# Patient Record
Sex: FEMALE | Race: WHITE | Employment: OTHER | URBAN - METROPOLITAN AREA
[De-identification: names, ages, dates, MRNs, and addresses within clinical notes are randomized per-mention and may not be internally consistent; named-entity substitution may affect disease eponyms.]

---

## 2017-01-19 ENCOUNTER — APPOINTMENT (OUTPATIENT)
Dept: CT IMAGING | Facility: CLINIC | Age: 82
DRG: 481 | End: 2017-01-19
Attending: FAMILY MEDICINE
Payer: MEDICARE

## 2017-01-19 ENCOUNTER — APPOINTMENT (OUTPATIENT)
Dept: GENERAL RADIOLOGY | Facility: CLINIC | Age: 82
DRG: 481 | End: 2017-01-19
Attending: FAMILY MEDICINE
Payer: MEDICARE

## 2017-01-19 ENCOUNTER — HOSPITAL ENCOUNTER (INPATIENT)
Facility: CLINIC | Age: 82
LOS: 4 days | Discharge: NURSING, REHAB OR LONG-TERM CARE | DRG: 481 | End: 2017-01-23
Attending: FAMILY MEDICINE | Admitting: FAMILY MEDICINE
Payer: MEDICARE

## 2017-01-19 DIAGNOSIS — I25.10 CORONARY ARTERY DISEASE INVOLVING NATIVE CORONARY ARTERY OF NATIVE HEART WITHOUT ANGINA PECTORIS: ICD-10-CM

## 2017-01-19 DIAGNOSIS — N18.9 CHRONIC KIDNEY DISEASE, UNSPECIFIED STAGE: ICD-10-CM

## 2017-01-19 DIAGNOSIS — S72.002A CLOSED LEFT HIP FRACTURE, INITIAL ENCOUNTER (H): ICD-10-CM

## 2017-01-19 DIAGNOSIS — D64.9 CHRONIC ANEMIA: ICD-10-CM

## 2017-01-19 DIAGNOSIS — E11.40 TYPE 2 DIABETES MELLITUS WITH DIABETIC NEUROPATHY, WITHOUT LONG-TERM CURRENT USE OF INSULIN (H): ICD-10-CM

## 2017-01-19 DIAGNOSIS — I10 BENIGN ESSENTIAL HYPERTENSION: ICD-10-CM

## 2017-01-19 DIAGNOSIS — E03.8 OTHER SPECIFIED HYPOTHYROIDISM: ICD-10-CM

## 2017-01-19 DIAGNOSIS — Z86.73 HISTORY OF CVA (CEREBROVASCULAR ACCIDENT): ICD-10-CM

## 2017-01-19 DIAGNOSIS — M1A.9XX0 CHRONIC GOUT, UNSPECIFIED CAUSE, UNSPECIFIED SITE: Primary | ICD-10-CM

## 2017-01-19 DIAGNOSIS — E87.6 HYPOKALEMIA: ICD-10-CM

## 2017-01-19 DIAGNOSIS — E78.00 HYPERCHOLESTEROLEMIA: ICD-10-CM

## 2017-01-19 PROBLEM — E87.0 HYPERNATREMIA: Status: ACTIVE | Noted: 2017-01-19

## 2017-01-19 PROBLEM — E78.5 HYPERLIPIDEMIA LDL GOAL <100: Status: ACTIVE | Noted: 2017-01-19

## 2017-01-19 PROBLEM — N18.30 CHRONIC KIDNEY DISEASE, STAGE III (MODERATE) (H): Status: ACTIVE | Noted: 2017-01-19

## 2017-01-19 PROBLEM — E03.4 HYPOTHYROIDISM DUE TO ACQUIRED ATROPHY OF THYROID: Status: ACTIVE | Noted: 2017-01-19

## 2017-01-19 PROBLEM — E11.9 TYPE 2 DIABETES MELLITUS WITHOUT COMPLICATION (H): Status: ACTIVE | Noted: 2017-01-19

## 2017-01-19 LAB
ALBUMIN SERPL-MCNC: 3.5 G/DL (ref 3.4–5)
ALP SERPL-CCNC: 56 U/L (ref 40–150)
ALT SERPL W P-5'-P-CCNC: 21 U/L (ref 0–50)
ANION GAP SERPL CALCULATED.3IONS-SCNC: 10 MMOL/L (ref 3–14)
AST SERPL W P-5'-P-CCNC: 25 U/L (ref 0–45)
BASOPHILS # BLD AUTO: 0 10E9/L (ref 0–0.2)
BASOPHILS NFR BLD AUTO: 0.2 %
BILIRUB DIRECT SERPL-MCNC: 0.2 MG/DL (ref 0–0.2)
BILIRUB SERPL-MCNC: 0.7 MG/DL (ref 0.2–1.3)
BUN SERPL-MCNC: 29 MG/DL (ref 7–30)
CALCIUM SERPL-MCNC: 8.7 MG/DL (ref 8.5–10.1)
CHLORIDE SERPL-SCNC: 115 MMOL/L (ref 94–109)
CO2 SERPL-SCNC: 25 MMOL/L (ref 20–32)
CREAT SERPL-MCNC: 1.22 MG/DL (ref 0.52–1.04)
DIFFERENTIAL METHOD BLD: ABNORMAL
EOSINOPHIL # BLD AUTO: 0 10E9/L (ref 0–0.7)
EOSINOPHIL NFR BLD AUTO: 0.3 %
ERYTHROCYTE [DISTWIDTH] IN BLOOD BY AUTOMATED COUNT: 15.6 % (ref 10–15)
GFR SERPL CREATININE-BSD FRML MDRD: 41 ML/MIN/1.7M2
GLUCOSE BLDC GLUCOMTR-MCNC: 132 MG/DL (ref 70–99)
GLUCOSE SERPL-MCNC: 106 MG/DL (ref 70–99)
HCT VFR BLD AUTO: 31 % (ref 35–47)
HGB BLD-MCNC: 10.1 G/DL (ref 11.7–15.7)
IMM GRANULOCYTES # BLD: 0 10E9/L (ref 0–0.4)
IMM GRANULOCYTES NFR BLD: 0.3 %
INR PPP: 1.05 (ref 0.86–1.14)
LYMPHOCYTES # BLD AUTO: 1.2 10E9/L (ref 0.8–5.3)
LYMPHOCYTES NFR BLD AUTO: 12.5 %
MCH RBC QN AUTO: 29.4 PG (ref 26.5–33)
MCHC RBC AUTO-ENTMCNC: 32.6 G/DL (ref 31.5–36.5)
MCV RBC AUTO: 90 FL (ref 78–100)
MONOCYTES # BLD AUTO: 0.7 10E9/L (ref 0–1.3)
MONOCYTES NFR BLD AUTO: 7.3 %
NEUTROPHILS # BLD AUTO: 7.7 10E9/L (ref 1.6–8.3)
NEUTROPHILS NFR BLD AUTO: 79.4 %
PLATELET # BLD AUTO: 213 10E9/L (ref 150–450)
POTASSIUM SERPL-SCNC: 3.9 MMOL/L (ref 3.4–5.3)
PROT SERPL-MCNC: 6.3 G/DL (ref 6.8–8.8)
RBC # BLD AUTO: 3.44 10E12/L (ref 3.8–5.2)
SODIUM SERPL-SCNC: 150 MMOL/L (ref 133–144)
WBC # BLD AUTO: 9.7 10E9/L (ref 4–11)

## 2017-01-19 PROCEDURE — 25000132 ZZH RX MED GY IP 250 OP 250 PS 637: Mod: GY | Performed by: FAMILY MEDICINE

## 2017-01-19 PROCEDURE — 99222 1ST HOSP IP/OBS MODERATE 55: CPT | Mod: AI | Performed by: FAMILY MEDICINE

## 2017-01-19 PROCEDURE — 73523 X-RAY EXAM HIPS BI 5/> VIEWS: CPT | Mod: TC

## 2017-01-19 PROCEDURE — 85610 PROTHROMBIN TIME: CPT | Performed by: FAMILY MEDICINE

## 2017-01-19 PROCEDURE — 85025 COMPLETE CBC W/AUTO DIFF WBC: CPT | Performed by: FAMILY MEDICINE

## 2017-01-19 PROCEDURE — 72125 CT NECK SPINE W/O DYE: CPT

## 2017-01-19 PROCEDURE — 27210995 ZZH RX 272: Performed by: FAMILY MEDICINE

## 2017-01-19 PROCEDURE — A9270 NON-COVERED ITEM OR SERVICE: HCPCS | Mod: GY | Performed by: FAMILY MEDICINE

## 2017-01-19 PROCEDURE — 00000146 ZZHCL STATISTIC GLUCOSE BY METER IP

## 2017-01-19 PROCEDURE — 99285 EMERGENCY DEPT VISIT HI MDM: CPT | Mod: 25

## 2017-01-19 PROCEDURE — 93005 ELECTROCARDIOGRAM TRACING: CPT

## 2017-01-19 PROCEDURE — 80076 HEPATIC FUNCTION PANEL: CPT | Performed by: FAMILY MEDICINE

## 2017-01-19 PROCEDURE — 93010 ELECTROCARDIOGRAM REPORT: CPT | Performed by: FAMILY MEDICINE

## 2017-01-19 PROCEDURE — 12000000 ZZH R&B MED SURG/OB

## 2017-01-19 PROCEDURE — 87081 CULTURE SCREEN ONLY: CPT | Performed by: FAMILY MEDICINE

## 2017-01-19 PROCEDURE — 70450 CT HEAD/BRAIN W/O DYE: CPT

## 2017-01-19 PROCEDURE — 99285 EMERGENCY DEPT VISIT HI MDM: CPT | Mod: 25 | Performed by: FAMILY MEDICINE

## 2017-01-19 PROCEDURE — 73700 CT LOWER EXTREMITY W/O DYE: CPT | Mod: LT

## 2017-01-19 PROCEDURE — 80048 BASIC METABOLIC PNL TOTAL CA: CPT | Performed by: FAMILY MEDICINE

## 2017-01-19 RX ORDER — ACETAMINOPHEN 325 MG/1
650 TABLET ORAL EVERY 4 HOURS PRN
Status: DISCONTINUED | OUTPATIENT
Start: 2017-01-19 | End: 2017-01-20

## 2017-01-19 RX ORDER — NALOXONE HYDROCHLORIDE 0.4 MG/ML
.1-.4 INJECTION, SOLUTION INTRAMUSCULAR; INTRAVENOUS; SUBCUTANEOUS
Status: DISCONTINUED | OUTPATIENT
Start: 2017-01-19 | End: 2017-01-20

## 2017-01-19 RX ORDER — LIDOCAINE 40 MG/G
CREAM TOPICAL
Status: DISCONTINUED | OUTPATIENT
Start: 2017-01-19 | End: 2017-01-20

## 2017-01-19 RX ORDER — SODIUM CHLORIDE 450 MG/100ML
INJECTION, SOLUTION INTRAVENOUS CONTINUOUS
Status: DISCONTINUED | OUTPATIENT
Start: 2017-01-19 | End: 2017-01-20

## 2017-01-19 RX ORDER — DEXTROSE MONOHYDRATE 25 G/50ML
25-50 INJECTION, SOLUTION INTRAVENOUS
Status: DISCONTINUED | OUTPATIENT
Start: 2017-01-19 | End: 2017-01-20

## 2017-01-19 RX ORDER — HYDROMORPHONE HYDROCHLORIDE 1 MG/ML
0.2 INJECTION, SOLUTION INTRAMUSCULAR; INTRAVENOUS; SUBCUTANEOUS
Status: DISCONTINUED | OUTPATIENT
Start: 2017-01-19 | End: 2017-01-20

## 2017-01-19 RX ORDER — HYDROCODONE BITARTRATE AND ACETAMINOPHEN 5; 325 MG/1; MG/1
1-2 TABLET ORAL EVERY 4 HOURS PRN
Status: DISCONTINUED | OUTPATIENT
Start: 2017-01-19 | End: 2017-01-20

## 2017-01-19 RX ORDER — NICOTINE POLACRILEX 4 MG
15-30 LOZENGE BUCCAL
Status: DISCONTINUED | OUTPATIENT
Start: 2017-01-19 | End: 2017-01-20

## 2017-01-19 RX ADMIN — HYDROCODONE BITARTRATE AND ACETAMINOPHEN 1 TABLET: 5; 325 TABLET ORAL at 18:55

## 2017-01-19 RX ADMIN — SODIUM CHLORIDE: 4.5 INJECTION, SOLUTION INTRAVENOUS at 20:46

## 2017-01-19 ASSESSMENT — ENCOUNTER SYMPTOMS
HEADACHES: 0
SHORTNESS OF BREATH: 0
VOMITING: 0
NAUSEA: 0

## 2017-01-19 ASSESSMENT — PAIN DESCRIPTION - DESCRIPTORS: DESCRIPTORS: ACHING

## 2017-01-19 NOTE — IP AVS SNAPSHOT
"          [address] SURGICAL: [phone]                                              INTERAGENCY TRANSFER FORM - LAB / IMAGING / EKG / EMG RESULTS   2017                    Hospital Admission Date: 2017  MINESH RUTHERFORD   : 1924  Sex: Female        Attending Provider: Liborio Hoffman DO     Allergies:  Hmg-coa-r Inhibitors, Metformin    Infection:  None   Service:  GENERAL MEDI    Ht:  1.651 m (5' 5\")   Wt:  61.5 kg (135 lb 9.3 oz)   Admission Wt:  61.5 kg (135 lb 9.3 oz)    BMI:  22.56 kg/m 2   BSA:  1.68 m 2            Patient PCP Information     Provider PCP Type    Liborio Parks MD General         Lab Results - 3 Days (17 - 17)      Glucose by meter [[id number]] (Abnormal)  Resulted: 17 1211, Result status: Final result    Ordering provider: Liborio Hoffman DO  17 1204 Resulting lab: POINT OF CARE TEST, GLUCOSE    Specimen Information    Type Source Collected On     17 1204          Components       Value Reference Range Flag Lab   Glucose 120 70 - 99 mg/dL H 170            Glucose by meter [[id number]] (Abnormal)  Resulted: 17 0816, Result status: Final result    Ordering provider: Liborio Hoffman DO  17 0810 Resulting lab: POINT OF CARE TEST, GLUCOSE    Specimen Information    Type Source Collected On     17 0810          Components       Value Reference Range Flag Lab   Glucose 124 70 - 99 mg/dL H 170            Creatinine [[id number]] (Abnormal)  Resulted: 17 0607, Result status: Final result    Ordering provider: Shabana Turpin MD  17 0000 Resulting lab: [facility]    Specimen Information    Type Source Collected On   Blood  17 0540          Components       Value Reference Range Flag Lab   Creatinine 1.58 0.52 - 1.04 mg/dL H 59   GFR Estimate 31 >60 mL/min/1.7m2 L 59   Comment:  Non  GFR Calc   GFR Estimate If Black 37 >60 " mL/min/1.7m2 L 59   Comment:   GFR Calc            Platelet count [[id number]]  Resulted: 01/23/17 0554, Result status: Final result    Ordering provider: Liborio Hoffman,   01/23/17 0000 Resulting lab: [facility]    Specimen Information    Type Source Collected On   Blood  01/23/17 0540          Components       Value Reference Range Flag Lab   Platelet Count 180 150 - 450 10e9/L  [facility]            Glucose by meter [[id number]] (Abnormal)  Resulted: 01/22/17 2101, Result status: Final result    Ordering provider: Liborio Hoffman,   01/22/17 2058 Resulting lab: POINT OF CARE TEST, GLUCOSE    Specimen Information    Type Source Collected On     01/22/17 2058          Components       Value Reference Range Flag Lab   Glucose 121 70 - 99 mg/dL H 170            Glucose by meter [[id number]] (Abnormal)  Resulted: 01/22/17 1826, Result status: Final result    Ordering provider: Liborio Hoffman DO  01/22/17 1743 Resulting lab: POINT OF CARE TEST, GLUCOSE    Specimen Information    Type Source Collected On     01/22/17 1743          Components       Value Reference Range Flag Lab   Glucose 122 70 - 99 mg/dL H 170            Glucose by meter [[id number]]  Resulted: 01/22/17 1731, Result status: Final result    Ordering provider: Liborio Hoffman,   01/22/17 1712 Resulting lab: POINT OF CARE TEST, GLUCOSE    Specimen Information    Type Source Collected On     01/22/17 1712          Components       Value Reference Range Flag Lab   Glucose 88 70 - 99 mg/dL  170            Glucose by meter [[id number]]  Resulted: 01/22/17 1731, Result status: Final result    Ordering provider: Liborio Hoffman DO  01/22/17 1649 Resulting lab: POINT OF CARE TEST, GLUCOSE    Specimen Information    Type Source Collected On     01/22/17 1649          Components       Value Reference Range Flag Lab   Glucose 80 70 - 99 mg/dL  170            Glucose by meter  [[id number]]  Resulted: 01/22/17 1731, Result status: Final result    Ordering provider: Liborio Hoffman DO  01/22/17 1638 Resulting lab: POINT OF CARE TEST, GLUCOSE    Specimen Information    Type Source Collected On     01/22/17 1638          Components       Value Reference Range Flag Lab   Glucose 78 70 - 99 mg/dL  170            UA with Microscopic reflex to Culture [[id number]] (Abnormal)  Resulted: 01/22/17 1228, Result status: Final result    Ordering provider: Shabana Turpin MD  01/22/17 0732 Resulting lab: [facility]    Specimen Information    Type Source Collected On   Urine Urine catheter 01/22/17 1050          Components       Value Reference Range Flag Lab   Color Urine Yellow   [facility]   Appearance Urine Clear   [facility]   Glucose Urine Negative NEG mg/dL  [facility]   Bilirubin Urine Negative NEG   [facility]   Ketones Urine Negative NEG mg/dL  [facility]   Specific Gravity Urine <=1.005 1.003 - 1.035   [facility]   pH Urine 5.5 5.0 - 7.0 pH  [facility]   Protein Albumin Urine Negative NEG mg/dL  [facility]   Urobilinogen Urine 0.2 0.2 - 1.0 EU/dL  [facility]   Nitrite Urine Negative NEG   [facility]   Blood Urine Trace NEG  A [facility]   Leukocyte Esterase Urine Negative NEG   [facility]   Source Catheterized Urine   [facility]   WBC Urine O - 2 0 - 2 /HPF  [facility]   RBC Urine O - 2 0 - 2 /HPF  [facility]            Glucose by meter [[id number]] (Abnormal)  Resulted: 01/22/17 1206, Result status: Final result    Ordering provider: Liborio Hoffman DO  01/22/17 1159 Resulting lab: POINT OF CARE TEST, GLUCOSE    Specimen Information    Type Source Collected On     01/22/17 1159          Components       Value Reference Range Flag Lab   Glucose 134 70 - 99 mg/dL H 170            Glucose by meter [[id number]] (Abnormal)  Resulted: 01/22/17 0756, Result status: Final result    Ordering provider: Liborio Hoffman DO  01/22/17 0750 Resulting lab: POINT OF CARE TEST, GLUCOSE     Specimen Information    Type Source Collected On     01/22/17 0750          Components       Value Reference Range Flag Lab   Glucose 163 70 - 99 mg/dL H 170            Basic metabolic panel [[id number]] (Abnormal)  Resulted: 01/22/17 0704, Result status: Final result    Ordering provider: Shabana Turpin MD  01/22/17 0000 Resulting lab: [facility]    Specimen Information    Type Source Collected On   Blood  01/22/17 0625          Components       Value Reference Range Flag Lab   Sodium 136 133 - 144 mmol/L  [facility]   Potassium 3.6 3.4 - 5.3 mmol/L  [facility]   Chloride 104 94 - 109 mmol/L  [facility]   Carbon Dioxide 21 20 - 32 mmol/L  [facility]   Anion Gap 11 3 - 14 mmol/L  [facility]   Glucose 155 70 - 99 mg/dL H [facility]   Urea Nitrogen 34 7 - 30 mg/dL H [facility]   Creatinine 1.73 0.52 - 1.04 mg/dL H [facility]   GFR Estimate 27 >60 mL/min/1.7m2 L [facility]   Comment:  Non  GFR Calc   GFR Estimate If Black 33 >60 mL/min/1.7m2 L [facility]   Comment:  African American GFR Calc   Calcium 7.8 8.5 - 10.1 mg/dL L [facility]            Hemoglobin [[id number]] (Abnormal)  Resulted: 01/22/17 0643, Result status: Final result    Ordering provider: Liborio Hoffman DO  01/22/17 0000 Resulting lab: [facility]    Specimen Information    Type Source Collected On   Blood  01/22/17 0625          Components       Value Reference Range Flag Lab   Hemoglobin 7.4 11.7 - 15.7 g/dL L [facility]            Glucose by meter [[id number]] (Abnormal)  Resulted: 01/21/17 2111, Result status: Final result    Ordering provider: Liborio Hoffman DO  01/21/17 2051 Resulting lab: POINT OF CARE TEST, GLUCOSE    Specimen Information    Type Source Collected On     01/21/17 2051          Components       Value Reference Range Flag Lab   Glucose 172 70 - 99 mg/dL H 170            Glucose by meter [[id number]] (Abnormal)  Resulted: 01/21/17 1650, Result status: Final result    Ordering  provider: Liborio Hoffman,   01/21/17 1644 Resulting lab: POINT OF CARE TEST, GLUCOSE    Specimen Information    Type Source Collected On     01/21/17 1644          Components       Value Reference Range Flag Lab   Glucose 168 70 - 99 mg/dL H 170            Glucose by meter [[id number]] (Abnormal)  Resulted: 01/21/17 1156, Result status: Final result    Ordering provider: Liborio Hoffman,   01/21/17 1151 Resulting lab: POINT OF CARE TEST, GLUCOSE    Specimen Information    Type Source Collected On     01/21/17 1151          Components       Value Reference Range Flag Lab   Glucose 156 70 - 99 mg/dL H 170            Hemoglobin and hematocrit [[id number]] (Abnormal)  Resulted: 01/21/17 0839, Result status: Final result    Ordering provider: Liborio Hoffman,   01/21/17 0822 Resulting lab: [facility]    Specimen Information    Type Source Collected On   Blood  01/21/17 0830          Components       Value Reference Range Flag Lab   Hemoglobin 7.4 11.7 - 15.7 g/dL L [facility]   Hematocrit 22.7 35.0 - 47.0 % L [facility]            Glucose by meter [[id number]] (Abnormal)  Resulted: 01/21/17 0746, Result status: Final result    Ordering provider: Liborio Hoffman DO  01/21/17 0726 Resulting lab: POINT OF CARE TEST, GLUCOSE    Specimen Information    Type Source Collected On     01/21/17 0726          Components       Value Reference Range Flag Lab   Glucose 157 70 - 99 mg/dL H 170            Hematocrit [[id number]] (Abnormal)  Resulted: 01/21/17 0732, Result status: Final result    Ordering provider: Liborio Hoffman DO  01/21/17 0602 Resulting lab: [facility]    Specimen Information    Type Source Collected On     01/21/17 0602          Components       Value Reference Range Flag Lab   Hematocrit 21.0 35.0 - 47.0 % L [facility]            Hemoglobin and hematocrit [[id number]]  Resulted: 01/21/17 0721, Result status: In process     Ordering provider: Liborio Hoffman,   01/21/17 0709 Resulting lab: [facility]    Specimen Information    Type Source Collected On   Blood  01/21/17 0602            Hemoglobin [[id number]] (Abnormal)  Resulted: 01/21/17 0609, Result status: Final result    Ordering provider: Liborio Hoffman,   01/21/17 0000 Resulting lab: [facility]    Specimen Information    Type Source Collected On   Blood  01/21/17 0602          Components       Value Reference Range Flag Lab   Hemoglobin 7.2 11.7 - 15.7 g/dL L [facility]            Methicillin resistant staph aureus cult [[id number]]  Resulted: 01/20/17 2233, Result status: Final result    Ordering provider: Jaret Grey MD  01/19/17 2123 Resulting lab: [facility]    Specimen Information    Type Source Collected On   Throat  01/19/17 2127          Components       Value Reference Range Flag Lab   Specimen Description Nares   [facility]   Culture Micro No MRSA isolated   [facility]   Micro Report Status FINAL 01/20/2017   [facility]            Glucose by meter [[id number]] (Abnormal)  Resulted: 01/20/17 2141, Result status: Final result    Ordering provider: Liborio Hoffman,   01/20/17 2136 Resulting lab: POINT OF CARE TEST, GLUCOSE    Specimen Information    Type Source Collected On     01/20/17 2136          Components       Value Reference Range Flag Lab   Glucose 229 70 - 99 mg/dL H 170            Glucose by meter [[id number]]  Resulted: 01/20/17 1031, Result status: Final result    Ordering provider: Jaret Grey MD  01/20/17 1026 Resulting lab: POINT OF CARE TEST, GLUCOSE    Specimen Information    Type Source Collected On     01/20/17 1026          Components       Value Reference Range Flag Lab   Glucose 82 70 - 99 mg/dL  170            Glucose by meter [[id number]]  Resulted: 01/20/17 0756, Result status: Final result    Ordering provider: Jaret Grey MD  01/20/17 0753 Resulting lab:  POINT OF CARE TEST, GLUCOSE    Specimen Information    Type Source Collected On     01/20/17 0753          Components       Value Reference Range Flag Lab   Glucose 94 70 - 99 mg/dL  170            Hemoglobin A1c [[id number]]  Resulted: 01/20/17 0629, Result status: Final result    Ordering provider: Jaret Grey MD  01/20/17 0000 Resulting lab: [facility]    Specimen Information    Type Source Collected On   Blood  01/20/17 0540          Components       Value Reference Range Flag Lab   Hemoglobin A1C 5.3 4.3 - 6.0 %  [facility]            Basic metabolic panel [[id number]] (Abnormal)  Resulted: 01/20/17 0603, Result status: Final result    Ordering provider: Jaret Grey MD  01/20/17 0000 Resulting lab: [facility]    Specimen Information    Type Source Collected On   Blood  01/20/17 0540          Components       Value Reference Range Flag Lab   Sodium 145 133 - 144 mmol/L H [facility]   Potassium 3.7 3.4 - 5.3 mmol/L  [facility]   Chloride 112 94 - 109 mmol/L H [facility]   Carbon Dioxide 23 20 - 32 mmol/L  [facility]   Anion Gap 10 3 - 14 mmol/L  [facility]   Glucose 89 70 - 99 mg/dL  [facility]   Urea Nitrogen 25 7 - 30 mg/dL  [facility]   Creatinine 1.11 0.52 - 1.04 mg/dL H [facility]   GFR Estimate 46 >60 mL/min/1.7m2 L [facility]   Comment:  Non  GFR Calc   GFR Estimate If Black 56 >60 mL/min/1.7m2 L [facility]   Comment:  African American GFR Calc   Calcium 8.4 8.5 - 10.1 mg/dL L [facility]            Hemoglobin [[id number]] (Abnormal)  Resulted: 01/20/17 0549, Result status: Final result    Ordering provider: Jaret Grey MD  01/20/17 0000 Resulting lab: [facility]    Specimen Information    Type Source Collected On   Blood  01/20/17 0540          Components       Value Reference Range Flag Lab   Hemoglobin 9.6 11.7 - 15.7 g/dL L [facility]            Glucose by meter [[id number]]  Resulted: 01/20/17 0401, Result status: Final result     Ordering provider: Jaret Grey MD  01/20/17 0358 Resulting lab: POINT OF CARE TEST, GLUCOSE    Specimen Information    Type Source Collected On     01/20/17 0358          Components       Value Reference Range Flag Lab   Glucose 87 70 - 99 mg/dL  170            Testing Performed By     Lab - Abbreviation Name Director Address Valid Date Range    22 - [facility] Unknown [address] [place] [address] 05/08/15 1057 - Present    45 - [facility] Unknown Unknown 01/28/02 0000 - Present    59 - Unknown [facility] Unknown [address] [facility] [place] [address] 12/31/14 1006 - Present    170 - Unknown POINT OF CARE TEST, GLUCOSE Unknown Unknown 10/31/11 1114 - Present            Unresulted Labs (24h ago through future)    Start       Ordered    01/23/17 0600  Platelet count -  (enoxaparin (LOVENOX) (Weight  kg with CrCl greater than 30 mL/min is prechecked))   EVERY THREE DAYS,   Routine     Comments:  Repeat every 3 days while on VTE prophylaxis. If no result is listed, this lab has not been done the past 365 days. LATEST LAB RESULT: PLATELET COUNT (10e9/L)       Date                     Value                 01/19/2017               213              ----------      01/20/17 1338    Unscheduled  Hemoglobin   CONDITIONAL X 2,   Routine     Comments:  Release on POD 1 and POD 2 if the morning Hgb is less than 8.0    01/20/17 1338         Imaging Results - 3 Days (01/20/17 - 01/20/17)      MR Hip Left w/o Contrast [[id number]]  Resulted: 01/20/17 1738, Result status: Final result    Ordering provider: Ethan Pacheco MD  01/20/17 0005 Resulted by: Ethan Hayward MD    Performed: 01/20/17 0927 - 01/20/17 1017 Resulting lab: RADIOLOGY RESULTS    Narrative:       MR HIP LEFT WITHOUT CONTRAST 1/20/2017 10:17 AM    HISTORY: Better visualization of left hip.    COMPARISONS: Pelvis and left hip x-rays dated 1/19/2017. CT  pelvis/left hip dated  1/19/2017.    TECHNIQUE: Coronal T1 and STIR.  Axial T1 and T2 fat suppression.    FINDINGS:   Osseous and Cartilaginous Structures: There is a completed, comminuted  fracture of the left greater trochanter. One of these fracture lines  extends into the intertrochanteric region of the left proximal femur  but does not appear to extend through the anterior cortex inferiorly.  This fracture appears acute. No other osseous fractures identified.  Bone marrow signal is otherwise within normal limits. There are small  osteophytes surrounding the right femoral head and around the right  acetabulum. Subchondral cyst is seen in the posterior mid right  acetabulum. These are consistent with degenerative change. Small  cystic structure in the left proximal femoral neck likely represents a  pit (synovial herniation pit). Bone marrow signal intensity is  otherwise within normal limits.    Acetabular Labrum: No juxtaacetabular cyst. No obvious labral tear is  appreciated, allowing for the large FOV technique. If indicated  clinically, MR arthrography would be considered the study of choice in  this regard.    Trochanteric and Iliopsoas Bursae: No fluid collection.    Common Hamstring Tendon: Intact.    Additional Findings: There is significant edema in the gluteus and  abductor and adductor muscles of the left hip with fluid collections  also seen in this region. These are most consistent with muscle tears  and strain. This is more edema typically seen in a hip fracture.  Muscle signal intensity in the right hip is within normal limits.  Visualized intrapelvic structures are grossly unremarkable. There is a  small left hip joint effusion.      Impression:       IMPRESSION:   1. Comminuted left greater trochanteric fracture with mild  displacement corresponds with the CT findings from 1/19/2017. One of  the fracture lines extends into the intertrochanteric region of the  proximal left femur and to the posterior cortex but does not  appear to  be completed as it does not appear to exit the anterior cortex of the  femur in this region.  2. Muscle strains/tears of the abductor and adductor muscles of the  left hip.  3. Small left hip joint effusion.  4. Mild to moderate degenerative changes of the right hip.    I discussed the findings of the left proximal femoral fracture and  probable muscular tear/strain with Dr. Hoffman on 1/20/2017 at  approximately 10:30 AM.    GABO FRIEND MD    Specimen Information    Type Source Collected On                  XR Surgery CHELSI L/T 5 Min Fluoro w Stills [[id number]]  Resulted: 01/20/17 1155, Result status: Final result    Ordering provider: Liborio Hoffman DO  01/20/17 1022 Performed: 01/20/17 1118 - 01/20/17 1155    Resulting lab: [facility]      Narrative:       This exam was marked as non-reportable because it will not be read by a   radiologist or a [place] non-radiologist provider.              Specimen Information    Type Source Collected On                  CT Cervical Spine w/o Contrast [[id number]]  Resulted: 01/20/17 0744, Result status: Final result    Ordering provider: Gabo Pacheco MD  01/19/17 1530 Resulted by: Cordell Daniel MD    Performed: 01/19/17 1535 - 01/19/17 1554 Resulting lab: RADIOLOGY RESULTS    Narrative:       CT CERVICAL SPINE WITHOUT CONTRAST   1/19/2017 3:54 PM     HISTORY: Fall with closed head injury.       TECHNIQUE: Axial images of the cervical spine were obtained without  intravenous contrast. Multiplanar reformations were performed.  Radiation dose for this scan was reduced using automated exposure  control, adjustment of the mA and/or kV according to patient size, or  iterative reconstruction technique.     COMPARISON: None.    FINDINGS: Sagittal images demonstrate normal posterior alignment.  There is no evidence for fracture. Mild to moderate multilevel  degenerative disc and facet disease is present. There is no evidence  for any significant central  canal stenosis. Soft tissue structures are  unremarkable as visualized except for some extensive vascular  calcifications.      Impression:       IMPRESSION: Degenerative changes. No evidence for fracture.    CHRIS TOBIN MD    Specimen Information    Type Source Collected On                  Testing Performed By     Lab - Abbreviation Name Director Address Valid Date Range    104 - [facility] RADIOLOGY RESULTS Unknown Unknown 02/16/05 1553 - Present    178 - [facility] Unknown Unknown 07/20/12 1135 - Present            Encounter-Level Documents:     There are no encounter-level documents.      Order-Level Documents:     There are no order-level documents.

## 2017-01-19 NOTE — IP AVS SNAPSHOT
` ` Patient Information     Patient Name Sex     Mary Ellen Cuba ([id number]) Female 1924       Room Bed    267 267-01      Patient Demographics     Address Phone    [address] [address] [phone] (Home)      Patient Ethnicity & Race     Ethnic Group Patient Race    American White      Emergency Contact(s)     Name Relation Home Work Mobile    Derek Mayer Brother [phone]      BRII MANN Friend [phone]        Documents on File        Status Date Received Description       Documents for the Patient    Privacy Notice - [place] Received 11     Insurance Card       External Medication Information Consent       Patient ID Received 13     Consent for Services - Hospital/Clinic Received 11     Insurance Card Received 11     Consent for EHR Access  13 Copied from existing Consent for services - C/HOD collected on 2011    [facility] Specified Other       Consent for Services - Hospital/Clinic Received but Refused Insurance Consent 04/10/13     Consent for Services/Privacy Notice - Hospital/Clinic Received 17     Insurance Card Received 17 HP    Insurance Card Received 17 Medicare    Patient ID Received 17        Documents for the Encounter    CMS IM for Patient Signature Received 17       Admission Information     Attending Provider Admitting Provider Admission Type Admission Date/Time    Liborio Hoffman DO Lorge, James Christopher, DO Emergency 17  1508    Discharge Date Hospital Service Auth/Cert Status Service Area     [facility] SERVICES    Unit Room/Bed Admission Status    PH 2A MEDICAL SURGICAL 267/267- Admission (Confirmed)            Admission     Complaint    Closed left hip fracture (H), left hip fracture, Fractured hip, left, closed, initial encounter (H)      Hospital Account     Name Acct ID Class Status Primary Coverage    Mary Ellen Cuba  [id number] Inpatient Open MEDICARE - MEDICARE RR FOR HB SUPPLEMENT            Guarantor Account (for Hospital Account #[id number])     Name Relation to Pt Service Area Active? Acct Type    Mary Ellen Cuba Self FCS Yes Personal/Family    Address Phone          [address] [place] [address] [phone](H)              Coverage Information (for Hospital Account #[id number])     1. MEDICARE/MEDICARE RR FOR HB SUPPLEMENT     F/O Payor/Plan Precert #    MEDICARE/MEDICARE RR FOR HB SUPPLEMENT     Subscriber Subscriber #    Mary Ellen Cuba [id number]    Address Phone    [address]  [place] [address] [phone]          2. [employer]     F/O Payor/Plan Precert #    [employer]     Subscriber Subscriber #    Mary Ellen Cuba [id number]    Address Phone    [address]  [place] [address] [phone]

## 2017-01-19 NOTE — ED NOTES
Was shoveling ice off her patio today, she slipped and fell, hit the back of her head and is having pain in her left hip.

## 2017-01-19 NOTE — IP AVS SNAPSHOT
"` `           [address] MEDICAL SURGICAL: [phone]                                              INTERAGENCY TRANSFER FORM - NURSING   2017                    Hospital Admission Date: 2017  MINESH RUTHERFORD   : 1924  Sex: Female        Attending Provider: Liborio Hoffman DO     Allergies:  Hmg-coa-r Inhibitors, Metformin    Infection:  None   Service:  GENERAL MEDI    Ht:  1.651 m (5' 5\")   Wt:  61.5 kg (135 lb 9.3 oz)   Admission Wt:  61.5 kg (135 lb 9.3 oz)    BMI:  22.56 kg/m 2   BSA:  1.68 m 2            Patient PCP Information     Provider PCP Type    Liborio Parks MD General      Current Code Status     Date Active Code Status Order ID Comments User Context       Prior      Code Status History     Date Active Date Inactive Code Status Order ID Comments User Context    2017  8:51 AM  Full Code [id number]  Liborio Hoffman DO Outpatient    2017  1:38 PM 2017  8:51 AM Full Code [id number]  Liborio Hoffman DO Inpatient    2017  8:20 PM 2017  1:38 PM Full Code [id number]  Jaret Grey MD Inpatient      Advance Directives        Does patient have a scanned Advance Directive/ACP document in EPIC?           No        Hospital Problems as of 2017              Priority Class Noted POA    Essential hypertension Medium  2017 Yes    Coronary artery disease involving native coronary artery of native heart without angina pectoris Medium  2017 Yes    Hyperlipidemia LDL goal <100 Medium  2017 Yes    Hypothyroidism due to acquired atrophy of thyroid Medium  2017 Yes    Type 2 diabetes mellitus without complication (H) Medium  2017 Yes    Hypernatremia Medium  2017 Unknown    Chronic kidney disease, stage III (moderate) Medium  2017 Unknown    Anemia Medium  2017 Unknown    * (Principal)Fractured hip, left, closed, initial encounter (H) Medium  2017 Yes    Urinary retention Medium  " 1/21/2017 No      Non-Hospital Problems as of 1/23/2017     None      Immunizations     None         END      ASSESSMENT     Discharge Profile Flowsheet     EXPECTED DISCHARGE     COGNITIVE/PERCEPTUAL/DEVELOPMENTAL      Expected Discharge Date  01/23/17 01/23/17 1316   Current Mental Status/Cognitive Functioning  recent memory is not intact 01/23/17 1316    DISCHARGE NEEDS ASSESSMENT     GASTROINTESTINAL (ADULT,PEDIATRIC,OB)      Concerns To Be Addressed  all concerns addressed in this encounter 01/23/17 1316   GI WDL  ex;GI symptoms 01/23/17 1316    Patient/family verbalizes understanding of discharge plan recommendations?  Yes 01/23/17 1316   Last Bowel Movement  -- (prior to admission; pt is poor historian; prune juice given) 01/23/17 1316    Medical Team notified of plan?  yes 01/23/17 1316   GI Signs/Symptoms  constipation 01/23/17 1316    Readmission Within The Last 30 Days  no previous admission in last 30 days 01/23/17 1316   Passing flatus  yes 01/23/17 1316    Anticipated Changes Related to Illness  inability to care for self 01/23/17 1316   COMMUNICATION ASSESSMENT      Equipment Currently Used at Home  -- (Has access to a shower chair ) 01/22/17 1027   Patient's communication style  spoken language (English or Bilingual) 01/23/17 1316    Transportation Available  van, wheelchair accessible 01/23/17 1316   SKIN      FUNCTIONAL LEVEL CURRENT     Inspection  Full 01/23/17 1040    Ambulation  3-->assistive equipment and person 01/23/17 1316   Procedural focused assessment (identify areas inspected)   Hip, left 01/22/17 1038    Transferring  3-->assistive equipment and person 01/23/17 1316   Skin WDL  ex 01/23/17 1040    Toileting  3-->assistive equipment and person 01/23/17 1316   Skin Color/Characteristics  pale 01/23/17 1040    Bathing  2-->assistive person 01/23/17 1316   Skin Temperature  warm 01/23/17 1040    Dressing  2-->assistive person 01/23/17 1316   Skin Moisture  dry 01/23/17 1040    Eating   "0-->independent 01/23/17 1316   Skin Integrity  incision(s) 01/23/17 1040    Communication  0-->understands/communicates without difficulty 01/23/17 1316   SAFETY      Swallowing  0-->swallows foods/liquids without difficulty 01/23/17 1316   Safety WDL  WDL 01/23/17 1040                 Assessment WDL (Within Defined Limits) Definitions           Safety WDL     Effective: 09/28/15    Row Information: <b>WDL Definition:</b> Bed in low position, wheels locked; call light in reach; upper side rails up x 2; ID band on<br> <font color=\"gray\"><i>Item=AS safety wdl>>List=AS safety wdl>>Version=F14</i></font>      Skin WDL     Effective: 09/28/15    Row Information: <b>WDL Definition:</b> Warm; dry; intact; elastic; without discoloration; pressure points without redness<br> <font color=\"gray\"><i>Item=AS skin wdl>>List=AS skin wdl>>Version=F14</i></font>      Vitals     Vital Signs Flowsheet     VITAL SIGNS     Response to Interventions  No change in pain 01/23/17 1103    Temp  98.9  F (37.2  C) 01/23/17 0757   CLINICALLY ALIGNED PAIN ASSESSMENT (CAPA) ([facility], [facility] ADULTS ONLY)      Temp src  Oral 01/23/17 0757   Comfort  negligible pain 01/23/17 1006    Resp  16 01/23/17 0757   Change in Pain  getting better 01/23/17 1006    Pulse  64 01/23/17 0835   Pain Control  fully effective 01/23/17 1006    Heart Rate  58 01/22/17 2321   Functioning  can do most things, but pain gets in the way of some 01/23/17 1006    Pulse/Heart Rate Source  Monitor 01/23/17 0806   Sleep  normal sleep 01/23/17 1006    BP  109/53 mmHg 01/23/17 0806   ANALGESIA SIDE EFFECTS MONITORING      BP Location  Left arm 01/23/17 0757   Side Effects Monitoring: Respiratory Quality  R 01/23/17 1006    Patient Position  Lying 01/20/17 1259   Side Effects Monitoring: Respiratory Depth  N 01/23/17 1006    OXYGEN THERAPY     Side Effects Monitoring: Sedation Level  2 01/23/17 1006    SpO2  95 % 01/23/17 0757   HEIGHT AND WEIGHT      O2 Device  None " "(Room air) 01/23/17 0757   Height  1.651 m (5' 5\") 01/19/17 1930    Oxygen Delivery  1 LPM 01/20/17 1638   Weight  61.5 kg (135 lb 9.3 oz) 01/19/17 1930    PAIN/COMFORT     Estimated Weight (From ED)  61.236 kg (135 lb) 01/19/17 1504    Patient Currently in Pain  denies 01/23/17 1006   BSA (Calculated - sq m)  1.68 01/19/17 1930    Preferred Pain Scale  CAPA (Clinically Aligned Pain Assessment) ([facility], [facility] Adults Only) 01/23/17 1006   BMI (Calculated)  22.61 01/19/17 1930    0-10 Pain Scale  3 01/23/17 0132   POSITIONING      Pain Location  Hip 01/23/17 1006   Body Position  supine, head elevated 01/23/17 0855    Pain Orientation  Left 01/23/17 1006   Head of Bed (HOB)  HOB at 60-90 degrees 01/23/17 1040    Pain Descriptors  Shooting 01/20/17 1553   DAILY CARE      Pain Management Interventions  analgesia administered 01/23/17 1040   Activity Type  up in chair 01/23/17 1040    Pain Intervention(s)  Medication (See eMAR) 01/23/17 1006   Activity Level of Assistance  assistance, 2 people 01/23/17 1040            Patient Lines/Drains/Airways Status    Active LINES/DRAINS/AIRWAYS     Name: Placement date: Placement time: Site: Days: Last dressing change:    Urethral Catheter Straight-tip 12 fr 01/21/17  2215  Straight-tip  1     Incision/Surgical Site 01/20/17 Left Hip 01/20/17  1154   3             Patient Lines/Drains/Airways Status    Active PICC/CVC     **None**            Intake/Output Detail Report     Date Intake     Output   Net    Shift P.O. I.V. IV Piggyback Total Urine Blood Total       Noc 01/21/17 2300 - 01/22/17 0659 -- 1200 -- 1200 700 -- 700 500    Day 01/22/17 0700 - 01/22/17 1459 -- 856.67 -- 856.67 850 -- 850 6.67    Kezia 01/22/17 1500 - 01/22/17 2259 280 476.67 -- 756.67 125 -- 125 631.67    Noc 01/22/17 2300 - 01/23/17 0659 100 1315 -- 1415 500 -- 500 915    Day 01/23/17 0700 - 01/23/17 3979 120 -- -- 120 -- -- -- 120      Case Management/Discharge Planning     Case " Management/Discharge Planning Flowsheet     REFERRAL INFORMATION     Major Change/Loss/Stressor  none 01/19/17 2103    Did the Initial Social Work Assessment result in a Social Work Case?  Yes 01/21/17 1003   EXPECTED DISCHARGE      Admission Type  inpatient 01/21/17 1003   Expected Discharge Date  01/23/17 01/23/17 1316    Arrived From  admitted as an inpatient 01/19/17 2103   ASSESSMENT/CONCERNS TO BE ADDRESSED      Referral Source  interdisciplinary rounds;physician 01/21/17 1003   Concerns To Be Addressed  all concerns addressed in this encounter 01/23/17 1316    Reason For Consult  discharge planning 01/21/17 1003   DISCHARGE PLANNING      Record Reviewed  history and physical;medical record 01/21/17 1003   Patient/family verbalizes understanding of discharge plan recommendations?  Yes 01/23/17 1316     Assigned to Case  Jody 01/21/17 1003   Medical Team notified of plan?  yes 01/23/17 1316    Primary Care MD Name  Liborio Charly  01/21/17 1003   Readmission Within The Last 30 Days  no previous admission in last 30 days 01/23/17 1316    LIVING ENVIRONMENT     Anticipated Changes Related to Illness  inability to care for self 01/23/17 1316    Lives With  alone 01/22/17 1004   Transportation Available  van, wheelchair accessible 01/23/17 1316    Living Arrangements  condominium ([facility] ) 01/22/17 1004   Discharge Plan Concerns  concerns to be addressed (rehab then back home) 01/19/17 2055    Provides Primary Care For  no one 01/21/17 1003   FINAL RESOURCES      Quality Of Family Relationships  supportive;involved 01/21/17 1003   Equipment Currently Used at Home  -- (Has access to a shower chair ) 01/22/17 1027    HOME SAFETY     ABUSE RISK SCREEN      Patient Feels Safe Living in Home?  yes 01/21/17 1003   QUESTION TO PATIENT:  Has a member of your family or a partner(now or in the past) intimidated, hurt, manipulated, or controlled you in any way?  no 01/19/17  1506    ASSESSMENT OF FAMILY/SOCIAL SUPPORT     QUESTION TO PATIENT: Do you feel safe going back to the place where you are living?  yes 01/19/17 1506    Marital Status   01/21/17 1003   OBSERVATION: Is there reason to believe there has been maltreatment of a vulnerable adult (ie. Physical/Sexual/Emotional abuse, self neglect, lack of adequate food, shelter, medical care, or financial exploitation)?  no 01/19/17 1506    Who is your support system?  Sibling(s);Other (specify) (friends ) 01/21/17 1003   (R) MENTAL HEALTH SUICIDE RISK      Description of Support System  Supportive;Involved 01/21/17 1003   Are you depressed or being treated for depression?  No 01/19/17 2103    EMPLOYMENT     HOMICIDE RISK      Do you work full or part-time?  no 01/21/17 1003   Homicidal Ideation  no 01/19/17 1506    COPING/STRESS

## 2017-01-19 NOTE — ED PROVIDER NOTES
History     Chief Complaint   Patient presents with     Fall     The history is provided by the patient and a friend.     Mary Ellen Cuba is a 89 year old female who presents to the ED with a fall. Today at 1200 patient was on her deck shoveling the snow when she slipped on ice. She had landed on her left buttock and went backwards hitting the back of her head. No LOC. She was able to crawl to the door and was able to pull herself up by grabbing door knob. Once she got up she had immediate pain to left hip. She has been limping since the fall and reports increasing pain with movement. There is a bump to back of head that is painful but no other pain to head. Her friend is with and states she is at her baseline, denying slurred vision or confusion. No nausea, vomiting, any new vision loss, chest pain or shortness of breath as well. She has no history of a previous hip injury. She is on Plavix for a stroke she had in 2013, 4 years ago.     Medical problems--- the patient has history of coronary artery disease status post stent placement, right CVA with left-sided deficits, hypertension, chronic kidney disease, diabetes mellitus, hypercholesterolemia, hypothyroidism. In the area.--This found on review of care everywhere    Medications--hydrochlorothiazide, potassium, Norvasc, glipizide, metoprolol, Sinequan, allopurinol, Synthroid, Zocor, Plavix--this found in care everywhere      Past Medical History   Diagnosis Date     Diabetes mellitus (H)      Hypertension     history of CVA    Past Surgical History   Procedure Laterality Date     Appendectomy       Gyn surgery       oopherectomy     Phacoemulsification with standard intraocular lens implant  6/30/2011     Procedure:PHACOEMULSIFICATION WITH STANDARD INTRAOCULAR LENS IMPLANT; Surgeon:DEANDRE DOS SANTOS; Location: OR     Phacoemulsification with standard intraocular lens implant  7/21/2011     Procedure:PHACOEMULSIFICATION WITH STANDARD INTRAOCULAR LENS IMPLANT;  Surgeon:DEANDRE NUGENT; Location:[facility]     Gi surgery       gwendolyn     Gi surgery       hemicolectomy     Laser yag capsulotomy  3/21/2013     Procedure: LASER YAG CAPSULOTOMY;  yag capsulotomy bilateral eyes;  Surgeon: Deandre Nugent MD;  Location:  OR    the patient has also had cardiac stent placement.    History reviewed. No pertinent family history.    Social History   Substance Use Topics     Smoking status: Never Smoker      Smokeless tobacco: Not on file     Alcohol Use: No               Allergies   Allergen Reactions     Hmg-Coa-R Inhibitors      Metformin GI Disturbance       Current Outpatient Prescriptions   Medication Sig Dispense Refill     allopurinol (ZYLOPRIM) 100 MG tablet Take 100 mg by mouth 2 times daily.       atenolol (TENORMIN) 50 MG tablet Take 50 mg by mouth daily.       Calcium-Vitamin D (CALCIUM + D PO) Take 1 tablet by mouth daily.       VITAMIN D, CHOLECALCIFEROL, PO Take  by mouth once a week.       fenofibrate 160 MG tablet Take 160 mg by mouth daily.       glipiZIDE (GLUCOTROL) 2.5 MG TABS Take 2.5 mg by mouth every morning (before breakfast).       hydrochlorothiazide (HYDRODIURIL) 25 MG tablet Take 25 mg by mouth daily.       potassium chloride (KLOR-CON) 10 MEQ CR tablet Take 10 mEq by mouth daily.       lisinopril (PRINIVIL,ZESTRIL) 20 MG tablet Take 40 mg by mouth daily.       levothyroxine (SYNTHROID, LEVOTHROID) 125 MCG tablet Take 125 mcg by mouth daily.       fluticasone (FLONASE) 50 MCG/ACT nasal spray 2 sprays by Both Nostrils route daily as needed.       folic acid (FOLVITE) 400 MCG tablet Take 400 mcg by mouth daily.       Multiple Vitamins-Minerals (CENTRUM SILVER ULTRA WOMENS) TABS Take 1 tablet by mouth daily.       aspirin 325 MG tablet Take 325 mg by mouth daily.      the patient also states that she takes Plavix because of her CVA    I have reviewed the Medications, Allergies, Past Medical and Surgical History, and Social History in the Epic  system.    Review of Systems   HENT:        Positive for pain to back of head where bump is.    Eyes: Negative for visual disturbance.   Respiratory: Negative for shortness of breath.    Cardiovascular: Negative for chest pain.   Gastrointestinal: Negative for nausea and vomiting.   Musculoskeletal:        Positive for left hip pain.     Neurological: Negative for headaches.        She has weakness to left arm but has had that since the stroke in 2013.    All other systems reviewed and are negative.      Physical Exam   BP: 176/71 mmHg  Heart Rate: 63  Temp: 97.8  F (36.6  C)  Resp: 18  SpO2: 100 %  Physical Exam   Constitutional: She is oriented to person, place, and time. She appears well-developed and well-nourished. No distress.   HENT:   Alert smiling well-nourished well-hydrated 89-year-old who appears much younger than stated age. She does not appear to be in any degree of distress or discomfort. She has moderate to severe pain with any movement of her right leg will localize to the left hip area--particularly internal and external rotation. Her head is traumatic with a 3 cm diameter by 1 cm thick area of hematoma her left occiput. There is no appreciable abrasion or bleeding at the site. There is no appreciable skull deformity. Negative New sign, negative raccoon eyes, negative for rhinorrhea or otorrhea.   Eyes:   Pupils equal reactive and accommodating.   Neck: Normal range of motion. Neck supple.   Cardiovascular: Normal rate, regular rhythm and normal heart sounds.    Pulmonary/Chest: Effort normal and breath sounds normal.   No chest wall or thorax tenderness on palpation   Abdominal: Soft. There is no tenderness.   Neurological: She is alert and oriented to person, place, and time. She has normal strength. No cranial nerve deficit or sensory deficit. GCS eye subscore is 4. GCS verbal subscore is 5. GCS motor subscore is 6.   She is very bright and alert. Sure speech is clear and articulate She is  tracking well and answers questions quickly and appropriately.   Nursing note and vitals reviewed.      ED Course   Procedures                EKG Interpretation:      Interpreted by Ethan MERAZ Tara  Time reviewed:6:43 PM     Symptoms at time of EKG: None   Rhythm: Normal sinus   Rate: Normal  Axis: Normal  Ectopy: None  Conduction: Normal  ST Segments/ T Waves: No ST-T wave changes and No acute ischemic changes  Q Waves: None  Comparison to prior: No old EKG available    Clinical Impression: normal EKG--normal sinus rhythm at a rate of 72 with no acute ST-T changes      Results for orders placed or performed during the hospital encounter of 01/19/17 (from the past 48 hour(s))   CT Head w/o Contrast    Narrative    CT SCAN OF THE HEAD WITHOUT CONTRAST   1/19/2017  3:53 PM     HISTORY: Fall at noon with closed head injury, no loss of  consciousness. On Plavix.    TECHNIQUE:  Axial images of the head and coronal reformations without  IV contrast material.  Radiation dose for this scan was reduced using  automated exposure control, adjustment of the mA and/or kV according  to patient size, or iterative reconstruction technique.    COMPARISON: None.    FINDINGS: There is a left parietal scalp hematoma. There is no  evidence for any underlying intracranial hemorrhage or skull fracture.  Air-fluid level is seen in the left maxillary sinus. There is some  mild mucosal thickening in both maxillary sinuses. Moderate cerebral  atrophy is present. Patchy white matter changes are seen in both  hemispheres without mass effect. Vascular calcifications are noted.      Impression    IMPRESSION:  1. Left parietal scalp hematoma. No evidence for intracranial  hemorrhage or skull fracture.  2. Cerebral atrophy with some chronic white matter changes.    CHRIS TOBIN MD   CT Cervical Spine w/o Contrast    Narrative    CT CERVICAL SPINE WITHOUT CONTRAST   1/19/2017 3:54 PM     HISTORY: Fall with closed head injury.       TECHNIQUE: Axial  images of the cervical spine were obtained without  intravenous contrast. Multiplanar reformations were performed.  Radiation dose for this scan was reduced using automated exposure  control, adjustment of the mA and/or kV according to patient size, or  iterative reconstruction technique.     COMPARISON: None.    FINDINGS: Sagittal images demonstrate normal posterior alignment.  There is no evidence for fracture. Mild to moderate multilevel  degenerative disc and facet disease is present. There is no evidence  for any significant central canal stenosis. Soft tissue structures are  unremarkable as visualized except for some extensive vascular  calcifications.      Impression    IMPRESSION: Degenerative changes. No evidence for fracture.   XR Pelvis w Hip Bilateral G/E 2 Views    Narrative    XR PELVIS AND HIP BILATERAL 2 VIEWS 1/19/2017 4:14 PM    HISTORY: Fall with left hip pain. Cannot bear weight.    COMPARISON: None.    FINDINGS: No convincing evidence of left hip fracture. There is  moderate bilateral osteoarthritis, right greater than left. Sacroiliac  joints are patent and symmetric. Vascular calcification noted.      Impression    IMPRESSION: No convincing evidence of fracture. If there is strong  clinical concern, CT would be suggested.    KIM STRONG MD   CT Lower Extremity Left w/o Contrast    Narrative    CT LOWER EXTREMITY LEFT W/O CONTRAST  1/19/2017 5:14 PM     HISTORY: pain w int/ext rotation    TECHNIQUE:  Axial CT images of the abdomen and pelvis from the iliac  crest through the symphysis pubis were acquired without IV contrast or  oral contrast.  Radiation dose for this scan was reduced using  automated exposure control, adjustment of the mA and/or kV according  to patient size, or iterative reconstruction technique.    COMPARISON:  None    FINDINGS:  There is a nondisplaced fracture through the base of the  greater trochanter. No other fractures are seen. The femoral neck  appears intact.  Intertrochanteric region appears normal. No pelvic  fractures are seen. There is soft tissue swelling around the greater  trochanter. This involves the left gluteal musculature. This would be  compatible with a associated hematoma.      Impression    IMPRESSION: Nondisplaced fracture through the base of the greater  trochanter.   CBC with platelets differential   Result Value Ref Range    WBC 9.7 4.0 - 11.0 10e9/L    RBC Count 3.44 (L) 3.8 - 5.2 10e12/L    Hemoglobin 10.1 (L) 11.7 - 15.7 g/dL    Hematocrit 31.0 (L) 35.0 - 47.0 %    MCV 90 78 - 100 fl    MCH 29.4 26.5 - 33.0 pg    MCHC 32.6 31.5 - 36.5 g/dL    RDW 15.6 (H) 10.0 - 15.0 %    Platelet Count 213 150 - 450 10e9/L    Diff Method Automated Method     % Neutrophils 79.4 %    % Lymphocytes 12.5 %    % Monocytes 7.3 %    % Eosinophils 0.3 %    % Basophils 0.2 %    % Immature Granulocytes 0.3 %    Absolute Neutrophil 7.7 1.6 - 8.3 10e9/L    Absolute Lymphocytes 1.2 0.8 - 5.3 10e9/L    Absolute Monocytes 0.7 0.0 - 1.3 10e9/L    Absolute Eosinophils 0.0 0.0 - 0.7 10e9/L    Absolute Basophils 0.0 0.0 - 0.2 10e9/L    Abs Immature Granulocytes 0.0 0 - 0.4 10e9/L   Basic metabolic panel   Result Value Ref Range    Sodium 150 (H) 133 - 144 mmol/L    Potassium 3.9 3.4 - 5.3 mmol/L    Chloride 115 (H) 94 - 109 mmol/L    Carbon Dioxide 25 20 - 32 mmol/L    Anion Gap 10 3 - 14 mmol/L    Glucose 106 (H) 70 - 99 mg/dL    Urea Nitrogen 29 7 - 30 mg/dL    Creatinine 1.22 (H) 0.52 - 1.04 mg/dL    GFR Estimate 41 (L) >60 mL/min/1.7m2    GFR Estimate If Black 50 (L) >60 mL/min/1.7m2    Calcium 8.7 8.5 - 10.1 mg/dL   Hepatic panel   Result Value Ref Range    Bilirubin Direct 0.2 0.0 - 0.2 mg/dL    Bilirubin Total 0.7 0.2 - 1.3 mg/dL    Albumin 3.5 3.4 - 5.0 g/dL    Protein Total 6.3 (L) 6.8 - 8.8 g/dL    Alkaline Phosphatase 56 40 - 150 U/L    ALT 21 0 - 50 U/L    AST 25 0 - 45 U/L   INR   Result Value Ref Range    INR 1.05 0.86 - 1.14         Critical Care time:  none              Results for orders placed or performed during the hospital encounter of 01/19/17 (from the past 24 hour(s))   CT Head w/o Contrast    Narrative    CT SCAN OF THE HEAD WITHOUT CONTRAST   1/19/2017  3:53 PM     HISTORY: Fall at noon with closed head injury, no loss of  consciousness. On Plavix.    TECHNIQUE:  Axial images of the head and coronal reformations without  IV contrast material.  Radiation dose for this scan was reduced using  automated exposure control, adjustment of the mA and/or kV according  to patient size, or iterative reconstruction technique.    COMPARISON: None.    FINDINGS: There is a left parietal scalp hematoma. There is no  evidence for any underlying intracranial hemorrhage or skull fracture.  Air-fluid level is seen in the left maxillary sinus. There is some  mild mucosal thickening in both maxillary sinuses. Moderate cerebral  atrophy is present. Patchy white matter changes are seen in both  hemispheres without mass effect. Vascular calcifications are noted.      Impression    IMPRESSION:  1. Left parietal scalp hematoma. No evidence for intracranial  hemorrhage or skull fracture.  2. Cerebral atrophy with some chronic white matter changes.    CHRIS TOBIN MD   CT Cervical Spine w/o Contrast    Narrative    CT CERVICAL SPINE WITHOUT CONTRAST   1/19/2017 3:54 PM     HISTORY: Fall with closed head injury.       TECHNIQUE: Axial images of the cervical spine were obtained without  intravenous contrast. Multiplanar reformations were performed.  Radiation dose for this scan was reduced using automated exposure  control, adjustment of the mA and/or kV according to patient size, or  iterative reconstruction technique.     COMPARISON: None.    FINDINGS: Sagittal images demonstrate normal posterior alignment.  There is no evidence for fracture. Mild to moderate multilevel  degenerative disc and facet disease is present. There is no evidence  for any significant central canal stenosis. Soft tissue  structures are  unremarkable as visualized except for some extensive vascular  calcifications.      Impression    IMPRESSION: Degenerative changes. No evidence for fracture.   XR Pelvis w Hip Bilateral G/E 2 Views    Narrative    XR PELVIS AND HIP BILATERAL 2 VIEWS 1/19/2017 4:14 PM    HISTORY: Fall with left hip pain. Cannot bear weight.    COMPARISON: None.    FINDINGS: No convincing evidence of left hip fracture. There is  moderate bilateral osteoarthritis, right greater than left. Sacroiliac  joints are patent and symmetric. Vascular calcification noted.      Impression    IMPRESSION: No convincing evidence of fracture. If there is strong  clinical concern, CT would be suggested.    KIM STRONG MD   CT Lower Extremity Left w/o Contrast    Narrative    CT LOWER EXTREMITY LEFT W/O CONTRAST  1/19/2017 5:14 PM     HISTORY: pain w int/ext rotation    TECHNIQUE:  Axial CT images of the abdomen and pelvis from the iliac  crest through the symphysis pubis were acquired without IV contrast or  oral contrast.  Radiation dose for this scan was reduced using  automated exposure control, adjustment of the mA and/or kV according  to patient size, or iterative reconstruction technique.    COMPARISON:  None    FINDINGS:  There is a nondisplaced fracture through the base of the  greater trochanter. No other fractures are seen. The femoral neck  appears intact. Intertrochanteric region appears normal. No pelvic  fractures are seen. There is soft tissue swelling around the greater  trochanter. This involves the left gluteal musculature. This would be  compatible with a associated hematoma.      Impression    IMPRESSION: Nondisplaced fracture through the base of the greater  trochanter.     Medications   dextrose 5% and 0.45% NaCl infusion (not administered)     6:12 PM--discussed the case with orthopedics Dr. Hoffman--recommends admission to the hospital with consultation with the hospitalist for preoperative. Plan surgery  tomorrow. She can eat tonight and n.p.o. after midnight. We will stop her Plavix.    Assessments & Plan (with Medical Decision Making)   KAILEY--89-year-old female in general good health when she fell while shoveling today. She landed on her left hip area and struck her head. She has a fracture nondisplaced through the base of the greater trochanter. The femoral neck appears intact and the intertrochanteric region appears normal. Her head CT shows no fracture or bleed. The patient is on Plavix per her report. The case was discussed with the hospitalist and orthopedics.  I have reviewed the nursing notes.    I have reviewed the findings, diagnosis, plan and need for follow up with the patient.    New Prescriptions    No medications on file       Final diagnoses:   Closed left hip fracture, initial encounter (H)   Coronary artery disease involving native coronary artery of native heart without angina pectoris   Benign essential hypertension   Other specified hypothyroidism   Type 2 diabetes mellitus with diabetic neuropathy, without long-term current use of insulin (H)   Chronic kidney disease, unspecified stage   Chronic anemia   Hypercholesterolemia   History of CVA (cerebrovascular accident)   This document serves as a record of services personally performed by Ethan Pacheco MD. It was created on their behalf by Zuleima Collins, a trained medical scribe. The creation of this record is based on the provider's personal observations and the statements of the patient. This document has been checked and approved by the attending provider.   Note: Chart documentation done in part with Dragon Voice Recognition software. Although reviewed after completion, some word and grammatical errors may remain.        1/19/2017   [facility] EMERGENCY DEPARTMENT      Ethan Pacheco MD  01/19/17 9804    Ethan Pacheco MD  01/19/17 [id number]

## 2017-01-19 NOTE — IP AVS SNAPSHOT
"    [address] SURGICAL: [phone]                                              INTERAGENCY TRANSFER FORM - PHYSICIAN ORDERS   2017                    Hospital Admission Date: 2017  MINESH RUTHERFORD   : 1924  Sex: Female        Attending Provider: Liborio Hoffman DO     Allergies:  Hmg-coa-r Inhibitors, Metformin    Infection:  None   Service:  GENERAL MEDI    Ht:  1.651 m (5' 5\")   Wt:  61.5 kg (135 lb 9.3 oz)   Admission Wt:  61.5 kg (135 lb 9.3 oz)    BMI:  22.56 kg/m 2   BSA:  1.68 m 2            Patient PCP Information     Provider PCP Type    Liborio Parks MD General      ED Clinical Impression     Diagnosis Description Comment Added By Time Added    Closed left hip fracture, initial encounter (H) [S72.002A] Closed left hip fracture, initial encounter (H) [S72.002A]  Ethan Pacheco MD 2017  5:41 PM    Coronary artery disease involving native coronary artery of native heart without angina pectoris [I25.10] Coronary artery disease involving native coronary artery of native heart without angina pectoris [I25.10]  Ethan Pacheco MD 2017  5:55 PM    Benign essential hypertension [I10] Benign essential hypertension [I10]  Ethan Pacheco MD 2017  5:55 PM    Other specified hypothyroidism [E03.8] Other specified hypothyroidism [E03.8]  Ethan Pacheco MD 2017  5:55 PM    Type 2 diabetes mellitus with diabetic neuropathy, without long-term current use of insulin (H) [E11.40] Type 2 diabetes mellitus with diabetic neuropathy, without long-term current use of insulin (H) [E11.40]  Ethan Pacheco MD 2017  5:57 PM    Chronic kidney disease, unspecified stage [N18.9] Chronic kidney disease, unspecified stage [N18.9]  Ethan Pacheco MD 2017  5:58 PM    Chronic anemia [D64.9] Chronic anemia [D64.9]  Ethan Pacheco MD 2017  5:58 PM    Hypercholesterolemia [E78.00] Hypercholesterolemia [E78.00]  Ethan Pacheco MD 2017  " 5:59 PM    History of CVA (cerebrovascular accident) [Z86.73] History of CVA (cerebrovascular accident) [Z86.73]  Tara, Ethan CORTÉS MD 1/19/2017  5:59 PM      Hospital Problems as of 1/23/2017              Priority Class Noted POA    Essential hypertension Medium  1/19/2017 Yes    Coronary artery disease involving native coronary artery of native heart without angina pectoris Medium  1/19/2017 Yes    Hyperlipidemia LDL goal <100 Medium  1/19/2017 Yes    Hypothyroidism due to acquired atrophy of thyroid Medium  1/19/2017 Yes    Type 2 diabetes mellitus without complication (H) Medium  1/19/2017 Yes    Hypernatremia Medium  1/19/2017 Unknown    Chronic kidney disease, stage III (moderate) Medium  1/19/2017 Unknown    Anemia Medium  1/19/2017 Unknown    * (Principal)Fractured hip, left, closed, initial encounter (H) Medium  1/20/2017 Yes    Urinary retention Medium  1/21/2017 No      Non-Hospital Problems as of 1/23/2017     None      Code Status History     Date Active Date Inactive Code Status Order ID Comments User Context    1/22/2017  8:51 AM  Full Code [id number]  Liborio Hoffman DO Outpatient    1/20/2017  1:38 PM 1/22/2017  8:51 AM Full Code [id number]  Liborio Hoffman DO Inpatient    1/19/2017  8:20 PM 1/20/2017  1:38 PM Full Code [id number]  Jaret Grey MD Inpatient         Medication Review      START taking        Dose / Directions Comments    acetaminophen 325 MG tablet   Commonly known as:  TYLENOL   Used for:  Closed left hip fracture, initial encounter (H)        Dose:  650 mg   Take 2 tablets (650 mg) by mouth every 4 hours as needed for other (surgical pain)   Quantity:  100 tablet   Refills:  1        enoxaparin 30 MG/0.3ML injection   Commonly known as:  LOVENOX   Used for:  Closed left hip fracture, initial encounter (H)        Dose:  30 mg   Inject 0.3 mLs (30 mg) Subcutaneous every 24 hours for 21 days   Quantity:  6.3 mL   Refills:  0        oxyCODONE 5 MG IR tablet    Commonly known as:  ROXICODONE   Used for:  Closed left hip fracture, initial encounter (H)        Dose:  5-10 mg   Take 1-2 tablets (5-10 mg) by mouth every 3 hours as needed for moderate to severe pain   Quantity:  60 tablet   Refills:  0    Give 1 tab for pain 1-5, give 2 tabs for pain 5-10. Indication: Pain       potassium citrate 5 MEQ (540 MG) CR tablet   Commonly known as:  UROCIT-K   Used for:  Hypokalemia   Replaces:  POTASSIUM CITRATE PO        Dose:  10 mEq   Take 2 tablets (10 mEq) by mouth daily   Refills:  0        senna-docusate 8.6-50 MG per tablet   Commonly known as:  SENOKOT-S;PERICOLACE   Used for:  Closed left hip fracture, initial encounter (H)        Dose:  1-2 tablet   Take 1-2 tablets by mouth 2 times daily as needed (constipation )   Quantity:  100 tablet   Refills:  0    Indication: Constipation         CONTINUE these medications which may have CHANGED, or have new prescriptions. If we are uncertain of the size of tablets/capsules you have at home, strength may be listed as something that might have changed.        Dose / Directions Comments    amLODIPine 5 MG tablet   Commonly known as:  NORVASC   This may have changed:    - medication strength  - how much to take   Used for:  Benign essential hypertension        Dose:  5 mg   Take 1 tablet (5 mg) by mouth daily   Quantity:  30 tablet   Refills:  0        clopidogrel 75 MG tablet   Commonly known as:  PLAVIX   This may have changed:  when to take this   Used for:  Coronary artery disease involving native coronary artery of native heart without angina pectoris        Dose:  75 mg   Take 1 tablet (75 mg) by mouth daily   Quantity:  30 tablet   Refills:  0        levothyroxine 112 MCG tablet   Commonly known as:  SYNTHROID/LEVOTHROID   This may have changed:  medication strength   Used for:  Other specified hypothyroidism        Dose:  112 mcg   Take 1 tablet (112 mcg) by mouth daily   Refills:  0        simvastatin 10 MG tablet   Commonly  known as:  ZOCOR   This may have changed:    - medication strength  - when to take this   Used for:  Hypercholesterolemia        Dose:  10 mg   Take 1 tablet (10 mg) by mouth At Bedtime   Quantity:  30 tablet   Refills:  0          CONTINUE these medications which have NOT CHANGED        Dose / Directions Comments    allopurinol 100 MG tablet   Commonly known as:  ZYLOPRIM   Used for:  Chronic gout, unspecified cause, unspecified site        Dose:  100 mg   Take 1 tablet (100 mg) by mouth 2 times daily   Quantity:  30 tablet   Refills:  0        aspirin 325 MG tablet   Used for:  Closed left hip fracture, initial encounter (H)        Dose:  325 mg   Take 1 tablet (325 mg) by mouth daily   Quantity:  120 tablet   Refills:  0    Hold while receiving Lovenox       CALCIUM + D PO        Dose:  1 tablet   Take 1 tablet by mouth daily.   Refills:  0        CENTRUM SILVER ULTRA WOMENS Tabs        Dose:  1 tablet   Take 1 tablet by mouth daily.   Refills:  0        fenofibrate 160 MG tablet   Used for:  Hypercholesterolemia        Dose:  160 mg   Take 1 tablet (160 mg) by mouth daily   Quantity:  30 tablet   Refills:  0        folic acid 400 MCG tablet   Commonly known as:  FOLVITE        Dose:  400 mcg   Take 400 mcg by mouth daily.   Refills:  0        metoprolol 25 MG 24 hr tablet   Commonly known as:  TOPROL XL   Used for:  Benign essential hypertension        Dose:  25 mg   Take 1 tablet (25 mg) by mouth daily   Quantity:  30 tablet   Refills:  0        VITAMIN D (CHOLECALCIFEROL) PO        Take  by mouth once a week.   Refills:  0          STOP taking     glipiZIDE 2.5 MG Tabs half-tab   Commonly known as:  GLUCOTROL           hydrochlorothiazide 25 MG tablet   Commonly known as:  HYDRODIURIL           POTASSIUM CITRATE PO   Replaced by:  potassium citrate 5 MEQ (540 MG) CR tablet                   Summary of Visit     Reason for your hospital stay       Hip fracture             After Care     Activity - Ambulate in  hallway       Every shift       Activity - Up with assistive device           Activity - Up with nursing assistance           Additional Discharge Instructions       Blood pressure checks twice a day for 1 week, then as per nursing home provider.       Advance Diet as Tolerated       Follow this diet upon discharge: Orders Placed This Encounter  Advance Diet as Tolerated: Fully Advanced to diet(s) per Provider order; 5787-3437 Calories: Moderate Consistent CHO (4-6 CHO units/meal)       Daily weights       Call Provider for weight gain of more than 2 pounds per day or 5 pounds per week.       Fall precautions           Rodriguez catheter       To straight gravity drainage. Remove rodriguez on 1/25/17 and monitor closely for ongoing urinary retention.  If it is present, please replace rodriguez and patient will need follow up with urology going forward.       General info for SNF       Length of Stay Estimate: Short Term Care: Estimated # of Days 31-90  Condition at Discharge: Stable  Level of care:skilled   Rehabilitation Potential: Good  Admission H&P remains valid and up-to-date: Yes  Recent Chemotherapy: N/A  Use Nursing Home Standing Orders: Yes       Glucose monitor nursing POCT       Before meals and at bedtime x1 week.  If stable (ranging between 90 and 160) may decrease to daily AM monitoring in one week.       Mantoux instructions       Give two-step Mantoux (PPD) Per Facility Policy Yes       NO CPM           Weight bearing status       As tolerated       Wound care (specify)       Site:   Left hip  Instructions:  Keep incision covered with hospital dressing (Aquacel) for one week. It is okay to shower with this dressing on. However, do not submerge your dressing and incision in water for roughly 2 weeks. After one week remove this dressing and then keep it clean, dry, and covered. If this dressing become looses or falls off it is ok to cover with gauze and tape.  Wash the incision gently with warm soapy water after  removing your hospital dressing and lightly pat dry.             Referrals     Physical Therapy Adult Consult       Evaluate and treat as clinically indicated.    Reason:  Left hip fracture. Wbat. Progress as tolerated.             Supplies     AntiEmbolism Stockings       Bilateral below knee length.On in the morning, off at night             Your next 10 appointments already scheduled     Feb 06, 2017 10:40 AM   New Visit with Liborio Hoffman,    [facility] ([facility])    [address]  [facility] [address]   [phone]              Follow-Up Appointment Instructions     Future Labs/Procedures    Follow Up and recommended labs and tests     Comments:    Follow up with Dr. Hoffman in 2 weeks.    Follow Up and recommended labs and tests     Comments:    Follow up with Nursing home physician within 5 days.  The following labs/tests are recommended: creatinine on 1/24/17 to make sure this is continuing to improve and patient staying hydrated.      Follow-Up Appointment Instructions     Follow Up and recommended labs and tests       Follow up with Dr. Hoffman in 2 weeks.       Follow Up and recommended labs and tests       Follow up with Nursing home physician within 5 days.  The following labs/tests are recommended: creatinine on 1/24/17 to make sure this is continuing to improve and patient staying hydrated.             Statement of Approval     Ordered          01/23/17 0849  I have reviewed and agree with all the recommendations and orders detailed in this document.   EFFECTIVE NOW     Approved and electronically signed by:  Joaquín Gomez PA-C

## 2017-01-19 NOTE — IP AVS SNAPSHOT
MRN:[id number]                      After Visit Summary   1/19/2017    Mary Ellen Cuba    MRN: [id number]           Thank you!     Thank you for choosing [facility] for your care. Our goal is always to provide you with excellent care. Hearing back from our patients is one way we can continue to improve our services. Please take a few minutes to complete the written survey that you may receive in the mail after you visit with us. Thank you!        Patient Information     Date Of Birth          1/20/1924        About your hospital stay     You were admitted on:  January 19, 2017 You last received care in the:  [address] [facility]    You were discharged on:  January 23, 2017        Reason for your hospital stay       Hip fracture                  Who to Call     For medical emergencies, please call 911.  For non-urgent questions about your medical care, please call your primary care provider or clinic, [phone]  For questions related to your surgery, please call your surgery clinic        Attending Provider     Provider    Tara, MD Matilda Singh, MD Yasmin Garnett, Liborio Mayberry, DO       Primary Care Provider Office Phone # Fax #    Liborio Parks [phone]       [facility] [address] [facility] [id number]        After Care Instructions     Activity - Ambulate in [place]       Every shift            Activity - Up with assistive device           Activity - Up with nursing assistance           Additional Discharge Instructions       Blood pressure checks twice a day for 1 week, then as per nursing home provider.            Advance Diet as Tolerated       Follow this diet upon discharge: Orders Placed This Encounter  Advance Diet as Tolerated: Fully Advanced to diet(s) per Provider order; 6756-6333 Calories: Moderate Consistent CHO (4-6 CHO units/meal)            Daily weights       Call Provider for weight gain of more than 2 pounds per  day or 5 pounds per week.            Fall precautions           Rodriguez catheter       To straight gravity drainage. Remove rodriguez on 1/25/17 and monitor closely for ongoing urinary retention.  If it is present, please replace rodriguez and patient will need follow up with urology going forward.            General info for SNF       Length of Stay Estimate: Short Term Care: Estimated # of Days 31-90  Condition at Discharge: Stable  Level of care:skilled   Rehabilitation Potential: Good  Admission H&P remains valid and up-to-date: Yes  Recent Chemotherapy: N/A  Use Nursing Home Standing Orders: Yes            Glucose monitor nursing POCT       Before meals and at bedtime x1 week.  If stable (ranging between 90 and 160) may decrease to daily AM monitoring in one week.            Mantoux instructions       Give two-step Mantoux (PPD) Per Facility Policy Yes            NO CPM           Weight bearing status       As tolerated            Wound care (specify)       Site:   Left hip  Instructions:  Keep incision covered with hospital dressing (Aquacel) for one week. It is okay to shower with this dressing on. However, do not submerge your dressing and incision in water for roughly 2 weeks. After one week remove this dressing and then keep it clean, dry, and covered. If this dressing become looses or falls off it is ok to cover with gauze and tape.  Wash the incision gently with warm soapy water after removing your hospital dressing and lightly pat dry.                  Follow-up Appointments     Follow Up and recommended labs and tests       Follow up with Dr. Hoffman in 2 weeks.            Follow Up and recommended labs and tests       Follow up with Nursing home physician within 5 days.  The following labs/tests are recommended: creatinine on 1/24/17 to make sure this is continuing to improve and patient staying hydrated.                  Your next 10 appointments already scheduled     Feb 06, 2017 10:40 AM   New [facility] with Liborio  [facility]    [address]  [address]  [facility] [id number] [phone]              Additional Services     Physical Therapy Adult Consult       Evaluate and treat as clinically indicated.    Reason:  Left hip fracture. Wbat. Progress as tolerated.                  Future tests that were ordered for you     AntiEmbolism Stockings       Bilateral below knee length.On in the morning, off at night                  Pending Results     No orders found from 1/18/2017 to 1/20/2017.            Statement of Approval     Ordered          01/23/17 0849  I have reviewed and agree with all the recommendations and orders detailed in this document.   EFFECTIVE NOW     Approved and electronically signed by:  Joaquín Gomez PA-C             Admission Information        Provider Department Dept Phone    1/19/2017 Liborio Hoffman DO  [facility] [phone]      Your Vitals Were     Blood Pressure Pulse Temperature    109/53 mmHg 64 98.9  F (37.2  C) (Oral)    Respirations Height Weight    16 1.651 m (5' 5\") 61.5 kg (135 lb 9.3 oz)    BMI (Body Mass Index) Pulse Oximetry       22.56 kg/m2 95%       Care EveryWhere ID     This is your Care EveryWhere ID. This could be used by other organizations to access your [facility] medical records  [id number]           Review of your medicines      START taking        Dose / Directions    acetaminophen 325 MG tablet   Commonly known as:  TYLENOL   Used for:  Closed left hip fracture, initial encounter (H)        Dose:  650 mg   Take 2 tablets (650 mg) by mouth every 4 hours as needed for other (surgical pain)   Quantity:  100 tablet   Refills:  1       enoxaparin 30 MG/0.3ML injection   Commonly known as:  LOVENOX   Used for:  Closed left hip fracture, initial encounter (H)        Dose:  30 mg   Inject 0.3 mLs (30 mg) Subcutaneous every 24 hours for 21 days   Quantity:  6.3 mL   Refills:  0       " oxyCODONE 5 MG IR tablet   Commonly known as:  ROXICODONE   Used for:  Closed left hip fracture, initial encounter (H)        Dose:  5-10 mg   Take 1-2 tablets (5-10 mg) by mouth every 3 hours as needed for moderate to severe pain   Quantity:  60 tablet   Refills:  0       potassium citrate 5 MEQ (540 MG) CR tablet   Commonly known as:  UROCIT-K   Used for:  Hypokalemia   Replaces:  POTASSIUM CITRATE PO        Dose:  10 mEq   Take 2 tablets (10 mEq) by mouth daily   Refills:  0       senna-docusate 8.6-50 MG per tablet   Commonly known as:  SENOKOT-S;PERICOLACE   Used for:  Closed left hip fracture, initial encounter (H)        Dose:  1-2 tablet   Take 1-2 tablets by mouth 2 times daily as needed (constipation )   Quantity:  100 tablet   Refills:  0         CONTINUE these medicines which may have CHANGED, or have new prescriptions. If we are uncertain of the size of tablets/capsules you have at home, strength may be listed as something that might have changed.        Dose / Directions    amLODIPine 5 MG tablet   Commonly known as:  NORVASC   This may have changed:    - medication strength  - how much to take   Used for:  Benign essential hypertension        Dose:  5 mg   Take 1 tablet (5 mg) by mouth daily   Quantity:  30 tablet   Refills:  0       clopidogrel 75 MG tablet   Commonly known as:  PLAVIX   This may have changed:  when to take this   Used for:  Coronary artery disease involving native coronary artery of native heart without angina pectoris        Dose:  75 mg   Take 1 tablet (75 mg) by mouth daily   Quantity:  30 tablet   Refills:  0       levothyroxine 112 MCG tablet   Commonly known as:  SYNTHROID/LEVOTHROID   This may have changed:  medication strength   Used for:  Other specified hypothyroidism        Dose:  112 mcg   Take 1 tablet (112 mcg) by mouth daily   Refills:  0       simvastatin 10 MG tablet   Commonly known as:  ZOCOR   This may have changed:    - medication strength  - when to take this    Used for:  Hypercholesterolemia        Dose:  10 mg   Take 1 tablet (10 mg) by mouth At Bedtime   Quantity:  30 tablet   Refills:  0         CONTINUE these medicines which have NOT CHANGED        Dose / Directions    allopurinol 100 MG tablet   Commonly known as:  ZYLOPRIM   Used for:  Chronic gout, unspecified cause, unspecified site        Dose:  100 mg   Take 1 tablet (100 mg) by mouth 2 times daily   Quantity:  30 tablet   Refills:  0       aspirin 325 MG tablet   Used for:  Closed left hip fracture, initial encounter (H)        Dose:  325 mg   Take 1 tablet (325 mg) by mouth daily   Quantity:  120 tablet   Refills:  0       CALCIUM + D PO        Dose:  1 tablet   Take 1 tablet by mouth daily.   Refills:  0       CENTRUM SILVER ULTRA WOMENS Tabs        Dose:  1 tablet   Take 1 tablet by mouth daily.   Refills:  0       fenofibrate 160 MG tablet   Used for:  Hypercholesterolemia        Dose:  160 mg   Take 1 tablet (160 mg) by mouth daily   Quantity:  30 tablet   Refills:  0       folic acid 400 MCG tablet   Commonly known as:  FOLVITE        Dose:  400 mcg   Take 400 mcg by mouth daily.   Refills:  0       metoprolol 25 MG 24 hr tablet   Commonly known as:  TOPROL XL   Used for:  Benign essential hypertension        Dose:  25 mg   Take 1 tablet (25 mg) by mouth daily   Quantity:  30 tablet   Refills:  0       VITAMIN D (CHOLECALCIFEROL) PO        Take  by mouth once a week.   Refills:  0         STOP taking     glipiZIDE 2.5 MG Tabs half-tab   Commonly known as:  GLUCOTROL           hydrochlorothiazide 25 MG tablet   Commonly known as:  HYDRODIURIL           POTASSIUM CITRATE PO   Replaced by:  potassium citrate 5 MEQ (540 MG) CR tablet                Where to get your medicines      Some of these will need a paper prescription and others can be bought over the counter. Ask your nurse if you have questions.     Bring a paper prescription for each of these medications    - acetaminophen 325 MG tablet  -  enoxaparin 30 MG/0.3ML injection  - oxyCODONE 5 MG IR tablet    You don't need a prescription for these medications    - allopurinol 100 MG tablet  - amLODIPine 5 MG tablet  - aspirin 325 MG tablet  - clopidogrel 75 MG tablet  - fenofibrate 160 MG tablet  - levothyroxine 112 MCG tablet  - metoprolol 25 MG 24 hr tablet  - potassium citrate 5 MEQ (540 MG) CR tablet  - simvastatin 10 MG tablet             Protect others around you: Learn how to safely use, store and throw away your medicines at www.disposemymeds.org.             Medication List: This is a list of all your medications and when to take them. Check marks below indicate your daily home schedule. Keep this list as a reference.      Medications           Morning Afternoon Evening Bedtime As Needed    acetaminophen 325 MG tablet   Commonly known as:  TYLENOL   Take 2 tablets (650 mg) by mouth every 4 hours as needed for other (surgical pain)   Last time this was given:  650 mg on 1/22/2017 11:55 AM                                allopurinol 100 MG tablet   Commonly known as:  ZYLOPRIM   Take 1 tablet (100 mg) by mouth 2 times daily   Last time this was given:  100 mg on 1/23/2017  8:35 AM                                amLODIPine 5 MG tablet   Commonly known as:  NORVASC   Take 1 tablet (5 mg) by mouth daily   Last time this was given:  5 mg on 1/23/2017  8:32 AM                                aspirin 325 MG tablet   Take 1 tablet (325 mg) by mouth daily                                CALCIUM + D PO   Take 1 tablet by mouth daily.                                CENTRUM SILVER ULTRA WOMENS Tabs   Take 1 tablet by mouth daily.   Last time this was given:  1 tablet on 1/23/2017  8:32 AM                                clopidogrel 75 MG tablet   Commonly known as:  PLAVIX   Take 1 tablet (75 mg) by mouth daily   Last time this was given:  75 mg on 1/23/2017  8:36 AM                                enoxaparin 30 MG/0.3ML injection   Commonly known as:  LOVENOX    Inject 0.3 mLs (30 mg) Subcutaneous every 24 hours for 21 days   Last time this was given:  30 mg on 1/23/2017  8:37 AM                                fenofibrate 160 MG tablet   Take 1 tablet (160 mg) by mouth daily   Last time this was given:  160 mg on 1/23/2017  8:35 AM                                folic acid 400 MCG tablet   Commonly known as:  FOLVITE   Take 400 mcg by mouth daily.   Last time this was given:  400 mcg on 1/23/2017  8:35 AM                                levothyroxine 112 MCG tablet   Commonly known as:  SYNTHROID/LEVOTHROID   Take 1 tablet (112 mcg) by mouth daily   Last time this was given:  112 mcg on 1/23/2017  8:36 AM                                metoprolol 25 MG 24 hr tablet   Commonly known as:  TOPROL XL   Take 1 tablet (25 mg) by mouth daily   Last time this was given:  25 mg on 1/23/2017  8:33 AM                                oxyCODONE 5 MG IR tablet   Commonly known as:  ROXICODONE   Take 1-2 tablets (5-10 mg) by mouth every 3 hours as needed for moderate to severe pain   Last time this was given:  10 mg on 1/23/2017  1:15 PM                                potassium citrate 5 MEQ (540 MG) CR tablet   Commonly known as:  UROCIT-K   Take 2 tablets (10 mEq) by mouth daily                                senna-docusate 8.6-50 MG per tablet   Commonly known as:  SENOKOT-S;PERICOLACE   Take 1-2 tablets by mouth 2 times daily as needed (constipation )                                simvastatin 10 MG tablet   Commonly known as:  ZOCOR   Take 1 tablet (10 mg) by mouth At Bedtime   Last time this was given:  10 mg on 1/22/2017  8:35 PM                                VITAMIN D (CHOLECALCIFEROL) PO   Take  by mouth once a week.

## 2017-01-19 NOTE — IP AVS SNAPSHOT
` `           [address] SURGICAL: [phone]                 INTERAGENCY TRANSFER FORM - NOTES (H&P, Discharge Summary, Consults, Procedures, Therapies)   2017                    Hospital Admission Date: 2017  MINESH RUTHERFORD   : 1924  Sex: Female        Patient PCP Information     Provider PCP Type    Liborio Parks MD General         History & Physicals      H&P by Jaret Grey MD at 2017  8:20 PM     Author:  Jaret Grey MD Service:  Hospitalist Author Type:  Physician    Filed:  2017  8:32 PM Note Time:  2017  8:20 PM Status:  Signed    :  Jaret Grey MD (Physician)           [facility]    History and Physical  Hospitalist       Date of Admission:  2017  Date of Service (when I saw the patient): 2017    Assessment and Plan  Minesh Rutherford is a 89 year old female who presents with a fall at home leading to left hip pain. I'd slipped on the ice falling backwards hitting her head, without loss of consciousness. Evaluation in the emergency department is showing a nondisplaced left trochanteric fracture. CT of the head and neck are not showing any acute pathology. Orthopedic surgery has reviewed the imaging and is recommending admission for possible surgery. They are recommending a MRI of the hip in the morning to help decide if surgery is necessary. She'll be NPO after midnight with IV narcotics for pain management. She is diabetic so will order sliding scale insulin coverage and will hold other medicines at this time. She will likely need rehab placement after surgery and that she lives alone in a senior apartment complex. She is medically cleared at this time for surgery in the morning.    Principal Problem:    Closed left hip fracture (H)    Assessment: result of a fall this afternoon, nondisplaced trochanteric fracture of left hip    Plan: treatment for orthopedics. MRI of  hip in morning help decide whether surgery is necessary  Active Problems:    Essential hypertension    Assessment: stable, taking HCTZ, Norvasc and Toprol-XL    Plan: restart after surgery    Coronary artery disease involving native coronary artery of native heart without angina pectoris    Assessment: remote history of problems in the past with stent placement. No current symptoms. EKG normal    Plan: no treatment necessary    Hypothyroidism due to acquired atrophy of thyroid    Assessment: taking oral replacement    Plan: restart after surgery    Type 2 diabetes mellitus without complication (H)    Assessment: on oral glipizide, no complications    Plan: hold meds for now, sliding scale insulin coverage for now    Hypernatremia    Assessment: maybe mildly dehydrated from her hydrochlorothiazide.    Plan: hold HCTZ. IV fluids tonight, recheck in AM    Chronic kidney disease, stage III (moderate)    Assessment: mildly elevated compared to past readings    Plan: IV fluids, recheck in AM    Anemia    Assessment: noted in her past records, no clear cause    Plan: follow    Hyperlipidemia LDL goal <100    Assessment: taking statin and fenofibrate    Plan: restart postsurgery    DVT Prophylaxis: Pneumatic Compression Devices  Code Status: Full Code    Disposition: Expected discharge in 2-3 days once surgery completed and deemed stable by surgery.    Jaret Grey MD    Primary Care Physician  Dr. Celis    Chief Complaint  89-year-old female who slipped on the ice injuring her left hip    History is obtained from the patient, electronic health record and emergency department physician    History of Present Illness  Mary Ellen Cuba is a 89 year old female who presents with an injury to her left hip. She was out on her patio moving snow and slipped and fell backwards landing on her left hip and then striking the back of her head against the ground. She did not experience loss of consciousness and has had minimal neck  or head pain since, but did experience left hip pain which limited her ability to ambulate. She presented to the emergency department with subsequent imaging showing a nondisplaced trochanteric fracture of the left hip. She lives alone in a senior apartment complex. History significant for diabetes taking glipizide, hypertension on several medications and hypothyroidism. She described yourself as healthy and prior to the fall was having no symptoms to suggest an upper respiratory infection. She denies any cardiac symptoms, although she has had a history of stent placement sometime in the past. She denies shortness of breath, chest pain, abdominal symptoms, urinary symptoms. This time denies headache, nausea. She only has pain in the hip if she tries to move.    Past Medical History   I have reviewed this patient's medical history and updated it with pertinent information if needed.   Past Medical History   Diagnosis Date     Diabetes mellitus (H)      Hypertension      Hyperlipemia      CAD (coronary artery disease)      remote hx of stent placement in past     CVA (cerebral vascular accident) (H) 2013     some left sided deficits       Past Surgical History  I have reviewed this patient's surgical history and updated it with pertinent information if needed.  Past Surgical History   Procedure Laterality Date     Appendectomy       Gyn surgery       oopherectomy     Phacoemulsification with standard intraocular lens implant  6/30/2011     Procedure:PHACOEMULSIFICATION WITH STANDARD INTRAOCULAR LENS IMPLANT; Surgeon:DEANDRE DOS SANTOS; Location:[facility] OR     Phacoemulsification with standard intraocular lens implant  7/21/2011     Procedure:PHACOEMULSIFICATION WITH STANDARD INTRAOCULAR LENS IMPLANT; Surgeon:DEANDRE DOS SANTOS; Location:[facility] OR     Gi surgery       gwendolyn     Gi surgery       hemicolectomy     Laser yag capsulotomy  3/21/2013     Procedure: LASER YAG CAPSULOTOMY;  yag capsulotomy bilateral eyes;  Surgeon: Raffi  Ja Capone MD;  Location: [facility] OR     Stent, coronary, lyn         Prior to Admission Medications  Prior to Admission Medications   Prescriptions Last Dose Informant Patient Reported? Taking?   AmLODIPine Besylate (NORVASC PO) 1/19/2017 at 0800  Yes Yes   Sig: Take 10 mg by mouth daily   Calcium-Vitamin D (CALCIUM + D PO) 1/19/2017 at 0800  Yes Yes   Sig: Take 1 tablet by mouth daily.   Clopidogrel Bisulfate (PLAVIX PO) 1/19/2017 at 0800  Yes Yes   Sig: Take 75 mg by mouth   Metoprolol Succinate (TOPROL XL PO) Unknown at Unknown if taking. Should have ran out in Deember  Yes No   Sig: Take 25 mg by mouth daily   Multiple Vitamins-Minerals (CENTRUM SILVER ULTRA WOMENS) TABS 1/19/2017 at 0800  Yes Yes   Sig: Take 1 tablet by mouth daily.   POTASSIUM CITRATE PO Unknown at Not refilled for a while per pharmacy.   Yes No   Sig: Take 10 mEq by mouth   SIMVASTATIN PO 1/18/2017 at 2100  Yes Yes   Sig: Take 10 mg by mouth   VITAMIN D, CHOLECALCIFEROL, PO Unknown at Unknown time  Yes No   Sig: Take  by mouth once a week.   allopurinol (ZYLOPRIM) 100 MG tablet 1/19/2017 at 0800  Yes Yes   Sig: Take 100 mg by mouth 2 times daily.   aspirin 325 MG tablet Unknown at Unknown if takes.   Yes No   Sig: Take 325 mg by mouth daily.   fenofibrate 160 MG tablet 1/19/2017 at 0800  Yes Yes   Sig: Take 160 mg by mouth daily.   folic acid (FOLVITE) 400 MCG tablet Unknown at Not sure if taking.  Yes No   Sig: Take 400 mcg by mouth daily.   glipiZIDE (GLUCOTROL) 2.5 MG TABS Unknown at Hasn't been filled since March.   Yes No   Sig: Take 2.5 mg by mouth every morning (before breakfast).   hydrochlorothiazide (HYDRODIURIL) 25 MG tablet 1/19/2017 at 0800  Yes Yes   Sig: Take 25 mg by mouth daily.   levothyroxine (SYNTHROID, LEVOTHROID) 125 MCG tablet 1/19/2017 at 0800  Yes Yes   Sig: Take 112 mcg by mouth daily       Facility-Administered Medications: None     Allergies  Allergies   Allergen Reactions     Hmg-Coa-R Inhibitors      Metformin GI  Disturbance       Social History  I have reviewed this patient's social history and updated it with pertinent information if needed. Mary Ellen Cuba  reports that she has never smoked. She does not have any smokeless tobacco history on file. She reports that she does not drink alcohol or use illicit drugs.    Family History  I have reviewed this patient's family history and updated it with pertinent information if needed.   Family History   Problem Relation Age of Onset     Colon Cancer Mother      Prostate Cancer Brother      Peptic Ulcer Disease Father        Review of Systems  The 10 point Review of Systems is negative other than noted in the HPI or here.     Physical Exam  Temp: 99.5  F (37.5  C) Temp src: Oral BP: 164/76 mmHg   Heart Rate: 72 Resp: 17 SpO2: 96 % O2 Device: None (Room air)    Vital Signs with Ranges  Temp:  [97.8  F (36.6  C)-99.5  F (37.5  C)] 99.5  F (37.5  C)  Heart Rate:  [63-72] 72  Resp:  [17-18] 17  BP: (163-176)/(71-86) 164/76 mmHg  SpO2:  [96 %-100 %] 96 %  135 lbs 9.33 oz    EXAM:  Constitutional: healthy, alert, mild distress and cooperative   Cardiovascular: PMI normal. No lifts, heaves, or thrills. RRR. No murmurs, clicks gallops or rub  Respiratory: Percussion normal. Good diaphragmatic excursion. Lungs clear  Psychiatric: mentation appears normal and affect normal/bright. Gets mildly confused about dates and medications  Head: area of contusion on back of scalp, scalp was intact with no laceration  Neck: Neck supple. No adenopathy. Thyroid symmetric, normal size,  ENT: ENT exam normal, no neck nodes or sinus tenderness  Abdomen: Abdomen soft, non-tender. BS normal. No masses, organomegaly  NEURO: nonfocal, unable to fully test due to hip injury   SKIN: no suspicious lesions or rashes  LYMPH: Normal cervical lymph nodes  JOINT/EXTREMITIES: laying flat on back. Legs are equal length without any type of rotation. CMS of the feet is normal     Data  Data reviewed today:  I  personally reviewed the EKG tracing showing Normal sinus rhythm, no acute changes and the X-ray, CT image(s) showing Nondisplaced fracture of left trochanteric of hip.    Recent Labs  Lab 01/19/17  1750   WBC 9.7   HGB 10.1*   MCV 90      INR 1.05   *   POTASSIUM 3.9   CHLORIDE 115*   CO2 25   BUN 29   CR 1.22*   ANIONGAP 10   ZULEMA 8.7   *   ALBUMIN 3.5   PROTTOTAL 6.3*   BILITOTAL 0.7   ALKPHOS 56   ALT 21   AST 25       Recent Results (from the past 24 hour(s))   CT Head w/o Contrast    Narrative    CT SCAN OF THE HEAD WITHOUT CONTRAST   1/19/2017  3:53 PM     HISTORY: Fall at noon with closed head injury, no loss of  consciousness. On Plavix.    TECHNIQUE:  Axial images of the head and coronal reformations without  IV contrast material.  Radiation dose for this scan was reduced using  automated exposure control, adjustment of the mA and/or kV according  to patient size, or iterative reconstruction technique.    COMPARISON: None.    FINDINGS: There is a left parietal scalp hematoma. There is no  evidence for any underlying intracranial hemorrhage or skull fracture.  Air-fluid level is seen in the left maxillary sinus. There is some  mild mucosal thickening in both maxillary sinuses. Moderate cerebral  atrophy is present. Patchy white matter changes are seen in both  hemispheres without mass effect. Vascular calcifications are noted.      Impression    IMPRESSION:  1. Left parietal scalp hematoma. No evidence for intracranial  hemorrhage or skull fracture.  2. Cerebral atrophy with some chronic white matter changes.    CHRIS TOBIN MD   CT Cervical Spine w/o Contrast    Narrative    CT CERVICAL SPINE WITHOUT CONTRAST   1/19/2017 3:54 PM     HISTORY: Fall with closed head injury.       TECHNIQUE: Axial images of the cervical spine were obtained without  intravenous contrast. Multiplanar reformations were performed.  Radiation dose for this scan was reduced using automated exposure  control,  adjustment of the mA and/or kV according to patient size, or  iterative reconstruction technique.     COMPARISON: None.    FINDINGS: Sagittal images demonstrate normal posterior alignment.  There is no evidence for fracture. Mild to moderate multilevel  degenerative disc and facet disease is present. There is no evidence  for any significant central canal stenosis. Soft tissue structures are  unremarkable as visualized except for some extensive vascular  calcifications.      Impression    IMPRESSION: Degenerative changes. No evidence for fracture.   XR Pelvis w Hip Bilateral G/E 2 Views    Narrative    XR PELVIS AND HIP BILATERAL 2 VIEWS 1/19/2017 4:14 PM    HISTORY: Fall with left hip pain. Cannot bear weight.    COMPARISON: None.    FINDINGS: No convincing evidence of left hip fracture. There is  moderate bilateral osteoarthritis, right greater than left. Sacroiliac  joints are patent and symmetric. Vascular calcification noted.      Impression    IMPRESSION: No convincing evidence of fracture. If there is strong  clinical concern, CT would be suggested.    KIM STRONG MD   CT Lower Extremity Left w/o Contrast    Narrative    CT LOWER EXTREMITY LEFT W/O CONTRAST  1/19/2017 5:14 PM     HISTORY: pain w int/ext rotation    TECHNIQUE:  Axial CT images of the abdomen and pelvis from the iliac  crest through the symphysis pubis were acquired without IV contrast or  oral contrast.  Radiation dose for this scan was reduced using  automated exposure control, adjustment of the mA and/or kV according  to patient size, or iterative reconstruction technique.    COMPARISON:  None    FINDINGS:  There is a nondisplaced fracture through the base of the  greater trochanter. No other fractures are seen. The femoral neck  appears intact. Intertrochanteric region appears normal. No pelvic  fractures are seen. There is soft tissue swelling around the greater  trochanter. This involves the left gluteal musculature. This would  be  compatible with a associated hematoma.      Impression    IMPRESSION: Nondisplaced fracture through the base of the greater  trochanter.    SIMA BOWMAN MD                       Discharge Summaries      Discharge Summaries by Joaquín Gomez PA-C at 1/23/2017  8:49 AM     Author:  Joaquín Gomez PA-C Service:  Orthopedics Author Type:  Physician Assistant    Filed:  1/23/2017  9:45 AM Note Time:  1/23/2017  8:49 AM Status:  Attested    :  Joaquín Gomez PA-C (Physician Assistant)      Related Notes: Original Note by Joaquín Gomez PA-C (Physician Assistant) filed at 1/23/2017  8:58 AM    Cosigner:  Liborio Hoffman DO at 1/23/2017 10:08 AM        Attestation signed by Liborio Hoffman DO at 1/23/2017 10:08 AM        Physician Attestation  I, Liborio Hoffman, have reviewed and discussed with the advanced practice provider their discharge plan for Mary Ellen Cuba. I did not participate in a shared visit by interviewing or examining the patient and this should be billed as an advanced practice provider only discharge.    Liborio Hoffman  Date of Service (when I saw the patient): I did not personally see this patient today.                        [facility] Discharge Summary    Mary Ellen Cuba MRN# [id number]   Age: 89 year old YOB: 1924     Date of Admission:  1/19/2017  Date of Discharge::  1/23/2017  Admitting Physician:  Liborio Hoffman DO  Discharge Physician:  Joaquín Gomez PA-C     Home clinic: [facility]          Admission Diagnoses:   Benign essential hypertension [I10]  Hypercholesterolemia [E78.00]  Chronic anemia [D64.9]  History of CVA (cerebrovascular accident) [Z86.73]  Closed left hip fracture, initial encounter (H) [S72.002A]  Chronic kidney disease, unspecified stage [N18.9]  Other specified hypothyroidism [E03.8]  Coronary artery disease involving native coronary artery of native heart without angina  pectoris [I25.10]  Type 2 diabetes mellitus with diabetic neuropathy, without long-term current use of insulin (H) [E11.40]          Discharge Diagnosis:   Benign essential hypertension [I10]  Hypercholesterolemia [E78.00]  Chronic anemia [D64.9]  History of CVA (cerebrovascular accident) [Z86.73]  Closed left hip fracture, initial encounter (H) [S72.002A]  Chronic kidney disease, unspecified stage [N18.9]  Other specified hypothyroidism [E03.8]  Coronary artery disease involving native coronary artery of native heart without angina pectoris [I25.10]  Type 2 diabetes mellitus with diabetic neuropathy, without long-term current use of insulin (H) [E11.40]          Procedures:   Procedure(s): Procedure(s):  OPEN REDUCTION INTERNAL FIXATION HIP NAILING       No other procedures performed during this admission           Medications Prior to Admission:     Prescriptions prior to admission   Medication Sig Dispense Refill Last Dose     Clopidogrel Bisulfate (PLAVIX PO) Take 75 mg by mouth   1/19/2017 at 0800     SIMVASTATIN PO Take 10 mg by mouth   1/18/2017 at 2100     allopurinol (ZYLOPRIM) 100 MG tablet Take 100 mg by mouth 2 times daily.   1/19/2017 at 0800     fenofibrate 160 MG tablet Take 160 mg by mouth daily.   1/19/2017 at 0800     levothyroxine (SYNTHROID, LEVOTHROID) 125 MCG tablet Take 112 mcg by mouth daily    1/19/2017 at 0800     Multiple Vitamins-Minerals (CENTRUM SILVER ULTRA WOMENS) TABS Take 1 tablet by mouth daily.   1/19/2017 at 0800     Calcium-Vitamin D (CALCIUM + D PO) Take 1 tablet by mouth daily.   1/19/2017 at 0800     POTASSIUM CITRATE PO Take 10 mEq by mouth   Unknown at Not refilled for a while per pharmacy.      [DISCONTINUED] AmLODIPine Besylate (NORVASC PO) Take 10 mg by mouth daily   1/19/2017 at 0800     VITAMIN D, CHOLECALCIFEROL, PO Take  by mouth once a week.   Unknown at Unknown time     folic acid (FOLVITE) 400 MCG tablet Take 400 mcg by mouth daily.   Unknown at Not sure if  taking.     [DISCONTINUED] glipiZIDE (GLUCOTROL) 2.5 MG TABS Take 2.5 mg by mouth every morning (before breakfast).   Unknown at Hasn't been filled since March.      [DISCONTINUED] hydrochlorothiazide (HYDRODIURIL) 25 MG tablet Take 25 mg by mouth daily.   1/19/2017 at 0800             Discharge Medications:     Current Discharge Medication List      START taking these medications    Details   !! oxyCODONE (ROXICODONE) 5 MG IR tablet Take 1-2 tablets (5-10 mg) by mouth every 3 hours as needed for moderate to severe pain  Qty: 60 tablet, Refills: 0    Comments: Give 1 tab for pain 1-5, give 2 tabs for pain 5-10. Indication: Pain  Associated Diagnoses: Closed left hip fracture, initial encounter (H)      senna-docusate (SENOKOT-S;PERICOLACE) 8.6-50 MG per tablet Take 1-2 tablets by mouth 2 times daily as needed (constipation )  Qty: 100 tablet, Refills: 0    Comments: Indication: Constipation  Associated Diagnoses: Closed left hip fracture, initial encounter (H)      !! oxyCODONE (ROXICODONE) 5 MG IR tablet Take 1 tablet (5 mg) by mouth every 6 hours as needed for moderate to severe pain  Qty: 70 tablet, Refills: 0    Associated Diagnoses: Closed left hip fracture, initial encounter (H)      acetaminophen (TYLENOL) 325 MG tablet Take 2 tablets (650 mg) by mouth every 4 hours as needed for other (surgical pain)  Qty: 100 tablet, Refills: 1    Associated Diagnoses: Closed left hip fracture, initial encounter (H)      enoxaparin (LOVENOX) 30 MG/0.3ML injection Inject 0.3 mLs (30 mg) Subcutaneous every 24 hours for 21 days  Qty: 6.3 mL, Refills: 0    Associated Diagnoses: Closed left hip fracture, initial encounter (H)       !! - Potential duplicate medications found. Please discuss with provider.      CONTINUE these medications which have CHANGED    Details   amLODIPine (NORVASC) 5 MG tablet Take 1 tablet (5 mg) by mouth daily  Qty: 30 tablet    Associated Diagnoses: Benign essential hypertension      metoprolol (TOPROL  XL) 25 MG 24 hr tablet Take 1 tablet (25 mg) by mouth daily  Qty: 30 tablet    Associated Diagnoses: Benign essential hypertension      aspirin 325 MG tablet Take 1 tablet (325 mg) by mouth daily  Qty: 120 tablet, Refills: 0    Comments: Hold while receiving Lovenox  Associated Diagnoses: Closed left hip fracture, initial encounter (H)         CONTINUE these medications which have NOT CHANGED    Details   Clopidogrel Bisulfate (PLAVIX PO) Take 75 mg by mouth      SIMVASTATIN PO Take 10 mg by mouth      allopurinol (ZYLOPRIM) 100 MG tablet Take 100 mg by mouth 2 times daily.      fenofibrate 160 MG tablet Take 160 mg by mouth daily.      levothyroxine (SYNTHROID, LEVOTHROID) 125 MCG tablet Take 112 mcg by mouth daily       Multiple Vitamins-Minerals (CENTRUM SILVER ULTRA WOMENS) TABS Take 1 tablet by mouth daily.      Calcium-Vitamin D (CALCIUM + D PO) Take 1 tablet by mouth daily.      POTASSIUM CITRATE PO Take 10 mEq by mouth      VITAMIN D, CHOLECALCIFEROL, PO Take  by mouth once a week.      folic acid (FOLVITE) 400 MCG tablet Take 400 mcg by mouth daily.         STOP taking these medications       glipiZIDE (GLUCOTROL) 2.5 MG TABS Comments:   Reason for Stopping:         hydrochlorothiazide (HYDRODIURIL) 25 MG tablet Comments:   Reason for Stopping:                     Consultations:   Hospitalist followed for Medical Management          Brief History of Illness:   Reason for admission requiring a surgical or invasive procedure:   Benign essential hypertension [I10]  Hypercholesterolemia [E78.00]  Chronic anemia [D64.9]  History of CVA (cerebrovascular accident) [Z86.73]  Closed left hip fracture, initial encounter (H) [S72.002A]  Chronic kidney disease, unspecified stage [N18.9]  Other specified hypothyroidism [E03.8]  Coronary artery disease involving native coronary artery of native heart without angina pectoris [I25.10]  Type 2 diabetes mellitus with diabetic neuropathy, without long-term current use of  insulin (H) [E11.40]   The patient underwent the following procedure(s):   Procedure(s):  OPEN REDUCTION INTERNAL FIXATION HIP NAILING   There were no immediate complications during this procedure.    Please refer to the full operative summary for details.           Hospital Course:   This patient was admitted for the above diagnosis on 1/19 through the ED.  Orthopedics was consulted and it was thought best to under go the above procedure.  A consent was signed and she went to surgery. Postoperatively she did very well with no apparent complications, and she had an uneventful hospital stay. Antibiotics were given for 24 hours after surgery. She was given lovenox for DVT prophylaxis and her pain was well controlled with IV pain meds, then transitioned to oral pain medications during her hospital stay. The patient was followed by the Orthopedic team and on postoperative day 1 CMS/dressing were intact.  On postoperative day 2 dressing was clean dry and intact and CMS was intact again. On POD#3 CMS was intact and dressing clean. She progressed well with physical therapy and discharge to TCU was recommended. Occupational therapy also saw the patient and worked on activities of daily living. Her chronic medical problems were followed by the medicine team during her hospital stay and there were no significant changes to conditions or treatment plans.  No new medical problems presented during her hospital stay, they did hold her Diabetic meds, increase fluids for Cr bump, and left Lewis in utnil Wed or Thursday when the TCU will try and take it out then.  Otherwise no major changes.       Discharge Instructions and Follow-Up:   Discharge diet: Pre-procedure diet or regular   Discharge activity: Activity as tolerated  Driving restricitIons: no driving while taking narcotic analgesics  Weight bearing status: Weight bearing as tolerated   Discharge follow-up: Follow up with Dr. Hoffman in 2 weeks    Wound care: Keep dressing on  until follow up   Anticoagulation:            Lovenox 30mg SQ Qday x 21 days after surgery.            Discharge Disposition:   Discharged to [facility]      Attestation:  I have reviewed today's vital signs, notes, medications, labs and imaging.  Amount of time performed on this discharge summary: 30 minutes.    Joaquín Gomez PA-C                      Consult Notes      Consults by Jody Hoyos at 1/21/2017  9:53 AM     Author:  Jody Hoyos Service:  Social Work Author Type:      Filed:  1/21/2017 11:41 AM Note Time:  1/21/2017  9:53 AM Status:  Addendum    :  Jody Hoyos ()      Related Notes: Original Note by Jody Hoyos () filed at 1/21/2017  9:57 AM     Consult Orders:    1. Care Transition RN/SW IP Consult [[id number]] ordered by Liborio Hoffman DO at 01/21/17 0940                Patient admitted as inpatient on 1/19/17 with diagnosis of Hip Fracture.      Met with patient to discuss living situation and discharge needs.    Patient resides in [place] in her own town home where she lives alone.    All discharge options reviewed and discussed with patient.  Patient hoping to admit to the TCU at [facility].  Referral also sent to [facility] as patient's second choice, per her request.     Plan is for patient to d/c to a TCU when medically stable and bed available.     will continue to assess and assist with disposition.    1139- Writer had discussed with patient that she has been accepted for TCU placement at [facility] for Monday, 1/23/17, as they had a bed open up.  Patient in agreement with the $14.00 per day private room charge and is very happy she can go to [facility] upon discharge.      Discussed transportation options.  Patient hoping to be able to transport with family/friend on day of d/c.             Consults signed by Liborio Hoffman DO at 1/20/2017  4:44 PM      Author:  Liborio Hoffman  DO Jefe Service:  Orthopedics Author Type:  Physician    Filed:  1/20/2017  4:44 PM Note Time:  1/20/2017 10:27 AM Status:  Signed    :  Liborio Hoffman DO (Physician)           ORTHOPEDIC CONSULTATION      REASON FOR CONSULTATION:  Hip fracture.      REQUESTING PHYSICIANS:  Jaret Grey III, MD and Ethan Pacheco MD      NOTE:  Mary Ellen Cuba is a 89-year-old female who had slipped, falling backwards on the ice.  She hit her head, but did not lose consciousness.  She was unable to bear weight on her left side afterwards.  She was subsequently evaluated in the ER.  She received an x-rays and a CT scan in the ER which showed a greater trochanter fracture.  However, I had requested an MRI, which was performed this morning.  She is currently evaluated on the second floor.  She only complains of pain to her left hip.  She has been unable to really roll or move the hip since the injury.  Currently is resting comfortably though.  No previous hip injuries.       PAST MEDICAL HISTORY:  Includes diabetes, hypertension, coronary artery disease, CVA.      PAST SURGICAL HISTORY:  Includes appendectomy, oophorectomy, cataract surgery, cholecystectomy, hemicolectomy, coronary artery stent.      PRIOR TO ADMISSION MEDICATIONS:  Include Norvasc, calcium, Plavix, Toprol, multivitamin, simvastatin, vitamin D, allopurinol, aspirin, fenofibrate, folic acid, glipizide, Synthroid.      ALLERGIES:  Include metformin and HMG-CoA/R-inhibitors.       SOCIAL HISTORY:  Denies tobacco, ETOH or illicits.  Ambulates independently.      FAMILY HISTORY:  Includes colon cancer, prostate cancer and peptic ulcer disease.        PHYSICAL EXAMINATION:   VITAL SIGNS:  She has got a temperature of 98.9, 65 pulse, blood pressure is 132/52, saturation 96% room air.   GENERAL:  She is awake, alert, oriented, no acute distress.  She is nontoxic.  She is conversant and very pleasant.   HEENT:  Atraumatic, normocephalic.  There is  no cervical spine pain with palpation or with range of motion.  Bilateral upper extremities show full motion with no pain with palpation or motion.   CHEST:  Equal chest rise and fall with no audible wheezing or retraction.   ABDOMEN:  Soft, nontender, nondistended.   EXTREMITIES:  Right lower extremity has no pain with palpation.  There is no peripheral edema.  The skin is intact.  Left side, there is tenderness globally around the left hip, particularly laterally in the greater trochanter region.  There is no pubic symphyseal pain.  She has no knee or distal pain.  There is no peripheral edema.  Distal neurovascular is grossly intact.  She does have a positive log roll and positive Stinchfield on the left.      IMAGING:  X-rays and a CT scan were reviewed.  X-rays and the CT show a fracture at the greater trochanter region.  MRI was reviewed which was obtained today.  It does show some edema extending into the intertrochanteric region.      IMPRESSION:  This is a 89-year-old female with a nondisplaced intertrochanteric hip fracture.      PLAN:  The above was relayed to the patient.  I discussed her options.  Given her edema extending into the intertrochanteric region, I have recommended surgical fixation.  The risks, benefits, complications, alternatives and anticipated postoperative course were discussed.  Risks including bleeding, infection, scar, scar tenderness, neurovascular injuries, failure to heal, fracture cutout were discussed.  I also discussed risks of blood clots, heart attacks, strokes and death.  Once thoroughly reviewed, the patient opted to proceed.  Case was reviewed with Dr. Turpin.  She has been medically cleared to proceed with surgery.  I did ask the patient if there is any family or friend's she would like me to contact and she declined.  Plan to proceed today once obtaining an operating room.         HARRISON DURHAM DO             D: 01/20/2017 10:27   T: 01/20/2017 14:44   MT: TS      Name:      MINESH RUTHERFORD   MRN:      [id number]        Account:       [id number]   :      1924           Consult Date:  2017      Document: [id number]       cc: Ethan Grey III, MD        Consults by Liborio Hoffman DO at 2017 10:12 AM     Author:  Liborio Hoffman DO Service:  Orthopedics Author Type:  Physician    Filed:  2017 10:27 AM Note Time:  2017 10:12 AM Status:  Addendum    :  Liborio Hoffman DO (Physician)      Related Notes: Original Note by Liborio Hoffman DO (Physician) filed at 2017 10:14 AM         IMP:  Left IT hip fracture    PLAN:  Options reviewed. Recommend fixation given the fracture extends into the IT region  Case reviewed with Dr Turpin  H&P reviewed  To OR today.    Full consult:[id number]  Lio Hoffman D.O.                Progress Notes - Physician (Notes from 17 through 17)      Progress Notes by Shabana Turpin MD at 2017  7:19 AM     Author:  Shabana Turpin MD Service:  Family Medicine Author Type:  Physician    Filed:  2017 11:23 AM Note Time:  2017  7:19 AM Status:  Signed    :  Shabana Turpin MD (Physician)           [facility] Progress Note          Assessment and Plan:   Assessment:   Principal Problem:    Fractured hip, left, closed, initial encounter (H)    Assessment: Status post surgical repair, currently postop day #3. Pain is much more appropriately controlled with increased oxycodone frequency and patient is eating and drinking better, continues to struggle with movement at times secondary to pain    Plan:  Ongoing management per orthopedic surgery. Anticipate TCU discharge later today versus tomorrow    Active Problems:    Essential hypertension    Assessment:  Blood pressure continues to be in the low normal range following surgical intervention on home regimen of metoprolol in half of the home  amlodipine dosing, with hydro-chlorothiazide still being held    Plan:  Continue to monitor blood pressure twice a day as patient transitions to the TCU. Would continue with metoprolol and amlodipine 5 mg - could increase amlodipine back to 10 mg if blood pressures increase going forward.      Type 2 diabetes mellitus without complication (H)    Assessment: Patient's appetite continues to be poor and oral glipizide 2.5 mg has been held    Plan:  Continue to hold glipizide at time of discharge. We'll continue with blood sugar monitoring and reinitiate the patient's blood sugar starts trending upward.      Urinary retention    Assessment: Present following anesthesia and surgical intervention, Lewis catheter is in place    Plan:  Patient will discharge on Lewis catheter with instructions for removal in 2 days. If retention recurs, Lewis should be replaced and patient will need outpatient urology follow-up      Coronary artery disease involving native coronary artery of native heart without angina pectoris    Assessment:  Chronic, no acute angina during this hospitalization    Plan:  Discharge about changed home regimen      Hyperlipidemia LDL goal <100    Assessment:  On simvastatin    Plan:  Discharge without change      Hypothyroidism due to acquired atrophy of thyroid    Assessment:  On Synthroid    Plan:  Discharge without change      Hypernatremia    Assessment:  Present initially and thought secondary to dehydration as well as hydrochlorothiazide use. Has resolved during this hospitalization    Plan:  Encourage ongoing oral intake and continue to hold hydrochlorothiazide at time of discharge      Chronic kidney disease, stage III (moderate)    Assessment:  With acute worsening of the above, however improving back towards baseline    Plan:  Continue close monitoring as above      Anemia    Assessment:  Chronic with hemoglobin 9.6 prior to surgery. Patient did have acute worsening secondary to surgical  "intervention however has stabilized in the mid 7 range and patient is hemodynamically stable.    Plan:  Ongoing outpatient monitoring to ensure improvement of hemoglobin following surgical intervention going forward.         VTE:  SCDs  Code Status:  Full Code        Interval History:   Continues to improve with pain much better controlled and patient eating and drinking well.  Vital signs generally better.  Creatinine trending downward with increased oral intake.  Lewis cath is place and adequate urine output.  Tolerating medications without significant side effects.  No new concerns today.            Significant Problems:     Past Medical History   Diagnosis Date     Diabetes mellitus (H)      Hypertension      Hyperlipemia      CAD (coronary artery disease)      remote hx of stent placement in past     CVA (cerebral vascular accident) (H) 2013     some left sided deficits            Physical Exam:   Blood pressure 108/61, pulse 58, temperature 98.8  F (37.1  C), temperature source Oral, resp. rate 20, height 1.651 m (5' 5\"), weight 61.5 kg (135 lb 9.3 oz), SpO2 97 %.  Constitutional:   awake, alert, cooperative, no apparent distress, and appears stated age     Lungs:   No increased work of breathing, good air exchange, clear to auscultation bilaterally, no crackles or wheezing     Cardiovascular:   Normal apical impulse, regular rate and rhythm, normal S1 and S2, no S3 or S4, and no murmur noted     Abdomen:   Bowel sounds present, abdomen soft and non-tender     Musculoskeletal:   no lower extremity pitting edema present     Neurologic:   Awake, alert, oriented to name, place and time.  Does get easily confused about the details and is aware of this.     Skin:   normal skin color, texture, turgor             Data:   All laboratory data reviewed    Attestation:  I have reviewed today's vital signs, notes, medications, labs and imaging.     Electronically Signed:  Shabana Turpin MD    Note: Chart " "documentation done in part with Dragon Voice Recognition software. Although reviewed after completion, some word and grammatical errors may remain.            Progress Notes by Joaquín Gomez PA-C at 1/23/2017  8:37 AM     Author:  Joaquín Gomez PA-C Service:  Orthopedics Author Type:  Physician Assistant    Filed:  1/23/2017  9:37 AM Note Time:  1/23/2017  8:37 AM Status:  Addendum    :  Joaquín Gomez PA-C (Physician Assistant)      Related Notes: Original Note by Joaquín Gomez PA-C (Physician Assistant) filed at 1/23/2017  8:43 AM         [facility] Orth[facility]    Progress note    89 year old female POD#3 s/p Left Hip IM Nailing with TFN Nail  S: Patient seen at bedside, she was eating in no apparent distress, alert and pleasant.  I discussed surgery and rehabilitation with the patient.  She denies numbness, tingling or major pain issues.  She was joking this AM and in good spirits.  She is sitting up in bed.  Discussed DC to [facility] today.    O: CMS intact LLE, DF/PF intact       Dressing on, clean and dry with a good seal       Left Hip with minimal swelling and no erythema or ecchymosis        Bilateral calves soft and non tender    Vital signs:  Temp: 98.9  F (37.2  C) Temp src: Oral BP: 109/53 mmHg Pulse: 64 Heart Rate: 58 Resp: 16 SpO2: 95 % O2 Device: None (Room air) Oxygen Delivery: 1 LPM Height: 165.1 cm (5' 5\") Weight: 61.5 kg (135 lb 9.3 oz)  Estimated body mass index is 22.56 kg/(m^2) as calculated from the following:    Height as of this encounter: 1.651 m (5' 5\").    Weight as of this encounter: 61.5 kg (135 lb 9.3 oz).      HEMOGLOBIN   Date Value Ref Range Status   01/22/2017 7.4* 11.7 - 15.7 g/dL Final     INR   Date Value Ref Range Status   01/19/2017 1.05 0.86 - 1.14 Final         A/P:  1. Pain-controlled   2. DVT Prophylaxis-Lovenox 30 QD x 21 days  3. Weight Bearing Status-WBAT LLE  4. Physical [facility]   5. Occupational " Therapy-[facility].   6. Case Management-[facility] Today  7. Hospitalist-Discussed in depth with Dr. Turpin this AM.  She is holding Diabetic meds, increasing fluids for Cr bump, and leaving Lewis in utnil Wed or Thursday when the TCU can try and take it out then.    8. Incision-no signs or symptoms of infection  9. Plan-DC today to [facility].  I am reviewing DC orders now and printing and signing the Narcotic.  Will do DC summary after that.      Joaquín Gomez PA-C  1/23/2017                  Progress Notes by Shabana Turpin MD at 1/22/2017  7:30 AM     Author:  Shabana Turpin MD Service:  Family Medicine Author Type:  Physician    Filed:  1/22/2017  9:04 PM Note Time:  1/22/2017  7:30 AM Status:  Signed    :  Shabana Turpin MD (Physician)           [facility] Progress Note          Assessment and Plan:   Assessment:   Principal Problem:    Fractured hip, left, closed, initial encounter (H)    Assessment: Status post surgical intervention, currently postop day #2. Patient is been struggling greatly with adequate pain control for the past 12 hours with p.o. medications not providing significant relief    Plan:  Ongoing management per orthopedic surgery. Patient does have IV Dilaudid pain medication available that can be used for breakthrough as needed.    Active Problems:    Essential hypertension    Assessment:  Blood pressures have been stable in the 100-130 range systolically     Plan:  continue with home regimen metoprolol, decreased amlodipine, and continue to hold hydrochlorothiazide for now. Continue to monitor blood pressure closely and increase back to home regimen as tolerated.      Type 2 diabetes mellitus without complication (H)    Assessment: patient has had minimal oral intake secondary to pain today    Plan: Will stop glipizide until appetite improves and continue to monitor blood sugars and give SSI as  needed      Elevated creatinine    Assessment:   Creatinine is elevated up to 1.73, increased from 1.11 prior to surgery. Patient has not been eating or drinking well    Plan:   we'll restart IV fluids and recheck creatinine tomorrow. Continue to monitor urinary output closely and recheck sooner if patient has decreased urinary output.      Urinary retention    Assessment: patient required placement of rodriguez last evening secondary to ongoing urinary retention.      Plan:  Will continue with placement of rodriguez and perform UA to evaluate for infection etiology. Likely is complication of anesthesia as retention was only noted following surgical procedure.  Continue to monitor for appropriate urinary output.        Coronary artery disease involving native coronary artery of native heart without angina pectoris    Assessment:  chronic, no symptoms concerning for angina    Plan:  continue current regimen and monitor      Hyperlipidemia LDL goal <100    Assessment:  on simvastatin    Plan:  continue without change      Hypothyroidism due to acquired atrophy of thyroid    Assessment:  on Synthroid    Plan:  continue without change      Hypernatremia    Assessment:  present initially and thought secondary to mild dehydration and chronic hydrochlorothiazide use.  Sodium is normal on recheck today    Plan:  continue to hold Hydrocort Dyazide and monitor.      Chronic kidney disease, stage III (moderate)    Assessment:  with acute worsening as above    Plan:  continue to monitor closely as above      Anemia    Assessment:  chronic, with acute worsening secondary to surgical intervention. Hemoglobin has an stable at 7.4 for the past 2 days the patient is hemodynamically stable    Plan:  monitor as per orthopedic surgery's recommendations.         VTE:  SCDs  Code Status:  Full code        Interval History:   Appears worse today from a pain perspective, with pain not adequately controlled with p.o. regimen.  Vitals stable  "compared to yesterday.  No new concerns or issues, however patient has not been eating or drinking well for the past 12+ hours secondary to worsening pain with increased creatinine this morning           Significant Problems:     Past Medical History   Diagnosis Date     Diabetes mellitus (H)      Hypertension      Hyperlipemia      CAD (coronary artery disease)      remote hx of stent placement in past     CVA (cerebral vascular accident) (H) 2013     some left sided deficits            Physical Exam:   Blood pressure 127/49, pulse 68, temperature 98.1  F (36.7  C), temperature source Oral, resp. rate 16, height 1.651 m (5' 5\"), weight 61.5 kg (135 lb 9.3 oz), SpO2 95 %.  Constitutional:   awake, alert, very uncomfortable - lying with her eyes closed and moaning in pain, restless and wincing in pain with each movement of her legs, and appears stated age     Lungs:   No increased work of breathing, decreased air exchange however patient having difficulty focusing on taking deep breaths, clear to auscultation bilaterally, no crackles or wheezing     Cardiovascular:   Normal apical impulse, regular rate and rhythm, normal S1 and S2, no S3 or S4, and no murmur noted     Abdomen:   Bowel sounds present, abdomen soft     Musculoskeletal:   no lower extremity pitting edema present     Neurologic:   Awake, alert, oriented to name, place and time.       Skin:   normal skin color, texture, turgor - skin underneath surgical bandage was not viewed             Data:   All laboratory data reviewed    Attestation:  I have reviewed today's vital signs, notes, medications, labs and imaging.     Electronically Signed:  Shabana Turpin MD    Note: Chart documentation done in part with Dragon Voice Recognition software. Although reviewed after completion, some word and grammatical errors may remain.          Progress Notes by Myranda Saleem RN at 1/22/2017  5:56 PM     Author:  Myranda Saleem RN Service:  (none) Author Type:  " Registered Nurse    Filed:  1/22/2017  6:01 PM Note Time:  1/22/2017  5:56 PM Status:  Signed    :  Myranda Saleem RN (Registered Nurse)           S-(situation): end of shift    B-(background): hip fracture    A-(assessment): AO with intermittent confusion, VSS, RA T99.1,I/S encouraged,  BS currently 122 see flow sheet, patient feed supper this shift and ate 50%fluids encouraged; pain is currently at a tolerable level with 10mg oxycodone. 125cc's UOP, Incision is CDI with good CMS.      R-(recommendations): Will continue to monitor per POC.        Progress Notes by Myranda Saleem RN at 1/22/2017  5:12 PM     Author:  Myranda Saleem RN Service:  (none) Author Type:  Registered Nurse    Filed:  1/22/2017  5:45 PM Note Time:  1/22/2017  5:12 PM Status:  Addendum    :  Myranda Saleem RN (Registered Nurse)      Related Notes: Original Note by Myranda Saleem RN (Registered Nurse) filed at 1/22/2017  5:14 PM         BG 77 patient was given apple juice 120cc's will recheck til patient reaches 100+. Patients @ 1740.         Progress Notes by Myranda Saleem RN at 1/22/2017  4:38 PM     Author:  Myranda Saleem RN Service:  (none) Author Type:  Registered Nurse    Filed:  1/22/2017  4:41 PM Note Time:  1/22/2017  4:38 PM Status:  Signed    :  Myranda Saleem RN (Registered Nurse)           Writer contacted Dr. Hoffman about patients pain, decreased flexion of the left hip/leg and decreased appetite. Per Dr. Hoffman pain medication oxycodone was increased to 5-10 mg every 3 hours. Will continue to monitor patient per POC.         Progress Notes by Arely Charles OT at 1/22/2017 10:35 AM     Author:  Arely Charles OT Service:  (none) Author Type:  Occupational Therapist    Filed:  1/22/2017 10:36 AM Note Time:  1/22/2017 10:35 AM Status:  Signed    :  Arely Charles OT (Occupational Therapist)            01/21/17 0930   Quick Adds   Type of Visit Initial Occupational Therapy Evaluation   Living Environment   Lives  With alone   Living Arrangements condominium  ([facility] )   Home Accessibility tub/shower is not walk in;grab bars present (bathtub);bed and bath on same level   Number of Stairs to Enter Home 0   Number of Stairs Within Home 0   Stair Railings at Home none   Transportation Available van, wheelchair accessible   Living Environment Comment Patient lives in a senior independent living facility.  No stairs to enter, patient reports complete independence in ADL's and IADL's prior to injury.     Self-Care   Dominant Hand right   Usual Activity Tolerance moderate   Current Activity Tolerance poor   Regular Exercise no   Equipment Currently Used at Home (Has access to a shower chair )   Functional Level Prior   Ambulation 1-->assistive equipment  (cane )   Transferring 0-->independent   Toileting 0-->independent   Bathing 0-->independent   Dressing 0-->independent   Eating 0-->independent   Communication 0-->understands/communicates without difficulty   Swallowing 0-->swallows foods/liquids without difficulty   Cognition 0 - no cognition issues reported   Fall history within last six months yes   Number of times patient has fallen within last six months 1   Which of the above functional risks had a recent onset or change? ambulation;transferring;toileting;fall history   General Information   Onset of Illness/Injury or Date of Surgery - Date 01/20/17   Referring Physician Dr. Hoffman    Patient/Family Goals Statement Not formally stated    Additional Occupational Profile Info/Pertinent History of Current Problem Patient was on her deck shoveling the snow when she slipped on ice. She had landed on her left buttock and went backwards hitting the back of her head. No LOC. She was able to crawl to the door and was able to pull herself up by grabbing door knob. Once she got up she had immediate pain to left hip. She has been limping since the fall and reports increasing pain with movement.  There is a bump to back of head that is painful but no other pain to head. Her friend is with and states she is at her baseline, denying slurred vision or confusion. No nausea, vomiting, any new vision loss, chest pain or shortness of breath as well. She has no history of a previous hip injury.  Patient underwent a nail pinning on 1/20/17    Precautions/Limitations fall precautions   Weight-Bearing Status - LUE full weight-bearing   Weight-Bearing Status - RUE full weight-bearing   Weight-Bearing Status - LLE weight-bearing as tolerated   Weight-Bearing Status - RLE full weight-bearing   Cognitive Status Examination   Orientation orientation to person, place and time   Level of Consciousness alert   Able to Follow Commands WNL/WFL   Personal Safety (Cognitive) WNL/WFL   Memory intact   Attention No deficits were identified   Organization/Problem Solving No deficits were identified   Executive Function No deficits were identified   Visual Perception   Visual Perception Wears glasses   Sensory Examination   Sensory Quick Adds No deficits were identified   Pain Assessment   Patient Currently in Pain Yes, see Vital Sign flowsheet   Range of Motion (ROM)   ROM Quick Adds No deficits were identified   ROM Comment UE ROM WFL    Strength   Manual Muscle Testing Quick Adds No deficits were identified   Strength Comments UE MMT WFL     Coordination   Upper Extremity Coordination No deficits were identified   Mobility   Bed Mobility Bed mobility skill: Supine to sit   Bed Mobility Skill: Supine to Sit   Level of [place]: Supine/Sit maximum assist (25% patients effort)   Physical Assist/Nonphysical Assist: Supine/Sit 1 person assist;verbal cues;supervision;set-up required   Assistive Device: Supine/Sit bedrail   Transfer Skill: Sit to Stand   Level of [place]: Sit/Stand moderate assist (50% patients effort)   Physical Assist/Nonphysical Assist: Sit/Stand 1 person + 1 person to manage equipment;verbal  "cues;supervision;set-up required   Transfer Skill: Sit to Stand weight-bearing as tolerated   Assistive Device for Transfer: Sit/Stand rolling walker   Upper Body Dressing   Level of [place]: Dress Upper Body stand-by assist   Physical Assist/Nonphysical Assist: Dress Upper Body supervision;set-up required   Lower Body Dressing   Level of [place]: Dress Lower Body maximum assist (25% patients effort)   Physical Assist/Nonphysical Assist: Dress Lower Body 1 person assist   Eating/Self Feeding   Level of [place]: Eating independent   Instrumental Activities of Daily Living (IADL)   Previous Responsibilities housekeeping;meal prep;laundry;shopping;medication management;finances   Activities of Daily Living Analysis   Impairments Contributing to Impaired Activities of Daily Living pain   General Therapy Interventions   Planned Therapy Interventions ADL retraining;transfer training   Clinical Impression   Criteria for Skilled Therapeutic Interventions Met yes, treatment indicated   OT Diagnosis Decreased independence in ADL's and functional transfers    Influenced by the following impairments pain, post surgical precautions and limited ROM at the hip.     Assessment of Occupational Performance 1-3 Performance Deficits   Identified Performance Deficits Dressing, bathing, toileting, functional transfers    Clinical Decision Making (Complexity) Low complexity   Therapy Frequency daily   Predicted Duration of Therapy Intervention (days/wks) 3 days    Anticipated Equipment Needs at Discharge (Defer to rehab )   Anticipated Discharge Disposition Transitional Care Facility   Risks and Benefits of Treatment have been explained. Yes   Patient, Family & other staff in agreement with plan of care Yes   [facility] TM \"6 Clicks\"   2016, Trustees of [facility], under license to [employer].  All rights reserved.   6 Clicks Short Forms Daily Activity Inpatient Short Form   [facility] AM-PAC  " "\"6 Clicks\" V.2 Basic Mobility Inpatient Short Form                               [facility] AM-PAC  \"6 Clicks\" Daily Activity Inpatient Short Form   1. Putting on and taking off regular lower body clothing? 2 - A Lot   2. Bathing (including washing, rinsing, drying)? 2 - A Lot   3. Toileting, which includes using toilet, bedpan or urinal? 2 - A Lot   4. Putting on and taking off regular upper body clothing? 3 - A Little   5. Taking care of personal grooming such as brushing teeth? 3 - A Little   6. Eating meals? 4 - None   Daily Activity Raw Score (Score out of 24.Lower scores equate to lower levels of function) 16   Total Evaluation Time   Total Evaluation Time (Minutes) 15        Arely APARICIO/L         Progress Notes by Liborio Hoffman DO at 1/22/2017  8:41 AM     Author:  Liborio Hoffman DO Service:  Orthopedics Author Type:  Physician    Filed:  1/22/2017  8:52 AM Note Time:  1/22/2017  8:41 AM Status:  Addendum    :  Liborio Hoffman DO (Physician)      Related Notes: Original Note by Liborio Hoffman DO (Physician) filed at 1/22/2017  8:43 AM         [facility]  Orthopedics Progress Note           Assessment and Plan:    Assessment:   Post-operative day #2 Procedure(s):  OPEN REDUCTION INTERNAL FIXATION HIP NAILING   Acute post-operative blood loss anemia-asymptomatic         Plan:   Normal healing wound.  No immediate surgical complications identified.  No excessive bleeding  Pain well-controlled.  Tolerating physical therapy and rehabilitation well.  Encourage IS  Start or continue physical therapy  Up with assistance.  Weight-bear as tolerated.  Anticipate discharge from hospital in 24 hours. Discharge to nursing/rehab facility.   Pain control measures  Advance diet as tolerated  Routine wound care  DVT Prophylaxis: Lovenox, SCD's, Compression Hose  Hospitalist following and assisting in medical issues. appreciate the assistance in the " "management of CKD and urinary retention, HTN, DM            Interval History:   Doing well.  Continues to improve.  Pain is controlled.  No fevers.  Some issues with urinary retention-small [place] of [place] cath placed.            Review of Systems:    The patient denies any chest pain, shortness of breath, excessive pain, fever, chills, purulent drainage from the wound, nausea or vomiting.               Physical Exam:   General: awake, alert, appropriate and in no acute distress  Blood pressure 139/60, pulse 65, temperature 98.2  F (36.8  C), temperature source Oral, resp. rate 16, height 1.651 m (5' 5\"), weight 61.5 kg (135 lb 9.3 oz), SpO2 94 %.  Left hip:  Dressing clean, dry and intact. Surrounding skin intact, no breakdown. Calf is soft, nontender with no significant swelling. Distal neurovascular grossly intact.  Compartments soft and non-tender.             Data:     Results for orders placed or performed during the hospital encounter of 01/19/17 (from the past 24 hour(s))   Care Transition RN/SW IP Consult    Narrative    Freddie Hoyosi     1/21/2017 11:41 AM  Patient admitted as inpatient on 1/19/17 with diagnosis of Hip   Fracture.      Met with patient to discuss living situation and discharge needs.    Patient resides in [place] in her own town home where she lives   alone.    All discharge options reviewed and discussed with patient.    Patient hoping to admit to the TCU at [facility].  Referral   also sent to [facility] as patient's second choice, per her   request.     Plan is for patient to d/c to a TCU when medically stable and bed   available.     will continue to assess and assist with   disposition.    Manisha- Writer had discussed with patient that she has been   accepted for TCU placement at [facility] for Monday,   1/23/17, as they had a bed open up.  Patient in agreement with   the $14.00 per day private room charge and is very happy she can   go to [facility] upon " discharge.      Discussed transportation options.  Patient hoping to be able to   transport with family/friend on day of d/c.         Glucose by meter   Result Value Ref Range    Glucose 156 (H) 70 - 99 mg/dL   Glucose by meter   Result Value Ref Range    Glucose 168 (H) 70 - 99 mg/dL   Glucose by meter   Result Value Ref Range    Glucose 172 (H) 70 - 99 mg/dL   Hemoglobin   Result Value Ref Range    Hemoglobin 7.4 (L) 11.7 - 15.7 g/dL   Basic metabolic panel   Result Value Ref Range    Sodium 136 133 - 144 mmol/L    Potassium 3.6 3.4 - 5.3 mmol/L    Chloride 104 94 - 109 mmol/L    Carbon Dioxide 21 20 - 32 mmol/L    Anion Gap 11 3 - 14 mmol/L    Glucose 155 (H) 70 - 99 mg/dL    Urea Nitrogen 34 (H) 7 - 30 mg/dL    Creatinine 1.73 (H) 0.52 - 1.04 mg/dL    GFR Estimate 27 (L) >60 mL/min/1.7m2    GFR Estimate If Black 33 (L) >60 mL/min/1.7m2    Calcium 7.8 (L) 8.5 - 10.1 mg/dL   Glucose by meter   Result Value Ref Range    Glucose 163 (H) 70 - 99 mg/dL              Progress Notes by Myranda Saleem RN at 1/21/2017  6:48 PM     Author:  Myranda Saleem RN Service:  (none) Author Type:  Registered Nurse    Filed:  1/21/2017  6:51 PM Note Time:  1/21/2017  6:48 PM Status:  Signed    :  Myranda Saleem RN (Registered Nurse)           S-(situation): End of shift note    B-(background):Fractured hip, left, closed, initial encounter.     A-(assessment):  AOx4 VSS, afebrile RA, I/S encouraged, up to commode with 2 assist walker and gait belt and up to chair for supper; encouraged to eat. Incision is CDI with ice to area. Good CMS. , teds and PCD's, Oxycodone and dilaudid for pain control. Bladder scanned for 298cc's.     R-(recommendations):  Will continue to monitor per POC.         Progress Notes by Shabana Turpin MD at 1/21/2017  3:00 PM     Author:  Shabana Turpin MD Service:  Family Medicine Author Type:  Physician    Filed:  1/21/2017  6:05 PM Note Time:  1/21/2017  3:00 PM Status:  Signed     :  Shabana Turpin MD (Physician)           [facility] Progress Note          Assessment and Plan:   Assessment:   Principal Problem:    Fractured hip, left, closed, initial encounter (H)    Assessment:  Patient is postoperative day number 1, has been doing well and participating with therapy. Hemoglobin did decrease from 9.6 prior to surgery down to 7.2 this morning, however is stable on recheck at 7.4 with patient being asymptomatic.  Pain is well controlled    Plan:  Ongoing management per orthopedic surgery. Anticipate discharge most likely on postop day #3 to a transitional care unit for ongoing rehabilitation.     Active Problems:    Essential hypertension    Assessment:  Blood pressures continued to improve and recovering following surgery. Today systolic blood pressures have been in the 130s    Plan:  We'll continue with metoprolol at home dosing, reinitiate amlodipine 5 mg today and consider increasing to 10 mg tomorrow, which is patient's previous home dosing if blood pressure tolerates. We'll continue to hold hydrochlorothiazide.      Type 2 diabetes mellitus without complication (H)    Assessment: Blood sugar is doing well, patient's appetite is improving    Plan:  We'll restart home glipizide at 2.5 mg tomorrow morning.      Urinary retention    Assessment:  Patient has needed straight catheter ×1 following surgical intervention. Patient has not voided throughout the day, however bladder scan has only shown 200 cc of urine present in the bladder at this time.    Plan:  We'll continue to monitor closely and consider straight catheter versus Lewis if urinary retention continues. Of note, patient has been intolerant of Lewis catheter placement and maintenance in the past during his hospitalization.      Coronary artery disease involving native coronary artery of native heart without angina pectoris    Assessment:  Known history without symptoms concerning for angina    Plan:   Continue home regimen and monitor      Hyperlipidemia LDL goal <100    Assessment:  On simvastatin    Plan:  Continue without change      Hypothyroidism due to acquired atrophy of thyroid    Assessment:  On Synthroid    Plan:  Continue home regimen without change      Hypernatremia     Assessment:  Presents initially thought secondary to mild dehydration and chronic hydrochlorothiazide use, trending downward but had not yet resolved    Plan:  We'll recheck sodium again tomorrow for ongoing monitoring.  Continue to hold hydrochlorothiazide.      Chronic kidney disease, stage III (moderate)    Assessment:  Chronic, creatinine had improved from 1.2 down to 1.1 following IV fluid rehydration    Plan:  Recheck creatinine tomorrow to ensure ongoing stability.      Anemia    Assessment: acute on chronic.  Hemoglobin was 9.6 prior to surgery. Did decrease down to 7.2 this morning, however recheck showed stability with repeat hemoglobin this afternoon 7.4. Patient currently asymptomatic    Plan:  Continue to monitor, ongoing decision regarding transfusion as per orthopedic surgery.        VTE:  SCDs  Code Status:  Full Code        Interval History:   Continues to improve.  Vital signs generally better and recovering well following surgery.  Blood pressures today have been in the 130s systolic.  Eating and voiding well.  Tolerating medications without significant side effects.  Has had urinary retention since rodriguez was removed following surgery - needed straight cath x1 this morning and has not been able to void yet this afternoon however bladder scan shows only 200 cc present.            Significant Problems:     Past Medical History   Diagnosis Date     Diabetes mellitus (H)      Hypertension      Hyperlipemia      CAD (coronary artery disease)      remote hx of stent placement in past     CVA (cerebral vascular accident) (H) 2013     some left sided deficits            Physical Exam:   Blood pressure 134/70, pulse 60,  "temperature 97  F (36.1  C), temperature source Oral, resp. rate 18, height 1.651 m (5' 5\"), weight 61.5 kg (135 lb 9.3 oz), SpO2 94 %.  Constitutional:   awake, alert, cooperative, no apparent distress, and appears stated age     Lungs:   No increased work of breathing, good air exchange, clear to auscultation bilaterally, no crackles or wheezing     Cardiovascular:   Normal apical impulse, regular rate and rhythm, normal S1 and S2, no S3 or S4, and no murmur noted     Abdomen:   Bowel sounds present, abdomen soft and non-tender     Musculoskeletal:   no lower extremity pitting edema present     Neurologic:   Awake, alert, oriented to name, place and time.       Skin:   normal skin color, texture, turgor             Data:   All laboratory data reviewed    Attestation:  I have reviewed today's vital signs, notes, medications, labs and imaging.     Electronically Signed:  Shabana Turpin MD    Note: Chart documentation done in part with Dragon Voice Recognition software. Although reviewed after completion, some word and grammatical errors may remain.          Progress Notes by Liborio Hoffman DO at 1/21/2017  7:09 AM     Author:  Liborio Hoffman DO Service:  Orthopedics Author Type:  Physician    Filed:  1/21/2017  7:11 AM Note Time:  1/21/2017  7:09 AM Status:  Signed    :  Liborio Hoffman DO (Physician)           [facility]  Orthopedics Progress Note           Assessment and Plan:    Assessment:   Post-operative day #1 Procedure(s):  OPEN REDUCTION INTERNAL FIXATION HIP NAILING   Acute post-operative blood loss anemia-asymptomatic         Plan:   Doing well.  Normal healing wound.  No immediate surgical complications identified.  No excessive bleeding  Pain well-controlled.  Tolerating physical therapy and rehabilitation well.  hgb 7.2 this am, will recheck   Encourage IS  Start or continue physical therapy  Up with assistance.  Weight-bear as " "tolerated.  Anticipate discharge from hospital in 24-48 hours.  Discharge to nursing/rehab facility.  Will need rehab/nursing placement.  Pain control measures  Advance diet as tolerated  Routine wound care  DVT Prophylaxis: Lovenox, SCD's, Compression Hose  Hospitalist following and assisting in medical issues.            Interval History:   Doing well.  Continues to improve.  Pain is well-controlled.  No fevers.  No overnight events           Review of Systems:    The patient denies any chest pain, shortness of breath, excessive pain, fever, chills, purulent drainage from the wound, nausea or vomiting.               Physical Exam:   General: awake, alert, appropriate and in no acute distress  Blood pressure 139/67, pulse 59, temperature 96.8  F (36  C), temperature source Oral, resp. rate 20, height 1.651 m (5' 5\"), weight 61.5 kg (135 lb 9.3 oz), SpO2 94 %.  Left hip:  Dressing clean, dry and intact. Surrounding skin intact, no breakdown. Calf is soft, nontender with no significant swelling. Distal neurovascular grossly intact.  Compartments soft and non-tender.no swelling of thigh.              Data:     Results for orders placed or performed during the hospital encounter of 01/19/17 (from the past 24 hour(s))   Glucose by meter   Result Value Ref Range    Glucose 94 70 - 99 mg/dL   MR Hip Left w/o Contrast    Narrative    MR HIP LEFT WITHOUT CONTRAST 1/20/2017 10:17 AM    HISTORY: Better visualization of left hip.    COMPARISONS: Pelvis and left hip x-rays dated 1/19/2017. CT  pelvis/left hip dated 1/19/2017.    TECHNIQUE: Coronal T1 and STIR.  Axial T1 and T2 fat suppression.    FINDINGS:   Osseous and Cartilaginous Structures: There is a completed, comminuted  fracture of the left greater trochanter. One of these fracture lines  extends into the intertrochanteric region of the left proximal femur  but does not appear to extend through the anterior cortex inferiorly.  This fracture appears acute. No other " osseous fractures identified.  Bone marrow signal is otherwise within normal limits. There are small  osteophytes surrounding the right femoral head and around the right  acetabulum. Subchondral cyst is seen in the posterior mid right  acetabulum. These are consistent with degenerative change. Small  cystic structure in the left proximal femoral neck likely represents a  pit (synovial herniation pit). Bone marrow signal intensity is  otherwise within normal limits.    Acetabular Labrum: No juxtaacetabular cyst. No obvious labral tear is  appreciated, allowing for the large FOV technique. If indicated  clinically, MR arthrography would be considered the study of choice in  this regard.    Trochanteric and Iliopsoas Bursae: No fluid collection.    Common Hamstring Tendon: Intact.    Additional Findings: There is significant edema in the gluteus and  abductor and adductor muscles of the left hip with fluid collections  also seen in this region. These are most consistent with muscle tears  and strain. This is more edema typically seen in a hip fracture.  Muscle signal intensity in the right hip is within normal limits.  Visualized intrapelvic structures are grossly unremarkable. There is a  small left hip joint effusion.      Impression    IMPRESSION:   1. Comminuted left greater trochanteric fracture with mild  displacement corresponds with the CT findings from 1/19/2017. One of  the fracture lines extends into the intertrochanteric region of the  proximal left femur and to the posterior cortex but does not appear to  be completed as it does not appear to exit the anterior cortex of the  femur in this region.  2. Muscle strains/tears of the abductor and adductor muscles of the  left hip.  3. Small left hip joint effusion.  4. Mild to moderate degenerative changes of the right hip.    I discussed the findings of the left proximal femoral fracture and  probable muscular tear/strain with Dr. Hoffman on 1/20/2017  at  approximately 10:30 AM.    GABO FRIEND MD   Glucose by meter   Result Value Ref Range    Glucose 82 70 - 99 mg/dL   XR Surgery CHELSI L/T 5 Min Fluoro w Stills    Narrative    This exam was marked as non-reportable because it will not be read by a   radiologist or a [place] non-radiologist provider.             Glucose by meter   Result Value Ref Range    Glucose 229 (H) 70 - 99 mg/dL   Hemoglobin   Result Value Ref Range    Hemoglobin 7.2 (L) 11.7 - 15.7 g/dL              Progress Notes by Iveth Dutta RN at 1/20/2017 12:33 PM     Author:  Iveth Dutta RN Service:  (none) Author Type:  Registered Nurse    Filed:  1/20/2017  1:05 PM Note Time:  1/20/2017 12:33 PM Status:  Signed    :  Iveth Dutta RN (Registered Nurse)           Transfer from pacu to Room med/surg  Transferred to bed via Hospital bed   S: 89 y/o F S/P ORIF left hip nailing  Anesthesia Type: General  Surgeon: Dr. Hoffman  Allergies: See Medication Reconciliation Record  DNR: No   B: Pertinent Medical History: See Record  Surgical History: See Record  A: EBL: 75ml  IVF: 300ml in OR; 50ml in pacu  UOP: None  Pain: denies  NPO: ___Yes _X__No   Vomiting: ___Yes _X__No   Drainage: None  Skin Integrity: Intact except left hip incisions - covered.   RFO: ___Yes__X_No   Brace/sling/equipment: ___Yes__X_No  See PACU record for ongoing assessment, vital signs and pain assessment.  R: Post-Op vitals and assessments as ordered/indicated per patient's condition.  Follow Post-Op orders and notify Physician prn.  Continue to involve patient/family in plan of care and discharge planning.  Reinforce Pre-Operative education.  Implement skin safety interventions as appropriate.  Name: Iveth DUMAS RN, report given to OTONIEL Osorio           Progress Notes by Munira Anthony RN at 1/20/2017 10:38 AM     Author:  Munira Anthony RN Service:  Skilled Nursing Author Type:  Registered Nurse    Filed:  1/20/2017 10:39 AM Note Time:  1/20/2017 10:38 AM Status:   Signed    :  Munira Anthony, RN (Registered Nurse)           Transfer of care to Surgery, SDS staff x 2, transferred in her bed from Med/Surg.       Progress Notes by Shabana Turpin MD at 1/20/2017 10:29 AM     Author:  Shabana Turpin MD Service:  Family Medicine Author Type:  Physician    Filed:  1/20/2017 10:36 AM Note Time:  1/20/2017 10:29 AM Status:  Signed    :  Shabana Turpin MD (Physician)           [facility] Progress Note          Assessment and Plan:   Assessment:   Principal Problem:    Closed left hip fracture (H)    Assessment: Visible on CT scan, following slipping on the ice.      Plan: Orthopedic surgery has seen and evaluated and is planning on going to surgery for repair later this morning.      Active Problems:    Essential hypertension    Assessment: patient normally takes metoprolol, HCTZ, and amlodipine for blood pressures.  Morning medications were held in anticipation of surgery today.  Blood pressures this morning having been in the 130-140s systolic.      Plan: continue to hold home regimen for now.  Would consider restarting metoprolol and amlodipine following surgery depending of blood pressures but would hold HCTZ for the next 1-2 days as patient recovers from surgery        Type 2 diabetes mellitus without complication (H)    Assessment: patient's home glipizide has been held and blood sugars have been monitored.  Have been trending down since NPO.  Currently is 82, trending downward from 94 at 0800.      Plan: Will start D5 1/2 NS to avoid hypoglycemia during surgery.  Continue to monitor blood sugars every 4 hours while NPO.        Coronary artery disease involving native coronary artery of native heart without angina pectoris    Assessment: chronic and stable, no symptoms concerning for angina    Plan: continue to monitor      Hyperlipidemia LDL goal <100    Assessment: patient normally on simvastatin    Plan: continue to  "hold secondary to NPO status, restart following surgery.      Hypothyroidism due to acquired atrophy of thyroid    Assessment: on synthroid    Plan: continue to hold secondary to NPO status, restart after surgery       Hypernatremia    Assessment: thought secondary to mild dehydration at the of admission and HCTZ use, improved but not resolved following IV fluids    Plan: continue with 1/2 NS as above, recheck tomorrow morning.  Hold HCTZ as above      Chronic kidney disease, stage III (moderate)    Assessment: improved slightly with IV fluids overnight with creatinine decreasing from 1.22 to 1.11 and GFR increasing from 41 to 46.    Plan: Change IV fluids as above to contain dextrose but otherwise continue with 1/2NS at 100 cc/hour for now, recheck creatinine tomorrow morning.      Anemia    Assessment: chronic, slightly worsened from 10.1 down to 9.6 following rehydration    Plan: continue to monitor following surgery.          VTE:  SCDs  Code Status:  Full code        Interval History:   About the same today.  Vitals signs stable with blood pressures tolerating held morning medications.  Blood sugars stable but trending downward with NPO status and most recently in the 80-90s.  Pain well controlled with current regimen.  Tolerating medications and treatment plan without significant side effects or problems.  Eating and voiding well.  No new concerns today.            Significant Problems:     Past Medical History   Diagnosis Date     Diabetes mellitus (H)      Hypertension      Hyperlipemia      CAD (coronary artery disease)      remote hx of stent placement in past     CVA (cerebral vascular accident) (H) 2013     some left sided deficits            Physical Exam:   Blood pressure 132/52, pulse 65, temperature 98.9  F (37.2  C), temperature source Oral, resp. rate 20, height 1.651 m (5' 5\"), weight 61.5 kg (135 lb 9.3 oz), SpO2 96 %.  Constitutional:   awake, alert, cooperative, no apparent distress, and " appears stated age     Lungs:   No increased work of breathing, good air exchange, clear to auscultation bilaterally, no crackles or wheezing     Cardiovascular:   Normal apical impulse, regular rate and rhythm, normal S1 and S2, no S3 or S4, and no murmur noted     Abdomen:   Bowel sounds present, abdomen soft and non-tender     Musculoskeletal:   SCDs in place, no edema in feet and lower legs     Neurologic:   Awake, alert, oriented to name, place and time.       Skin:   normal skin color, texture, turgor             Data:   All laboratory data reviewed    Attestation:  I have reviewed today's vital signs, notes, medications, labs and imaging.     Electronically Signed:  Shabana Turpin MD    Note: Chart documentation done in part with Dragon Voice Recognition software. Although reviewed after completion, some word and grammatical errors may remain.                Procedure Notes     No notes of this type exist for this encounter.         Progress Notes - Therapies (Notes from 01/20/17 through 01/23/17)      Progress Notes by Arely Charles OT at 1/22/2017 10:35 AM     Author:  Arely Charles OT Service:  (none) Author Type:  Occupational Therapist    Filed:  1/22/2017 10:36 AM Note Time:  1/22/2017 10:35 AM Status:  Signed    :  Arely Charles OT (Occupational Therapist)            01/21/17 0930   Quick Adds   Type of Visit Initial Occupational Therapy Evaluation   Living Environment   Lives With alone   Living Arrangements condominium  ([facility] )   Home Accessibility tub/shower is not walk in;grab bars present (bathtub);bed and bath on same level   Number of Stairs to Enter Home 0   Number of Stairs Within Home 0   Stair Railings at Home none   Transportation Available van, wheelchair accessible   Living Environment Comment Patient lives in a senior independent living facility.  No stairs to enter, patient reports complete independence in ADL's and IADL's prior to  injury.     Self-Care   Dominant Hand right   Usual Activity Tolerance moderate   Current Activity Tolerance poor   Regular Exercise no   Equipment Currently Used at Home (Has access to a shower chair )   Functional Level Prior   Ambulation 1-->assistive equipment  (cane )   Transferring 0-->independent   Toileting 0-->independent   Bathing 0-->independent   Dressing 0-->independent   Eating 0-->independent   Communication 0-->understands/communicates without difficulty   Swallowing 0-->swallows foods/liquids without difficulty   Cognition 0 - no cognition issues reported   Fall history within last six months yes   Number of times patient has fallen within last six months 1   Which of the above functional risks had a recent onset or change? ambulation;transferring;toileting;fall history   General Information   Onset of Illness/Injury or Date of Surgery - Date 01/20/17   Referring Physician Dr. Hoffman    Patient/Family Goals Statement Not formally stated    Additional Occupational Profile Info/Pertinent History of Current Problem Patient was on her deck shoveling the snow when she slipped on ice. She had landed on her left buttock and went backwards hitting the back of her head. No LOC. She was able to crawl to the door and was able to pull herself up by grabbing door knob. Once she got up she had immediate pain to left hip. She has been limping since the fall and reports increasing pain with movement. There is a bump to back of head that is painful but no other pain to head. Her friend is with and states she is at her baseline, denying slurred vision or confusion. No nausea, vomiting, any new vision loss, chest pain or shortness of breath as well. She has no history of a previous hip injury.  Patient underwent a nail pinning on 1/20/17    Precautions/Limitations fall precautions   Weight-Bearing Status - LUE full weight-bearing   Weight-Bearing Status - RUE full weight-bearing   Weight-Bearing Status - LLE  weight-bearing as tolerated   Weight-Bearing Status - RLE full weight-bearing   Cognitive Status Examination   Orientation orientation to person, place and time   Level of Consciousness alert   Able to Follow Commands WNL/WFL   Personal Safety (Cognitive) WNL/WFL   Memory intact   Attention No deficits were identified   Organization/Problem Solving No deficits were identified   Executive Function No deficits were identified   Visual Perception   Visual Perception Wears glasses   Sensory Examination   Sensory Quick Adds No deficits were identified   Pain Assessment   Patient Currently in Pain Yes, see Vital Sign flowsheet   Range of Motion (ROM)   ROM Quick Adds No deficits were identified   ROM Comment UE ROM WFL    Strength   Manual Muscle Testing Quick Adds No deficits were identified   Strength Comments UE MMT WFL     Coordination   Upper Extremity Coordination No deficits were identified   Mobility   Bed Mobility Bed mobility skill: Supine to sit   Bed Mobility Skill: Supine to Sit   Level of [place]: Supine/Sit maximum assist (25% patients effort)   Physical Assist/Nonphysical Assist: Supine/Sit 1 person assist;verbal cues;supervision;set-up required   Assistive Device: Supine/Sit bedrail   Transfer Skill: Sit to Stand   Level of [place]: Sit/Stand moderate assist (50% patients effort)   Physical Assist/Nonphysical Assist: Sit/Stand 1 person + 1 person to manage equipment;verbal cues;supervision;set-up required   Transfer Skill: Sit to Stand weight-bearing as tolerated   Assistive Device for Transfer: Sit/Stand rolling walker   Upper Body Dressing   Level of [place]: Dress Upper Body stand-by assist   Physical Assist/Nonphysical Assist: Dress Upper Body supervision;set-up required   Lower Body Dressing   Level of [place]: Dress Lower Body maximum assist (25% patients effort)   Physical Assist/Nonphysical Assist: Dress Lower Body 1 person assist   Eating/Self Feeding   Level of [place]:  "Eating independent   Instrumental Activities of Daily Living (IADL)   Previous Responsibilities housekeeping;meal prep;laundry;shopping;medication management;finances   Activities of Daily Living Analysis   Impairments Contributing to Impaired Activities of Daily Living pain   General Therapy Interventions   Planned Therapy Interventions ADL retraining;transfer training   Clinical Impression   Criteria for Skilled Therapeutic Interventions Met yes, treatment indicated   OT Diagnosis Decreased independence in ADL's and functional transfers    Influenced by the following impairments pain, post surgical precautions and limited ROM at the hip.     Assessment of Occupational Performance 1-3 Performance Deficits   Identified Performance Deficits Dressing, bathing, toileting, functional transfers    Clinical Decision Making (Complexity) Low complexity   Therapy Frequency daily   Predicted Duration of Therapy Intervention (days/wks) 3 days    Anticipated Equipment Needs at Discharge (Defer to rehab )   Anticipated Discharge Disposition Transitional Care Facility   Risks and Benefits of Treatment have been explained. Yes   Patient, Family & other staff in agreement with plan of care Yes   [facility] TM \"6 Clicks\"   2016, Trustees of [employer] [facility] [employer], under license to [employer].  All rights reserved.   6 Clicks Short Forms Daily Activity Inpatient Short Form   [facility]  \"6 Clicks\" V.2 Basic Mobility Inpatient Short Form                               [facility]  \"6 Clicks\" Daily Activity Inpatient Short Form   1. Putting on and taking off regular lower body clothing? 2 - A Lot   2. Bathing (including washing, rinsing, drying)? 2 - A Lot   3. Toileting, which includes using toilet, bedpan or urinal? 2 - A Lot   4. Putting on and taking off regular upper body clothing? 3 - A Little   5. Taking care of personal grooming such as brushing teeth? 3 - A Little   6. Eating meals? 4 - " None   Daily Activity Raw Score (Score out of 24.Lower scores equate to lower levels of function) 16   Total Evaluation Time   Total Evaluation Time (Minutes) 15        Arely APARICIO/ANTONIO

## 2017-01-19 NOTE — IP AVS SNAPSHOT
` [address] MEDICAL SURGICAL: [phone]            Medication Administration Report for Mary Ellen Cuba as of 01/23/17 1317   Legend:    Given Hold Not Given Due Canceled Entry Other Actions    Time Time (Time) Time  Time-Action       Inactive    Active    Linked        Medications 01/17/17 01/18/17 01/19/17 01/20/17 01/21/17 01/22/17 01/23/17    acetaminophen (TYLENOL) tablet 650 mg  Dose: 650 mg Freq: EVERY 4 HOURS PRN Route: PO  PRN Reason: other  PRN Comment: surgical pain  Start: 01/22/17 1041   Admin Instructions: May give first dose 4 hours after last scheduled dose of acetaminophen.  Hold if getting IV tylenol  Maximum acetaminophen dose from all sources = 75 mg/kg/day not to exceed 4 grams/day.          1155 (650 mg)-Given            allopurinol (ZYLOPRIM) tablet 100 mg  Dose: 100 mg Freq: 2 TIMES DAILY Route: PO  Start: 01/20/17 2100       2150 (100 mg)-Given        0829 (100 mg)-Given       2100 (100 mg)-Given        0859 (100 mg)-Given       2035 (100 mg)-Given        0835 (100 mg)-Given       [ ] 2100           amLODIPine (NORVASC) tablet 5 mg  Dose: 5 mg Freq: DAILY Route: PO  Start: 01/21/17 1500        1557 (5 mg)-Given        0856 (5 mg)-Given        0832 (5 mg)-Given           benzocaine-menthol (CHLORASEPTIC) 6-10 MG lozenge 1-2 lozenge  Dose: 1-2 lozenge Freq: EVERY 1 HOUR PRN Route: BU  PRN Reason: sore throat  PRN Comment: sore throat without fever  Start: 01/20/17 1338              bisacodyl (DULCOLAX) Suppository 10 mg  Dose: 10 mg Freq: DAILY PRN Route: RE  PRN Reason: constipation  Start: 01/22/17 2046   Admin Instructions: Hold for loose stools.  This is the third step of a three step constipation treatment protocol.               clopidogrel (PLAVIX) tablet 75 mg  Dose: 75 mg Freq: DAILY Route: PO  Start: 01/21/17 0900        0829 (75 mg)-Given        0856 (75 mg)-Given        0836 (75 mg)-Given           dextrose 5% and 0.45% NaCl infusion  Rate: 125 mL/hr Freq:  CONTINUOUS Route: IV  Start: 01/20/17 1030       1032 ( )-New Bag       1107-Stopped       1428 ( )-Restarted       2333 ( )-New Bag        1106 ( )-New Bag       1941 ( )-New Bag        0540 ( )-New Bag       1554 ( )-New Bag       1654 ( )-Rate/Dose Verify       2110 ( )-Rate/Dose Change        0132 ( )-New Bag           docusate sodium (COLACE) capsule 100 mg  Dose: 100 mg Freq: 2 TIMES DAILY Route: PO  Start: 01/22/17 2100   Admin Instructions: To prevent constipation.  Hold for loose stools.          2111 (100 mg)-Given        0833 (100 mg)-Given       [ ] 2100           enoxaparin (LOVENOX) injection 30 mg  Dose: 30 mg Freq: EVERY 24 HOURS Route: SC  Start: 01/23/17 0900   Admin Instructions: Check to make sure start date/time is 12-24 hours post op unless documented complication, AND no sooner than 22 hours post op if spinal anesthesia used.   Continue until discharge to home. HOLD if platelet count falls below 50% of baseline or less than 100,000/ L and notify provider.           0837 (30 mg)-Given           fenofibrate tablet 160 mg  Dose: 160 mg Freq: DAILY Route: PO  Start: 01/20/17 1345       1543 (160 mg)-Given        0829 (160 mg)-Given        0858 (160 mg)-Given        0835 (160 mg)-Given           folic acid (FOLVITE) tablet 400 mcg  Dose: 400 mcg Freq: DAILY Route: PO  Start: 01/20/17 1345       1543 (400 mcg)-Given        0829 (400 mcg)-Given        0859 (400 mcg)-Given        0835 (400 mcg)-Given           glucose 40 % gel 15-30 g  Dose: 15-30 g Freq: EVERY 15 MIN PRN Route: PO  PRN Reason: low blood sugar  Start: 01/20/17 1906   Admin Instructions: Give 15 g for BG 51 to 69 mg/dL IF patient is conscious and able to swallow. Give 30 g for BG less than or equal to 50 mg/dL IF patient is conscious and able to swallow. Do NOT give glucose gel via enteral tube.  IF patient has enteral tube: give apple juice 120 mL (4 oz or 15 g of CHO) via enteral tube for BG 51 to 69 mg/dL.  Give apple juice 240 mL  (8 oz or 30 g of CHO) via enteral tube for BG less than or equal to 50 mg/dL.              Or  dextrose 50 % injection 25-50 mL  Dose: 25-50 mL Freq: EVERY 15 MIN PRN Route: IV  PRN Reason: low blood sugar  Start: 01/20/17 1906   Admin Instructions: Use if have IV access, BG less than 70 mg/dL and meet dose criteria below:  Dose if conscious and alert (or disorientated) and NPO = 25 mL  Dose if unconscious / not alert = 50 mL              Or  glucagon injection 1 mg  Dose: 1 mg Freq: EVERY 15 MIN PRN Route: SC  PRN Reason: low blood sugar  PRN Comment: May repeat x 1 only  Start: 01/20/17 1906   Admin Instructions: May give SQ or IM. IF BG less than or equal to 50 mg/dL and no IV access.  ONLY use glucagon IF patient has NO IV access AND is UNABLE to swallow.               HYDROmorphone (PF) (DILAUDID) injection 0.2 mg  Dose: 0.2 mg Freq: EVERY 2 HOURS PRN Route: IV  PRN Reason: severe pain  PRN Comment: or if patient unable to take PO  Start: 01/20/17 1338   Admin Instructions: Hold while on PCA.        1551 (0.2 mg)-Given        1723 (0.2 mg)-Given        1552 (0.2 mg)-Given            hydrOXYzine (ATARAX) tablet 10 mg  Dose: 10 mg Freq: EVERY 6 HOURS PRN Route: PO  PRN Reason: itching  Start: 01/20/17 1338   Admin Instructions: Caution to be used when administering multiple CNS depressing meds within a short time frame.         2100 (10 mg)-Given        0531 (10 mg)-Given            insulin aspart (NovoLOG) inj (RAPID ACTING)  Dose: 1-5 Units Freq: AT BEDTIME Route: SC  Start: 01/20/17 2100   Admin Instructions: MEDIUM INSULIN RESISTANCE DOSING    Do Not give Bedtime Correction Insulin if BG less than  200.   For  - 249 give 1 units.   For  - 299 give 2 units.   For  - 349 give 3 units.   For  -399 give 4 units.   For BG greater than or equal to 400 give 5 units.  Notify provider if glucose greater than or equal to 350 mg/dL after administration of correction dose.  If given at mealtime,  "must be administered 5 min before meal or immediately after.        2151 (1 Units)-Given [C]        (2100)-Not Given        (2111)-Not Given        [ ] 2100           insulin aspart (NovoLOG) inj (RAPID ACTING)  Dose: 1-7 Units Freq: 3 TIMES DAILY BEFORE MEALS Route: SC  Start: 01/21/17 0800   Admin Instructions: Correction Scale - MEDIUM INSULIN RESISTANCE DOSING     Do Not give Correction Insulin if Pre-Meal BG less than 140.   For Pre-Meal  - 189 give 1 unit.   For Pre-Meal  - 239 give 2 units.   For Pre-Meal  - 289 give 3 units.   For Pre-Meal  - 339 give 4 units.   For Pre-Meal - 399 give 5 units.   For Pre-Meal -449 give 6 units  For Pre-Meal BG greater than or equal to 450 give 7 units.   To be given with prandial insulin, and based on pre-meal blood glucose.    Notify provider if glucose greater than or equal to 350 mg/dL after administration of correction dose.  If given at mealtime, must be administered 5 min before meal or immediately after.         0828 (1 Units)-Given       1210 (1 Units)-Given       1724 (1 Units)-Given        0853 (1 Units)-Given       (1200)-Not Given       (1805)-Not Given        (0837)-Not Given       (1200)-Not Given       [ ] 1700           levothyroxine (SYNTHROID/LEVOTHROID) tablet 112 mcg  Dose: 112 mcg Freq: DAILY Route: PO  Start: 01/20/17 1345       1543 (112 mcg)-Given        0829 (112 mcg)-Given        0857 (112 mcg)-Given        0836 (112 mcg)-Given           lidocaine (LMX4) kit  Freq: EVERY 1 HOUR PRN Route: Top  PRN Reason: pain  PRN Comment: with VAD insertion or accessing implanted port.  Start: 01/20/17 1338   Admin Instructions: Do NOT give if patient has a history of allergy to any local anesthetic or any \"yair\" product.   Apply 30 minutes prior to VAD insertion or port access.  MAX Dose:  2.5 g (  of 5 g tube)               lidocaine 1 % 1 mL  Dose: 1 mL Freq: EVERY 1 HOUR PRN Route: OTHER  PRN Comment: mild pain with VAD " "insertion or accessing implanted port  Start: 01/20/17 1338   Admin Instructions: Do NOT give if patient has a history of allergy to any local anesthetic or any \"yair\" product. MAX dose 1 mL subcutaneous OR intradermal in divided doses.               metoprolol (TOPROL-XL) 24 hr tablet 25 mg  Dose: 25 mg Freq: DAILY Route: PO  Start: 01/20/17 1345       1543 (25 mg)-Given        0829 (25 mg)-Given        0856 (25 mg)-Given        0833 (25 mg)-Given           multivitamin, therapeutic with minerals (THERA-VIT-M) tablet 1 tablet  Dose: 1 tablet Freq: DAILY Route: PO  Start: 01/20/17 1345       1543 (1 tablet)-Given        0829 (1 tablet)-Given        0856 (1 tablet)-Given        0832 (1 tablet)-Given           naloxone (NARCAN) injection 0.1-0.4 mg  Dose: 0.1-0.4 mg Freq: EVERY 2 MIN PRN Route: IV  PRN Reason: opioid reversal  Start: 01/20/17 1338   Admin Instructions: For respiratory rate LESS than or EQUAL to 8.  Partial reversal dose:  0.1 mg titrated q 2 minutes for Analgesia Side Effects Monitoring Sedation Level of 3 (frequently drowsy, arousable, drifts to sleep during conversation).Full reversal dose:  0.4 mg bolus for Analgesia Side Effects Monitoring Sedation Level of 4 (somnolent, minimal or no response to stimulation).               ondansetron (ZOFRAN-ODT) ODT tab 4 mg  Dose: 4 mg Freq: EVERY 6 HOURS PRN Route: PO  PRN Reason: nausea  Start: 01/20/17 1338   Admin Instructions: This is Step 1 of nausea and vomiting management.  If nausea not resolved in 15 minutes, go to Step 2 prochlorperazine (COMPAZINE). Do not push through foil backing. Peel back foil and gently remove. Place on tongue immediately. Administration with liquid unnecessary              Or  ondansetron (ZOFRAN) injection 4 mg  Dose: 4 mg Freq: EVERY 6 HOURS PRN Route: IV  PRN Reasons: nausea,vomiting  Start: 01/20/17 1338   Admin Instructions: This is Step 1 of nausea and vomiting management.  If nausea not resolved in 15 minutes, go to " Step 2 prochlorperazine (COMPAZINE).               oxyCODONE (ROXICODONE) IR tablet 5-10 mg  Dose: 5-10 mg Freq: EVERY 3 HOURS PRN Route: PO  PRN Reason: moderate to severe pain  Start: 01/22/17 1636   Admin Instructions: IF CrCl is UNKNOWN start at lowest end of dosing range.  Hold while on PCA or with regular IV opioid dosing.          1707 (10 mg)-Given       2035 (10 mg)-Given        0132 (10 mg)-Given       0620 (10 mg)-Given       1004 (10 mg)-Given       1315 (10 mg)-Given           polyethylene glycol (MIRALAX/GLYCOLAX) Packet 17 g  Dose: 17 g Freq: DAILY PRN Route: PO  PRN Reason: constipation  Start: 01/22/17 2046   Admin Instructions: Give in 8oz of  water, juice, or soda. Hold for loose stools.  This is the second step of a three step constipation treatment protocol.  1 Packet = 17 grams. Mixed prescribed dose in 8 ounces of water. Follow with 8 oz. of water.               prochlorperazine (COMPAZINE) injection 5 mg  Dose: 5 mg Freq: EVERY 6 HOURS PRN Route: IV  PRN Reasons: nausea,vomiting  Start: 01/20/17 1338   Admin Instructions: This is Step 2 of nausea and vomiting management.   If nausea not resolved in 15 minutes, give metoclopramide (REGLAN) if ordered (step 3 of nausea and vomiting management)              Or  prochlorperazine (COMPAZINE) tablet 5 mg  Dose: 5 mg Freq: EVERY 6 HOURS PRN Route: PO  PRN Reasons: nausea,vomiting  Start: 01/20/17 1338   Admin Instructions: This is Step 2 of nausea and vomiting management.   If nausea not resolved in 15 minutes, give metoclopramide (REGLAN) if ordered (step 3 of nausea and vomiting management)               senna-docusate (SENOKOT-S;PERICOLACE) 8.6-50 MG per tablet 1-2 tablet  Dose: 1-2 tablet Freq: 2 TIMES DAILY PRN Route: PO  PRN Comment: constipation   Start: 01/22/17 2046   Admin Instructions: If no bowel movement in 24 hours, increase to 2 tablets PO BID.  Hold for loose stools.   This is the first step of a three step constipation treatment  protocol.               simvastatin (Zocor) tablet 10 mg  Dose: 10 mg Freq: AT BEDTIME Route: PO  Start: 01/20/17 2100 2150 (10 mg)-Given        2059 (10 mg)-Given        2035 (10 mg)-Given        [ ] 2100           sodium chloride (PF) 0.9% PF flush 3 mL  Dose: 3 mL Freq: EVERY 8 HOURS Route: IK  Start: 01/20/17 1345   Admin Instructions: And Q1H PRN, to lock peripheral IV dormant line.        (1351)-Not Given       (2150)-Not Given        (0523)-Not Given       (1351)-Not Given       (2047)-Not Given        (0541)-Not Given       (1338)-Not Given       (2111)-Not Given        (0457)-Not Given       (1310)-Not Given       [ ] 2145           sodium chloride (PF) 0.9% PF flush 3 mL  Dose: 3 mL Freq: EVERY 1 HOUR PRN Route: IK  PRN Reason: line flush  PRN Comment: for peripheral IV flush post IV meds  Start: 01/20/17 1338             Completed Medications  Medications 01/17/17 01/18/17 01/19/17 01/20/17 01/21/17 01/22/17 01/23/17         Dose: 1,000 mg Freq: EVERY 8 HOURS Route: IV  Last Dose: 1,000 mg (01/21/17 1210)  Start: 01/20/17 1245   End: 01/21/17 1225   Admin Instructions: Maximum dose of acetaminophen is 4000 mg from all sources.  Maximum acetaminophen dose from all sources= 75 mg/kg/day not to exceed 4 grams/day.        1422 (1,000 mg)-New Bag       2006 (1,000 mg)-New Bag        0359 (1,000 mg)-New Bag       1210 (1,000 mg)-New Bag               Dose: 1 g Freq: EVERY 8 HOURS Route: IV  Indications of Use: SURGICAL PROPHYLAXIS  Start: 01/20/17 1900   End: 01/21/17 0344   Admin Instructions: First post-op dose due 8 hours after intra-op dose, see eMAR.        1829 (1 g)-New Bag        0314 (1 g)-New Bag            Discontinued Medications  Medications 01/17/17 01/18/17 01/19/17 01/20/17 01/21/17 01/22/17 01/23/17         Dose: 650 mg Freq: EVERY 4 HOURS PRN Route: PO  PRN Reason: other  PRN Comment: surgical pain  Start: 01/23/17 0000   End: 01/22/17 1041   Admin Instructions: May give first dose 4  hours after last scheduled dose of acetaminophen.  Hold if getting IV tylenol  Maximum acetaminophen dose from all sources = 75 mg/kg/day not to exceed 4 grams/day.          1041-Med Discontinued          Dose: 650 mg Freq: EVERY 4 HOURS PRN Route: PO  PRN Reason: mild pain  Start: 01/19/17 1943   End: 01/20/17 1339   Admin Instructions: Alternate ibuprofen (if ordered) with acetaminophen.  Maximum acetaminophen dose from all sources = 75 mg/kg/day not to exceed 4 grams/day.        1040-Auto Hold       1319-Unhold       1339-Med Discontinued            Dose: 2.5 mg Freq: EVERY 4 HOURS PRN Route: NEBULIZATION  PRN Reason: shortness of breath / dyspnea  Start: 01/20/17 1252   End: 01/20/17 1319       1319-Med Discontinued            Dose: 40 mg Freq: EVERY 24 HOURS Route: SC  Start: 01/21/17 0900   End: 01/22/17 0931   Admin Instructions: Check to make sure start date/time is 12-24 hours post op unless documented complication, AND no sooner than 22 hours post op if spinal anesthesia used.   Continue until discharge to home. HOLD if platelet count falls below 50% of baseline or less than 100,000/ L and notify provider.         0829 (40 mg)-Given        0854 (40 mg)-Given       0931-Med Discontinued          Dose: 25-50 mcg Freq: EVERY 2 MIN PRN Route: IV  PRN Reason: other  PRN Comment: acute pain  Start: 01/20/17 1252   End: 01/20/17 1319   Admin Instructions: MAX cumulative dose = 250 mcg.    Use Fentanyl initially, as a short acting agent for acute pain control.  If insufficient, or a longer acting agent is needed, begin Morphine or Hydromorphone if ordered.        1319-Med Discontinued            Dose: 2.5 mg Freq: EVERY MORNING BEFORE BREAKFAST Route: PO  Start: 01/22/17 0730   End: 01/22/17 1550   Admin Instructions: Take before or with meals.          0728 (2.5 mg)-Given       1550-Med Discontinued          Dose: 15-30 g Freq: EVERY 15 MIN PRN Route: PO  PRN Reason: low blood sugar  Start: 01/19/17 2015   End:  01/20/17 1339   Admin Instructions: Give 15 g for BG 51 to 69 mg/dL IF patient is conscious and able to swallow. Give 30 g for BG less than or equal to 50 mg/dL IF patient is conscious and able to swallow. Do NOT give glucose gel via enteral tube.  IF patient has enteral tube: give apple juice 120 mL (4 oz or 15 g of CHO) via enteral tube for BG 51 to 69 mg/dL.  Give apple juice 240 mL (8 oz or 30 g of CHO) via enteral tube for BG less than or equal to 50 mg/dL.        1040-Auto Hold       1319-Unhold       1339-Med Discontinued         Or    Dose: 25-50 mL Freq: EVERY 15 MIN PRN Route: IV  PRN Reason: low blood sugar  Start: 01/19/17 2015   End: 01/20/17 1339   Admin Instructions: Use if have IV access, BG less than 70 mg/dL and meet dose criteria below:  Dose if conscious and alert (or disorientated) and NPO = 25 mL  Dose if unconscious / not alert = 50 mL        1040-Auto Hold       1319-Unhold       1339-Med Discontinued         Or    Dose: 1 mg Freq: EVERY 15 MIN PRN Route: SC  PRN Reason: low blood sugar  PRN Comment: May repeat x 1 only  Start: 01/19/17 2015   End: 01/20/17 1339   Admin Instructions: May give SQ or IM. IF BG less than or equal to 50 mg/dL and no IV access.  ONLY use glucagon IF patient has NO IV access AND is UNABLE to swallow.        1040-Auto Hold       1319-Unhold       1339-Med Discontinued            Dose: 1-2 tablet Freq: EVERY 4 HOURS PRN Route: PO  PRN Reason: moderate to severe pain  Start: 01/19/17 1851   End: 01/20/17 1339   Admin Instructions: Maximum acetaminophen dose from all sources= 75 mg/kg/day not to exceed 4 grams       1855 (1 tablet)-Given        0016 (1 tablet)-Given       0539 (1 tablet)-Given       1040-Auto Hold       1319-Unhold       1339-Med Discontinued            Dose: 0.2 mg Freq: EVERY 2 HOURS PRN Route: IV  PRN Reason: moderate to severe pain  Start: 01/19/17 2015   End: 01/20/17 1339       0903 (0.2 mg)-Given       1040-Auto Hold       1319-Unhold      "  1339-Med Discontinued            Dose: 0.3-0.5 mg Freq: EVERY 5 MIN PRN Route: IV  PRN Reason: moderate to severe pain  PRN Comment: acute pain.  May administer if RR is > 10   Start: 01/20/17 1252   End: 01/20/17 1319   Admin Instructions: If fentanyl is also ordered, use HYDROmorphone if pain control insufficient with fentanyl or a longer acting agent is needed.   Max cumulative dose = 2 mg        1319-Med Discontinued            Dose: 1-6 Units Freq: EVERY 4 HOURS Route: SC  Start: 01/19/17 2030   End: 01/20/17 1339   Admin Instructions: Correction Scale - MEDIUM INSULIN RESISTANCE DOSING     Do Not give Correction Insulin if BG less than 140.  For  - 189 give 1 unit.  For  - 239 give 2 units.  For  - 289 give 3 units.  For  - 339 give 4 units.  For  - 399 give 5 units.  For BG greater than or equal to 400 give 6 units.  Check blood glucose Q4H and administer based on blood glucose.  Notify provider if glucose greater than or equal to 350 mg/dL after administration of correction dose.  If given at mealtime, must be administered 5 min before meal or immediately after.       (2046)-Not Given [C]        (0016)-Not Given       (0402)-Not Given [C]       (0827)-Not Given       1040-Auto Hold       1230-Automatically Held       1319-Unhold       1339-Med Discontinued            Rate: 100 mL/hr Freq: CONTINUOUS Route: IV  Start: 01/20/17 1300   End: 01/20/17 1319   Admin Instructions: Continue until IV catheter is weaned               1319-Med Discontinued            Freq: EVERY 1 HOUR PRN Route: Top  PRN Reason: pain  PRN Comment: with VAD insertion or accessing implanted port.  Start: 01/19/17 1943   End: 01/20/17 1339   Admin Instructions: Do NOT give if patient has a history of allergy to any local anesthetic or any \"yair\" product.   Apply 30 minutes prior to VAD insertion or port access.  MAX Dose:  2.5 g (  of 5 g tube)        1040-Auto Hold       1319-Unhold       1339-Med " "Discontinued            Dose: 1 mL Freq: EVERY 1 HOUR PRN Route: OTHER  PRN Comment: mild pain with VAD insertion or accessing implanted port  Start: 01/19/17 1943   End: 01/20/17 1339   Admin Instructions: Do NOT give if patient has a history of allergy to any local anesthetic or any \"yair\" product. MAX dose 1 mL subcutaneous OR intradermal in divided doses.        1040-Auto Hold       1319-Unhold       1339-Med Discontinued            Dose: 0.1-0.4 mg Freq: EVERY 2 MIN PRN Route: IV  PRN Reason: opioid reversal  Start: 01/19/17 1943   End: 01/20/17 1339   Admin Instructions: For respiratory rate LESS than or EQUAL to 8.  Partial reversal dose:  0.1 mg titrated q 2 minutes for Analgesia Side Effects Monitoring Sedation Level of 3 (frequently drowsy, arousable, drifts to sleep during conversation).Full reversal dose:  0.4 mg bolus for Analgesia Side Effects Monitoring Sedation Level of 4 (somnolent, minimal or no response to stimulation).        1040-Auto Hold       1319-Unhold       1339-Med Discontinued            Dose: 4 mg Freq: EVERY 30 MIN PRN Route: PO  PRN Reason: nausea  Start: 01/20/17 1251   End: 01/20/17 1319   Admin Instructions: MAX total dose = 8 mg, including OR dosing. If not resolved in 15 minutes, then go to step 2 (Prochlorperazine if ordered).        1319-Med Discontinued         Or    Dose: 4 mg Freq: EVERY 30 MIN PRN Route: IV  PRN Reason: nausea  Start: 01/20/17 1251   End: 01/20/17 1319   Admin Instructions: MAX total dose = 8 mg, including OR dosing. If not resolved in 15 minutes, then go to step 2 (Prochlorperazine if ordered).        1319-Med Discontinued            Dose: 5 mg Freq: EVERY 3 HOURS PRN Route: PO  PRN Reason: moderate to severe pain  Start: 01/20/17 1338   End: 01/22/17 1638   Admin Instructions: IF CrCl is UNKNOWN start at lowest end of dosing range.  Hold while on PCA or with regular IV opioid dosing.        1829 (5 mg)-Given       2349 (5 mg)-Given        0404 (5 " mg)-Given       0829 (5 mg)-Given       1224 (5 mg)-Given       1557 (5 mg)-Given       2100 (5 mg)-Given        0531 (5 mg)-Given       0856 (5 mg)-Given       1155 (5 mg)-Given       1638-Med Discontinued          Dose: 5 mg Freq: EVERY 6 HOURS PRN Route: IV  PRN Reasons: nausea,vomiting  Start: 01/20/17 1252   End: 01/20/17 1319   Admin Instructions: This is Step 2 of the nausea and vomiting protocol.   If nausea not resolved in 15 minutes, give Metoclopramide if ordered (step 3 of nausea and vomiting protocol)        1319-Med Discontinued            Dose: 3 mL Freq: EVERY 8 HOURS Route: IK  Start: 01/19/17 1945   End: 01/20/17 1339   Admin Instructions: And Q1H PRN, to lock peripheral IV dormant line.       (2035)-Not Given        (0402)-Not Given       1040-Auto Hold       1145-Automatically Held       1319-Unhold       1339-Med Discontinued            Dose: 3 mL Freq: EVERY 1 HOUR PRN Route: IK  PRN Reason: line flush  PRN Comment: for peripheral IV flush post IV meds  Start: 01/19/17 1943   End: 01/20/17 1339       1040-Auto Hold       1319-Unhold       1339-Med Discontinued       Medications 01/17/17 01/18/17 01/19/17 01/20/17 01/21/17 01/22/17 01/23/17

## 2017-01-20 ENCOUNTER — ANESTHESIA (OUTPATIENT)
Dept: SURGERY | Facility: CLINIC | Age: 82
DRG: 481 | End: 2017-01-20
Payer: MEDICARE

## 2017-01-20 ENCOUNTER — APPOINTMENT (OUTPATIENT)
Dept: MRI IMAGING | Facility: CLINIC | Age: 82
DRG: 481 | End: 2017-01-20
Attending: FAMILY MEDICINE
Payer: MEDICARE

## 2017-01-20 ENCOUNTER — ANESTHESIA EVENT (OUTPATIENT)
Dept: SURGERY | Facility: CLINIC | Age: 82
DRG: 481 | End: 2017-01-20
Payer: MEDICARE

## 2017-01-20 ENCOUNTER — APPOINTMENT (OUTPATIENT)
Dept: GENERAL RADIOLOGY | Facility: CLINIC | Age: 82
DRG: 481 | End: 2017-01-20
Attending: ORTHOPAEDIC SURGERY
Payer: MEDICARE

## 2017-01-20 ENCOUNTER — SURGERY (OUTPATIENT)
Age: 82
End: 2017-01-20

## 2017-01-20 ENCOUNTER — APPOINTMENT (OUTPATIENT)
Dept: PHYSICAL THERAPY | Facility: CLINIC | Age: 82
DRG: 481 | End: 2017-01-20
Attending: ORTHOPAEDIC SURGERY
Payer: MEDICARE

## 2017-01-20 PROBLEM — S72.002A: Status: ACTIVE | Noted: 2017-01-20

## 2017-01-20 LAB
ANION GAP SERPL CALCULATED.3IONS-SCNC: 10 MMOL/L (ref 3–14)
BACTERIA SPEC CULT: NORMAL
BUN SERPL-MCNC: 25 MG/DL (ref 7–30)
CALCIUM SERPL-MCNC: 8.4 MG/DL (ref 8.5–10.1)
CHLORIDE SERPL-SCNC: 112 MMOL/L (ref 94–109)
CO2 SERPL-SCNC: 23 MMOL/L (ref 20–32)
CREAT SERPL-MCNC: 1.11 MG/DL (ref 0.52–1.04)
GFR SERPL CREATININE-BSD FRML MDRD: 46 ML/MIN/1.7M2
GLUCOSE BLDC GLUCOMTR-MCNC: 229 MG/DL (ref 70–99)
GLUCOSE BLDC GLUCOMTR-MCNC: 82 MG/DL (ref 70–99)
GLUCOSE BLDC GLUCOMTR-MCNC: 87 MG/DL (ref 70–99)
GLUCOSE BLDC GLUCOMTR-MCNC: 94 MG/DL (ref 70–99)
GLUCOSE BLDC GLUCOMTR-MCNC: 99 MG/DL (ref 70–99)
GLUCOSE SERPL-MCNC: 89 MG/DL (ref 70–99)
HBA1C MFR BLD: 5.3 % (ref 4.3–6)
HGB BLD-MCNC: 9.6 G/DL (ref 11.7–15.7)
MICRO REPORT STATUS: NORMAL
POTASSIUM SERPL-SCNC: 3.7 MMOL/L (ref 3.4–5.3)
SODIUM SERPL-SCNC: 145 MMOL/L (ref 133–144)
SPECIMEN SOURCE: NORMAL

## 2017-01-20 PROCEDURE — 25000125 ZZHC RX 250: Performed by: ORTHOPAEDIC SURGERY

## 2017-01-20 PROCEDURE — 83036 HEMOGLOBIN GLYCOSYLATED A1C: CPT | Performed by: FAMILY MEDICINE

## 2017-01-20 PROCEDURE — 0QH706Z INSERTION OF INTRAMEDULLARY INTERNAL FIXATION DEVICE INTO LEFT UPPER FEMUR, OPEN APPROACH: ICD-10-PCS | Performed by: ORTHOPAEDIC SURGERY

## 2017-01-20 PROCEDURE — C1713 ANCHOR/SCREW BN/BN,TIS/BN: HCPCS | Performed by: ORTHOPAEDIC SURGERY

## 2017-01-20 PROCEDURE — 25000125 ZZHC RX 250: Performed by: NURSE ANESTHETIST, CERTIFIED REGISTERED

## 2017-01-20 PROCEDURE — 80048 BASIC METABOLIC PNL TOTAL CA: CPT | Performed by: FAMILY MEDICINE

## 2017-01-20 PROCEDURE — 25000566 ZZH SEVOFLURANE, EA 15 MIN: Performed by: ORTHOPAEDIC SURGERY

## 2017-01-20 PROCEDURE — 99232 SBSQ HOSP IP/OBS MODERATE 35: CPT | Mod: 57 | Performed by: ORTHOPAEDIC SURGERY

## 2017-01-20 PROCEDURE — 97161 PT EVAL LOW COMPLEX 20 MIN: CPT | Mod: GP

## 2017-01-20 PROCEDURE — 25000125 ZZHC RX 250: Performed by: FAMILY MEDICINE

## 2017-01-20 PROCEDURE — 25000128 H RX IP 250 OP 636: Performed by: NURSE ANESTHETIST, CERTIFIED REGISTERED

## 2017-01-20 PROCEDURE — 40000306 ZZH STATISTIC PRE PROC ASSESS II: Performed by: ORTHOPAEDIC SURGERY

## 2017-01-20 PROCEDURE — A9270 NON-COVERED ITEM OR SERVICE: HCPCS | Mod: GY | Performed by: ORTHOPAEDIC SURGERY

## 2017-01-20 PROCEDURE — 40000193 ZZH STATISTIC PT WARD VISIT

## 2017-01-20 PROCEDURE — 27210995 ZZH RX 272: Performed by: FAMILY MEDICINE

## 2017-01-20 PROCEDURE — A9270 NON-COVERED ITEM OR SERVICE: HCPCS | Mod: GY | Performed by: FAMILY MEDICINE

## 2017-01-20 PROCEDURE — 27211024 ZZHC OR SUPPLY OTHER OPNP: Performed by: ORTHOPAEDIC SURGERY

## 2017-01-20 PROCEDURE — 25800025 ZZH RX 258: Performed by: FAMILY MEDICINE

## 2017-01-20 PROCEDURE — 37000008 ZZH ANESTHESIA TECHNICAL FEE, 1ST 30 MIN: Performed by: ORTHOPAEDIC SURGERY

## 2017-01-20 PROCEDURE — 27210794 ZZH OR GENERAL SUPPLY STERILE: Performed by: ORTHOPAEDIC SURGERY

## 2017-01-20 PROCEDURE — 25000132 ZZH RX MED GY IP 250 OP 250 PS 637: Mod: GY | Performed by: ORTHOPAEDIC SURGERY

## 2017-01-20 PROCEDURE — 36415 COLL VENOUS BLD VENIPUNCTURE: CPT | Performed by: FAMILY MEDICINE

## 2017-01-20 PROCEDURE — 40000277 XR SURGERY CARM FLUORO LESS THAN 5 MIN W STILLS: Mod: TC

## 2017-01-20 PROCEDURE — 00000146 ZZHCL STATISTIC GLUCOSE BY METER IP

## 2017-01-20 PROCEDURE — 99232 SBSQ HOSP IP/OBS MODERATE 35: CPT | Performed by: FAMILY MEDICINE

## 2017-01-20 PROCEDURE — 37000009 ZZH ANESTHESIA TECHNICAL FEE, EACH ADDTL 15 MIN: Performed by: ORTHOPAEDIC SURGERY

## 2017-01-20 PROCEDURE — 25000132 ZZH RX MED GY IP 250 OP 250 PS 637: Mod: GY | Performed by: FAMILY MEDICINE

## 2017-01-20 PROCEDURE — 27245 TREAT THIGH FRACTURE: CPT | Mod: LT | Performed by: ORTHOPAEDIC SURGERY

## 2017-01-20 PROCEDURE — 12000007 ZZH R&B INTERMEDIATE

## 2017-01-20 PROCEDURE — 71000014 ZZH RECOVERY PHASE 1 LEVEL 2 FIRST HR: Performed by: ORTHOPAEDIC SURGERY

## 2017-01-20 PROCEDURE — 36000063 ZZH SURGERY LEVEL 4 EA 15 ADDTL MIN: Performed by: ORTHOPAEDIC SURGERY

## 2017-01-20 PROCEDURE — 73721 MRI JNT OF LWR EXTRE W/O DYE: CPT | Mod: LT

## 2017-01-20 PROCEDURE — 85018 HEMOGLOBIN: CPT | Performed by: FAMILY MEDICINE

## 2017-01-20 PROCEDURE — 36000065 ZZH SURGERY LEVEL 4 W FLUORO 1ST 30 MIN: Performed by: ORTHOPAEDIC SURGERY

## 2017-01-20 DEVICE — IMPLANTABLE DEVICE: Type: IMPLANTABLE DEVICE | Site: FEMUR | Status: FUNCTIONAL

## 2017-01-20 RX ORDER — FENOFIBRATE 160 MG/1
160 TABLET ORAL DAILY
Status: DISCONTINUED | OUTPATIENT
Start: 2017-01-20 | End: 2017-01-23 | Stop reason: HOSPADM

## 2017-01-20 RX ORDER — ACETAMINOPHEN 10 MG/ML
1000 INJECTION, SOLUTION INTRAVENOUS EVERY 8 HOURS
Status: COMPLETED | OUTPATIENT
Start: 2017-01-20 | End: 2017-01-21

## 2017-01-20 RX ORDER — HYDROMORPHONE HYDROCHLORIDE 1 MG/ML
.3-.5 INJECTION, SOLUTION INTRAMUSCULAR; INTRAVENOUS; SUBCUTANEOUS EVERY 5 MIN PRN
Status: DISCONTINUED | OUTPATIENT
Start: 2017-01-20 | End: 2017-01-20

## 2017-01-20 RX ORDER — FENTANYL CITRATE 50 UG/ML
25-50 INJECTION, SOLUTION INTRAMUSCULAR; INTRAVENOUS
Status: DISCONTINUED | OUTPATIENT
Start: 2017-01-20 | End: 2017-01-20

## 2017-01-20 RX ORDER — ONDANSETRON 2 MG/ML
4 INJECTION INTRAMUSCULAR; INTRAVENOUS EVERY 6 HOURS PRN
Status: DISCONTINUED | OUTPATIENT
Start: 2017-01-20 | End: 2017-01-23 | Stop reason: HOSPADM

## 2017-01-20 RX ORDER — LIDOCAINE 40 MG/G
CREAM TOPICAL
Status: DISCONTINUED | OUTPATIENT
Start: 2017-01-20 | End: 2017-01-23 | Stop reason: HOSPADM

## 2017-01-20 RX ORDER — ONDANSETRON 2 MG/ML
4 INJECTION INTRAMUSCULAR; INTRAVENOUS EVERY 30 MIN PRN
Status: DISCONTINUED | OUTPATIENT
Start: 2017-01-20 | End: 2017-01-20

## 2017-01-20 RX ORDER — HYDROXYZINE HYDROCHLORIDE 10 MG/1
10 TABLET, FILM COATED ORAL EVERY 6 HOURS PRN
Status: DISCONTINUED | OUTPATIENT
Start: 2017-01-20 | End: 2017-01-23 | Stop reason: HOSPADM

## 2017-01-20 RX ORDER — SODIUM CHLORIDE, SODIUM LACTATE, POTASSIUM CHLORIDE, CALCIUM CHLORIDE 600; 310; 30; 20 MG/100ML; MG/100ML; MG/100ML; MG/100ML
500 INJECTION, SOLUTION INTRAVENOUS CONTINUOUS
Status: DISCONTINUED | OUTPATIENT
Start: 2017-01-20 | End: 2017-01-20 | Stop reason: HOSPADM

## 2017-01-20 RX ORDER — METOPROLOL SUCCINATE 25 MG/1
25 TABLET, EXTENDED RELEASE ORAL DAILY
Status: DISCONTINUED | OUTPATIENT
Start: 2017-01-20 | End: 2017-01-23 | Stop reason: HOSPADM

## 2017-01-20 RX ORDER — OXYCODONE HYDROCHLORIDE 5 MG/1
5 TABLET ORAL
Status: DISCONTINUED | OUTPATIENT
Start: 2017-01-20 | End: 2017-01-22

## 2017-01-20 RX ORDER — SIMVASTATIN 5 MG
10 TABLET ORAL AT BEDTIME
Status: DISCONTINUED | OUTPATIENT
Start: 2017-01-20 | End: 2017-01-23 | Stop reason: HOSPADM

## 2017-01-20 RX ORDER — LANOLIN ALCOHOL/MO/W.PET/CERES
400 CREAM (GRAM) TOPICAL DAILY
Status: DISCONTINUED | OUTPATIENT
Start: 2017-01-20 | End: 2017-01-23 | Stop reason: HOSPADM

## 2017-01-20 RX ORDER — MULTIPLE VITAMINS W/ MINERALS TAB 9MG-400MCG
1 TAB ORAL DAILY
Status: DISCONTINUED | OUTPATIENT
Start: 2017-01-20 | End: 2017-01-23 | Stop reason: HOSPADM

## 2017-01-20 RX ORDER — NICOTINE POLACRILEX 4 MG
15-30 LOZENGE BUCCAL
Status: DISCONTINUED | OUTPATIENT
Start: 2017-01-20 | End: 2017-01-23 | Stop reason: HOSPADM

## 2017-01-20 RX ORDER — ONDANSETRON 4 MG/1
4 TABLET, ORALLY DISINTEGRATING ORAL EVERY 30 MIN PRN
Status: DISCONTINUED | OUTPATIENT
Start: 2017-01-20 | End: 2017-01-20

## 2017-01-20 RX ORDER — CEFAZOLIN SODIUM 1 G/3ML
1 INJECTION, POWDER, FOR SOLUTION INTRAMUSCULAR; INTRAVENOUS EVERY 8 HOURS
Status: COMPLETED | OUTPATIENT
Start: 2017-01-20 | End: 2017-01-21

## 2017-01-20 RX ORDER — ONDANSETRON 4 MG/1
4 TABLET, ORALLY DISINTEGRATING ORAL EVERY 6 HOURS PRN
Status: DISCONTINUED | OUTPATIENT
Start: 2017-01-20 | End: 2017-01-23 | Stop reason: HOSPADM

## 2017-01-20 RX ORDER — LEVOTHYROXINE SODIUM 112 UG/1
112 TABLET ORAL DAILY
Status: DISCONTINUED | OUTPATIENT
Start: 2017-01-20 | End: 2017-01-23 | Stop reason: HOSPADM

## 2017-01-20 RX ORDER — ALLOPURINOL 100 MG/1
100 TABLET ORAL 2 TIMES DAILY
Status: DISCONTINUED | OUTPATIENT
Start: 2017-01-20 | End: 2017-01-23 | Stop reason: HOSPADM

## 2017-01-20 RX ORDER — GLYCOPYRROLATE 0.2 MG/ML
INJECTION, SOLUTION INTRAMUSCULAR; INTRAVENOUS PRN
Status: DISCONTINUED | OUTPATIENT
Start: 2017-01-20 | End: 2017-01-20

## 2017-01-20 RX ORDER — LIDOCAINE HYDROCHLORIDE 20 MG/ML
INJECTION, SOLUTION INFILTRATION; PERINEURAL PRN
Status: DISCONTINUED | OUTPATIENT
Start: 2017-01-20 | End: 2017-01-20

## 2017-01-20 RX ORDER — FENTANYL CITRATE 50 UG/ML
25-50 INJECTION, SOLUTION INTRAMUSCULAR; INTRAVENOUS
Status: DISCONTINUED | OUTPATIENT
Start: 2017-01-20 | End: 2017-01-20 | Stop reason: HOSPADM

## 2017-01-20 RX ORDER — EPHEDRINE SULFATE 50 MG/ML
INJECTION, SOLUTION INTRAMUSCULAR; INTRAVENOUS; SUBCUTANEOUS PRN
Status: DISCONTINUED | OUTPATIENT
Start: 2017-01-20 | End: 2017-01-20

## 2017-01-20 RX ORDER — ALBUTEROL SULFATE 0.83 MG/ML
2.5 SOLUTION RESPIRATORY (INHALATION) EVERY 4 HOURS PRN
Status: DISCONTINUED | OUTPATIENT
Start: 2017-01-20 | End: 2017-01-20

## 2017-01-20 RX ORDER — CEFAZOLIN SODIUM 1 G/3ML
1 INJECTION, POWDER, FOR SOLUTION INTRAMUSCULAR; INTRAVENOUS SEE ADMIN INSTRUCTIONS
Status: DISCONTINUED | OUTPATIENT
Start: 2017-01-20 | End: 2017-01-20 | Stop reason: HOSPADM

## 2017-01-20 RX ORDER — ONDANSETRON 4 MG/1
4 TABLET, ORALLY DISINTEGRATING ORAL EVERY 30 MIN PRN
Status: DISCONTINUED | OUTPATIENT
Start: 2017-01-20 | End: 2017-01-20 | Stop reason: HOSPADM

## 2017-01-20 RX ORDER — SODIUM CHLORIDE, SODIUM LACTATE, POTASSIUM CHLORIDE, CALCIUM CHLORIDE 600; 310; 30; 20 MG/100ML; MG/100ML; MG/100ML; MG/100ML
1000 INJECTION, SOLUTION INTRAVENOUS CONTINUOUS
Status: DISCONTINUED | OUTPATIENT
Start: 2017-01-20 | End: 2017-01-20

## 2017-01-20 RX ORDER — PROPOFOL 10 MG/ML
INJECTION, EMULSION INTRAVENOUS PRN
Status: DISCONTINUED | OUTPATIENT
Start: 2017-01-20 | End: 2017-01-20

## 2017-01-20 RX ORDER — ACETAMINOPHEN 325 MG/1
650 TABLET ORAL EVERY 4 HOURS PRN
Status: DISCONTINUED | OUTPATIENT
Start: 2017-01-23 | End: 2017-01-22

## 2017-01-20 RX ORDER — FENTANYL CITRATE 50 UG/ML
INJECTION, SOLUTION INTRAMUSCULAR; INTRAVENOUS PRN
Status: DISCONTINUED | OUTPATIENT
Start: 2017-01-20 | End: 2017-01-20

## 2017-01-20 RX ORDER — SODIUM CHLORIDE, SODIUM LACTATE, POTASSIUM CHLORIDE, CALCIUM CHLORIDE 600; 310; 30; 20 MG/100ML; MG/100ML; MG/100ML; MG/100ML
1000 INJECTION, SOLUTION INTRAVENOUS CONTINUOUS
Status: DISCONTINUED | OUTPATIENT
Start: 2017-01-20 | End: 2017-01-20 | Stop reason: HOSPADM

## 2017-01-20 RX ORDER — NALOXONE HYDROCHLORIDE 0.4 MG/ML
.1-.4 INJECTION, SOLUTION INTRAMUSCULAR; INTRAVENOUS; SUBCUTANEOUS
Status: DISCONTINUED | OUTPATIENT
Start: 2017-01-20 | End: 2017-01-23 | Stop reason: HOSPADM

## 2017-01-20 RX ORDER — CLOPIDOGREL BISULFATE 75 MG/1
75 TABLET ORAL DAILY
Status: DISCONTINUED | OUTPATIENT
Start: 2017-01-21 | End: 2017-01-23 | Stop reason: HOSPADM

## 2017-01-20 RX ORDER — HYDROMORPHONE HYDROCHLORIDE 1 MG/ML
0.2 INJECTION, SOLUTION INTRAMUSCULAR; INTRAVENOUS; SUBCUTANEOUS
Status: DISCONTINUED | OUTPATIENT
Start: 2017-01-20 | End: 2017-01-23 | Stop reason: HOSPADM

## 2017-01-20 RX ORDER — PROCHLORPERAZINE MALEATE 5 MG
5 TABLET ORAL EVERY 6 HOURS PRN
Status: DISCONTINUED | OUTPATIENT
Start: 2017-01-20 | End: 2017-01-23 | Stop reason: HOSPADM

## 2017-01-20 RX ORDER — HYDROMORPHONE HYDROCHLORIDE 1 MG/ML
.3-.5 INJECTION, SOLUTION INTRAMUSCULAR; INTRAVENOUS; SUBCUTANEOUS EVERY 5 MIN PRN
Status: DISCONTINUED | OUTPATIENT
Start: 2017-01-20 | End: 2017-01-20 | Stop reason: HOSPADM

## 2017-01-20 RX ORDER — ALBUTEROL SULFATE 0.83 MG/ML
2.5 SOLUTION RESPIRATORY (INHALATION) EVERY 4 HOURS PRN
Status: DISCONTINUED | OUTPATIENT
Start: 2017-01-20 | End: 2017-01-20 | Stop reason: HOSPADM

## 2017-01-20 RX ORDER — SODIUM CHLORIDE 9 MG/ML
INJECTION, SOLUTION INTRAVENOUS CONTINUOUS PRN
Status: DISCONTINUED | OUTPATIENT
Start: 2017-01-20 | End: 2017-01-20

## 2017-01-20 RX ORDER — CEFAZOLIN SODIUM 2 G/100ML
2 INJECTION, SOLUTION INTRAVENOUS
Status: COMPLETED | OUTPATIENT
Start: 2017-01-20 | End: 2017-01-20

## 2017-01-20 RX ORDER — ONDANSETRON 2 MG/ML
4 INJECTION INTRAMUSCULAR; INTRAVENOUS EVERY 30 MIN PRN
Status: DISCONTINUED | OUTPATIENT
Start: 2017-01-20 | End: 2017-01-20 | Stop reason: HOSPADM

## 2017-01-20 RX ORDER — DEXTROSE MONOHYDRATE 25 G/50ML
25-50 INJECTION, SOLUTION INTRAVENOUS
Status: DISCONTINUED | OUTPATIENT
Start: 2017-01-20 | End: 2017-01-23 | Stop reason: HOSPADM

## 2017-01-20 RX ADMIN — HYDROCODONE BITARTRATE AND ACETAMINOPHEN 1 TABLET: 5; 325 TABLET ORAL at 00:16

## 2017-01-20 RX ADMIN — DEXTROSE AND SODIUM CHLORIDE: 5; 450 INJECTION, SOLUTION INTRAVENOUS at 23:33

## 2017-01-20 RX ADMIN — SODIUM CHLORIDE: 4.5 INJECTION, SOLUTION INTRAVENOUS at 05:58

## 2017-01-20 RX ADMIN — ACETAMINOPHEN 1000 MG: 10 INJECTION, SOLUTION INTRAVENOUS at 20:06

## 2017-01-20 RX ADMIN — OXYCODONE HYDROCHLORIDE 5 MG: 5 TABLET ORAL at 18:29

## 2017-01-20 RX ADMIN — OXYCODONE HYDROCHLORIDE 5 MG: 5 TABLET ORAL at 23:49

## 2017-01-20 RX ADMIN — ALLOPURINOL 100 MG: 100 TABLET ORAL at 21:50

## 2017-01-20 RX ADMIN — GLYCOPYRROLATE 0.1 MG: 0.2 INJECTION, SOLUTION INTRAMUSCULAR; INTRAVENOUS at 11:40

## 2017-01-20 RX ADMIN — FENTANYL CITRATE 50 MCG: 50 INJECTION, SOLUTION INTRAMUSCULAR; INTRAVENOUS at 11:35

## 2017-01-20 RX ADMIN — Medication 10 MG: at 11:55

## 2017-01-20 RX ADMIN — ACETAMINOPHEN 400 MCG: 400 TABLET ORAL at 15:43

## 2017-01-20 RX ADMIN — PROPOFOL 100 MG: 10 INJECTION, EMULSION INTRAVENOUS at 11:10

## 2017-01-20 RX ADMIN — DEXTROSE AND SODIUM CHLORIDE: 5; 450 INJECTION, SOLUTION INTRAVENOUS at 10:32

## 2017-01-20 RX ADMIN — HYDROMORPHONE HYDROCHLORIDE 0.5 MG: 1 INJECTION, SOLUTION INTRAMUSCULAR; INTRAVENOUS; SUBCUTANEOUS at 12:14

## 2017-01-20 RX ADMIN — FENTANYL CITRATE 50 MCG: 50 INJECTION, SOLUTION INTRAMUSCULAR; INTRAVENOUS at 11:03

## 2017-01-20 RX ADMIN — HYDROMORPHONE HYDROCHLORIDE 0.2 MG: 1 INJECTION, SOLUTION INTRAMUSCULAR; INTRAVENOUS; SUBCUTANEOUS at 09:03

## 2017-01-20 RX ADMIN — HYDROMORPHONE HYDROCHLORIDE 0.5 MG: 1 INJECTION, SOLUTION INTRAMUSCULAR; INTRAVENOUS; SUBCUTANEOUS at 11:39

## 2017-01-20 RX ADMIN — Medication 1 TABLET: at 15:43

## 2017-01-20 RX ADMIN — FENOFIBRATE 160 MG: 160 TABLET ORAL at 15:43

## 2017-01-20 RX ADMIN — LIDOCAINE HYDROCHLORIDE 40 MG: 20 INJECTION, SOLUTION INFILTRATION; PERINEURAL at 11:10

## 2017-01-20 RX ADMIN — GLYCOPYRROLATE 0.1 MG: 0.2 INJECTION, SOLUTION INTRAMUSCULAR; INTRAVENOUS at 11:46

## 2017-01-20 RX ADMIN — LEVOTHYROXINE SODIUM 112 MCG: 112 TABLET ORAL at 15:43

## 2017-01-20 RX ADMIN — HYDROCODONE BITARTRATE AND ACETAMINOPHEN 1 TABLET: 5; 325 TABLET ORAL at 05:39

## 2017-01-20 RX ADMIN — CEFAZOLIN SODIUM 2 G: 2 INJECTION, SOLUTION INTRAVENOUS at 11:11

## 2017-01-20 RX ADMIN — SUCCINYLCHOLINE CHLORIDE 100 MG: 20 INJECTION, SOLUTION INTRAMUSCULAR; INTRAVENOUS at 11:10

## 2017-01-20 RX ADMIN — CEFAZOLIN SODIUM 1 G: 1 INJECTION, POWDER, FOR SOLUTION INTRAMUSCULAR; INTRAVENOUS at 18:29

## 2017-01-20 RX ADMIN — SIMVASTATIN 10 MG: 5 TABLET, FILM COATED ORAL at 21:50

## 2017-01-20 RX ADMIN — SODIUM CHLORIDE: 9 INJECTION, SOLUTION INTRAVENOUS at 11:05

## 2017-01-20 RX ADMIN — ACETAMINOPHEN 1000 MG: 10 INJECTION, SOLUTION INTRAVENOUS at 14:22

## 2017-01-20 RX ADMIN — HYDROMORPHONE HYDROCHLORIDE 0.2 MG: 1 INJECTION, SOLUTION INTRAMUSCULAR; INTRAVENOUS; SUBCUTANEOUS at 15:51

## 2017-01-20 RX ADMIN — METOPROLOL SUCCINATE 25 MG: 25 TABLET, EXTENDED RELEASE ORAL at 15:43

## 2017-01-20 ASSESSMENT — PAIN DESCRIPTION - DESCRIPTORS: DESCRIPTORS: ACHING

## 2017-01-20 NOTE — CONSULTS
IMP:  Left IT hip fracture    PLAN:  Options reviewed. Recommend fixation given the fracture extends into the IT region  Case reviewed with Dr Turpin  H&P reviewed  To OR today.    Full consult:[id number]  Lio Hoffman D.O.

## 2017-01-20 NOTE — PLAN OF CARE
S- Transfer to OR from [facility].    B- hip fracture    A- Patient is vitally stable, RA, afebrile. pain controlled with medication. AOX4, using bedpan to void. Good CMS to area.     R- Transfer to OR per physician orders reprot given to Leela. Continue to monitor pt and update physician as needed.      Code status: Full Code  Skin: NA  Fall Risk: Yes- Department fall risk interventions implemented.  Isolation: None  Core Measures: NA  Medication drips upon transfer: D51/2NS @ 100 ml/hr

## 2017-01-20 NOTE — PROGRESS NOTES
Pt complained of catheter 16 Fr,  burning so tried deflating balloon and repositioning but that didn't help.  Discontinued rodriguez and reinserted 14 Fr.but that also burned and she wanted it removed.  Joshua DC'd and she refused to have another put in.  States she will use bed pan.

## 2017-01-20 NOTE — PROGRESS NOTES
[facility] Progress Note          Assessment and Plan:   Assessment:   Principal Problem:    Closed left hip fracture (H)    Assessment: Visible on CT scan, following slipping on the ice.      Plan: Orthopedic surgery has seen and evaluated and is planning on going to surgery for repair later this morning.      Active Problems:    Essential hypertension    Assessment: patient normally takes metoprolol, HCTZ, and amlodipine for blood pressures.  Morning medications were held in anticipation of surgery today.  Blood pressures this morning having been in the 130-140s systolic.      Plan: continue to hold home regimen for now.  Would consider restarting metoprolol and amlodipine following surgery depending of blood pressures but would hold HCTZ for the next 1-2 days as patient recovers from surgery        Type 2 diabetes mellitus without complication (H)    Assessment: patient's home glipizide has been held and blood sugars have been monitored.  Have been trending down since NPO.  Currently is 82, trending downward from 94 at 0800.      Plan: Will start D5 1/2 NS to avoid hypoglycemia during surgery.  Continue to monitor blood sugars every 4 hours while NPO.        Coronary artery disease involving native coronary artery of native heart without angina pectoris    Assessment: chronic and stable, no symptoms concerning for angina    Plan: continue to monitor      Hyperlipidemia LDL goal <100    Assessment: patient normally on simvastatin    Plan: continue to hold secondary to NPO status, restart following surgery.      Hypothyroidism due to acquired atrophy of thyroid    Assessment: on synthroid    Plan: continue to hold secondary to NPO status, restart after surgery       Hypernatremia    Assessment: thought secondary to mild dehydration at the of admission and HCTZ use, improved but not resolved following IV fluids    Plan: continue with 1/2 NS as above, recheck tomorrow morning.  Hold HCTZ as above      Chronic  "kidney disease, stage III (moderate)    Assessment: improved slightly with IV fluids overnight with creatinine decreasing from 1.22 to 1.11 and GFR increasing from 41 to 46.    Plan: Change IV fluids as above to contain dextrose but otherwise continue with 1/2NS at 100 cc/hour for now, recheck creatinine tomorrow morning.      Anemia    Assessment: chronic, slightly worsened from 10.1 down to 9.6 following rehydration    Plan: continue to monitor following surgery.          VTE:  SCDs  Code Status:  Full code        Interval History:   About the same today.  Vitals signs stable with blood pressures tolerating held morning medications.  Blood sugars stable but trending downward with NPO status and most recently in the 80-90s.  Pain well controlled with current regimen.  Tolerating medications and treatment plan without significant side effects or problems.  Eating and voiding well.  No new concerns today.            Significant Problems:     Past Medical History   Diagnosis Date     Diabetes mellitus (H)      Hypertension      Hyperlipemia      CAD (coronary artery disease)      remote hx of stent placement in past     CVA (cerebral vascular accident) (H) 2013     some left sided deficits            Physical Exam:   Blood pressure 132/52, pulse 65, temperature 98.9  F (37.2  C), temperature source Oral, resp. rate 20, height 1.651 m (5' 5\"), weight 61.5 kg (135 lb 9.3 oz), SpO2 96 %.  Constitutional:   awake, alert, cooperative, no apparent distress, and appears stated age     Lungs:   No increased work of breathing, good air exchange, clear to auscultation bilaterally, no crackles or wheezing     Cardiovascular:   Normal apical impulse, regular rate and rhythm, normal S1 and S2, no S3 or S4, and no murmur noted     Abdomen:   Bowel sounds present, abdomen soft and non-tender     Musculoskeletal:   SCDs in place, no edema in feet and lower legs     Neurologic:   Awake, alert, oriented to name, place and time.   "     Skin:   normal skin color, texture, turgor             Data:   All laboratory data reviewed    Attestation:  I have reviewed today's vital signs, notes, medications, labs and imaging.     Electronically Signed:  Shabana Turpin MD    Note: Chart documentation done in part with Dragon Voice Recognition software. Although reviewed after completion, some word and grammatical errors may remain.

## 2017-01-20 NOTE — ED NOTES
Pt had stroke in 2013. Pt unable to see well due to cataracts. Unable to do med sheet as pt has pill bottle. Does not know what she takes.

## 2017-01-20 NOTE — ANESTHESIA CARE TRANSFER NOTE
Patient: Mary Ellen Cuba    OPEN REDUCTION INTERNAL FIXATION HIP NAILING (Left Hip)  Additional InformationProcedure(s):  Left hip Trochanteria fixation nailing - Wound Class: I-Clean    Diagnosis: left hip fracture  Diagnosis Additional Information: No value filed.    Anesthesia Type:   General, ETT     Note:  Airway :Nasal Cannula  Patient transferred to:PACU        Vitals: (Last set prior to Anesthesia Care Transfer)              Electronically Signed By: STAN Clemons CRNA  January 20, 2017  12:21 PM

## 2017-01-20 NOTE — BRIEF OP NOTE
[facility] Brief Operative Note    Pre-operative diagnosis: left nondisplaced IT hip fracture   Post-operative diagnosis: left nondisplaced IT hip fracture   Procedure: Procedure(s):   INTERNAL FIXATION HIP NAILING with TFN synthes 130deg 11x170 with 80mm helical blade   Surgeon: Liborio Hoffman DO   Assistant(s): None   Estimated blood loss:  Fluids: Less than 75ml  300 ml Crystalloids   Specimens: None   Findings: See dictated operative report for full details [id number]     Lio Hoffman D.O.

## 2017-01-20 NOTE — PLAN OF CARE
Problem: Goal Outcome Summary  Goal: Goal Outcome Summary  Patient is a 89 year old year old female.     Prior to admission, patient was living alone in a condo with friends and neighbors checking in with no stairs to enter, using a cane for mobility, and requiring no  assist for ADLs.     Currently, patient is requiring mod assistance for functional mobility sit to from supine and unknown  for ambulation because only sat edge of bed. She was fatigued. She did scoot along edge of bed with bilat weight bearing through legs to move bottom along bed and UE lifting. She needed mod assist to move form sit to spine. She was able to use the trapeze to assist with scooting up in bed.     Barriers to return to previous living situation include weakness and gait dysfunction.      Patient equipment needs at discharge include none if goes to short term rehab..      Recommend discharge to short term rehab with medical transport.

## 2017-01-20 NOTE — CONSULTS
ORTHOPEDIC CONSULTATION      REASON FOR CONSULTATION:  Hip fracture.      REQUESTING PHYSICIANS:  Jaret Grey III, MD and Ethan Pacheco MD      NOTE:  Mary Ellen Cuba is a 89-year-old female who had slipped, falling backwards on the ice.  She hit her head, but did not lose consciousness.  She was unable to bear weight on her left side afterwards.  She was subsequently evaluated in the ER.  She received an x-rays and a CT scan in the ER which showed a greater trochanter fracture.  However, I had requested an MRI, which was performed this morning.  She is currently evaluated on the second floor.  She only complains of pain to her left hip.  She has been unable to really roll or move the hip since the injury.  Currently is resting comfortably though.  No previous hip injuries.       PAST MEDICAL HISTORY:  Includes diabetes, hypertension, coronary artery disease, CVA.      PAST SURGICAL HISTORY:  Includes appendectomy, oophorectomy, cataract surgery, cholecystectomy, hemicolectomy, coronary artery stent.      PRIOR TO ADMISSION MEDICATIONS:  Include Norvasc, calcium, Plavix, Toprol, multivitamin, simvastatin, vitamin D, allopurinol, aspirin, fenofibrate, folic acid, glipizide, Synthroid.      ALLERGIES:  Include metformin and HMG-CoA/R-inhibitors.       SOCIAL HISTORY:  Denies tobacco, ETOH or illicits.  Ambulates independently.      FAMILY HISTORY:  Includes colon cancer, prostate cancer and peptic ulcer disease.        PHYSICAL EXAMINATION:   VITAL SIGNS:  She has got a temperature of 98.9, 65 pulse, blood pressure is 132/52, saturation 96% room air.   GENERAL:  She is awake, alert, oriented, no acute distress.  She is nontoxic.  She is conversant and very pleasant.   HEENT:  Atraumatic, normocephalic.  There is no cervical spine pain with palpation or with range of motion.  Bilateral upper extremities show full motion with no pain with palpation or motion.   CHEST:  Equal chest rise and fall with no audible  wheezing or retraction.   ABDOMEN:  Soft, nontender, nondistended.   EXTREMITIES:  Right lower extremity has no pain with palpation.  There is no peripheral edema.  The skin is intact.  Left side, there is tenderness globally around the left hip, particularly laterally in the greater trochanter region.  There is no pubic symphyseal pain.  She has no knee or distal pain.  There is no peripheral edema.  Distal neurovascular is grossly intact.  She does have a positive log roll and positive Stinchfield on the left.      IMAGING:  X-rays and a CT scan were reviewed.  X-rays and the CT show a fracture at the greater trochanter region.  MRI was reviewed which was obtained today.  It does show some edema extending into the intertrochanteric region.      IMPRESSION:  This is a 89-year-old female with a nondisplaced intertrochanteric hip fracture.      PLAN:  The above was relayed to the patient.  I discussed her options.  Given her edema extending into the intertrochanteric region, I have recommended surgical fixation.  The risks, benefits, complications, alternatives and anticipated postoperative course were discussed.  Risks including bleeding, infection, scar, scar tenderness, neurovascular injuries, failure to heal, fracture cutout were discussed.  I also discussed risks of blood clots, heart attacks, strokes and death.  Once thoroughly reviewed, the patient opted to proceed.  Case was reviewed with Dr. Turpin.  She has been medically cleared to proceed with surgery.  I did ask the patient if there is any family or friend's she would like me to contact and she declined.  Plan to proceed today once obtaining an operating room.         HARRISON DURHAM DO             D: 2017 10:27   T: 2017 14:44   MT: TS      Name:     MINESH RUTHERFORD   MRN:      [id number]        Account:       [id number]   :      1924           Consult Date:  2017      Document: [id number]       cc: Ethan Pacheco MD        Jaret Grey III, MD

## 2017-01-20 NOTE — PLAN OF CARE
S- Transfer to MRI from [facility]    B- hip fracture?    A- Patient is vitally stable, RA, afebrile. pain controlled with medication. AOX4, using bedpan to void. MRI sheet filled out, good CMS to area.      R- Transfer to MRI per physician orders. Continue to monitor pt and update physician as needed.      Code status: Full Code  Skin: NA  Fall Risk: Yes- Department fall risk interventions implemented.  Isolation: None  Core Measures: NA  Medication drips upon transfer: NA

## 2017-01-20 NOTE — ANESTHESIA PREPROCEDURE EVALUATION
Anesthesia Evaluation     . Pt has had prior anesthetic. Type: General      ROS/MED HX    ENT/Pulmonary:  - neg pulmonary ROS     Neurologic:     (+)CVA date: 2013 with deficits- Left sided,     Cardiovascular:     (+) Dyslipidemia, hypertension--CAD, --stent,. : . . . :. . Previous cardiac testing date:results:date: results:ECG reviewed date:1/19/17 results:SR date: results:          METS/Exercise Tolerance:     Hematologic:         Musculoskeletal:         GI/Hepatic:  - neg GI/hepatic ROS       Renal/Genitourinary:     (+) chronic renal disease, type: CRI,       Endo:     (+) type II DM Last HgA1c: 5.3 thyroid problem hypothyroidism, .      Psychiatric:  - neg psychiatric ROS       Infectious Disease:  - neg infectious disease ROS       Malignancy:      - no malignancy   Other:    - neg other ROS           Physical Exam  Normal systems: cardiovascular, pulmonary and dental    Airway   Mallampati: I  TM distance: >3 FB  Neck ROM: full    Dental     Cardiovascular   Rhythm and rate: regular and normal      Pulmonary    breath sounds clear to auscultation                    Anesthesia Plan      History & Physical Review  History and physical reviewed and following examination; no interval change.    ASA Status:  3 .    NPO Status:  > 8 hours    Plan for General and ETT with Intravenous and Propofol induction. Maintenance will be Inhalation and Balanced.    PONV prophylaxis:  Dexamethasone or Solumedrol and Ondansetron (or other 5HT-3)       Postoperative Care  Postoperative pain management:  IV analgesics and Oral pain medications.      Consents  Anesthetic plan, risks, benefits and alternatives discussed with:  Patient..                          .

## 2017-01-20 NOTE — PLAN OF CARE
Problem: Goal Outcome Summary  Goal: Goal Outcome Summary  Outcome: No Change  VSS.  NPO.  States pain controlled with norco.  Does not complain of head pain from fall at home.  IVF continue.  Blood sugars every 4 hours, were 132, 99, 87.

## 2017-01-20 NOTE — OP NOTE
DATE OF PROCEDURE:  01/20/2017.      PREOPERATIVE DIAGNOSIS:  Left nondisplaced intertrochanteric hip fracture.      POSTOPERATIVE DIAGNOSIS:  Left nondisplaced intertrochanteric hip fracture.      PROCEDURE:  Internal fixation with a TFN nail.      SURGEON:  Liborio Hoffman DO      FIRST ASSISTANT:  None.      ANESTHESIA:  General.      COMPLICATIONS:  None.      ESTIMATED BLOOD LOSS:  Less than 75 mL.      FLUIDS:  300 mL crystalloids.      COUNTS:  Correct.      DISPOSITION:  To PACU in stable condition.      SPECIFICATIONS:  Include a Synthes 11 mm x 170 mm x 130 degree TFN with an 80 mm helical blade.      GROSS FINDINGS:  There was a nondisplaced intertrochanteric hip fracture with some comminution in the greater trochanter.      NOTE:  Mary Ellen Cuba is a 89-year-old female who had a ground level fall, sustaining the above injury.  She was evaluated through radiographs, CT and MRI.  MRI showed a fracture line extending into the intertrochanteric region, mostly in the posterior aspect.  There was also some soft tissue injury as well.  The fracture was nondisplaced.  Given the fracture extension into the intertrochanteric region, we discussed internal fixation.  I reviewed the risks, benefits, complications, alternatives, anticipated postop course, risks including bleeding, infection, scar, scar tenderness, neurovascular injuries, failure to relieve all symptoms, implant cutout, need for revision surgeries, blood clots, heart attacks, strokes and death.  Once thoroughly reviewed, she opted to proceed.  I did offer to contact any family or friends; however, she declined.      DETAILS OF PROCEDURE:  The appropriate extremity was marked.  Consent was obtained.  She was wheeled to OP suite #1, transferred off the cart to the OR table without incidence.  When deemed appropriate by Anesthesia, she was positioned on the fracture bed.  All bony prominences were padded.  Her leg was placed in the traction device with no  real traction applied given the nondisplaced nature of the fracture.  The right leg was positioned in the stirrup.  The left lower extremity was then prepped and draped in a normal manner.  Prior to incision, a timeout was performed and the patient, surgery and extremity were verified.  Antibiotics had been administered.  Longitudinal skin incision just proximal to the greater trochanter was performed.  The IT band and fascia were split in line.  Digital dissection down to the greater trochanter was then performed.  A guide pin was placed on the greater trochanter and then passed down the femoral shaft.  It was confirmed to be in acceptable position on multiplanar fluoroscopy imaging.  The reamer was then utilized to open the proximal femur.  The 11 mm x 170 mm nail was placed with no difficulties.  Lateral outrigger device was placed and the appropriate skin incision point was found on the lateral aspect of the femur and this was performed.  The jig was placed.  A guide pin was then placed to approximate center-center aspect of the center-center zone of the neck.  This was confirmed on both AP and lateral imaging.  I then drilled it to a 90.  This was based on measurements.  A 90 helical blade was placed.  Unfortunately, this proved to be slightly long with it just being under the subchondral bone.  This was removed with no issues.  An 80 helical blade was then placed.  This was locked into position.  The jig for the helical blade was removed.  The guides and cannulas for the distal screw were then placed.  The bone was drilled with no issues and a screw was placed based on the measurement, which was 36 mm.  The jigs were removed.  The areas were copiously irrigated.  Final fluoroscopy images were taken.  The IT and fascia were closed with 0 Vicryl, followed with 2-0 Vicryl subcutaneous, followed by staples in the skin.  A sterile dressing was then applied and she subsequently transferred to the PACU in stable  condition.      PLAN:  She is admitted back to the hospital for continual orthopedic care, DVT prophylaxis and pain management.         HARRISON DURHAM DO             D: 2017 12:09   T: 2017 17:55   MT: TS      Name:     MINESH RUTHERFORD   MRN:      [id number]        Account:        [id number]   :      1924           Procedure Date: 2017      Document: [id number]       cc: Harrison Durham DO

## 2017-01-20 NOTE — H&P
[facility]    History and Physical  Hospitalist       Date of Admission:  1/19/2017  Date of Service (when I saw the patient): 01/19/2017    Assessment and Plan  Mary Ellen Cuba is a 89 year old female who presents with a fall at home leading to left hip pain. I'd slipped on the ice falling backwards hitting her head, without loss of consciousness. Evaluation in the emergency department is showing a nondisplaced left trochanteric fracture. CT of the head and neck are not showing any acute pathology. Orthopedic surgery has reviewed the imaging and is recommending admission for possible surgery. They are recommending a MRI of the hip in the morning to help decide if surgery is necessary. She'll be NPO after midnight with IV narcotics for pain management. She is diabetic so will order sliding scale insulin coverage and will hold other medicines at this time. She will likely need rehab placement after surgery and that she lives alone in a senior apartment complex. She is medically cleared at this time for surgery in the morning.    Principal Problem:    Closed left hip fracture (H)    Assessment: result of a fall this afternoon, nondisplaced trochanteric fracture of left hip    Plan: treatment for orthopedics. MRI of hip in morning help decide whether surgery is necessary  Active Problems:    Essential hypertension    Assessment: stable, taking HCTZ, Norvasc and Toprol-XL    Plan: restart after surgery    Coronary artery disease involving native coronary artery of native heart without angina pectoris    Assessment: remote history of problems in the past with stent placement. No current symptoms. EKG normal    Plan: no treatment necessary    Hypothyroidism due to acquired atrophy of thyroid    Assessment: taking oral replacement    Plan: restart after surgery    Type 2 diabetes mellitus without complication (H)    Assessment: on oral glipizide, no complications    Plan: hold meds for  now, sliding scale insulin coverage for now    Hypernatremia    Assessment: maybe mildly dehydrated from her hydrochlorothiazide.    Plan: hold HCTZ. IV fluids tonight, recheck in AM    Chronic kidney disease, stage III (moderate)    Assessment: mildly elevated compared to past readings    Plan: IV fluids, recheck in AM    Anemia    Assessment: noted in her past records, no clear cause    Plan: follow    Hyperlipidemia LDL goal <100    Assessment: taking statin and fenofibrate    Plan: restart postsurgery    DVT Prophylaxis: Pneumatic Compression Devices  Code Status: Full Code    Disposition: Expected discharge in 2-3 days once surgery completed and deemed stable by surgery.    Jaret Grey MD    Primary Care Physician  Dr. Celis    Chief Complaint  89-year-old female who slipped on the ice injuring her left hip    History is obtained from the patient, electronic health record and emergency department physician    History of Present Illness  Mary Ellen Cuba is a 89 year old female who presents with an injury to her left hip. She was out on her patio moving snow and slipped and fell backwards landing on her left hip and then striking the back of her head against the ground. She did not experience loss of consciousness and has had minimal neck or head pain since, but did experience left hip pain which limited her ability to ambulate. She presented to the emergency department with subsequent imaging showing a nondisplaced trochanteric fracture of the left hip. She lives alone in a senior apartment complex. History significant for diabetes taking glipizide, hypertension on several medications and hypothyroidism. She described yourself as healthy and prior to the fall was having no symptoms to suggest an upper respiratory infection. She denies any cardiac symptoms, although she has had a history of stent placement sometime in the past. She denies shortness of breath, chest pain, abdominal symptoms, urinary  symptoms. This time denies headache, nausea. She only has pain in the hip if she tries to move.    Past Medical History   I have reviewed this patient's medical history and updated it with pertinent information if needed.   Past Medical History   Diagnosis Date     Diabetes mellitus (H)      Hypertension      Hyperlipemia      CAD (coronary artery disease)      remote hx of stent placement in past     CVA (cerebral vascular accident) (H) 2013     some left sided deficits       Past Surgical History  I have reviewed this patient's surgical history and updated it with pertinent information if needed.  Past Surgical History   Procedure Laterality Date     Appendectomy       Gyn surgery       oopherectomy     Phacoemulsification with standard intraocular lens implant  6/30/2011     Procedure:PHACOEMULSIFICATION WITH STANDARD INTRAOCULAR LENS IMPLANT; Surgeon:DEANDRE NUGENT; Location:[facility] OR     Phacoemulsification with standard intraocular lens implant  7/21/2011     Procedure:PHACOEMULSIFICATION WITH STANDARD INTRAOCULAR LENS IMPLANT; Surgeon:DEANDRE NUGENT; Location:[facility] OR     Gi surgery       gwendolyn     Gi surgery       hemicolectomy     Laser yag capsulotomy  3/21/2013     Procedure: LASER YAG CAPSULOTOMY;  yag capsulotomy bilateral eyes;  Surgeon: Deandre Nugent MD;  Location: [facility] OR     Stent, coronary, lyn         Prior to Admission Medications  Prior to Admission Medications   Prescriptions Last Dose Informant Patient Reported? Taking?   AmLODIPine Besylate (NORVASC PO) 1/19/2017 at 0800  Yes Yes   Sig: Take 10 mg by mouth daily   Calcium-Vitamin D (CALCIUM + D PO) 1/19/2017 at 0800  Yes Yes   Sig: Take 1 tablet by mouth daily.   Clopidogrel Bisulfate (PLAVIX PO) 1/19/2017 at 0800  Yes Yes   Sig: Take 75 mg by mouth   Metoprolol Succinate (TOPROL XL PO) Unknown at Unknown if taking. Should have ran out in Deember  Yes No   Sig: Take 25 mg by mouth daily   Multiple Vitamins-Minerals (CENTRUM SILVER ULTRA  WOMENS) TABS 1/19/2017 at 0800  Yes Yes   Sig: Take 1 tablet by mouth daily.   POTASSIUM CITRATE PO Unknown at Not refilled for a while per pharmacy.   Yes No   Sig: Take 10 mEq by mouth   SIMVASTATIN PO 1/18/2017 at 2100  Yes Yes   Sig: Take 10 mg by mouth   VITAMIN D, CHOLECALCIFEROL, PO Unknown at Unknown time  Yes No   Sig: Take  by mouth once a week.   allopurinol (ZYLOPRIM) 100 MG tablet 1/19/2017 at 0800  Yes Yes   Sig: Take 100 mg by mouth 2 times daily.   aspirin 325 MG tablet Unknown at Unknown if takes.   Yes No   Sig: Take 325 mg by mouth daily.   fenofibrate 160 MG tablet 1/19/2017 at 0800  Yes Yes   Sig: Take 160 mg by mouth daily.   folic acid (FOLVITE) 400 MCG tablet Unknown at Not sure if taking.  Yes No   Sig: Take 400 mcg by mouth daily.   glipiZIDE (GLUCOTROL) 2.5 MG TABS Unknown at Hasn't been filled since March.   Yes No   Sig: Take 2.5 mg by mouth every morning (before breakfast).   hydrochlorothiazide (HYDRODIURIL) 25 MG tablet 1/19/2017 at 0800  Yes Yes   Sig: Take 25 mg by mouth daily.   levothyroxine (SYNTHROID, LEVOTHROID) 125 MCG tablet 1/19/2017 at 0800  Yes Yes   Sig: Take 112 mcg by mouth daily       Facility-Administered Medications: None     Allergies  Allergies   Allergen Reactions     Hmg-Coa-R Inhibitors      Metformin GI Disturbance       Social History  I have reviewed this patient's social history and updated it with pertinent information if needed. Mary Ellen Cuba  reports that she has never smoked. She does not have any smokeless tobacco history on file. She reports that she does not drink alcohol or use illicit drugs.    Family History  I have reviewed this patient's family history and updated it with pertinent information if needed.   Family History   Problem Relation Age of Onset     Colon Cancer Mother      Prostate Cancer Brother      Peptic Ulcer Disease Father        Review of Systems  The 10 point Review of Systems is negative other than noted in the HPI or here.      Physical Exam  Temp: 99.5  F (37.5  C) Temp src: Oral BP: 164/76 mmHg   Heart Rate: 72 Resp: 17 SpO2: 96 % O2 Device: None (Room air)    Vital Signs with Ranges  Temp:  [97.8  F (36.6  C)-99.5  F (37.5  C)] 99.5  F (37.5  C)  Heart Rate:  [63-72] 72  Resp:  [17-18] 17  BP: (163-176)/(71-86) 164/76 mmHg  SpO2:  [96 %-100 %] 96 %  135 lbs 9.33 oz    EXAM:  Constitutional: healthy, alert, mild distress and cooperative   Cardiovascular: PMI normal. No lifts, heaves, or thrills. RRR. No murmurs, clicks gallops or rub  Respiratory: Percussion normal. Good diaphragmatic excursion. Lungs clear  Psychiatric: mentation appears normal and affect normal/bright. Gets mildly confused about dates and medications  Head: area of contusion on back of scalp, scalp was intact with no laceration  Neck: Neck supple. No adenopathy. Thyroid symmetric, normal size,  ENT: ENT exam normal, no neck nodes or sinus tenderness  Abdomen: Abdomen soft, non-tender. BS normal. No masses, organomegaly  NEURO: nonfocal, unable to fully test due to hip injury   SKIN: no suspicious lesions or rashes  LYMPH: Normal cervical lymph nodes  JOINT/EXTREMITIES: laying flat on back. Legs are equal length without any type of rotation. CMS of the feet is normal     Data  Data reviewed today:  I personally reviewed the EKG tracing showing Normal sinus rhythm, no acute changes and the X-ray, CT image(s) showing Nondisplaced fracture of left trochanteric of hip.    Recent Labs  Lab 01/19/17  1750   WBC 9.7   HGB 10.1*   MCV 90      INR 1.05   *   POTASSIUM 3.9   CHLORIDE 115*   CO2 25   BUN 29   CR 1.22*   ANIONGAP 10   ZULEMA 8.7   *   ALBUMIN 3.5   PROTTOTAL 6.3*   BILITOTAL 0.7   ALKPHOS 56   ALT 21   AST 25       Recent Results (from the past 24 hour(s))   CT Head w/o Contrast    Narrative    CT SCAN OF THE HEAD WITHOUT CONTRAST   1/19/2017  3:53 PM     HISTORY: Fall at noon with closed head injury, no loss of  consciousness. On  Plavix.    TECHNIQUE:  Axial images of the head and coronal reformations without  IV contrast material.  Radiation dose for this scan was reduced using  automated exposure control, adjustment of the mA and/or kV according  to patient size, or iterative reconstruction technique.    COMPARISON: None.    FINDINGS: There is a left parietal scalp hematoma. There is no  evidence for any underlying intracranial hemorrhage or skull fracture.  Air-fluid level is seen in the left maxillary sinus. There is some  mild mucosal thickening in both maxillary sinuses. Moderate cerebral  atrophy is present. Patchy white matter changes are seen in both  hemispheres without mass effect. Vascular calcifications are noted.      Impression    IMPRESSION:  1. Left parietal scalp hematoma. No evidence for intracranial  hemorrhage or skull fracture.  2. Cerebral atrophy with some chronic white matter changes.    CHRIS TOBIN MD   CT Cervical Spine w/o Contrast    Narrative    CT CERVICAL SPINE WITHOUT CONTRAST   1/19/2017 3:54 PM     HISTORY: Fall with closed head injury.       TECHNIQUE: Axial images of the cervical spine were obtained without  intravenous contrast. Multiplanar reformations were performed.  Radiation dose for this scan was reduced using automated exposure  control, adjustment of the mA and/or kV according to patient size, or  iterative reconstruction technique.     COMPARISON: None.    FINDINGS: Sagittal images demonstrate normal posterior alignment.  There is no evidence for fracture. Mild to moderate multilevel  degenerative disc and facet disease is present. There is no evidence  for any significant central canal stenosis. Soft tissue structures are  unremarkable as visualized except for some extensive vascular  calcifications.      Impression    IMPRESSION: Degenerative changes. No evidence for fracture.   XR Pelvis w Hip Bilateral G/E 2 Views    Narrative    XR PELVIS AND HIP BILATERAL 2 VIEWS 1/19/2017 4:14  PM    HISTORY: Fall with left hip pain. Cannot bear weight.    COMPARISON: None.    FINDINGS: No convincing evidence of left hip fracture. There is  moderate bilateral osteoarthritis, right greater than left. Sacroiliac  joints are patent and symmetric. Vascular calcification noted.      Impression    IMPRESSION: No convincing evidence of fracture. If there is strong  clinical concern, CT would be suggested.    KIM STRONG MD   CT Lower Extremity Left w/o Contrast    Narrative    CT LOWER EXTREMITY LEFT W/O CONTRAST  1/19/2017 5:14 PM     HISTORY: pain w int/ext rotation    TECHNIQUE:  Axial CT images of the abdomen and pelvis from the iliac  crest through the symphysis pubis were acquired without IV contrast or  oral contrast.  Radiation dose for this scan was reduced using  automated exposure control, adjustment of the mA and/or kV according  to patient size, or iterative reconstruction technique.    COMPARISON:  None    FINDINGS:  There is a nondisplaced fracture through the base of the  greater trochanter. No other fractures are seen. The femoral neck  appears intact. Intertrochanteric region appears normal. No pelvic  fractures are seen. There is soft tissue swelling around the greater  trochanter. This involves the left gluteal musculature. This would be  compatible with a associated hematoma.      Impression    IMPRESSION: Nondisplaced fracture through the base of the greater  trochanter.    SIMA BOWMAN MD

## 2017-01-20 NOTE — PROGRESS NOTES
Transfer from pacu to Room med/surg  Transferred to bed via Hospital bed   S: 89 y/o F S/P ORIF left hip nailing  Anesthesia Type: General  Surgeon: Dr. Hoffman  Allergies: See Medication Reconciliation Record  DNR: No   B: Pertinent Medical History: See Record  Surgical History: See Record  A: EBL: 75ml  IVF: 300ml in OR; 50ml in pacu  UOP: None  Pain: denies  NPO: ___Yes _X__No   Vomiting: ___Yes _X__No   Drainage: None  Skin Integrity: Intact except left hip incisions - covered.   RFO: ___Yes__X_No   Brace/sling/equipment: ___Yes__X_No  See PACU record for ongoing assessment, vital signs and pain assessment.  R: Post-Op vitals and assessments as ordered/indicated per patient's condition.  Follow Post-Op orders and notify Physician prn.  Continue to involve patient/family in plan of care and discharge planning.  Reinforce Pre-Operative education.  Implement skin safety interventions as appropriate.  Name: Iveth DUMAS RN, report given to OTNOIEL Osorio

## 2017-01-21 ENCOUNTER — APPOINTMENT (OUTPATIENT)
Dept: PHYSICAL THERAPY | Facility: CLINIC | Age: 82
DRG: 481 | End: 2017-01-21
Payer: MEDICARE

## 2017-01-21 PROBLEM — S72.002A CLOSED LEFT HIP FRACTURE (H): Status: RESOLVED | Noted: 2017-01-19 | Resolved: 2017-01-21

## 2017-01-21 PROBLEM — R33.9 URINARY RETENTION: Status: ACTIVE | Noted: 2017-01-21

## 2017-01-21 LAB
GLUCOSE BLDC GLUCOMTR-MCNC: 156 MG/DL (ref 70–99)
GLUCOSE BLDC GLUCOMTR-MCNC: 157 MG/DL (ref 70–99)
GLUCOSE BLDC GLUCOMTR-MCNC: 168 MG/DL (ref 70–99)
GLUCOSE BLDC GLUCOMTR-MCNC: 172 MG/DL (ref 70–99)
HCT VFR BLD AUTO: 21 % (ref 35–47)
HCT VFR BLD AUTO: 22.7 % (ref 35–47)
HGB BLD-MCNC: 7.2 G/DL (ref 11.7–15.7)
HGB BLD-MCNC: 7.4 G/DL (ref 11.7–15.7)

## 2017-01-21 PROCEDURE — 99232 SBSQ HOSP IP/OBS MODERATE 35: CPT | Performed by: FAMILY MEDICINE

## 2017-01-21 PROCEDURE — 97110 THERAPEUTIC EXERCISES: CPT | Mod: GP | Performed by: PHYSICAL THERAPIST

## 2017-01-21 PROCEDURE — 25000132 ZZH RX MED GY IP 250 OP 250 PS 637: Mod: GY | Performed by: FAMILY MEDICINE

## 2017-01-21 PROCEDURE — 36415 COLL VENOUS BLD VENIPUNCTURE: CPT | Performed by: ORTHOPAEDIC SURGERY

## 2017-01-21 PROCEDURE — 85014 HEMATOCRIT: CPT | Performed by: ORTHOPAEDIC SURGERY

## 2017-01-21 PROCEDURE — 25000132 ZZH RX MED GY IP 250 OP 250 PS 637: Mod: GY | Performed by: ORTHOPAEDIC SURGERY

## 2017-01-21 PROCEDURE — A9270 NON-COVERED ITEM OR SERVICE: HCPCS | Mod: GY | Performed by: FAMILY MEDICINE

## 2017-01-21 PROCEDURE — 97530 THERAPEUTIC ACTIVITIES: CPT | Mod: GP | Performed by: PHYSICAL THERAPIST

## 2017-01-21 PROCEDURE — 25800025 ZZH RX 258: Performed by: FAMILY MEDICINE

## 2017-01-21 PROCEDURE — A9270 NON-COVERED ITEM OR SERVICE: HCPCS | Mod: GY | Performed by: ORTHOPAEDIC SURGERY

## 2017-01-21 PROCEDURE — 12000000 ZZH R&B MED SURG/OB

## 2017-01-21 PROCEDURE — 00000146 ZZHCL STATISTIC GLUCOSE BY METER IP

## 2017-01-21 PROCEDURE — 25000125 ZZHC RX 250: Performed by: ORTHOPAEDIC SURGERY

## 2017-01-21 PROCEDURE — 85018 HEMOGLOBIN: CPT | Performed by: ORTHOPAEDIC SURGERY

## 2017-01-21 PROCEDURE — 40000193 ZZH STATISTIC PT WARD VISIT: Performed by: PHYSICAL THERAPIST

## 2017-01-21 RX ORDER — AMLODIPINE BESYLATE 5 MG/1
5 TABLET ORAL DAILY
Status: DISCONTINUED | OUTPATIENT
Start: 2017-01-21 | End: 2017-01-23 | Stop reason: HOSPADM

## 2017-01-21 RX ADMIN — HYDROMORPHONE HYDROCHLORIDE 0.2 MG: 1 INJECTION, SOLUTION INTRAMUSCULAR; INTRAVENOUS; SUBCUTANEOUS at 17:23

## 2017-01-21 RX ADMIN — Medication 1 TABLET: at 08:29

## 2017-01-21 RX ADMIN — OXYCODONE HYDROCHLORIDE 5 MG: 5 TABLET ORAL at 21:00

## 2017-01-21 RX ADMIN — METOPROLOL SUCCINATE 25 MG: 25 TABLET, EXTENDED RELEASE ORAL at 08:29

## 2017-01-21 RX ADMIN — OXYCODONE HYDROCHLORIDE 5 MG: 5 TABLET ORAL at 04:04

## 2017-01-21 RX ADMIN — ACETAMINOPHEN 1000 MG: 10 INJECTION, SOLUTION INTRAVENOUS at 12:10

## 2017-01-21 RX ADMIN — DEXTROSE AND SODIUM CHLORIDE: 5; 450 INJECTION, SOLUTION INTRAVENOUS at 19:41

## 2017-01-21 RX ADMIN — ENOXAPARIN SODIUM 40 MG: 40 INJECTION SUBCUTANEOUS at 08:29

## 2017-01-21 RX ADMIN — ACETAMINOPHEN 400 MCG: 400 TABLET ORAL at 08:29

## 2017-01-21 RX ADMIN — CEFAZOLIN SODIUM 1 G: 1 INJECTION, POWDER, FOR SOLUTION INTRAMUSCULAR; INTRAVENOUS at 03:14

## 2017-01-21 RX ADMIN — ALLOPURINOL 100 MG: 100 TABLET ORAL at 21:00

## 2017-01-21 RX ADMIN — CLOPIDOGREL 75 MG: 75 TABLET, FILM COATED ORAL at 08:29

## 2017-01-21 RX ADMIN — HYDROXYZINE HYDROCHLORIDE 10 MG: 10 TABLET ORAL at 21:00

## 2017-01-21 RX ADMIN — DEXTROSE AND SODIUM CHLORIDE: 5; 450 INJECTION, SOLUTION INTRAVENOUS at 11:06

## 2017-01-21 RX ADMIN — INSULIN ASPART 1 UNITS: 100 INJECTION, SOLUTION INTRAVENOUS; SUBCUTANEOUS at 12:10

## 2017-01-21 RX ADMIN — INSULIN ASPART 1 UNITS: 100 INJECTION, SOLUTION INTRAVENOUS; SUBCUTANEOUS at 17:24

## 2017-01-21 RX ADMIN — SIMVASTATIN 10 MG: 5 TABLET, FILM COATED ORAL at 20:59

## 2017-01-21 RX ADMIN — ALLOPURINOL 100 MG: 100 TABLET ORAL at 08:29

## 2017-01-21 RX ADMIN — OXYCODONE HYDROCHLORIDE 5 MG: 5 TABLET ORAL at 08:29

## 2017-01-21 RX ADMIN — OXYCODONE HYDROCHLORIDE 5 MG: 5 TABLET ORAL at 15:57

## 2017-01-21 RX ADMIN — ACETAMINOPHEN 1000 MG: 10 INJECTION, SOLUTION INTRAVENOUS at 03:59

## 2017-01-21 RX ADMIN — INSULIN ASPART 1 UNITS: 100 INJECTION, SOLUTION INTRAVENOUS; SUBCUTANEOUS at 08:28

## 2017-01-21 RX ADMIN — FENOFIBRATE 160 MG: 160 TABLET ORAL at 08:29

## 2017-01-21 RX ADMIN — AMLODIPINE BESYLATE 5 MG: 5 TABLET ORAL at 15:57

## 2017-01-21 RX ADMIN — LEVOTHYROXINE SODIUM 112 MCG: 112 TABLET ORAL at 08:29

## 2017-01-21 RX ADMIN — OXYCODONE HYDROCHLORIDE 5 MG: 5 TABLET ORAL at 12:24

## 2017-01-21 ASSESSMENT — ACTIVITIES OF DAILY LIVING (ADL): PREVIOUS_RESPONSIBILITIES: HOUSEKEEPING;MEAL PREP;LAUNDRY;SHOPPING;MEDICATION MANAGEMENT;FINANCES

## 2017-01-21 NOTE — PROGRESS NOTES
[facility] Progress Note          Assessment and Plan:   Assessment:   Principal Problem:    Fractured hip, left, closed, initial encounter (H)    Assessment:  Patient is postoperative day number 1, has been doing well and participating with therapy. Hemoglobin did decrease from 9.6 prior to surgery down to 7.2 this morning, however is stable on recheck at 7.4 with patient being asymptomatic.  Pain is well controlled    Plan:  Ongoing management per orthopedic surgery. Anticipate discharge most likely on postop day #3 to a transitional care unit for ongoing rehabilitation.     Active Problems:    Essential hypertension    Assessment:  Blood pressures continued to improve and recovering following surgery. Today systolic blood pressures have been in the 130s    Plan:  We'll continue with metoprolol at home dosing, reinitiate amlodipine 5 mg today and consider increasing to 10 mg tomorrow, which is patient's previous home dosing if blood pressure tolerates. We'll continue to hold hydrochlorothiazide.      Type 2 diabetes mellitus without complication (H)    Assessment: Blood sugar is doing well, patient's appetite is improving    Plan:  We'll restart home glipizide at 2.5 mg tomorrow morning.      Urinary retention    Assessment:  Patient has needed straight catheter ×1 following surgical intervention. Patient has not voided throughout the day, however bladder scan has only shown 200 cc of urine present in the bladder at this time.    Plan:  We'll continue to monitor closely and consider straight catheter versus Lewis if urinary retention continues. Of note, patient has been intolerant of Lewis catheter placement and maintenance in the past during his hospitalization.      Coronary artery disease involving native coronary artery of native heart without angina pectoris    Assessment:  Known history without symptoms concerning for angina    Plan:  Continue home regimen and monitor      Hyperlipidemia LDL goal  <100    Assessment:  On simvastatin    Plan:  Continue without change      Hypothyroidism due to acquired atrophy of thyroid    Assessment:  On Synthroid    Plan:  Continue home regimen without change      Hypernatremia     Assessment:  Presents initially thought secondary to mild dehydration and chronic hydrochlorothiazide use, trending downward but had not yet resolved    Plan:  We'll recheck sodium again tomorrow for ongoing monitoring.  Continue to hold hydrochlorothiazide.      Chronic kidney disease, stage III (moderate)    Assessment:  Chronic, creatinine had improved from 1.2 down to 1.1 following IV fluid rehydration    Plan:  Recheck creatinine tomorrow to ensure ongoing stability.      Anemia    Assessment: acute on chronic.  Hemoglobin was 9.6 prior to surgery. Did decrease down to 7.2 this morning, however recheck showed stability with repeat hemoglobin this afternoon 7.4. Patient currently asymptomatic    Plan:  Continue to monitor, ongoing decision regarding transfusion as per orthopedic surgery.        VTE:  SCDs  Code Status:  Full Code        Interval History:   Continues to improve.  Vital signs generally better and recovering well following surgery.  Blood pressures today have been in the 130s systolic.  Eating and voiding well.  Tolerating medications without significant side effects.  Has had urinary retention since rodriguez was removed following surgery - needed straight cath x1 this morning and has not been able to void yet this afternoon however bladder scan shows only 200 cc present.            Significant Problems:     Past Medical History   Diagnosis Date     Diabetes mellitus (H)      Hypertension      Hyperlipemia      CAD (coronary artery disease)      remote hx of stent placement in past     CVA (cerebral vascular accident) (H) 2013     some left sided deficits            Physical Exam:   Blood pressure 134/70, pulse 60, temperature 97  F (36.1  C), temperature source Oral, resp. rate  "18, height 1.651 m (5' 5\"), weight 61.5 kg (135 lb 9.3 oz), SpO2 94 %.  Constitutional:   awake, alert, cooperative, no apparent distress, and appears stated age     Lungs:   No increased work of breathing, good air exchange, clear to auscultation bilaterally, no crackles or wheezing     Cardiovascular:   Normal apical impulse, regular rate and rhythm, normal S1 and S2, no S3 or S4, and no murmur noted     Abdomen:   Bowel sounds present, abdomen soft and non-tender     Musculoskeletal:   no lower extremity pitting edema present     Neurologic:   Awake, alert, oriented to name, place and time.       Skin:   normal skin color, texture, turgor             Data:   All laboratory data reviewed    Attestation:  I have reviewed today's vital signs, notes, medications, labs and imaging.     Electronically Signed:  Shabana Turpin MD    Note: Chart documentation done in part with Dragon Voice Recognition software. Although reviewed after completion, some word and grammatical errors may remain.     "

## 2017-01-21 NOTE — CONSULTS
Patient admitted as inpatient on 1/19/17 with diagnosis of Hip Fracture.      Met with patient to discuss living situation and discharge needs.    Patient resides in [place] in her own town home where she lives alone.    All discharge options reviewed and discussed with patient.  Patient hoping to admit to the TCU at [facility].  Referral also sent to [facility] as patient's second choice, per her request.     Plan is for patient to d/c to a TCU when medically stable and bed available.     will continue to assess and assist with disposition.    Manisha- Writer had discussed with patient that she has been accepted for TCU placement at [facility] for Monday, 1/23/17, as they had a bed open up.  Patient in agreement with the $14.00 per day private room charge and is very happy she can go to [facility] upon discharge.      Discussed transportation options.  Patient hoping to be able to transport with family/friend on day of d/c.

## 2017-01-21 NOTE — PLAN OF CARE
spoke with patient regarding recommendation for TCU after the hospital stay.     Patient stated that her first choice is [facility] in [place], as she lives in [place].  Referral being sent in case a bed becomes available for Monday.  Currently [facility] is full.  Will discuss other possible rehab options with patient as well.

## 2017-01-21 NOTE — PROGRESS NOTES
"[facility]  Orthopedics Progress Note           Assessment and Plan:    Assessment:   Post-operative day #1 Procedure(s):  OPEN REDUCTION INTERNAL FIXATION HIP NAILING   Acute post-operative blood loss anemia-asymptomatic         Plan:   Doing well.  Normal healing wound.  No immediate surgical complications identified.  No excessive bleeding  Pain well-controlled.  Tolerating physical therapy and rehabilitation well.  hgb 7.2 this am, will recheck   Encourage IS  Start or continue physical therapy  Up with assistance.  Weight-bear as tolerated.  Anticipate discharge from hospital in 24-48 hours.  Discharge to nursing/rehab facility.  Will need rehab/nursing placement.  Pain control measures  Advance diet as tolerated  Routine wound care  DVT Prophylaxis: Lovenox, SCD's, Compression Hose  Hospitalist following and assisting in medical issues.            Interval History:   Doing well.  Continues to improve.  Pain is well-controlled.  No fevers.  No overnight events           Review of Systems:    The patient denies any chest pain, shortness of breath, excessive pain, fever, chills, purulent drainage from the wound, nausea or vomiting.               Physical Exam:   General: awake, alert, appropriate and in no acute distress  Blood pressure 139/67, pulse 59, temperature 96.8  F (36  C), temperature source Oral, resp. rate 20, height 1.651 m (5' 5\"), weight 61.5 kg (135 lb 9.3 oz), SpO2 94 %.  Left hip:  Dressing clean, dry and intact. Surrounding skin intact, no breakdown. Calf is soft, nontender with no significant swelling. Distal neurovascular grossly intact.  Compartments soft and non-tender.no swelling of thigh.              Data:     Results for orders placed or performed during the hospital encounter of 01/19/17 (from the past 24 hour(s))   Glucose by meter   Result Value Ref Range    Glucose 94 70 - 99 mg/dL   MR Hip Left w/o Contrast    Narrative    MR HIP LEFT WITHOUT CONTRAST 1/20/2017 " 10:17 AM    HISTORY: Better visualization of left hip.    COMPARISONS: Pelvis and left hip x-rays dated 1/19/2017. CT  pelvis/left hip dated 1/19/2017.    TECHNIQUE: Coronal T1 and STIR.  Axial T1 and T2 fat suppression.    FINDINGS:   Osseous and Cartilaginous Structures: There is a completed, comminuted  fracture of the left greater trochanter. One of these fracture lines  extends into the intertrochanteric region of the left proximal femur  but does not appear to extend through the anterior cortex inferiorly.  This fracture appears acute. No other osseous fractures identified.  Bone marrow signal is otherwise within normal limits. There are small  osteophytes surrounding the right femoral head and around the right  acetabulum. Subchondral cyst is seen in the posterior mid right  acetabulum. These are consistent with degenerative change. Small  cystic structure in the left proximal femoral neck likely represents a  pit (synovial herniation pit). Bone marrow signal intensity is  otherwise within normal limits.    Acetabular Labrum: No juxtaacetabular cyst. No obvious labral tear is  appreciated, allowing for the large FOV technique. If indicated  clinically, MR arthrography would be considered the study of choice in  this regard.    Trochanteric and Iliopsoas Bursae: No fluid collection.    Common Hamstring Tendon: Intact.    Additional Findings: There is significant edema in the gluteus and  abductor and adductor muscles of the left hip with fluid collections  also seen in this region. These are most consistent with muscle tears  and strain. This is more edema typically seen in a hip fracture.  Muscle signal intensity in the right hip is within normal limits.  Visualized intrapelvic structures are grossly unremarkable. There is a  small left hip joint effusion.      Impression    IMPRESSION:   1. Comminuted left greater trochanteric fracture with mild  displacement corresponds with the CT findings from 1/19/2017.  One of  the fracture lines extends into the intertrochanteric region of the  proximal left femur and to the posterior cortex but does not appear to  be completed as it does not appear to exit the anterior cortex of the  femur in this region.  2. Muscle strains/tears of the abductor and adductor muscles of the  left hip.  3. Small left hip joint effusion.  4. Mild to moderate degenerative changes of the right hip.    I discussed the findings of the left proximal femoral fracture and  probable muscular tear/strain with Dr. Hoffman on 1/20/2017 at  approximately 10:30 AM.    GABO FRIEND MD   Glucose by meter   Result Value Ref Range    Glucose 82 70 - 99 mg/dL   XR Surgery CHELSI L/T 5 Min Fluoro w Stills    Narrative    This exam was marked as non-reportable because it will not be read by a   radiologist or a [place] non-radiologist provider.             Glucose by meter   Result Value Ref Range    Glucose 229 (H) 70 - 99 mg/dL   Hemoglobin   Result Value Ref Range    Hemoglobin 7.2 (L) 11.7 - 15.7 g/dL

## 2017-01-21 NOTE — PLAN OF CARE
Problem: Goal Outcome Summary  Goal: Goal Outcome Summary  Outcome: No Change  VSS. Afebrile. Pt c/o some pain/discomfort in L hip surgical site. PRN pain medication given per MAR which has been effective. Ice applied. Denies any n/t to bilateral lower extremities and are warm to touch. Pt continues to have not void. Bladder scan at 0200 showed 220 mls. Pt denies having any discomfort or urge void. Fluids encouraged. No complaints at this time.   Pt up to bedside commode at 0430 unable to void. Pt bladder scanned showing 378mls of urine in bladder. Pt straight cathed draining 400mls of concentrated yellow urine. Denies any complaints.

## 2017-01-21 NOTE — ANESTHESIA POSTPROCEDURE EVALUATION
Patient: Mary Ellen Cuba    OPEN REDUCTION INTERNAL FIXATION HIP NAILING (Left Hip)  Additional InformationProcedure(s):  Left hip Trochanteria fixation nailing - Wound Class: I-Clean    Diagnosis:left hip fracture  Diagnosis Additional Information: No value filed.    Anesthesia Type:  General, ETT    Note:  Anesthesia Post Evaluation    Patient location during evaluation: Bedside and Floor  Patient participation: Able to fully participate in evaluation  Level of consciousness: awake and alert  Pain management: satisfactory to patient  Airway patency: patent  Cardiovascular status: stable  Respiratory status: spontaneous ventilation and room air  Hydration status: stable  PONV: none     Anesthetic complications: None    Comments: Appear to tolerate Gen well without anesthesia related problems / complications noted.  Pain level satisfactory per patient. No N  /  V last night.  No complaints per patient.  Will follow as needed.        Last vitals:  Filed Vitals:    01/20/17 2201 01/20/17 2326 01/21/17 0700   BP: 114/58 116/60 139/67   Pulse: 59 60 59   Temp:  97  F (36.1  C) 96.8  F (36  C)   Resp: 18 16 20   SpO2: 94% 93% 94%       Electronically Signed By: STAN Chilel CRNA  January 21, 2017  8:58 AM

## 2017-01-21 NOTE — PLAN OF CARE
Problem: Goal Outcome Summary  Goal: Goal Outcome Summary  Outcome: Improving  VSS, afebrile. CMS intact. Dressing CDI. Having some pain with movement PRN oxycodone given (see MAR). Pt has been unable to void after surgery, bladder scan shows 327mL-pt refusing to be straight cath (per order over 300mL). MD notified.  at HS. Denies nausea, tolerated clear liquid tray for dinner. Pt is a shallow breather and was setting alarms off on capnography frequently-pt adamantly refused capnography even after education on the importance. Pt placed on continuous pulse ox and is on room air with good O2 sats.

## 2017-01-21 NOTE — PLAN OF CARE
Problem: Goal Outcome Summary  Goal: Goal Outcome Summary    Goal status: This am minimal pain issues, however by this pm exercises and movements were painful.   Requires mod A for supine>sit this afternoon due to pain, min A to stand FWW, and max A with transfer only when stepping on left LE. Gait not yet attempted.     Functional status: Requires assistance for transfers, bed mobility, and has not yet attempted transfers.     Areas of progress: Standing and pivot transfer performed. Initiated LE exercises.     Barrier to discharge Home: assist with all mobility    Equipment needs: defer to rehab facility    Discharge disposition/rationale: Recommend discharge to TCU with medical transport.

## 2017-01-21 NOTE — PLAN OF CARE
Problem: Fractured Hip (Adult)  Goal: Signs and Symptoms of Listed Potential Problems Will be Absent or Manageable (Fractured Hip)  Signs and symptoms of listed potential problems will be absent or manageable by discharge/transition of care (reference Fractured Hip (Adult) CPG).   Outcome: Improving  VSS, up with P.T.and walker and again to chair later, tolerated this quite well, pain managed with Roxicodone 5 mg X2, BGL of 157 and 156--SS insulin given, bladder scanned for 200 cc- pt refuses catheter but is willing to try to use the commode, dressing is dry and intact, + CMS, no nausea.

## 2017-01-22 ENCOUNTER — APPOINTMENT (OUTPATIENT)
Dept: PHYSICAL THERAPY | Facility: CLINIC | Age: 82
DRG: 481 | End: 2017-01-22
Payer: MEDICARE

## 2017-01-22 LAB
ALBUMIN UR-MCNC: NEGATIVE MG/DL
ANION GAP SERPL CALCULATED.3IONS-SCNC: 11 MMOL/L (ref 3–14)
APPEARANCE UR: CLEAR
BILIRUB UR QL STRIP: NEGATIVE
BUN SERPL-MCNC: 34 MG/DL (ref 7–30)
CALCIUM SERPL-MCNC: 7.8 MG/DL (ref 8.5–10.1)
CHLORIDE SERPL-SCNC: 104 MMOL/L (ref 94–109)
CO2 SERPL-SCNC: 21 MMOL/L (ref 20–32)
COLOR UR AUTO: YELLOW
CREAT SERPL-MCNC: 1.73 MG/DL (ref 0.52–1.04)
GFR SERPL CREATININE-BSD FRML MDRD: 27 ML/MIN/1.7M2
GLUCOSE BLDC GLUCOMTR-MCNC: 121 MG/DL (ref 70–99)
GLUCOSE BLDC GLUCOMTR-MCNC: 122 MG/DL (ref 70–99)
GLUCOSE BLDC GLUCOMTR-MCNC: 134 MG/DL (ref 70–99)
GLUCOSE BLDC GLUCOMTR-MCNC: 163 MG/DL (ref 70–99)
GLUCOSE BLDC GLUCOMTR-MCNC: 78 MG/DL (ref 70–99)
GLUCOSE BLDC GLUCOMTR-MCNC: 80 MG/DL (ref 70–99)
GLUCOSE BLDC GLUCOMTR-MCNC: 88 MG/DL (ref 70–99)
GLUCOSE SERPL-MCNC: 155 MG/DL (ref 70–99)
GLUCOSE UR STRIP-MCNC: NEGATIVE MG/DL
HGB BLD-MCNC: 7.4 G/DL (ref 11.7–15.7)
HGB UR QL STRIP: ABNORMAL
KETONES UR STRIP-MCNC: NEGATIVE MG/DL
LEUKOCYTE ESTERASE UR QL STRIP: NEGATIVE
NITRATE UR QL: NEGATIVE
PH UR STRIP: 5.5 PH (ref 5–7)
POTASSIUM SERPL-SCNC: 3.6 MMOL/L (ref 3.4–5.3)
RBC #/AREA URNS AUTO: ABNORMAL /HPF (ref 0–2)
SODIUM SERPL-SCNC: 136 MMOL/L (ref 133–144)
SP GR UR STRIP: <=1.005 (ref 1–1.03)
URN SPEC COLLECT METH UR: ABNORMAL
UROBILINOGEN UR STRIP-ACNC: 0.2 EU/DL (ref 0.2–1)
WBC #/AREA URNS AUTO: ABNORMAL /HPF (ref 0–2)

## 2017-01-22 PROCEDURE — 12000000 ZZH R&B MED SURG/OB

## 2017-01-22 PROCEDURE — 25000132 ZZH RX MED GY IP 250 OP 250 PS 637: Mod: GY | Performed by: FAMILY MEDICINE

## 2017-01-22 PROCEDURE — 25000132 ZZH RX MED GY IP 250 OP 250 PS 637: Mod: GY | Performed by: ORTHOPAEDIC SURGERY

## 2017-01-22 PROCEDURE — 40000133 ZZH STATISTIC OT WARD VISIT: Performed by: OCCUPATIONAL THERAPIST

## 2017-01-22 PROCEDURE — 97110 THERAPEUTIC EXERCISES: CPT | Mod: GP | Performed by: PHYSICAL THERAPIST

## 2017-01-22 PROCEDURE — 00000146 ZZHCL STATISTIC GLUCOSE BY METER IP

## 2017-01-22 PROCEDURE — 85018 HEMOGLOBIN: CPT | Performed by: ORTHOPAEDIC SURGERY

## 2017-01-22 PROCEDURE — 36415 COLL VENOUS BLD VENIPUNCTURE: CPT | Performed by: ORTHOPAEDIC SURGERY

## 2017-01-22 PROCEDURE — 81001 URINALYSIS AUTO W/SCOPE: CPT | Performed by: FAMILY MEDICINE

## 2017-01-22 PROCEDURE — A9270 NON-COVERED ITEM OR SERVICE: HCPCS | Mod: GY | Performed by: FAMILY MEDICINE

## 2017-01-22 PROCEDURE — 40000193 ZZH STATISTIC PT WARD VISIT: Performed by: PHYSICAL THERAPIST

## 2017-01-22 PROCEDURE — 80048 BASIC METABOLIC PNL TOTAL CA: CPT | Performed by: ORTHOPAEDIC SURGERY

## 2017-01-22 PROCEDURE — 99232 SBSQ HOSP IP/OBS MODERATE 35: CPT | Performed by: FAMILY MEDICINE

## 2017-01-22 PROCEDURE — A9270 NON-COVERED ITEM OR SERVICE: HCPCS | Mod: GY | Performed by: ORTHOPAEDIC SURGERY

## 2017-01-22 PROCEDURE — 97165 OT EVAL LOW COMPLEX 30 MIN: CPT | Mod: GO | Performed by: OCCUPATIONAL THERAPIST

## 2017-01-22 PROCEDURE — 25800025 ZZH RX 258: Performed by: FAMILY MEDICINE

## 2017-01-22 PROCEDURE — 97530 THERAPEUTIC ACTIVITIES: CPT | Mod: GO | Performed by: OCCUPATIONAL THERAPIST

## 2017-01-22 PROCEDURE — 97530 THERAPEUTIC ACTIVITIES: CPT | Mod: GP | Performed by: PHYSICAL THERAPIST

## 2017-01-22 PROCEDURE — 97535 SELF CARE MNGMENT TRAINING: CPT | Mod: GO | Performed by: OCCUPATIONAL THERAPIST

## 2017-01-22 PROCEDURE — 25000125 ZZHC RX 250: Performed by: ORTHOPAEDIC SURGERY

## 2017-01-22 RX ORDER — AMOXICILLIN 250 MG
1-2 CAPSULE ORAL 2 TIMES DAILY PRN
Status: DISCONTINUED | OUTPATIENT
Start: 2017-01-22 | End: 2017-01-23 | Stop reason: HOSPADM

## 2017-01-22 RX ORDER — DOCUSATE SODIUM 100 MG/1
100 CAPSULE, LIQUID FILLED ORAL 2 TIMES DAILY
Status: DISCONTINUED | OUTPATIENT
Start: 2017-01-22 | End: 2017-01-23 | Stop reason: HOSPADM

## 2017-01-22 RX ORDER — ACETAMINOPHEN 325 MG/1
650 TABLET ORAL EVERY 4 HOURS PRN
Status: DISCONTINUED | OUTPATIENT
Start: 2017-01-22 | End: 2017-01-23 | Stop reason: HOSPADM

## 2017-01-22 RX ORDER — POLYETHYLENE GLYCOL 3350 17 G/17G
17 POWDER, FOR SOLUTION ORAL DAILY PRN
Status: DISCONTINUED | OUTPATIENT
Start: 2017-01-22 | End: 2017-01-23 | Stop reason: HOSPADM

## 2017-01-22 RX ORDER — ASPIRIN 325 MG
325 TABLET ORAL DAILY
Qty: 120 TABLET | Refills: 0 | Status: ON HOLD
Start: 2017-01-22 | End: 2017-12-13

## 2017-01-22 RX ORDER — OXYCODONE HYDROCHLORIDE 5 MG/1
5-10 TABLET ORAL
Status: DISCONTINUED | OUTPATIENT
Start: 2017-01-22 | End: 2017-01-23 | Stop reason: HOSPADM

## 2017-01-22 RX ORDER — BISACODYL 10 MG
10 SUPPOSITORY, RECTAL RECTAL DAILY PRN
Status: DISCONTINUED | OUTPATIENT
Start: 2017-01-22 | End: 2017-01-23 | Stop reason: HOSPADM

## 2017-01-22 RX ORDER — OXYCODONE HYDROCHLORIDE 5 MG/1
5 TABLET ORAL EVERY 6 HOURS PRN
Qty: 70 TABLET | Refills: 0 | Status: SHIPPED | OUTPATIENT
Start: 2017-01-22 | End: 2017-01-23

## 2017-01-22 RX ORDER — ACETAMINOPHEN 325 MG/1
650 TABLET ORAL EVERY 4 HOURS PRN
Qty: 100 TABLET | Refills: 1 | Status: ON HOLD | OUTPATIENT
Start: 2017-01-23 | End: 2017-05-17

## 2017-01-22 RX ADMIN — FENOFIBRATE 160 MG: 160 TABLET ORAL at 08:58

## 2017-01-22 RX ADMIN — OXYCODONE HYDROCHLORIDE 5 MG: 5 TABLET ORAL at 11:55

## 2017-01-22 RX ADMIN — OXYCODONE HYDROCHLORIDE 5 MG: 5 TABLET ORAL at 08:56

## 2017-01-22 RX ADMIN — Medication 2.5 MG: at 07:28

## 2017-01-22 RX ADMIN — OXYCODONE HYDROCHLORIDE 10 MG: 5 TABLET ORAL at 20:35

## 2017-01-22 RX ADMIN — METOPROLOL SUCCINATE 25 MG: 25 TABLET, EXTENDED RELEASE ORAL at 08:56

## 2017-01-22 RX ADMIN — DEXTROSE AND SODIUM CHLORIDE: 5; 450 INJECTION, SOLUTION INTRAVENOUS at 15:54

## 2017-01-22 RX ADMIN — Medication 1 TABLET: at 08:56

## 2017-01-22 RX ADMIN — AMLODIPINE BESYLATE 5 MG: 5 TABLET ORAL at 08:56

## 2017-01-22 RX ADMIN — CLOPIDOGREL 75 MG: 75 TABLET, FILM COATED ORAL at 08:56

## 2017-01-22 RX ADMIN — SIMVASTATIN 10 MG: 5 TABLET, FILM COATED ORAL at 20:35

## 2017-01-22 RX ADMIN — ENOXAPARIN SODIUM 40 MG: 40 INJECTION SUBCUTANEOUS at 08:54

## 2017-01-22 RX ADMIN — ACETAMINOPHEN 650 MG: 325 TABLET ORAL at 11:55

## 2017-01-22 RX ADMIN — OXYCODONE HYDROCHLORIDE 10 MG: 5 TABLET ORAL at 17:07

## 2017-01-22 RX ADMIN — OXYCODONE HYDROCHLORIDE 5 MG: 5 TABLET ORAL at 05:31

## 2017-01-22 RX ADMIN — ACETAMINOPHEN 400 MCG: 400 TABLET ORAL at 08:59

## 2017-01-22 RX ADMIN — DOCUSATE SODIUM 100 MG: 100 CAPSULE ORAL at 21:11

## 2017-01-22 RX ADMIN — DEXTROSE AND SODIUM CHLORIDE: 5; 450 INJECTION, SOLUTION INTRAVENOUS at 05:40

## 2017-01-22 RX ADMIN — ALLOPURINOL 100 MG: 100 TABLET ORAL at 20:35

## 2017-01-22 RX ADMIN — LEVOTHYROXINE SODIUM 112 MCG: 112 TABLET ORAL at 08:57

## 2017-01-22 RX ADMIN — HYDROMORPHONE HYDROCHLORIDE 0.2 MG: 1 INJECTION, SOLUTION INTRAMUSCULAR; INTRAVENOUS; SUBCUTANEOUS at 15:52

## 2017-01-22 RX ADMIN — INSULIN ASPART 1 UNITS: 100 INJECTION, SOLUTION INTRAVENOUS; SUBCUTANEOUS at 08:53

## 2017-01-22 RX ADMIN — ALLOPURINOL 100 MG: 100 TABLET ORAL at 08:59

## 2017-01-22 RX ADMIN — HYDROXYZINE HYDROCHLORIDE 10 MG: 10 TABLET ORAL at 05:31

## 2017-01-22 NOTE — PLAN OF CARE
Problem: Goal Outcome Summary  Goal: Goal Outcome Summary  Goal status: Declined progress today due to pain control issues. Her hip flexion AAROM today to 15 dg at best. PROM 30 dg at best.   Max A x2 today for bed mobility and transfers. Unable to weight shift or perform stepping at all in standing. Standing with FWW with min A x1-2 for 3 minutes.     Addendum following 12 pm visit: Pt extremely groggy. Has at best 5-10 dg PROM hip flexion and no AROM, ankle pumps and range limited as well although gradually improved. Reports significant pain with any movement. Pt refused any functional mobility.     Functional status: Due to pain issues is max A x2 for all functional mobility.     Areas of progress: Decline today due to pain control issues    Barrier to discharge Home: Pain levels, assist with all functional mobility.     Equipment needs: defer to rehab facility    Discharge disposition/rationale: Recommend discharge to TCU with medical transport (due to pain levels).

## 2017-01-22 NOTE — PLAN OF CARE
Problem: Fractured Hip (Adult)  Goal: Signs and Symptoms of Listed Potential Problems Will be Absent or Manageable (Fractured Hip)  Signs and symptoms of listed potential problems will be absent or manageable by discharge/transition of care (reference Fractured Hip (Adult) CPG).   Outcome: Declining  VSS, Lewis patent, very poor appetite, BGL of 163 and 134, only able to do bed P.T. due to pain. Pt has been medicated twice for increased pain and has been somnolent T/O shift, + CMS, dressing changed per nursing- no S/S of infection.

## 2017-01-22 NOTE — PLAN OF CARE
Problem: Goal Outcome Summary  Goal: Goal Outcome Summary  Outcome: No Change  Vitals stable, good CMS. Dressing is clean dry and intact. Pt has had intermittent confusion throughout the night. Pt reorients easily. Pt aware that she is confused and states she feels the confusion is making her anxious. Pt is very hesitant to take any pain meds, d/t to feeling like they are adding to her confusion. PRN Hydroxyzine and Oxycodone given per MAR. Pt unable to void after sitting on commode,  bladder scanned for 410, . MD notified, rodriguez placed per orders.

## 2017-01-22 NOTE — PROGRESS NOTES
S-(situation): end of shift    B-(background): hip fracture    A-(assessment): AO with intermittent confusion, VSS, RA T99.1,I/S encouraged,  BS currently 122 see flow sheet, patient feed supper this shift and ate 50%fluids encouraged; pain is currently at a tolerable level with 10mg oxycodone. 125cc's UOP, Incision is CDI with good CMS.      R-(recommendations): Will continue to monitor per POC.

## 2017-01-22 NOTE — PROGRESS NOTES
01/21/17 0930   Quick Adds   Type of Visit Initial Occupational Therapy Evaluation   Living Environment   Lives With alone   Living Arrangements condominium  ([facility] )   Home Accessibility tub/shower is not walk in;grab bars present (bathtub);bed and bath on same level   Number of Stairs to Enter Home 0   Number of Stairs Within Home 0   Stair Railings at Home none   Transportation Available van, wheelchair accessible   Living Environment Comment Patient lives in a senior independent living facility.  No stairs to enter, patient reports complete independence in ADL's and IADL's prior to injury.     Self-Care   Dominant Hand right   Usual Activity Tolerance moderate   Current Activity Tolerance poor   Regular Exercise no   Equipment Currently Used at Home (Has access to a shower chair )   Functional Level Prior   Ambulation 1-->assistive equipment  (cane )   Transferring 0-->independent   Toileting 0-->independent   Bathing 0-->independent   Dressing 0-->independent   Eating 0-->independent   Communication 0-->understands/communicates without difficulty   Swallowing 0-->swallows foods/liquids without difficulty   Cognition 0 - no cognition issues reported   Fall history within last six months yes   Number of times patient has fallen within last six months 1   Which of the above functional risks had a recent onset or change? ambulation;transferring;toileting;fall history   General Information   Onset of Illness/Injury or Date of Surgery - Date 01/20/17   Referring Physician Dr. Hoffman    Patient/Family Goals Statement Not formally stated    Additional Occupational Profile Info/Pertinent History of Current Problem Patient was on her deck shoveling the snow when she slipped on ice. She had landed on her left buttock and went backwards hitting the back of her head. No LOC. She was able to crawl to the door and was able to pull herself up by grabbing door knob. Once she got up she  had immediate pain to left hip. She has been limping since the fall and reports increasing pain with movement. There is a bump to back of head that is painful but no other pain to head. Her friend is with and states she is at her baseline, denying slurred vision or confusion. No nausea, vomiting, any new vision loss, chest pain or shortness of breath as well. She has no history of a previous hip injury.  Patient underwent a nail pinning on 1/20/17    Precautions/Limitations fall precautions   Weight-Bearing Status - LUE full weight-bearing   Weight-Bearing Status - RUE full weight-bearing   Weight-Bearing Status - LLE weight-bearing as tolerated   Weight-Bearing Status - RLE full weight-bearing   Cognitive Status Examination   Orientation orientation to person, place and time   Level of Consciousness alert   Able to Follow Commands WNL/WFL   Personal Safety (Cognitive) WNL/WFL   Memory intact   Attention No deficits were identified   Organization/Problem Solving No deficits were identified   Executive Function No deficits were identified   Visual Perception   Visual Perception Wears glasses   Sensory Examination   Sensory Quick Adds No deficits were identified   Pain Assessment   Patient Currently in Pain Yes, see Vital Sign flowsheet   Range of Motion (ROM)   ROM Quick Adds No deficits were identified   ROM Comment UE ROM WFL    Strength   Manual Muscle Testing Quick Adds No deficits were identified   Strength Comments UE MMT WFL     Coordination   Upper Extremity Coordination No deficits were identified   Mobility   Bed Mobility Bed mobility skill: Supine to sit   Bed Mobility Skill: Supine to Sit   Level of [place]: Supine/Sit maximum assist (25% patients effort)   Physical Assist/Nonphysical Assist: Supine/Sit 1 person assist;verbal cues;supervision;set-up required   Assistive Device: Supine/Sit bedrail   Transfer Skill: Sit to Stand   Level of [place]: Sit/Stand moderate assist (50% patients effort)  "  Physical Assist/Nonphysical Assist: Sit/Stand 1 person + 1 person to manage equipment;verbal cues;supervision;set-up required   Transfer Skill: Sit to Stand weight-bearing as tolerated   Assistive Device for Transfer: Sit/Stand rolling walker   Upper Body Dressing   Level of [place]: Dress Upper Body stand-by assist   Physical Assist/Nonphysical Assist: Dress Upper Body supervision;set-up required   Lower Body Dressing   Level of [place]: Dress Lower Body maximum assist (25% patients effort)   Physical Assist/Nonphysical Assist: Dress Lower Body 1 person assist   Eating/Self Feeding   Level of [place]: Eating independent   Instrumental Activities of Daily Living (IADL)   Previous Responsibilities housekeeping;meal prep;laundry;shopping;medication management;finances   Activities of Daily Living Analysis   Impairments Contributing to Impaired Activities of Daily Living pain   General Therapy Interventions   Planned Therapy Interventions ADL retraining;transfer training   Clinical Impression   Criteria for Skilled Therapeutic Interventions Met yes, treatment indicated   OT Diagnosis Decreased independence in ADL's and functional transfers    Influenced by the following impairments pain, post surgical precautions and limited ROM at the hip.     Assessment of Occupational Performance 1-3 Performance Deficits   Identified Performance Deficits Dressing, bathing, toileting, functional transfers    Clinical Decision Making (Complexity) Low complexity   Therapy Frequency daily   Predicted Duration of Therapy Intervention (days/wks) 3 days    Anticipated Equipment Needs at Discharge (Defer to rehab )   Anticipated Discharge Disposition Transitional Care Facility   Risks and Benefits of Treatment have been explained. Yes   Patient, Family & other staff in agreement with plan of care Yes   [facility] AM-PAC TM \"6 Clicks\"   2016, Trustees of [facility], under license to [employer].  All rights " "reserved.   6 Clicks Short Forms Daily Activity Inpatient Short Form   [facility] AM-PAC  \"6 Clicks\" V.2 Basic Mobility Inpatient Short Form                               [facility]-PAC  \"6 Clicks\" Daily Activity Inpatient Short Form   1. Putting on and taking off regular lower body clothing? 2 - A Lot   2. Bathing (including washing, rinsing, drying)? 2 - A Lot   3. Toileting, which includes using toilet, bedpan or urinal? 2 - A Lot   4. Putting on and taking off regular upper body clothing? 3 - A Little   5. Taking care of personal grooming such as brushing teeth? 3 - A Little   6. Eating meals? 4 - None   Daily Activity Raw Score (Score out of 24.Lower scores equate to lower levels of function) 16   Total Evaluation Time   Total Evaluation Time (Minutes) 15        Arely VILA    "

## 2017-01-22 NOTE — PLAN OF CARE
contacted admissions at [facility] to verify bed for patient for tomorrow.  H&P re-faxed per their request.      Wheel chair/van transport recommended by PT.  Patient is aware of the private cost.

## 2017-01-22 NOTE — PLAN OF CARE
Problem: Goal Outcome Summary  Goal: Goal Outcome Summary  Outcome: No Change  OT:  Evaluation completed, minimal treatment completed due to pain management and lethargy.  Patient lives in an independent senior living condominium with no stairs to enter/within home environment.  Patient reports independence with ADL's and functional transfers with the use of a cane.  Patient reports that she has access to a shower chair at home.  Patient has both a walk-in shower and tub shower (grab bars present), stand height toilet without grab bars.  Currently, patient is mod A in bed mobility from supine<>sit and min A to assume standing position.  Max A in donning and doffing socks, progressing to min A post education on use of reacher and sockaid.  Patient would benefit from TCU stay in order to progress independence in ADL's and functional transfer prior to return to home environment.  Anticipate need for medical transport, will continue to monitor and update as indicated.      Arely APARICIO/ANTONIO

## 2017-01-22 NOTE — PROGRESS NOTES
BG 77 patient was given apple juice 120cc's will recheck til patient reaches 89+. Patients @ [id number].

## 2017-01-22 NOTE — PROGRESS NOTES
[facility] Progress Note          Assessment and Plan:   Assessment:   Principal Problem:    Fractured hip, left, closed, initial encounter (H)    Assessment: Status post surgical intervention, currently postop day #2. Patient is been struggling greatly with adequate pain control for the past 12 hours with p.o. medications not providing significant relief    Plan:  Ongoing management per orthopedic surgery. Patient does have IV Dilaudid pain medication available that can be used for breakthrough as needed.    Active Problems:    Essential hypertension    Assessment:  Blood pressures have been stable in the 100-130 range systolically     Plan:  continue with home regimen metoprolol, decreased amlodipine, and continue to hold hydrochlorothiazide for now. Continue to monitor blood pressure closely and increase back to home regimen as tolerated.      Type 2 diabetes mellitus without complication (H)    Assessment: patient has had minimal oral intake secondary to pain today    Plan: Will stop glipizide until appetite improves and continue to monitor blood sugars and give SSI as needed      Elevated creatinine    Assessment:   Creatinine is elevated up to 1.73, increased from 1.11 prior to surgery. Patient has not been eating or drinking well    Plan:   we'll restart IV fluids and recheck creatinine tomorrow. Continue to monitor urinary output closely and recheck sooner if patient has decreased urinary output.      Urinary retention    Assessment: patient required placement of rodriguez last evening secondary to ongoing urinary retention.      Plan:  Will continue with placement of rodriguez and perform UA to evaluate for infection etiology. Likely is complication of anesthesia as retention was only noted following surgical procedure.  Continue to monitor for appropriate urinary output.        Coronary artery disease involving native coronary artery of native heart without angina pectoris    Assessment:  chronic, no  "symptoms concerning for angina    Plan:  continue current regimen and monitor      Hyperlipidemia LDL goal <100    Assessment:  on simvastatin    Plan:  continue without change      Hypothyroidism due to acquired atrophy of thyroid    Assessment:  on Synthroid    Plan:  continue without change      Hypernatremia    Assessment:  present initially and thought secondary to mild dehydration and chronic hydrochlorothiazide use.  Sodium is normal on recheck today    Plan:  continue to hold Hydrocort Dyazide and monitor.      Chronic kidney disease, stage III (moderate)    Assessment:  with acute worsening as above    Plan:  continue to monitor closely as above      Anemia    Assessment:  chronic, with acute worsening secondary to surgical intervention. Hemoglobin has an stable at 7.4 for the past 2 days the patient is hemodynamically stable    Plan:  monitor as per orthopedic surgery's recommendations.         VTE:  SCDs  Code Status:  Full code        Interval History:   Appears worse today from a pain perspective, with pain not adequately controlled with p.o. regimen.  Vitals stable compared to yesterday.  No new concerns or issues, however patient has not been eating or drinking well for the past 12+ hours secondary to worsening pain with increased creatinine this morning           Significant Problems:     Past Medical History   Diagnosis Date     Diabetes mellitus (H)      Hypertension      Hyperlipemia      CAD (coronary artery disease)      remote hx of stent placement in past     CVA (cerebral vascular accident) (H) 2013     some left sided deficits            Physical Exam:   Blood pressure 127/49, pulse 68, temperature 98.1  F (36.7  C), temperature source Oral, resp. rate 16, height 1.651 m (5' 5\"), weight 61.5 kg (135 lb 9.3 oz), SpO2 95 %.  Constitutional:   awake, alert, very uncomfortable - lying with her eyes closed and moaning in pain, restless and wincing in pain with each movement of her legs, and " appears stated age     Lungs:   No increased work of breathing, decreased air exchange however patient having difficulty focusing on taking deep breaths, clear to auscultation bilaterally, no crackles or wheezing     Cardiovascular:   Normal apical impulse, regular rate and rhythm, normal S1 and S2, no S3 or S4, and no murmur noted     Abdomen:   Bowel sounds present, abdomen soft     Musculoskeletal:   no lower extremity pitting edema present     Neurologic:   Awake, alert, oriented to name, place and time.       Skin:   normal skin color, texture, turgor - skin underneath surgical bandage was not viewed             Data:   All laboratory data reviewed    Attestation:  I have reviewed today's vital signs, notes, medications, labs and imaging.     Electronically Signed:  Shabana Turpin MD    Note: Chart documentation done in part with Dragon Voice Recognition software. Although reviewed after completion, some word and grammatical errors may remain.

## 2017-01-22 NOTE — PROGRESS NOTES
"[facility]  Orthopedics Progress Note           Assessment and Plan:    Assessment:   Post-operative day #2 Procedure(s):  OPEN REDUCTION INTERNAL FIXATION HIP NAILING   Acute post-operative blood loss anemia-asymptomatic         Plan:   Normal healing wound.  No immediate surgical complications identified.  No excessive bleeding  Pain well-controlled.  Tolerating physical therapy and rehabilitation well.  Encourage IS  Start or continue physical therapy  Up with assistance.  Weight-bear as tolerated.  Anticipate discharge from hospital in 24 hours. Discharge to nursing/rehab facility.   Pain control measures  Advance diet as tolerated  Routine wound care  DVT Prophylaxis: Lovenox, SCD's, Compression Hose  Hospitalist following and assisting in medical issues. appreciate the assistance in the management of CKD and urinary retention, HTN, DM            Interval History:   Doing well.  Continues to improve.  Pain is controlled.  No fevers.  Some issues with urinary retention-small [place] cath placed.            Review of Systems:    The patient denies any chest pain, shortness of breath, excessive pain, fever, chills, purulent drainage from the wound, nausea or vomiting.               Physical Exam:   General: awake, alert, appropriate and in no acute distress  Blood pressure 139/60, pulse 65, temperature 98.2  F (36.8  C), temperature source Oral, resp. rate 16, height 1.651 m (5' 5\"), weight 61.5 kg (135 lb 9.3 oz), SpO2 94 %.  Left hip:  Dressing clean, dry and intact. Surrounding skin intact, no breakdown. Calf is soft, nontender with no significant swelling. Distal neurovascular grossly intact.  Compartments soft and non-tender.             Data:     Results for orders placed or performed during the hospital encounter of 01/19/17 (from the past 24 hour(s))   Care Transition RN/SW IP Consult    Narrative    Jody Hoyos     1/21/2017 11:41 AM  Patient admitted as inpatient on 1/19/17 with " diagnosis of Hip   Fracture.      Met with patient to discuss living situation and discharge needs.    Patient resides in [place] in her own town home where she lives   alone.    All discharge options reviewed and discussed with patient.    Patient hoping to admit to the TCU at [facility].  Referral   also sent to [facility] as patient's second choice, per her   request.     Plan is for patient to d/c to a TCU when medically stable and bed   available.     will continue to assess and assist with   disposition.    Manisha- Writer had discussed with patient that she has been   accepted for TCU placement at [facility] for Monday,   1/23/17, as they had a bed open up.  Patient in agreement with   the $14.00 per day private room charge and is very happy she can   go to [facility] upon discharge.      Discussed transportation options.  Patient hoping to be able to   transport with family/friend on day of d/c.         Glucose by meter   Result Value Ref Range    Glucose 156 (H) 70 - 99 mg/dL   Glucose by meter   Result Value Ref Range    Glucose 168 (H) 70 - 99 mg/dL   Glucose by meter   Result Value Ref Range    Glucose 172 (H) 70 - 99 mg/dL   Hemoglobin   Result Value Ref Range    Hemoglobin 7.4 (L) 11.7 - 15.7 g/dL   Basic metabolic panel   Result Value Ref Range    Sodium 136 133 - 144 mmol/L    Potassium 3.6 3.4 - 5.3 mmol/L    Chloride 104 94 - 109 mmol/L    Carbon Dioxide 21 20 - 32 mmol/L    Anion Gap 11 3 - 14 mmol/L    Glucose 155 (H) 70 - 99 mg/dL    Urea Nitrogen 34 (H) 7 - 30 mg/dL    Creatinine 1.73 (H) 0.52 - 1.04 mg/dL    GFR Estimate 27 (L) >60 mL/min/1.7m2    GFR Estimate If Black 33 (L) >60 mL/min/1.7m2    Calcium 7.8 (L) 8.5 - 10.1 mg/dL   Glucose by meter   Result Value Ref Range    Glucose 163 (H) 70 - 99 mg/dL

## 2017-01-22 NOTE — PROGRESS NOTES
S-(situation): End of shift note    B-(background):Fractured hip, left, closed, initial encounter.     A-(assessment):  AOx4 VSS, afebrile RA, I/S encouraged, up to commode with 2 assist walker and gait belt and up to chair for supper; encouraged to eat. Incision is CDI with ice to area. Good CMS. , teds and PCD's, Oxycodone and dilaudid for pain control. Bladder scanned for 298cc's.     R-(recommendations):  Will continue to monitor per POC.

## 2017-01-23 ENCOUNTER — APPOINTMENT (OUTPATIENT)
Dept: PHYSICAL THERAPY | Facility: CLINIC | Age: 82
DRG: 481 | End: 2017-01-23
Payer: MEDICARE

## 2017-01-23 ENCOUNTER — APPOINTMENT (OUTPATIENT)
Dept: OCCUPATIONAL THERAPY | Facility: CLINIC | Age: 82
DRG: 481 | End: 2017-01-23
Payer: MEDICARE

## 2017-01-23 VITALS
HEART RATE: 64 BPM | RESPIRATION RATE: 16 BRPM | HEIGHT: 65 IN | OXYGEN SATURATION: 95 % | WEIGHT: 135.58 LBS | BODY MASS INDEX: 22.59 KG/M2 | TEMPERATURE: 98.9 F | SYSTOLIC BLOOD PRESSURE: 109 MMHG | DIASTOLIC BLOOD PRESSURE: 53 MMHG

## 2017-01-23 LAB
CREAT SERPL-MCNC: 1.58 MG/DL (ref 0.52–1.04)
GFR SERPL CREATININE-BSD FRML MDRD: 31 ML/MIN/1.7M2
GLUCOSE BLDC GLUCOMTR-MCNC: 120 MG/DL (ref 70–99)
GLUCOSE BLDC GLUCOMTR-MCNC: 124 MG/DL (ref 70–99)
PLATELET # BLD AUTO: 180 10E9/L (ref 150–450)

## 2017-01-23 PROCEDURE — 25000132 ZZH RX MED GY IP 250 OP 250 PS 637: Mod: GY | Performed by: FAMILY MEDICINE

## 2017-01-23 PROCEDURE — A9270 NON-COVERED ITEM OR SERVICE: HCPCS | Mod: GY | Performed by: FAMILY MEDICINE

## 2017-01-23 PROCEDURE — 36415 COLL VENOUS BLD VENIPUNCTURE: CPT | Performed by: ORTHOPAEDIC SURGERY

## 2017-01-23 PROCEDURE — 97110 THERAPEUTIC EXERCISES: CPT | Mod: GP | Performed by: PHYSICAL THERAPIST

## 2017-01-23 PROCEDURE — 40000133 ZZH STATISTIC OT WARD VISIT

## 2017-01-23 PROCEDURE — 97530 THERAPEUTIC ACTIVITIES: CPT | Mod: GO

## 2017-01-23 PROCEDURE — 99232 SBSQ HOSP IP/OBS MODERATE 35: CPT | Performed by: FAMILY MEDICINE

## 2017-01-23 PROCEDURE — 85049 AUTOMATED PLATELET COUNT: CPT | Performed by: ORTHOPAEDIC SURGERY

## 2017-01-23 PROCEDURE — 25800025 ZZH RX 258: Performed by: FAMILY MEDICINE

## 2017-01-23 PROCEDURE — 97530 THERAPEUTIC ACTIVITIES: CPT | Mod: GP | Performed by: PHYSICAL THERAPIST

## 2017-01-23 PROCEDURE — 00000146 ZZHCL STATISTIC GLUCOSE BY METER IP

## 2017-01-23 PROCEDURE — 25000132 ZZH RX MED GY IP 250 OP 250 PS 637: Mod: GY | Performed by: ORTHOPAEDIC SURGERY

## 2017-01-23 PROCEDURE — 97535 SELF CARE MNGMENT TRAINING: CPT | Mod: GO

## 2017-01-23 PROCEDURE — 25000125 ZZHC RX 250: Performed by: ORTHOPAEDIC SURGERY

## 2017-01-23 PROCEDURE — A9270 NON-COVERED ITEM OR SERVICE: HCPCS | Mod: GY | Performed by: ORTHOPAEDIC SURGERY

## 2017-01-23 PROCEDURE — 40000193 ZZH STATISTIC PT WARD VISIT: Performed by: PHYSICAL THERAPIST

## 2017-01-23 PROCEDURE — 82565 ASSAY OF CREATININE: CPT | Performed by: ORTHOPAEDIC SURGERY

## 2017-01-23 RX ORDER — CLOPIDOGREL BISULFATE 75 MG/1
75 TABLET ORAL DAILY
Qty: 30 TABLET | Status: ON HOLD | DISCHARGE
Start: 2017-01-23 | End: 2017-12-13

## 2017-01-23 RX ORDER — FENOFIBRATE 160 MG/1
160 TABLET ORAL DAILY
Qty: 30 TABLET | Status: ON HOLD | DISCHARGE
Start: 2017-01-23 | End: 2018-03-02

## 2017-01-23 RX ORDER — ALLOPURINOL 100 MG/1
100 TABLET ORAL 2 TIMES DAILY
Qty: 30 TABLET | Status: ON HOLD | DISCHARGE
Start: 2017-01-23 | End: 2018-02-02

## 2017-01-23 RX ORDER — LEVOTHYROXINE SODIUM 112 UG/1
112 TABLET ORAL DAILY
DISCHARGE
Start: 2017-01-23

## 2017-01-23 RX ORDER — AMLODIPINE BESYLATE 5 MG/1
5 TABLET ORAL DAILY
Qty: 30 TABLET | Status: ON HOLD | DISCHARGE
Start: 2017-01-23 | End: 2017-05-30

## 2017-01-23 RX ORDER — POTASSIUM CITRATE 5 MEQ/1
10 TABLET, EXTENDED RELEASE ORAL DAILY
Status: ON HOLD | DISCHARGE
Start: 2017-01-23 | End: 2017-05-30

## 2017-01-23 RX ORDER — OXYCODONE HYDROCHLORIDE 5 MG/1
5-10 TABLET ORAL
Qty: 60 TABLET | Refills: 0 | Status: SHIPPED | OUTPATIENT
Start: 2017-01-23 | End: 2017-04-20

## 2017-01-23 RX ORDER — AMOXICILLIN 250 MG
1-2 CAPSULE ORAL
Qty: 100 TABLET | Refills: 0 | Status: ON HOLD | COMMUNITY
Start: 2017-01-23 | End: 2018-03-02

## 2017-01-23 RX ORDER — SIMVASTATIN 10 MG
10 TABLET ORAL AT BEDTIME
Qty: 30 TABLET | DISCHARGE
Start: 2017-01-23

## 2017-01-23 RX ORDER — METOPROLOL SUCCINATE 25 MG/1
25 TABLET, EXTENDED RELEASE ORAL DAILY
Qty: 30 TABLET | Status: ON HOLD | DISCHARGE
Start: 2017-01-23 | End: 2018-02-02

## 2017-01-23 RX ADMIN — METOPROLOL SUCCINATE 25 MG: 25 TABLET, EXTENDED RELEASE ORAL at 08:33

## 2017-01-23 RX ADMIN — LEVOTHYROXINE SODIUM 112 MCG: 112 TABLET ORAL at 08:36

## 2017-01-23 RX ADMIN — OXYCODONE HYDROCHLORIDE 10 MG: 5 TABLET ORAL at 13:15

## 2017-01-23 RX ADMIN — ALLOPURINOL 100 MG: 100 TABLET ORAL at 08:35

## 2017-01-23 RX ADMIN — DOCUSATE SODIUM 100 MG: 100 CAPSULE ORAL at 08:33

## 2017-01-23 RX ADMIN — ACETAMINOPHEN 400 MCG: 400 TABLET ORAL at 08:35

## 2017-01-23 RX ADMIN — FENOFIBRATE 160 MG: 160 TABLET ORAL at 08:35

## 2017-01-23 RX ADMIN — Medication 1 TABLET: at 08:32

## 2017-01-23 RX ADMIN — OXYCODONE HYDROCHLORIDE 10 MG: 5 TABLET ORAL at 06:20

## 2017-01-23 RX ADMIN — DEXTROSE AND SODIUM CHLORIDE: 5; 450 INJECTION, SOLUTION INTRAVENOUS at 01:32

## 2017-01-23 RX ADMIN — CLOPIDOGREL 75 MG: 75 TABLET, FILM COATED ORAL at 08:36

## 2017-01-23 RX ADMIN — OXYCODONE HYDROCHLORIDE 10 MG: 5 TABLET ORAL at 10:04

## 2017-01-23 RX ADMIN — OXYCODONE HYDROCHLORIDE 10 MG: 5 TABLET ORAL at 01:32

## 2017-01-23 RX ADMIN — ENOXAPARIN SODIUM 30 MG: 30 INJECTION SUBCUTANEOUS at 08:37

## 2017-01-23 RX ADMIN — AMLODIPINE BESYLATE 5 MG: 5 TABLET ORAL at 08:32

## 2017-01-23 NOTE — PROGRESS NOTES
Name: Mary Ellen Cuba    MRN#: [id number]    Reason for Hospitalization: Benign essential hypertension [I10]  Hypercholesterolemia [E78.00]  Chronic anemia [D64.9]  History of CVA (cerebrovascular accident) [Z86.73]  Closed left hip fracture, initial encounter (H) [S72.002A]  Chronic kidney disease, unspecified stage [N18.9]  Other specified hypothyroidism [E03.8]  Coronary artery disease involving native coronary artery of native heart without angina pectoris [I25.10]  Type 2 diabetes mellitus with diabetic neuropathy, without long-term current use of insulin (H) [E11.40]    Discharge Date: 1/23/2017    Patient / Family response to discharge plan: in agreement    Follow-Up Appt: Future Appointments  Date Time Provider Department Center   2/6/2017 10:40 AM Liborio Hoffman DO [facility]       Other Providers (Care Coordinator, County Services, PCA services etc): No    Discharge Disposition: [facility] via [facility]    PAS-RR    D: Per DHS regulation, SW completed and submitted PAS-RR to [place] [employer] on [employer]ging Direct Connect via the Senior LinkAge Line.  PAS-RR confirmation # is : [id number]     P: Further questions may be directed to [facility] LinkAge Line at #[phone], option #4 for PAS-RR staff.      SOFIA Camacho  [facility] [phone]/ [place] [phone]

## 2017-01-23 NOTE — PROGRESS NOTES
"[facility] Orthopedics    Progress note    89 year old female POD#3 s/p Left Hip IM Nailing with TFN Nail  S: Patient seen at bedside, she was eating in no apparent distress, alert and pleasant.  I discussed surgery and rehabilitation with the patient.  She denies numbness, tingling or major pain issues.  She was joking this AM and in good spirits.  She is sitting up in bed.  Discussed DC to [facility] today.    O: CMS intact LLE, DF/PF intact       Dressing on, clean and dry with a good seal       Left Hip with minimal swelling and no erythema or ecchymosis        Bilateral calves soft and non tender    Vital signs:  Temp: 98.9  F (37.2  C) Temp src: Oral BP: 109/53 mmHg Pulse: 64 Heart Rate: 58 Resp: 16 SpO2: 95 % O2 Device: None (Room air) Oxygen Delivery: 1 LPM Height: 165.1 cm (5' 5\") Weight: 61.5 kg (135 lb 9.3 oz)  Estimated body mass index is 22.56 kg/(m^2) as calculated from the following:    Height as of this encounter: 1.651 m (5' 5\").    Weight as of this encounter: 61.5 kg (135 lb 9.3 oz).      HEMOGLOBIN   Date Value Ref Range Status   01/22/2017 7.4* 11.7 - 15.7 g/dL Final     INR   Date Value Ref Range Status   01/19/2017 1.05 0.86 - 1.14 Final         A/P:  1. Pain-controlled   2. DVT Prophylaxis-Lovenox 30 QD x 21 days  3. Weight Bearing Status-WBAT LLE  4. Physical [facility]   5. Occupational Therapy-[facility].   6. Case Management-[facility] Today  7. Hospitalist-Discussed in depth with Dr. Turpin this AM.  She is holding Diabetic meds, increasing fluids for Cr bump, and leaving Lewis in utnil Wed or Thursday when the TCU can try and take it out then.    8. Incision-no signs or symptoms of infection  9. Plan-DC today to [facility].  I am reviewing DC orders now and printing and signing the Narcotic.  Will do DC summary after that.      Joaquín Gomez PA-C  1/23/2017             "

## 2017-01-23 NOTE — PROGRESS NOTES
[facility] Progress Note          Assessment and Plan:   Assessment:   Principal Problem:    Fractured hip, left, closed, initial encounter (H)    Assessment: Status post surgical repair, currently postop day #3. Pain is much more appropriately controlled with increased oxycodone frequency and patient is eating and drinking better, continues to struggle with movement at times secondary to pain    Plan:  Ongoing management per orthopedic surgery. Anticipate TCU discharge later today versus tomorrow    Active Problems:    Essential hypertension    Assessment:  Blood pressure continues to be in the low normal range following surgical intervention on home regimen of metoprolol in half of the home amlodipine dosing, with hydro-chlorothiazide still being held    Plan:  Continue to monitor blood pressure twice a day as patient transitions to the TCU. Would continue with metoprolol and amlodipine 5 mg - could increase amlodipine back to 10 mg if blood pressures increase going forward.      Type 2 diabetes mellitus without complication (H)    Assessment: Patient's appetite continues to be poor and oral glipizide 2.5 mg has been held    Plan:  Continue to hold glipizide at time of discharge. We'll continue with blood sugar monitoring and reinitiate the patient's blood sugar starts trending upward.      Urinary retention    Assessment: Present following anesthesia and surgical intervention, Lewis catheter is in place    Plan:  Patient will discharge on Lewis catheter with instructions for removal in 2 days. If retention recurs, Lewis should be replaced and patient will need outpatient urology follow-up      Coronary artery disease involving native coronary artery of native heart without angina pectoris    Assessment:  Chronic, no acute angina during this hospitalization    Plan:  Discharge about changed home regimen      Hyperlipidemia LDL goal <100    Assessment:  On simvastatin    Plan:  Discharge without change       "Hypothyroidism due to acquired atrophy of thyroid    Assessment:  On Synthroid    Plan:  Discharge without change      Hypernatremia    Assessment:  Present initially and thought secondary to dehydration as well as hydrochlorothiazide use. Has resolved during this hospitalization    Plan:  Encourage ongoing oral intake and continue to hold hydrochlorothiazide at time of discharge      Chronic kidney disease, stage III (moderate)    Assessment:  With acute worsening of the above, however improving back towards baseline    Plan:  Continue close monitoring as above      Anemia    Assessment:  Chronic with hemoglobin 9.6 prior to surgery. Patient did have acute worsening secondary to surgical intervention however has stabilized in the mid 7 range and patient is hemodynamically stable.    Plan:  Ongoing outpatient monitoring to ensure improvement of hemoglobin following surgical intervention going forward.         VTE:  SCDs  Code Status:  Full Code        Interval History:   Continues to improve with pain much better controlled and patient eating and drinking well.  Vital signs generally better.  Creatinine trending downward with increased oral intake.  Lewis cath is place and adequate urine output.  Tolerating medications without significant side effects.  No new concerns today.            Significant Problems:     Past Medical History   Diagnosis Date     Diabetes mellitus (H)      Hypertension      Hyperlipemia      CAD (coronary artery disease)      remote hx of stent placement in past     CVA (cerebral vascular accident) (H) 2013     some left sided deficits            Physical Exam:   Blood pressure 108/61, pulse 58, temperature 98.8  F (37.1  C), temperature source Oral, resp. rate 20, height 1.651 m (5' 5\"), weight 61.5 kg (135 lb 9.3 oz), SpO2 97 %.  Constitutional:   awake, alert, cooperative, no apparent distress, and appears stated age     Lungs:   No increased work of breathing, good air exchange, clear to " auscultation bilaterally, no crackles or wheezing     Cardiovascular:   Normal apical impulse, regular rate and rhythm, normal S1 and S2, no S3 or S4, and no murmur noted     Abdomen:   Bowel sounds present, abdomen soft and non-tender     Musculoskeletal:   no lower extremity pitting edema present     Neurologic:   Awake, alert, oriented to name, place and time.  Does get easily confused about the details and is aware of this.     Skin:   normal skin color, texture, turgor             Data:   All laboratory data reviewed    Attestation:  I have reviewed today's vital signs, notes, medications, labs and imaging.     Electronically Signed:  Shabana Turpin MD    Note: Chart documentation done in part with Dragon Voice Recognition software. Although reviewed after completion, some word and grammatical errors may remain.

## 2017-01-23 NOTE — PLAN OF CARE
"Problem: Goal Outcome Summary  Goal: Goal Outcome Summary  Occupational Therapy     Goal status:              1:  Patient was unable to stand without BUE support on walker limiting ability to participate in grooming/hygiene while standing.               2:  Patient was Max A for LB dressing BLE this date due to pain limited activity tolerance.  Patient declined to look at dressing equipment at this time \"I'm not interested in any of it,\" with increased agitation in discussion of AE and that topic was ended.               3:  Patient was mod A x 2 for safety with transfer from bed to chair.  Patient needed verbal cues for sequencing and task/distance to be shortened due to pain and fatigue.               4:  Not addressed this session.     Functional status: Patient is currently max A x 2 for safety with BADL's due to fatigue, activity tolerance, and pain.     Areas of progress:  Activity level, decreased pain, increased independence with BADL    Barrier to discharge Home: Level of assistance needed to complete basis needs.     Equipment needs: Defer to TCU            Discharge disposition/rationale: TCU for continued progress with decreased assistance and increased independence with basic needs.     Transport recommendations: Medical van transport.               "

## 2017-01-23 NOTE — DISCHARGE SUMMARY
[facility] Discharge Summary    Mary Ellen Cuba MRN# [id number]   Age: 89 year old YOB: 1924     Date of Admission:  1/19/2017  Date of Discharge::  1/23/2017  Admitting Physician:  Liborio Hoffman DO  Discharge Physician:  Joaquín Gomez PA-C     [place] [facility]: [facility]          Admission Diagnoses:   Benign essential hypertension [I10]  Hypercholesterolemia [E78.00]  Chronic anemia [D64.9]  History of CVA (cerebrovascular accident) [Z86.73]  Closed left hip fracture, initial encounter (H) [S72.002A]  Chronic kidney disease, unspecified stage [N18.9]  Other specified hypothyroidism [E03.8]  Coronary artery disease involving native coronary artery of native heart without angina pectoris [I25.10]  Type 2 diabetes mellitus with diabetic neuropathy, without long-term current use of insulin (H) [E11.40]          Discharge Diagnosis:   Benign essential hypertension [I10]  Hypercholesterolemia [E78.00]  Chronic anemia [D64.9]  History of CVA (cerebrovascular accident) [Z86.73]  Closed left hip fracture, initial encounter (H) [S72.002A]  Chronic kidney disease, unspecified stage [N18.9]  Other specified hypothyroidism [E03.8]  Coronary artery disease involving native coronary artery of native heart without angina pectoris [I25.10]  Type 2 diabetes mellitus with diabetic neuropathy, without long-term current use of insulin (H) [E11.40]          Procedures:   Procedure(s): Procedure(s):  OPEN REDUCTION INTERNAL FIXATION HIP NAILING       No other procedures performed during this admission           Medications Prior to Admission:     Prescriptions prior to admission   Medication Sig Dispense Refill Last Dose     Clopidogrel Bisulfate (PLAVIX PO) Take 75 mg by mouth   1/19/2017 at 0800     SIMVASTATIN PO Take 10 mg by mouth   1/18/2017 at 2100     allopurinol (ZYLOPRIM) 100 MG tablet Take 100 mg by mouth 2 times daily.   1/19/2017 at 0800     fenofibrate 160 MG tablet  Take 160 mg by mouth daily.   1/19/2017 at 0800     levothyroxine (SYNTHROID, LEVOTHROID) 125 MCG tablet Take 112 mcg by mouth daily    1/19/2017 at 0800     Multiple Vitamins-Minerals (CENTRUM SILVER ULTRA WOMENS) TABS Take 1 tablet by mouth daily.   1/19/2017 at 0800     Calcium-Vitamin D (CALCIUM + D PO) Take 1 tablet by mouth daily.   1/19/2017 at 0800     POTASSIUM CITRATE PO Take 10 mEq by mouth   Unknown at Not refilled for a while per pharmacy.      [DISCONTINUED] AmLODIPine Besylate (NORVASC PO) Take 10 mg by mouth daily   1/19/2017 at 0800     VITAMIN D, CHOLECALCIFEROL, PO Take  by mouth once a week.   Unknown at Unknown time     folic acid (FOLVITE) 400 MCG tablet Take 400 mcg by mouth daily.   Unknown at Not sure if taking.     [DISCONTINUED] glipiZIDE (GLUCOTROL) 2.5 MG TABS Take 2.5 mg by mouth every morning (before breakfast).   Unknown at Hasn't been filled since March.      [DISCONTINUED] hydrochlorothiazide (HYDRODIURIL) 25 MG tablet Take 25 mg by mouth daily.   1/19/2017 at 0800             Discharge Medications:     Current Discharge Medication List      START taking these medications    Details   !! oxyCODONE (ROXICODONE) 5 MG IR tablet Take 1-2 tablets (5-10 mg) by mouth every 3 hours as needed for moderate to severe pain  Qty: 60 tablet, Refills: 0    Comments: Give 1 tab for pain 1-5, give 2 tabs for pain 5-10. Indication: Pain  Associated Diagnoses: Closed left hip fracture, initial encounter (H)      senna-docusate (SENOKOT-S;PERICOLACE) 8.6-50 MG per tablet Take 1-2 tablets by mouth 2 times daily as needed (constipation )  Qty: 100 tablet, Refills: 0    Comments: Indication: Constipation  Associated Diagnoses: Closed left hip fracture, initial encounter (H)      !! oxyCODONE (ROXICODONE) 5 MG IR tablet Take 1 tablet (5 mg) by mouth every 6 hours as needed for moderate to severe pain  Qty: 70 tablet, Refills: 0    Associated Diagnoses: Closed left hip fracture, initial encounter (H)       acetaminophen (TYLENOL) 325 MG tablet Take 2 tablets (650 mg) by mouth every 4 hours as needed for other (surgical pain)  Qty: 100 tablet, Refills: 1    Associated Diagnoses: Closed left hip fracture, initial encounter (H)      enoxaparin (LOVENOX) 30 MG/0.3ML injection Inject 0.3 mLs (30 mg) Subcutaneous every 24 hours for 21 days  Qty: 6.3 mL, Refills: 0    Associated Diagnoses: Closed left hip fracture, initial encounter (H)       !! - Potential duplicate medications found. Please discuss with provider.      CONTINUE these medications which have CHANGED    Details   amLODIPine (NORVASC) 5 MG tablet Take 1 tablet (5 mg) by mouth daily  Qty: 30 tablet    Associated Diagnoses: Benign essential hypertension      metoprolol (TOPROL XL) 25 MG 24 hr tablet Take 1 tablet (25 mg) by mouth daily  Qty: 30 tablet    Associated Diagnoses: Benign essential hypertension      aspirin 325 MG tablet Take 1 tablet (325 mg) by mouth daily  Qty: 120 tablet, Refills: 0    Comments: Hold while receiving Lovenox  Associated Diagnoses: Closed left hip fracture, initial encounter (H)         CONTINUE these medications which have NOT CHANGED    Details   Clopidogrel Bisulfate (PLAVIX PO) Take 75 mg by mouth      SIMVASTATIN PO Take 10 mg by mouth      allopurinol (ZYLOPRIM) 100 MG tablet Take 100 mg by mouth 2 times daily.      fenofibrate 160 MG tablet Take 160 mg by mouth daily.      levothyroxine (SYNTHROID, LEVOTHROID) 125 MCG tablet Take 112 mcg by mouth daily       Multiple Vitamins-Minerals (CENTRUM SILVER ULTRA WOMENS) TABS Take 1 tablet by mouth daily.      Calcium-Vitamin D (CALCIUM + D PO) Take 1 tablet by mouth daily.      POTASSIUM CITRATE PO Take 10 mEq by mouth      VITAMIN D, CHOLECALCIFEROL, PO Take  by mouth once a week.      folic acid (FOLVITE) 400 MCG tablet Take 400 mcg by mouth daily.         STOP taking these medications       glipiZIDE (GLUCOTROL) 2.5 MG TABS Comments:   Reason for Stopping:          hydrochlorothiazide (HYDRODIURIL) 25 MG tablet Comments:   Reason for Stopping:                     Consultations:   Hospitalist followed for Medical Management          Brief History of Illness:   Reason for admission requiring a surgical or invasive procedure:   Benign essential hypertension [I10]  Hypercholesterolemia [E78.00]  Chronic anemia [D64.9]  History of CVA (cerebrovascular accident) [Z86.73]  Closed left hip fracture, initial encounter (H) [S72.002A]  Chronic kidney disease, unspecified stage [N18.9]  Other specified hypothyroidism [E03.8]  Coronary artery disease involving native coronary artery of native heart without angina pectoris [I25.10]  Type 2 diabetes mellitus with diabetic neuropathy, without long-term current use of insulin (H) [E11.40]   The patient underwent the following procedure(s):   Procedure(s):  OPEN REDUCTION INTERNAL FIXATION HIP NAILING   There were no immediate complications during this procedure.    Please refer to the full operative summary for details.           Hospital Course:   This patient was admitted for the above diagnosis on 1/19 through the ED.  Orthopedics was consulted and it was thought best to under go the above procedure.  A consent was signed and she went to surgery. Postoperatively she did very well with no apparent complications, and she had an uneventful hospital stay. Antibiotics were given for 24 hours after surgery. She was given lovenox for DVT prophylaxis and her pain was well controlled with IV pain meds, then transitioned to oral pain medications during her hospital stay. The patient was followed by the Orthopedic team and on postoperative day 1 CMS/dressing were intact.  On postoperative day 2 dressing was clean dry and intact and CMS was intact again. On POD#3 CMS was intact and dressing clean. She progressed well with physical therapy and discharge to TCU was recommended. Occupational therapy also saw the patient and worked on activities of daily  living. Her chronic medical problems were followed by the medicine team during her hospital stay and there were no significant changes to conditions or treatment plans.  No new medical problems presented during her hospital stay, they did hold her Diabetic meds, increase fluids for Cr bump, and left Lewis in utnil Wed or Thursday when the TCU will try and take it out then.  Otherwise no major changes.       Discharge Instructions and Follow-Up:   Discharge diet: Pre-procedure diet or regular   Discharge activity: Activity as tolerated  Driving restricitIons: no driving while taking narcotic analgesics  Weight bearing status: Weight bearing as tolerated   Discharge follow-up: Follow up with Dr. Hoffman in 2 weeks    Wound care: Keep dressing on until follow up   Anticoagulation:            Lovenox 30mg SQ Qday x 21 days after surgery.            Discharge Disposition:   Discharged to [facility] T[facility]      Attestation:  I have reviewed today's vital signs, notes, medications, labs and imaging.  Amount of time performed on this discharge summary: 30 minutes.    Joaquín Gomez PA-C

## 2017-01-23 NOTE — PLAN OF CARE
Problem: Goal Outcome Summary  Goal: Goal Outcome Summary  Outcome: No Change  Pt is alert and oriented. Vitals stable, afebrile. PRN Oxycodone given per MAR. Pt has pain with movement and is resistant to being turned. Pt reports that her right leg is stiff from not moving.. ROM done on right leg and pt repositioned q2hr. Lewis patent with good output. Pt is using IS with encouragement. Pt declined snacks, hs .

## 2017-01-23 NOTE — PLAN OF CARE
Problem: Goal Outcome Summary  Goal: Goal Outcome Summary  Outcome: Completed Date Met:  01/23/17  Good CMS to left foot and leg. Aquacell dressing to left hip is CDI. Passing gas. Lewis is draining pale yellow urine. Clear lungs. Alert and oriented, but forgetful. Pain has been well-controlled with Oxycodone. Up to chair with two assists, walker, and gait belt; apprehensive to walk and sit. Good appetite. Discharged to [facility] per wheelchair with Handivan. Lewis was left in and will be removed 1-25-17.

## 2017-01-23 NOTE — PROGRESS NOTES
S-(situation): Patient discharged to [facility] via wheelchair with Handivan.    B-(background): Left CONNIE    A-(assessment): Good CMS to left leg. Aquacell dressing is CDI. Oxycodone is controlling pain well. Good appetite. Rodriguez catheter is draining pale yellow urine and will be DCed 1-25-17. VSS NO BM while hospitalized, but passing gas and received stool softeners and prune juice.  Last bowel movement: unknown    R-(recommendations):Report called to OTONIEL Horton at [facility]. Listed belongings gathered and sent with patient.     Discharge Nursing Criteria:     Care Plan and Patient education resolved: Yes    Core Measures   Core Measures applicable during stay (Stroke/TIA, MI, Pneumonia and SSI): none      Vaccines  Pneumonia Vaccine verified at discharge: Yes  Influenza status verified at discharge:  Yes    Intentionally Retained Items (examples: Drains, Wound packing, ureteral stents, rodriguez, PICC line or IV)  Patient was sent to Nursing Home with rodriguez catheter left in place.   Information you need to know about your procedure form filled out and copy given to patient: No, pt very forgetful and staff at [facility] were alerted per writer and orders of plan to DC it 1-25-17.    [facility]  Home and hospital aquired medications returned to patient: NA  Medication Bin checked and emptied on discharge Yes  All paperwork sent with patient/Copy of AVS given to patient or family Yes.

## 2017-01-23 NOTE — PROGRESS NOTES
Mary Ellen IBRAHIM Bereket  Gender: female  : 1924  [address] [address] [address]  [phone] (home)     Medical Record: [id number]  Pharmacy: Data Unavailable  Primary Care Provider: Liborio Parks    Parent's names are: Data Unavailable (mother) and Data Unavailable (father).      [facility]      Discharge Phone Call:  Key Words/Key Times    Discharged to [facility].  Herbert Yeboah RN

## 2017-01-23 NOTE — PLAN OF CARE
Problem: Goal Outcome Summary  Goal: Goal Outcome Summary      Goal status:Progressing, slowly  1) Not assessed today, Max A yesterday, up with nursing this AM.   2) Sit to stand mod A x 2, standing min A x 1 for 1 minute.   3) Unable to progress gait with standing practice so far    Areas of progress:Standing, better pain control    Functional status: Mod A x 2 sit to stand, min A standing, max A bed mobility    Barrier to discharge to previous living situation: Level of assist forFunctional mobility, lives alone, slow PT progression, advanced age    Equipment needs: Defer to TCU    Discharge disposition/rationale: Short term rehabilitation for ongoing PT,   Recommend medical van transport due to patients mobility deficits    Physical Therapy Discharge Summary    Current functional/mobility status: See above in care plan note for details.    Reason for therapy discharge:    Discharged to transitional care facility.    Progress towards therapy goal(s):See goals on Care Plan in [facility] electronic health record for goal details.  Goals partially met.  Barriers to achieving goals:   limited tolerance for therapy and discharge from facility.         Therapy recommendation(s):    Continued therapy is recommended.  Rationale/Recommendations:  PT to progress gait and functional mobility to improve functional independence.       She Gaitan................... PT, DPT, CLT      1/23/2017, 10:33 AM  [phone]

## 2017-01-23 NOTE — PROGRESS NOTES
Occupational Therapy Discharge Summary    Reason for therapy discharge:    Discharged to transitional care facility.    Progress towards therapy goal(s). See goals on Care Plan in [facility] electronic health record for goal details.  Goals partially met.  Barriers to achieving goals:   limited tolerance for therapy and discharge from facility.    Therapy recommendation(s):    Continued therapy is recommended.  Rationale/Recommendations:  To increase ability to complete ADL at prior level of function as possible. .

## 2017-01-24 ENCOUNTER — RECORDS - HEALTHEAST (OUTPATIENT)
Dept: LAB | Facility: CLINIC | Age: 82
End: 2017-01-24

## 2017-01-24 LAB
CREAT SERPL-MCNC: 1.48 MG/DL (ref 0.6–1.1)
GFR SERPL CREATININE-BSD FRML MDRD: 33 ML/MIN/1.73M2

## 2017-01-26 ENCOUNTER — INFUSION THERAPY VISIT (OUTPATIENT)
Dept: INFUSION THERAPY | Facility: CLINIC | Age: 82
End: 2017-01-26
Payer: MEDICARE

## 2017-01-26 VITALS
SYSTOLIC BLOOD PRESSURE: 138 MMHG | HEART RATE: 62 BPM | RESPIRATION RATE: 18 BRPM | OXYGEN SATURATION: 98 % | TEMPERATURE: 99 F | DIASTOLIC BLOOD PRESSURE: 55 MMHG

## 2017-01-26 DIAGNOSIS — N18.30 CHRONIC KIDNEY DISEASE, STAGE III (MODERATE) (H): ICD-10-CM

## 2017-01-26 DIAGNOSIS — D64.9 ANEMIA: ICD-10-CM

## 2017-01-26 DIAGNOSIS — I25.10 CORONARY ARTERY DISEASE INVOLVING NATIVE CORONARY ARTERY OF NATIVE HEART WITHOUT ANGINA PECTORIS: Primary | ICD-10-CM

## 2017-01-26 LAB
ABO + RH BLD: NORMAL
ABO + RH BLD: NORMAL
BLD GP AB SCN SERPL QL: NORMAL
BLD PROD TYP BPU: NORMAL
BLD PROD TYP BPU: NORMAL
BLD UNIT ID BPU: 0
BLOOD BANK CMNT PATIENT-IMP: NORMAL
BLOOD PRODUCT CODE: NORMAL
BPU ID: NORMAL
NUM BPU REQUESTED: 1
SPECIMEN EXP DATE BLD: NORMAL
TRANSFUSION STATUS PATIENT QL: NORMAL
TRANSFUSION STATUS PATIENT QL: NORMAL

## 2017-01-26 PROCEDURE — 25000125 ZZHC RX 250: Performed by: FAMILY MEDICINE

## 2017-01-26 PROCEDURE — P9016 RBC LEUKOCYTES REDUCED: HCPCS | Performed by: FAMILY MEDICINE

## 2017-01-26 PROCEDURE — 86850 RBC ANTIBODY SCREEN: CPT | Performed by: FAMILY MEDICINE

## 2017-01-26 PROCEDURE — 36415 COLL VENOUS BLD VENIPUNCTURE: CPT | Performed by: FAMILY MEDICINE

## 2017-01-26 PROCEDURE — 25000128 H RX IP 250 OP 636: Performed by: FAMILY MEDICINE

## 2017-01-26 PROCEDURE — 96374 THER/PROPH/DIAG INJ IV PUSH: CPT

## 2017-01-26 PROCEDURE — 86923 COMPATIBILITY TEST ELECTRIC: CPT | Performed by: FAMILY MEDICINE

## 2017-01-26 PROCEDURE — 86900 BLOOD TYPING SEROLOGIC ABO: CPT | Performed by: FAMILY MEDICINE

## 2017-01-26 PROCEDURE — 36430 TRANSFUSION BLD/BLD COMPNT: CPT

## 2017-01-26 PROCEDURE — 86901 BLOOD TYPING SEROLOGIC RH(D): CPT | Performed by: FAMILY MEDICINE

## 2017-01-26 RX ORDER — FUROSEMIDE 10 MG/ML
20 INJECTION INTRAMUSCULAR; INTRAVENOUS ONCE
Status: COMPLETED | OUTPATIENT
Start: 2017-01-26 | End: 2017-01-26

## 2017-01-26 RX ADMIN — FUROSEMIDE 20 MG: 10 INJECTION, SOLUTION INTRAVENOUS at 14:02

## 2017-01-26 RX ADMIN — SODIUM CHLORIDE 250 ML: 9 INJECTION, SOLUTION INTRAVENOUS at 11:32

## 2017-01-26 ASSESSMENT — PAIN SCALES - GENERAL: PAINLEVEL: MILD PAIN (2)

## 2017-01-26 NOTE — PROGRESS NOTES
Patients Hgb-6.9.  Consent signed. Transferred into chair with 2 assist. Tolerated 1 unit of blood. Lung sounds right base fine crackles  pre/post  unit of blood.  Discharged back to [facility] with [facility] transport.

## 2017-02-06 ENCOUNTER — RADIANT APPOINTMENT (OUTPATIENT)
Dept: GENERAL RADIOLOGY | Facility: OTHER | Age: 82
End: 2017-02-06
Attending: ORTHOPAEDIC SURGERY
Payer: COMMERCIAL

## 2017-02-06 ENCOUNTER — OFFICE VISIT (OUTPATIENT)
Dept: ORTHOPEDICS | Facility: OTHER | Age: 82
End: 2017-02-06
Payer: COMMERCIAL

## 2017-02-06 VITALS — HEIGHT: 65 IN | BODY MASS INDEX: 22.49 KG/M2 | TEMPERATURE: 97.7 F | WEIGHT: 135 LBS

## 2017-02-06 DIAGNOSIS — S72.002A: Primary | ICD-10-CM

## 2017-02-06 DIAGNOSIS — S72.002A: ICD-10-CM

## 2017-02-06 PROCEDURE — 99207 ZZC FRACTURE CARE IN GLOBAL PERIOD: CPT | Performed by: ORTHOPAEDIC SURGERY

## 2017-02-06 PROCEDURE — 73502 X-RAY EXAM HIP UNI 2-3 VIEWS: CPT

## 2017-02-06 ASSESSMENT — PAIN SCALES - GENERAL: PAINLEVEL: NO PAIN (0)

## 2017-02-06 NOTE — MR AVS SNAPSHOT
"              After Visit Summary   2/6/2017    Mary Ellen Cuba    MRN: [id number]           Patient Information     Date Of Birth          1/20/1924        Visit Information        Provider Department      2/6/2017 10:40 AM Liborio Hoffman DO [facility]        Today's Diagnoses     Fractured hip, left, closed, initial encounter (H)    -  1        Follow-ups after your visit        Your next 10 appointments already scheduled     Mar 23, 2017  2:00 PM   Return Visit with Liborio Hoffman DO   [facility] ([facility])    [address]  [place] [id number] [phone]              Who to contact     If you have questions or need follow up information about today's clinic visit or your schedule please contact [facility] directly at [phone].  Normal or non-critical lab and imaging results will be communicated to you by MyChart, letter or phone within 4 business days after the clinic has received the results. If you do not hear from us within 7 days, please contact the clinic through MyChart or phone. If you have a critical or abnormal lab result, we will notify you by phone as soon as possible.  Submit refill requests through [employer] or call your pharmacy and they will forward the refill request to us. Please allow 3 business days for your refill to be completed.          Additional Information About Your Visit        Care EveryWhere ID     This is your Care EveryWhere ID. This could be used by other organizations to access your [place] medical records  [id number]        Your Vitals Were     Temperature Height BMI (Body Mass Index)             97.7  F (36.5  C) (Temporal) 5' 5\" (1.651 m) 22.47 kg/m2          Blood Pressure from Last 3 Encounters:   01/26/17 138/55   01/23/17 109/53   07/21/11 160/62    Weight from Last 3 Encounters:   02/06/17 135 lb (61.236 kg)   01/19/17 135 lb 9.3 oz (61.5 kg)   07/21/11 " 162 lb (73.483 kg)               Primary Care Provider Office Phone # Fax #    Liborio Parks [phone]       [facility] [address]  [place] [address]        Thank you!     Thank you for choosing [facility]  for your care. Our goal is always to provide you with excellent care. Hearing back from our patients is one way we can continue to improve our services. Please take a few minutes to complete the written survey that you may receive in the mail after your visit with us. Thank you!             Your Updated Medication List - Protect others around you: Learn how to safely use, store and throw away your medicines at www.disposemymeds.org.          This list is accurate as of: 2/6/17 12:28 PM.  Always use your most recent med list.                   Brand Name Dispense Instructions for use    acetaminophen 325 MG tablet    TYLENOL    100 tablet    Take 2 tablets (650 mg) by mouth every 4 hours as needed for other (surgical pain)       allopurinol 100 MG tablet    ZYLOPRIM    30 tablet    Take 1 tablet (100 mg) by mouth 2 times daily       amLODIPine 5 MG tablet    NORVASC    30 tablet    Take 1 tablet (5 mg) by mouth daily       aspirin 325 MG tablet     120 tablet    Take 1 tablet (325 mg) by mouth daily       CALCIUM + D PO      Take 1 tablet by mouth daily.       CENTRUM SILVER ULTRA WOMENS Tabs      Take 1 tablet by mouth daily.       clopidogrel 75 MG tablet    PLAVIX    30 tablet    Take 1 tablet (75 mg) by mouth daily       enoxaparin 30 MG/0.3ML injection    LOVENOX    6.3 mL    Inject 0.3 mLs (30 mg) Subcutaneous every 24 hours for 21 days       fenofibrate 160 MG tablet     30 tablet    Take 1 tablet (160 mg) by mouth daily       folic acid 400 MCG tablet    FOLVITE     Take 400 mcg by mouth daily.       levothyroxine 112 MCG tablet    SYNTHROID/LEVOTHROID     Take 1 tablet (112 mcg) by mouth daily       metoprolol 25 MG 24 hr tablet    TOPROL XL    30 tablet     Take 1 tablet (25 mg) by mouth daily       oxyCODONE 5 MG IR tablet    ROXICODONE    60 tablet    Take 1-2 tablets (5-10 mg) by mouth every 3 hours as needed for moderate to severe pain       potassium citrate 5 MEQ (540 MG) CR tablet    UROCIT-K     Take 2 tablets (10 mEq) by mouth daily       senna-docusate 8.6-50 MG per tablet    SENOKOT-S;PERICOLACE    100 tablet    Take 1-2 tablets by mouth 2 times daily as needed (constipation )       simvastatin 10 MG tablet    ZOCOR    30 tablet    Take 1 tablet (10 mg) by mouth At Bedtime       VITAMIN D (CHOLECALCIFEROL) PO      Take  by mouth once a week.

## 2017-02-06 NOTE — NURSING NOTE
"Chief Complaint   Patient presents with     Surgical Followup     DOS: 1/20/17~Internal fixation with a TFN nail~17 days postop       Initial Temp(Src) 97.7  F (36.5  C) (Temporal) Estimated body mass index is 22.47 kg/(m^2) as calculated from the following:    Height as of this encounter: 5' 5\" (1.651 m).    Weight as of this encounter: 135 lb (61.236 kg).  Medication Reconciliation: complete     Dianna/ADORE     "

## 2017-02-06 NOTE — PROGRESS NOTES
Orthopedic Clinic Post-Operative Note    CHIEF COMPLAINT:   Chief Complaint   Patient presents with     Surgical Followup     DOS: 1/20/17~Internal fixation with a TFN nail~17 days postop       HISTORY OF PRESENT ILLNESS  Location: left hip   Rating of Pain:  mild  Pain is better with:  Rest  Pain improving overall: Yes  Associated Features: None  Patient concerns: Hip has been progressing. She is also been dealing with a decubitus along her sacrum. Nursing home as been managing this. It has been improving.    Patient's past medical, surgical, social and family histories reviewed.     Past Medical History   Diagnosis Date     Diabetes mellitus (H)      Hypertension      Hyperlipemia      CAD (coronary artery disease)      remote hx of stent placement in past     CVA (cerebral vascular accident) (H) 2013     some left sided deficits       Past Surgical History   Procedure Laterality Date     Appendectomy       Gyn surgery       oopherectomy     Phacoemulsification with standard intraocular lens implant  6/30/2011     Procedure:PHACOEMULSIFICATION WITH STANDARD INTRAOCULAR LENS IMPLANT; Surgeon:DEANDRE NUGENT; [facility]     Phacoemulsification with standard intraocular lens implant  7/21/2011     Procedure:PHACOEMULSIFICATION WITH STANDARD INTRAOCULAR LENS IMPLANT; Surgeon:DEANDRE NUGENT; [facility]     Gi surgery       gwendolyn     Gi surgery       hemicolectomy     Laser yag capsulotomy  3/21/2013     Procedure: LASER YAG CAPSULOTOMY;  yag capsulotomy bilateral eyes;  Surgeon: Deandre Nugent MD;  [facility]     Stent, coronary, lyn       Open reduction internal fixation hip nailing Left 1/20/2017     Procedure: OPEN REDUCTION INTERNAL FIXATION HIP NAILING;  Surgeon: Liborio Hoffman DO;  [facility]       Medications:    Current Outpatient Prescriptions on File Prior to Visit:  amLODIPine (NORVASC) 5 MG tablet Take 1 tablet (5 mg) by mouth daily   metoprolol (TOPROL XL) 25 MG 24 hr  tablet Take 1 tablet (25 mg) by mouth daily   oxyCODONE (ROXICODONE) 5 MG IR tablet Take 1-2 tablets (5-10 mg) by mouth every 3 hours as needed for moderate to severe pain   senna-docusate (SENOKOT-S;PERICOLACE) 8.6-50 MG per tablet Take 1-2 tablets by mouth 2 times daily as needed (constipation )   fenofibrate 160 MG tablet Take 1 tablet (160 mg) by mouth daily   simvastatin (ZOCOR) 10 MG tablet Take 1 tablet (10 mg) by mouth At Bedtime   allopurinol (ZYLOPRIM) 100 MG tablet Take 1 tablet (100 mg) by mouth 2 times daily   potassium citrate (UROCIT-K) 5 MEQ (540 MG) CR tablet Take 2 tablets (10 mEq) by mouth daily   clopidogrel (PLAVIX) 75 MG tablet Take 1 tablet (75 mg) by mouth daily   levothyroxine (SYNTHROID/LEVOTHROID) 112 MCG tablet Take 1 tablet (112 mcg) by mouth daily   acetaminophen (TYLENOL) 325 MG tablet Take 2 tablets (650 mg) by mouth every 4 hours as needed for other (surgical pain)   aspirin 325 MG tablet Take 1 tablet (325 mg) by mouth daily   enoxaparin (LOVENOX) 30 MG/0.3ML injection Inject 0.3 mLs (30 mg) Subcutaneous every 24 hours for 21 days   VITAMIN D, CHOLECALCIFEROL, PO Take  by mouth once a week.   folic acid (FOLVITE) 400 MCG tablet Take 400 mcg by mouth daily.   Multiple Vitamins-Minerals (CENTRUM SILVER ULTRA WOMENS) TABS Take 1 tablet by mouth daily.   Calcium-Vitamin D (CALCIUM + D PO) Take 1 tablet by mouth daily.     No current facility-administered medications on file prior to visit.    Allergies   Allergen Reactions     Hmg-Coa-R Inhibitors      Metformin GI Disturbance       Social History     Occupational History     Not on file.     Social History Main Topics     Smoking status: Never Smoker      Smokeless tobacco: Not on file     Alcohol Use: No     Drug Use: No     Sexual Activity: Not on file       Family History   Problem Relation Age of Onset     Colon Cancer Mother      Prostate Cancer Brother      Peptic Ulcer Disease Father        REVIEW OF SYSTEMS  General: negative  "for, night sweats, dizziness, fatigue  Resp: No shortness of breath and no cough  CV: negative for chest pain, syncope or near-syncope  GI: negative for nausea, vomiting and diarrhea  : negative for dysuria and hematuria  Musculoskeletal: as above  Neurologic: negative for syncope   Hematologic: negative for bleeding disorder    Physical Exam:  Vitals: Temp(Src) 97.7  F (36.5  C) (Temporal)  Ht 5' 5\" (1.651 m)  Wt 135 lb (61.236 kg)  BMI 22.47 kg/m2  BMI= Body mass index is 22.47 kg/(m^2).  Constitutional: healthy, alert and no acute distress   Psychiatric: mentation appears normal and affect normal/bright  NEURO: no focal deficits  SKIN: .well healed, no erythema, no incision breakdown and no drainage.  JOINT/EXTREMITIES: Distal neurovascular intact. There is some swelling/hematoma along the proximal incision. No evidence of infection. No drainage.  Sacral area shows some early-stage decubitus. There is no actual skin breakdown.  GAIT: not tested     Diagnostic Modalities:  left hip X-ray: Short locked TFN implant placed. No position changes. No evidence of loosening.  Independent visualization of the images was performed.      Impression:   Chief Complaint   Patient presents with     Surgical Followup     DOS: 1/20/17~Internal fixation with a TFN nail~17 days postop    doing well from a hip perspective. Early-stage sacral decubitus being treated    Plan:   Activity: Use assistive devices, weightbearing as tolerated  Staples/Sutures out: Yes,   Pain controlled: Yes. Continue to use: Norco  Immobilzation: No  Physical Therapy: passive range of motion, active range of motion, endurance  Rest, Ice, Elevation  Return to clinic 6, week(s), or sooner as needed for changes.    Re-x-ray on return: No    Lio Hoffman D.O.    "

## 2017-03-23 ENCOUNTER — OFFICE VISIT (OUTPATIENT)
Dept: ORTHOPEDICS | Facility: OTHER | Age: 82
End: 2017-03-23
Payer: COMMERCIAL

## 2017-03-23 VITALS — WEIGHT: 142 LBS | TEMPERATURE: 98.2 F | BODY MASS INDEX: 23.66 KG/M2 | HEIGHT: 65 IN

## 2017-03-23 DIAGNOSIS — S72.002A: Primary | ICD-10-CM

## 2017-03-23 PROCEDURE — 99024 POSTOP FOLLOW-UP VISIT: CPT | Performed by: ORTHOPAEDIC SURGERY

## 2017-03-23 ASSESSMENT — PAIN SCALES - GENERAL: PAINLEVEL: MILD PAIN (3)

## 2017-03-23 NOTE — LETTER
3/23/2017       RE: Mary Ellen Cuba  [address] [facility] [address]           Dear Colleague,    Thank you for referring your patient, Mary Ellen Cuba, to the [facility]. Please see a copy of my visit note below.    Orthopedic Clinic Post-Operative Note    CHIEF COMPLAINT:   Chief Complaint   Patient presents with     RECHECK     Left hip follow up     Surgical Followup     DOS: 1/20/17~Internal fixation with a TFN nail~9 weeks postop       HISTORY OF PRESENT ILLNESS  Noticing some thigh discomfort. Worse with weightbearing and twisting her hip. Better with rest. No new injuries. Presents with her daughter.    Patient's past medical, surgical, social and family histories reviewed.     Past Medical History:   Diagnosis Date     CAD (coronary artery disease)     remote hx of stent placement in past     CVA (cerebral vascular accident) (H) 2013    some left sided deficits     Diabetes mellitus (H)      Hyperlipemia      Hypertension        Past Surgical History:   Procedure Laterality Date     APPENDECTOMY       GI SURGERY      gwendolyn     GI SURGERY      hemicolectomy     GYN SURGERY      oopherectomy     LASER YAG CAPSULOTOMY  3/21/2013    Procedure: LASER YAG CAPSULOTOMY;  yag capsulotomy bilateral eyes;  Surgeon: Deandre Nugent MD;  [facility]     OPEN REDUCTION INTERNAL FIXATION HIP NAILING Left 1/20/2017    Procedure: OPEN REDUCTION INTERNAL FIXATION HIP NAILING;  Surgeon: Liborio Hoffman DO;  [facility]     PHACOEMULSIFICATION WITH STANDARD INTRAOCULAR LENS IMPLANT  6/30/2011    Procedure:PHACOEMULSIFICATION WITH STANDARD INTRAOCULAR LENS IMPLANT; Surgeon:DEANDRE NUGENT; [facility]     PHACOEMULSIFICATION WITH STANDARD INTRAOCULAR LENS IMPLANT  7/21/2011    Procedure:PHACOEMULSIFICATION WITH STANDARD INTRAOCULAR LENS IMPLANT; Surgeon:DEANDRE NUGENT; [facility]     STENT, CORONARY, DIOGENES         Medications:    Current Outpatient Prescriptions  on File Prior to Visit:  amLODIPine (NORVASC) 5 MG tablet Take 1 tablet (5 mg) by mouth daily   metoprolol (TOPROL XL) 25 MG 24 hr tablet Take 1 tablet (25 mg) by mouth daily   senna-docusate (SENOKOT-S;PERICOLACE) 8.6-50 MG per tablet Take 1-2 tablets by mouth 2 times daily as needed (constipation )   fenofibrate 160 MG tablet Take 1 tablet (160 mg) by mouth daily   simvastatin (ZOCOR) 10 MG tablet Take 1 tablet (10 mg) by mouth At Bedtime   allopurinol (ZYLOPRIM) 100 MG tablet Take 1 tablet (100 mg) by mouth 2 times daily   potassium citrate (UROCIT-K) 5 MEQ (540 MG) CR tablet Take 2 tablets (10 mEq) by mouth daily   clopidogrel (PLAVIX) 75 MG tablet Take 1 tablet (75 mg) by mouth daily   levothyroxine (SYNTHROID/LEVOTHROID) 112 MCG tablet Take 1 tablet (112 mcg) by mouth daily   acetaminophen (TYLENOL) 325 MG tablet Take 2 tablets (650 mg) by mouth every 4 hours as needed for other (surgical pain)   aspirin 325 MG tablet Take 1 tablet (325 mg) by mouth daily   VITAMIN D, CHOLECALCIFEROL, PO Take  by mouth once a week.   folic acid (FOLVITE) 400 MCG tablet Take 400 mcg by mouth daily.   Multiple Vitamins-Minerals (CENTRUM SILVER ULTRA WOMENS) TABS Take 1 tablet by mouth daily.   Calcium-Vitamin D (CALCIUM + D PO) Take 1 tablet by mouth daily.   oxyCODONE (ROXICODONE) 5 MG IR tablet Take 1-2 tablets (5-10 mg) by mouth every 3 hours as needed for moderate to severe pain     No current facility-administered medications on file prior to visit.     Allergies   Allergen Reactions     Hmg-Coa-R Inhibitors      Metformin GI Disturbance       Social History     Occupational History     Not on file.     Social History Main Topics     Smoking status: Never Smoker     Smokeless tobacco: Not on file     Alcohol use No     Drug use: No     Sexual activity: Not on file       Family History   Problem Relation Age of Onset     Colon Cancer Mother      Prostate Cancer Brother      Peptic Ulcer Disease Father        REVIEW OF  "SYSTEMS  General: negative for, night sweats, dizziness, fatigue  Resp: No shortness of breath and no cough  CV: negative for chest pain, syncope or near-syncope  GI: negative for nausea, vomiting and diarrhea  : negative for dysuria and hematuria  Musculoskeletal: as above  Neurologic: negative for syncope   Hematologic: negative for bleeding disorder    Physical Exam:  Vitals: Temp 98.2  F (36.8  C) (Temporal)  Ht 5' 5\" (1.651 m)  Wt 142 lb (64.4 kg)  BMI 23.63 kg/m2  BMI= Body mass index is 23.63 kg/(m^2).  Constitutional: healthy, alert and no acute distress   Psychiatric: mentation appears normal and affect normal/bright  NEURO: no focal deficits  SKIN: .well healed, no erythema, no incision breakdown and no drainage.  JOINT/EXTREMITIES: No focal areas of tenderness. Pain is re-created with internal and external rotation of her hip. No peripheral edema  GAIT: not tested     Diagnostic Modalities:  None today. Old X-rays reviewed  Independent visualization of the images was performed.      Impression:   Chief Complaint   Patient presents with     RECHECK     Left hip follow up     Surgical Followup     DOS: 1/20/17~Internal fixation with a TFN nail~9 weeks postop    left hip primary osteoarthritis    Plan:   Pain is re-created with hip motion. She does have underlying osteoarthritis. I recommended she continue physical therapy. She is only taking Tylenol 1 g daily. Recommended increasing that to 2-3 times a day.    Return to clinic 4-6 , week(s), PRN, or sooner as needed for changes.    Re-x-ray on return: Yes.    Lio Hoffman D.O.    Again, thank you for allowing me to participate in the care of your patient.        Sincerely,    Liborio Hoffman, DO    "

## 2017-03-23 NOTE — PROGRESS NOTES
Orthopedic Clinic Post-Operative Note    CHIEF COMPLAINT:   Chief Complaint   Patient presents with     RECHECK     Left hip follow up     Surgical Followup     DOS: 1/20/17~Internal fixation with a TFN nail~9 weeks postop       HISTORY OF PRESENT ILLNESS  Noticing some thigh discomfort. Worse with weightbearing and twisting her hip. Better with rest. No new injuries. Presents with her daughter.    Patient's past medical, surgical, social and family histories reviewed.     Past Medical History:   Diagnosis Date     CAD (coronary artery disease)     remote hx of stent placement in past     CVA (cerebral vascular accident) (H) 2013    some left sided deficits     Diabetes mellitus (H)      Hyperlipemia      Hypertension        Past Surgical History:   Procedure Laterality Date     APPENDECTOMY       GI SURGERY      gwendolyn     GI SURGERY      hemicolectomy     GYN SURGERY      oopherectomy     LASER YAG CAPSULOTOMY  3/21/2013    Procedure: LASER YAG CAPSULOTOMY;  yag capsulotomy bilateral eyes;  Surgeon: Deandre Nugent MD;  [facility]     OPEN REDUCTION INTERNAL FIXATION HIP NAILING Left 1/20/2017    Procedure: OPEN REDUCTION INTERNAL FIXATION HIP NAILING;  Surgeon: Liborio Hoffman DO;  [facility]     PHACOEMULSIFICATION WITH STANDARD INTRAOCULAR LENS IMPLANT  6/30/2011    Procedure:PHACOEMULSIFICATION WITH STANDARD INTRAOCULAR LENS IMPLANT; Surgeon:DEANDRE NUGENT; [facility]     PHACOEMULSIFICATION WITH STANDARD INTRAOCULAR LENS IMPLANT  7/21/2011    Procedure:PHACOEMULSIFICATION WITH STANDARD INTRAOCULAR LENS IMPLANT; Surgeon:DEANDRE NUGENT; [facility]     STENT, CORONARY, DIOGENES         Medications:    Current Outpatient Prescriptions on File Prior to Visit:  amLODIPine (NORVASC) 5 MG tablet Take 1 tablet (5 mg) by mouth daily   metoprolol (TOPROL XL) 25 MG 24 hr tablet Take 1 tablet (25 mg) by mouth daily   senna-docusate (SENOKOT-S;PERICOLACE) 8.6-50 MG per tablet Take 1-2 tablets  by mouth 2 times daily as needed (constipation )   fenofibrate 160 MG tablet Take 1 tablet (160 mg) by mouth daily   simvastatin (ZOCOR) 10 MG tablet Take 1 tablet (10 mg) by mouth At Bedtime   allopurinol (ZYLOPRIM) 100 MG tablet Take 1 tablet (100 mg) by mouth 2 times daily   potassium citrate (UROCIT-K) 5 MEQ (540 MG) CR tablet Take 2 tablets (10 mEq) by mouth daily   clopidogrel (PLAVIX) 75 MG tablet Take 1 tablet (75 mg) by mouth daily   levothyroxine (SYNTHROID/LEVOTHROID) 112 MCG tablet Take 1 tablet (112 mcg) by mouth daily   acetaminophen (TYLENOL) 325 MG tablet Take 2 tablets (650 mg) by mouth every 4 hours as needed for other (surgical pain)   aspirin 325 MG tablet Take 1 tablet (325 mg) by mouth daily   VITAMIN D, CHOLECALCIFEROL, PO Take  by mouth once a week.   folic acid (FOLVITE) 400 MCG tablet Take 400 mcg by mouth daily.   Multiple Vitamins-Minerals (CENTRUM SILVER ULTRA WOMENS) TABS Take 1 tablet by mouth daily.   Calcium-Vitamin D (CALCIUM + D PO) Take 1 tablet by mouth daily.   oxyCODONE (ROXICODONE) 5 MG IR tablet Take 1-2 tablets (5-10 mg) by mouth every 3 hours as needed for moderate to severe pain     No current facility-administered medications on file prior to visit.     Allergies   Allergen Reactions     Hmg-Coa-R Inhibitors      Metformin GI Disturbance       Social History     Occupational History     Not on file.     Social History Main Topics     Smoking status: Never Smoker     Smokeless tobacco: Not on file     Alcohol use No     Drug use: No     Sexual activity: Not on file       Family History   Problem Relation Age of Onset     Colon Cancer Mother      Prostate Cancer Brother      Peptic Ulcer Disease Father        REVIEW OF SYSTEMS  General: negative for, night sweats, dizziness, fatigue  Resp: No shortness of breath and no cough  CV: negative for chest pain, syncope or near-syncope  GI: negative for nausea, vomiting and diarrhea  : negative for dysuria and  "hematuria  Musculoskeletal: as above  Neurologic: negative for syncope   Hematologic: negative for bleeding disorder    Physical Exam:  Vitals: Temp 98.2  F (36.8  C) (Temporal)  Ht 5' 5\" (1.651 m)  Wt 142 lb (64.4 kg)  BMI 23.63 kg/m2  BMI= Body mass index is 23.63 kg/(m^2).  Constitutional: healthy, alert and no acute distress   Psychiatric: mentation appears normal and affect normal/bright  NEURO: no focal deficits  SKIN: .well healed, no erythema, no incision breakdown and no drainage.  JOINT/EXTREMITIES: No focal areas of tenderness. Pain is re-created with internal and external rotation of her hip. No peripheral edema  GAIT: not tested     Diagnostic Modalities:  None today. Old X-rays reviewed  Independent visualization of the images was performed.      Impression:   Chief Complaint   Patient presents with     RECHECK     Left hip follow up     Surgical Followup     DOS: 1/20/17~Internal fixation with a TFN nail~9 weeks postop    left hip primary osteoarthritis    Plan:   Pain is re-created with hip motion. She does have underlying osteoarthritis. I recommended she continue physical therapy. She is only taking Tylenol 1 g daily. Recommended increasing that to 2-3 times a day.    Return to clinic 4-6 , week(s), PRN, or sooner as needed for changes.    Re-x-ray on return: Yes.    Lio Hoffman D.O.  "

## 2017-03-23 NOTE — NURSING NOTE
"Chief Complaint   Patient presents with     RECHECK     Left hip follow up     Surgical Followup     DOS: 1/20/17~Internal fixation with a TFN nail~9 weeks postop       Initial Temp 98.2  F (36.8  C) (Temporal)  Ht 5' 5\" (1.651 m)  Wt 142 lb (64.4 kg)  BMI 23.63 kg/m2 Estimated body mass index is 23.63 kg/(m^2) as calculated from the following:    Height as of this encounter: 5' 5\" (1.651 m).    Weight as of this encounter: 142 lb (64.4 kg).  Medication Reconciliation: complete   Dianna/ADORE     "

## 2017-03-23 NOTE — MR AVS SNAPSHOT
"              After Visit Summary   3/23/2017    Mary Ellen Cuba    MRN: [id number]           Patient Information     Date Of Birth          1/20/1924        Visit Information        Provider Department      3/23/2017 2:00 PM Liborio Hoffman, DO [facility]         Follow-ups after your visit        Who to contact     If you have questions or need follow up information about today's clinic visit or your schedule please contact [facility] directly at [phone].  Normal or non-critical lab and imaging results will be communicated to you by MyChart, letter or phone within 4 business days after the clinic has received the results. If you do not hear from us within 7 days, please contact the clinic through MyChart or phone. If you have a critical or abnormal lab result, we will notify you by phone as soon as possible.  Submit refill requests through [employer] or call your pharmacy and they will forward the refill request to us. Please allow 3 business days for your refill to be completed.          Additional Information About Your Visit        Care EveryWhere ID     This is your Care EveryWhere ID. This could be used by other organizations to access your [facility] medical records  [id number]        Your Vitals Were     Temperature Height BMI (Body Mass Index)             98.2  F (36.8  C) (Temporal) 5' 5\" (1.651 m) 23.63 kg/m2          Blood Pressure from Last 3 Encounters:   01/26/17 138/55   01/23/17 109/53   07/21/11 160/62    Weight from Last 3 Encounters:   03/23/17 142 lb (64.4 kg)   02/06/17 135 lb (61.2 kg)   01/19/17 135 lb 9.3 oz (61.5 kg)              Today, you had the following     No orders found for display       Primary Care Provider Office Phone # Fax #    Liborio Parks [phone]       [facility] [address]  [place] [address]        Thank you!     Thank you for choosing [facility]  for your care. Our goal is always " to provide you with excellent care. Hearing back from our patients is one way we can continue to improve our services. Please take a few minutes to complete the written survey that you may receive in the mail after your visit with us. Thank you!             Your Updated Medication List - Protect others around you: Learn how to safely use, store and throw away your medicines at www.disposemymeds.org.          This list is accurate as of: 3/23/17  2:07 PM.  Always use your most recent med list.                   Brand Name Dispense Instructions for use    acetaminophen 325 MG tablet    TYLENOL    100 tablet    Take 2 tablets (650 mg) by mouth every 4 hours as needed for other (surgical pain)       allopurinol 100 MG tablet    ZYLOPRIM    30 tablet    Take 1 tablet (100 mg) by mouth 2 times daily       amLODIPine 5 MG tablet    NORVASC    30 tablet    Take 1 tablet (5 mg) by mouth daily       aspirin 325 MG tablet     120 tablet    Take 1 tablet (325 mg) by mouth daily       CALCIUM + D PO      Take 1 tablet by mouth daily.       CENTRUM SILVER ULTRA WOMENS Tabs      Take 1 tablet by mouth daily.       clopidogrel 75 MG tablet    PLAVIX    30 tablet    Take 1 tablet (75 mg) by mouth daily       fenofibrate 160 MG tablet     30 tablet    Take 1 tablet (160 mg) by mouth daily       folic acid 400 MCG tablet    FOLVITE     Take 400 mcg by mouth daily.       levothyroxine 112 MCG tablet    SYNTHROID/LEVOTHROID     Take 1 tablet (112 mcg) by mouth daily       metoprolol 25 MG 24 hr tablet    TOPROL XL    30 tablet    Take 1 tablet (25 mg) by mouth daily       oxyCODONE 5 MG IR tablet    ROXICODONE    60 tablet    Take 1-2 tablets (5-10 mg) by mouth every 3 hours as needed for moderate to severe pain       potassium citrate 5 MEQ (540 MG) CR tablet    UROCIT-K     Take 2 tablets (10 mEq) by mouth daily       senna-docusate 8.6-50 MG per tablet    SENOKOT-S;PERICOLACE    100 tablet    Take 1-2 tablets by mouth 2 times daily  as needed (constipation )       simvastatin 10 MG tablet    ZOCOR    30 tablet    Take 1 tablet (10 mg) by mouth At Bedtime       VITAMIN D (CHOLECALCIFEROL) PO      Take  by mouth once a week.

## 2017-04-20 ENCOUNTER — RADIANT APPOINTMENT (OUTPATIENT)
Dept: GENERAL RADIOLOGY | Facility: OTHER | Age: 82
End: 2017-04-20
Attending: ORTHOPAEDIC SURGERY
Payer: COMMERCIAL

## 2017-04-20 ENCOUNTER — OFFICE VISIT (OUTPATIENT)
Dept: ORTHOPEDICS | Facility: OTHER | Age: 82
End: 2017-04-20
Payer: COMMERCIAL

## 2017-04-20 VITALS — WEIGHT: 135 LBS | BODY MASS INDEX: 22.49 KG/M2 | HEIGHT: 65 IN | TEMPERATURE: 98.3 F

## 2017-04-20 DIAGNOSIS — S72.002A: ICD-10-CM

## 2017-04-20 DIAGNOSIS — S72.002A: Primary | ICD-10-CM

## 2017-04-20 PROCEDURE — 73502 X-RAY EXAM HIP UNI 2-3 VIEWS: CPT

## 2017-04-20 PROCEDURE — 99024 POSTOP FOLLOW-UP VISIT: CPT | Performed by: ORTHOPAEDIC SURGERY

## 2017-04-20 ASSESSMENT — PAIN SCALES - GENERAL: PAINLEVEL: NO PAIN (0)

## 2017-04-20 NOTE — NURSING NOTE
"Chief Complaint   Patient presents with     RECHECK     left hip follow up     Surgical Followup     DOS: 1/20/17~Internal fixation with a TFN nail~13 weeks postop       Initial Temp 98.3  F (36.8  C) (Temporal)  Ht 5' 5\" (1.651 m)  Wt 135 lb (61.2 kg)  BMI 22.47 kg/m2 Estimated body mass index is 22.47 kg/(m^2) as calculated from the following:    Height as of this encounter: 5' 5\" (1.651 m).    Weight as of this encounter: 135 lb (61.2 kg).  Medication Reconciliation: complete   Dianna/ADORE     "

## 2017-04-20 NOTE — PROGRESS NOTES
Orthopedic Clinic Post-Operative Note    CHIEF COMPLAINT:   Chief Complaint   Patient presents with     RECHECK     left hip follow up     Surgical Followup     DOS: 1/20/17~Internal fixation with a TFN nail~13 weeks postop       HISTORY OF PRESENT ILLNESS  Location: left hip   Rating of Pain:  none  Pain is better with:  Not applicable-has no current pain.  Pain improving overall: Yes  Associated Features: None  Patient concerns: No    Patient's past medical, surgical, social and family histories reviewed.     Past Medical History:   Diagnosis Date     CAD (coronary artery disease)     remote hx of stent placement in past     CVA (cerebral vascular accident) (H) 2013    some left sided deficits     Diabetes mellitus (H)      Hyperlipemia      Hypertension        Past Surgical History:   Procedure Laterality Date     APPENDECTOMY       GI SURGERY      gwendolyn     GI SURGERY      hemicolectomy     GYN SURGERY      oopherectomy     LASER YAG CAPSULOTOMY  3/21/2013    Procedure: LASER YAG CAPSULOTOMY;  yag capsulotomy bilateral eyes;  Surgeon: Deandre Nugent MD;  [facility]     OPEN REDUCTION INTERNAL FIXATION HIP NAILING Left 1/20/2017    Procedure: OPEN REDUCTION INTERNAL FIXATION HIP NAILING;  Surgeon: Liborio Hoffman DO;  [facility]     PHACOEMULSIFICATION WITH STANDARD INTRAOCULAR LENS IMPLANT  6/30/2011    Procedure:PHACOEMULSIFICATION WITH STANDARD INTRAOCULAR LENS IMPLANT; Surgeon:DEANDRE NUGENT; Location[facility]     PHACOEMULSIFICATION WITH STANDARD INTRAOCULAR LENS IMPLANT  7/21/2011    Procedure:PHACOEMULSIFICATION WITH STANDARD INTRAOCULAR LENS IMPLANT; Surgeon:DEANDRE NUGENT; Location:[facility]     STENT, CORONARY, DIOGENES         Medications:    Current Outpatient Prescriptions on File Prior to Visit:  amLODIPine (NORVASC) 5 MG tablet Take 1 tablet (5 mg) by mouth daily   metoprolol (TOPROL XL) 25 MG 24 hr tablet Take 1 tablet (25 mg) by mouth daily   senna-docusate  (SENOKOT-S;PERICOLACE) 8.6-50 MG per tablet Take 1-2 tablets by mouth 2 times daily as needed (constipation )   fenofibrate 160 MG tablet Take 1 tablet (160 mg) by mouth daily   simvastatin (ZOCOR) 10 MG tablet Take 1 tablet (10 mg) by mouth At Bedtime   allopurinol (ZYLOPRIM) 100 MG tablet Take 1 tablet (100 mg) by mouth 2 times daily   potassium citrate (UROCIT-K) 5 MEQ (540 MG) CR tablet Take 2 tablets (10 mEq) by mouth daily   clopidogrel (PLAVIX) 75 MG tablet Take 1 tablet (75 mg) by mouth daily   levothyroxine (SYNTHROID/LEVOTHROID) 112 MCG tablet Take 1 tablet (112 mcg) by mouth daily   acetaminophen (TYLENOL) 325 MG tablet Take 2 tablets (650 mg) by mouth every 4 hours as needed for other (surgical pain)   aspirin 325 MG tablet Take 1 tablet (325 mg) by mouth daily   VITAMIN D, CHOLECALCIFEROL, PO Take  by mouth once a week.   folic acid (FOLVITE) 400 MCG tablet Take 400 mcg by mouth daily.   Multiple Vitamins-Minerals (CENTRUM SILVER ULTRA WOMENS) TABS Take 1 tablet by mouth daily.   Calcium-Vitamin D (CALCIUM + D PO) Take 1 tablet by mouth daily.     No current facility-administered medications on file prior to visit.     Allergies   Allergen Reactions     Hmg-Coa-R Inhibitors      Metformin GI Disturbance       Social History     Occupational History     Not on file.     Social History Main Topics     Smoking status: Never Smoker     Smokeless tobacco: Not on file     Alcohol use No     Drug use: No     Sexual activity: Not on file       Family History   Problem Relation Age of Onset     Colon Cancer Mother      Prostate Cancer Brother      Peptic Ulcer Disease Father        REVIEW OF SYSTEMS  General: negative for, night sweats, dizziness, fatigue  Resp: No shortness of breath and no cough  CV: negative for chest pain, syncope or near-syncope  GI: negative for nausea, vomiting and diarrhea  : negative for dysuria and hematuria  Musculoskeletal: as above  Neurologic: negative for syncope   Hematologic:  "negative for bleeding disorder    Physical Exam:  Vitals: Temp 98.3  F (36.8  C) (Temporal)  Ht 5' 5\" (1.651 m)  Wt 135 lb (61.2 kg)  BMI 22.47 kg/m2  BMI= Body mass index is 22.47 kg/(m^2).  Constitutional: healthy, alert and no acute distress   Psychiatric: mentation appears normal and affect normal/bright  NEURO: no focal deficits  SKIN: .well healed, no erythema, no incision breakdown and no drainage.  JOINT/EXTREMITIES: Some weakness with hip flexion. No focal areas of tenderness. No peripheral edema. Distal neurovascular intact  GAIT: not tested     Diagnostic Modalities:  left hip X-ray: Short locked TFN in place. Fracture appears to be healed. No change in position.  Independent visualization of the images was performed.      Impression:   Chief Complaint   Patient presents with     RECHECK     left hip follow up     Surgical Followup     DOS: 1/20/17~Internal fixation with a TFN nail~13 weeks postop    doing well-residual weakness.    Plan:   Activity: None, use as tolerated  Staples/Sutures out: Not applicable.  Pain controlled: Yes. Continue to use: Nothing  Immobilzation: No  Physical Therapy: endurance, resistance training  Return to clinic PRN, or sooner as needed for changes.    Re-x-ray on return: No    Lio Hoffman D.O.  "

## 2017-04-20 NOTE — LETTER
4/20/2017       RE: Mary Ellen Cuba  [address] [facility] [address]           Dear Colleague,    Thank you for referring your patient, Mary Ellen Cuba, to the [facility]. Please see a copy of my visit note below.    Orthopedic Clinic Post-Operative Note    CHIEF COMPLAINT:   Chief Complaint   Patient presents with     RECHECK     left hip follow up     Surgical Followup     DOS: 1/20/17~Internal fixation with a TFN nail~13 weeks postop       HISTORY OF PRESENT ILLNESS  Location: left hip   Rating of Pain:  none  Pain is better with:  Not applicable-has no current pain.  Pain improving overall: Yes  Associated Features: None  Patient concerns: No    Patient's past medical, surgical, social and family histories reviewed.     Past Medical History:   Diagnosis Date     CAD (coronary artery disease)     remote hx of stent placement in past     CVA (cerebral vascular accident) (H) 2013    some left sided deficits     Diabetes mellitus (H)      Hyperlipemia      Hypertension        Past Surgical History:   Procedure Laterality Date     APPENDECTOMY       GI SURGERY      gwendolyn     GI SURGERY      hemicolectomy     GYN SURGERY      oopherectomy     LASER YAG CAPSULOTOMY  3/21/2013    Procedure: LASER YAG CAPSULOTOMY;  yag capsulotomy bilateral eyes;  Surgeon: Deandre Nugent MD;  [facility]     OPEN REDUCTION INTERNAL FIXATION HIP NAILING Left 1/20/2017    Procedure: OPEN REDUCTION INTERNAL FIXATION HIP NAILING;  Surgeon: Liborio Hoffman DO;  [facility]     PHACOEMULSIFICATION WITH STANDARD INTRAOCULAR LENS IMPLANT  6/30/2011    Procedure:PHACOEMULSIFICATION WITH STANDARD INTRAOCULAR LENS IMPLANT; Surgeon:DEANDRE NUGENT; [facility]     PHACOEMULSIFICATION WITH STANDARD INTRAOCULAR LENS IMPLANT  7/21/2011    Procedure:PHACOEMULSIFICATION WITH STANDARD INTRAOCULAR LENS IMPLANT; Surgeon:DEANDRE NUGENT; Location[facility]     STENT, CORONARY, DIOGENES          Medications:    Current Outpatient Prescriptions on File Prior to Visit:  amLODIPine (NORVASC) 5 MG tablet Take 1 tablet (5 mg) by mouth daily   metoprolol (TOPROL XL) 25 MG 24 hr tablet Take 1 tablet (25 mg) by mouth daily   senna-docusate (SENOKOT-S;PERICOLACE) 8.6-50 MG per tablet Take 1-2 tablets by mouth 2 times daily as needed (constipation )   fenofibrate 160 MG tablet Take 1 tablet (160 mg) by mouth daily   simvastatin (ZOCOR) 10 MG tablet Take 1 tablet (10 mg) by mouth At Bedtime   allopurinol (ZYLOPRIM) 100 MG tablet Take 1 tablet (100 mg) by mouth 2 times daily   potassium citrate (UROCIT-K) 5 MEQ (540 MG) CR tablet Take 2 tablets (10 mEq) by mouth daily   clopidogrel (PLAVIX) 75 MG tablet Take 1 tablet (75 mg) by mouth daily   levothyroxine (SYNTHROID/LEVOTHROID) 112 MCG tablet Take 1 tablet (112 mcg) by mouth daily   acetaminophen (TYLENOL) 325 MG tablet Take 2 tablets (650 mg) by mouth every 4 hours as needed for other (surgical pain)   aspirin 325 MG tablet Take 1 tablet (325 mg) by mouth daily   VITAMIN D, CHOLECALCIFEROL, PO Take  by mouth once a week.   folic acid (FOLVITE) 400 MCG tablet Take 400 mcg by mouth daily.   Multiple Vitamins-Minerals (CENTRUM SILVER ULTRA WOMENS) TABS Take 1 tablet by mouth daily.   Calcium-Vitamin D (CALCIUM + D PO) Take 1 tablet by mouth daily.     No current facility-administered medications on file prior to visit.     Allergies   Allergen Reactions     Hmg-Coa-R Inhibitors      Metformin GI Disturbance       Social History     Occupational History     Not on file.     Social History Main Topics     Smoking status: Never Smoker     Smokeless tobacco: Not on file     Alcohol use No     Drug use: No     Sexual activity: Not on file       Family History   Problem Relation Age of Onset     Colon Cancer Mother      Prostate Cancer Brother      Peptic Ulcer Disease Father        REVIEW OF SYSTEMS  General: negative for, night sweats, dizziness, fatigue  Resp: No  "shortness of breath and no cough  CV: negative for chest pain, syncope or near-syncope  GI: negative for nausea, vomiting and diarrhea  : negative for dysuria and hematuria  Musculoskeletal: as above  Neurologic: negative for syncope   Hematologic: negative for bleeding disorder    Physical Exam:  Vitals: Temp 98.3  F (36.8  C) (Temporal)  Ht 5' 5\" (1.651 m)  Wt 135 lb (61.2 kg)  BMI 22.47 kg/m2  BMI= Body mass index is 22.47 kg/(m^2).  Constitutional: healthy, alert and no acute distress   Psychiatric: mentation appears normal and affect normal/bright  NEURO: no focal deficits  SKIN: .well healed, no erythema, no incision breakdown and no drainage.  JOINT/EXTREMITIES: Some weakness with hip flexion. No focal areas of tenderness. No peripheral edema. Distal neurovascular intact  GAIT: not tested     Diagnostic Modalities:  left hip X-ray: Short locked TFN in place. Fracture appears to be healed. No change in position.  Independent visualization of the images was performed.      Impression:   Chief Complaint   Patient presents with     RECHECK     left hip follow up     Surgical Followup     DOS: 1/20/17~Internal fixation with a TFN nail~13 weeks postop    doing well-residual weakness.    Plan:   Activity: None, use as tolerated  Staples/Sutures out: Not applicable.  Pain controlled: Yes. Continue to use: Nothing  Immobilzation: No  Physical Therapy: endurance, resistance training  Return to clinic PRN, or sooner as needed for changes.    Re-x-ray on return: No    Lio Hoffman D.O.    Again, thank you for allowing me to participate in the care of your patient.        Sincerely,              Liborio Hoffman, DO    "

## 2017-04-20 NOTE — MR AVS SNAPSHOT
"              After Visit Summary   2017    Mary Ellen Cuba    MRN: [id number]           Patient Information     Date Of Birth          1924        Visit Information        Provider Department      2017 1:20 PM Liborio Hoffman,  [facility]        Today's Diagnoses     Fractured hip, left, closed, initial encounter (H)    -  1       Follow-ups after your visit        Who to contact     If you have questions or need follow up information about today's clinic visit or your schedule please contact [facility] directly at [phone].  Normal or non-critical lab and imaging results will be communicated to you by [employer], letter or phone within 4 business days after the clinic has received the results. If you do not hear from us within 7 days, please contact the clinic through [employer] or phone. If you have a critical or abnormal lab result, we will notify you by phone as soon as possible.  Submit refill requests through [employer] or call your pharmacy and they will forward the refill request to us. Please allow 3 business days for your refill to be completed.          Additional Information About Your Visit        MyChart Information     [employer] lets you send messages to your doctor, view your test results, renew your prescriptions, schedule appointments and more. To sign up, go to [email or web address] . Click on \"Log in\" on the left side of the screen, which will take you to the Welcome page. Then click on \"Sign up Now\" on the right side of the page.     You will be asked to enter the access code listed below, as well as some personal information. Please follow the directions to create your username and password.     Your access code is: [email or web address]  Expires: 2017  1:23 PM     Your access code will  in 90 days. If you need help or a new code, please call your [facility] or [phone].        Care EveryWhere ID     This is your Care EveryWhere " "ID. This could be used by other organizations to access your [facility] medical records  [id number]        Your Vitals Were     Temperature Height BMI (Body Mass Index)             98.3  F (36.8  C) (Temporal) 5' 5\" (1.651 m) 22.47 kg/m2          Blood Pressure from Last 3 Encounters:   01/26/17 138/55   01/23/17 109/53   07/21/11 160/62    Weight from Last 3 Encounters:   04/20/17 135 lb (61.2 kg)   03/23/17 142 lb (64.4 kg)   02/06/17 135 lb (61.2 kg)               Primary Care Provider Office Phone # Fax #    Liborio Parks [phone]       [facility] [address] [address]        Thank you!     Thank you for choosing [facility]  for your care. Our goal is always to provide you with excellent care. Hearing back from our patients is one way we can continue to improve our services. Please take a few minutes to complete the written survey that you may receive in the mail after your visit with us. Thank you!             Your Updated Medication List - Protect others around you: Learn how to safely use, store and throw away your medicines at www.disposemymeds.org.          This list is accurate as of: 4/20/17  1:23 PM.  Always use your most recent med list.                   Brand Name Dispense Instructions for use    acetaminophen 325 MG tablet    TYLENOL    100 tablet    Take 2 tablets (650 mg) by mouth every 4 hours as needed for other (surgical pain)       allopurinol 100 MG tablet    ZYLOPRIM    30 tablet    Take 1 tablet (100 mg) by mouth 2 times daily       amLODIPine 5 MG tablet    NORVASC    30 tablet    Take 1 tablet (5 mg) by mouth daily       aspirin 325 MG tablet     120 tablet    Take 1 tablet (325 mg) by mouth daily       CALCIUM + D PO      Take 1 tablet by mouth daily.       CENTRUM SILVER ULTRA WOMENS Tabs      Take 1 tablet by mouth daily.       clopidogrel 75 MG tablet    PLAVIX    30 tablet    Take 1 tablet (75 mg) by mouth daily       fenofibrate " 160 MG tablet     30 tablet    Take 1 tablet (160 mg) by mouth daily       folic acid 400 MCG tablet    FOLVITE     Take 400 mcg by mouth daily.       levothyroxine 112 MCG tablet    SYNTHROID/LEVOTHROID     Take 1 tablet (112 mcg) by mouth daily       metoprolol 25 MG 24 hr tablet    TOPROL XL    30 tablet    Take 1 tablet (25 mg) by mouth daily       potassium citrate 5 MEQ (540 MG) CR tablet    UROCIT-K     Take 2 tablets (10 mEq) by mouth daily       senna-docusate 8.6-50 MG per tablet    SENOKOT-S;PERICOLACE    100 tablet    Take 1-2 tablets by mouth 2 times daily as needed (constipation )       simvastatin 10 MG tablet    ZOCOR    30 tablet    Take 1 tablet (10 mg) by mouth At Bedtime       VITAMIN D (CHOLECALCIFEROL) PO      Take  by mouth once a week.

## 2017-05-15 ENCOUNTER — HOSPITAL ENCOUNTER (OUTPATIENT)
Facility: CLINIC | Age: 82
Setting detail: OBSERVATION
Discharge: OTHER INSTITUTION | End: 2017-05-17
Attending: FAMILY MEDICINE | Admitting: INTERNAL MEDICINE
Payer: MEDICARE

## 2017-05-15 ENCOUNTER — APPOINTMENT (OUTPATIENT)
Dept: GENERAL RADIOLOGY | Facility: CLINIC | Age: 82
End: 2017-05-15
Attending: FAMILY MEDICINE
Payer: MEDICARE

## 2017-05-15 DIAGNOSIS — S72.002A CLOSED LEFT HIP FRACTURE, INITIAL ENCOUNTER (H): ICD-10-CM

## 2017-05-15 DIAGNOSIS — S32.592A PUBIC RAMUS FRACTURE, LEFT, CLOSED, INITIAL ENCOUNTER (H): ICD-10-CM
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
## 2017-05-15 PROBLEM — S32.599A: Status: ACTIVE | Noted: 2017-05-15

## 2017-05-15 PROCEDURE — 99285 EMERGENCY DEPT VISIT HI MDM: CPT | Mod: 25

## 2017-05-15 PROCEDURE — A9270 NON-COVERED ITEM OR SERVICE: HCPCS | Mod: GY | Performed by: INTERNAL MEDICINE

## 2017-05-15 PROCEDURE — G0378 HOSPITAL OBSERVATION PER HR: HCPCS

## 2017-05-15 PROCEDURE — 73552 X-RAY EXAM OF FEMUR 2/>: CPT | Mod: TC,LT

## 2017-05-15 PROCEDURE — 99285 EMERGENCY DEPT VISIT HI MDM: CPT | Mod: Z6 | Performed by: FAMILY MEDICINE

## 2017-05-15 PROCEDURE — 25000132 ZZH RX MED GY IP 250 OP 250 PS 637: Mod: GY | Performed by: INTERNAL MEDICINE

## 2017-05-15 PROCEDURE — 99219 ZZC INITIAL OBSERVATION CARE,LEVL II: CPT | Performed by: INTERNAL MEDICINE

## 2017-05-15 PROCEDURE — 72170 X-RAY EXAM OF PELVIS: CPT | Mod: TC

## 2017-05-15 RX ORDER — HYDROCODONE BITARTRATE AND ACETAMINOPHEN 5; 325 MG/1; MG/1
1 TABLET ORAL EVERY 6 HOURS PRN
Status: DISCONTINUED | OUTPATIENT
Start: 2017-05-15 | End: 2017-05-16

## 2017-05-15 RX ORDER — DEXTROSE MONOHYDRATE 25 G/50ML
25-50 INJECTION, SOLUTION INTRAVENOUS
Status: DISCONTINUED | OUTPATIENT
Start: 2017-05-15 | End: 2017-05-17 | Stop reason: HOSPADM

## 2017-05-15 RX ORDER — AMOXICILLIN 250 MG
1-2 CAPSULE ORAL 2 TIMES DAILY PRN
Status: DISCONTINUED | OUTPATIENT
Start: 2017-05-15 | End: 2017-05-17 | Stop reason: HOSPADM

## 2017-05-15 RX ORDER — AMLODIPINE BESYLATE 5 MG/1
5 TABLET ORAL DAILY
Status: DISCONTINUED | OUTPATIENT
Start: 2017-05-16 | End: 2017-05-17 | Stop reason: HOSPADM

## 2017-05-15 RX ORDER — CLOPIDOGREL BISULFATE 75 MG/1
75 TABLET ORAL DAILY
Status: DISCONTINUED | OUTPATIENT
Start: 2017-05-16 | End: 2017-05-17 | Stop reason: HOSPADM

## 2017-05-15 RX ORDER — FENTANYL CITRATE 50 UG/ML
25 INJECTION, SOLUTION INTRAMUSCULAR; INTRAVENOUS ONCE
Status: DISCONTINUED | OUTPATIENT
Start: 2017-05-15 | End: 2017-05-15

## 2017-05-15 RX ORDER — NICOTINE POLACRILEX 4 MG
15-30 LOZENGE BUCCAL
Status: DISCONTINUED | OUTPATIENT
Start: 2017-05-15 | End: 2017-05-17 | Stop reason: HOSPADM

## 2017-05-15 RX ORDER — ONDANSETRON 4 MG/1
4 TABLET, ORALLY DISINTEGRATING ORAL EVERY 6 HOURS PRN
Status: DISCONTINUED | OUTPATIENT
Start: 2017-05-15 | End: 2017-05-17 | Stop reason: HOSPADM

## 2017-05-15 RX ORDER — POTASSIUM CITRATE 10 MEQ/1
10 TABLET, EXTENDED RELEASE ORAL DAILY
Status: DISCONTINUED | OUTPATIENT
Start: 2017-05-16 | End: 2017-05-15

## 2017-05-15 RX ORDER — METOPROLOL SUCCINATE 25 MG/1
25 TABLET, EXTENDED RELEASE ORAL DAILY
Status: DISCONTINUED | OUTPATIENT
Start: 2017-05-16 | End: 2017-05-17

## 2017-05-15 RX ORDER — ACETAMINOPHEN 325 MG/1
650 TABLET ORAL EVERY 4 HOURS PRN
Status: DISCONTINUED | OUTPATIENT
Start: 2017-05-15 | End: 2017-05-16

## 2017-05-15 RX ORDER — LIDOCAINE 40 MG/G
CREAM TOPICAL
Status: DISCONTINUED | OUTPATIENT
Start: 2017-05-15 | End: 2017-05-17 | Stop reason: HOSPADM

## 2017-05-15 RX ORDER — NALOXONE HYDROCHLORIDE 0.4 MG/ML
.1-.4 INJECTION, SOLUTION INTRAMUSCULAR; INTRAVENOUS; SUBCUTANEOUS
Status: DISCONTINUED | OUTPATIENT
Start: 2017-05-15 | End: 2017-05-17 | Stop reason: HOSPADM

## 2017-05-15 RX ADMIN — ACETAMINOPHEN 650 MG: 325 TABLET ORAL at 23:58

## 2017-05-15 ASSESSMENT — ENCOUNTER SYMPTOMS
VOMITING: 0
DIZZINESS: 0
LIGHT-HEADEDNESS: 0
WEAKNESS: 0
SHORTNESS OF BREATH: 0
JOINT SWELLING: 0
COUGH: 0
CHILLS: 0
ACTIVITY CHANGE: 1
FEVER: 0
MYALGIAS: 0
NAUSEA: 0
ARTHRALGIAS: 0
DIARRHEA: 0
BACK PAIN: 0

## 2017-05-15 NOTE — IP AVS SNAPSHOT
Mary Ellen Cuba #[id number] (CSN: [id number])  (89 year old F)  (Adm: 05/15/17)     [id number]               [address] MEDICAL SURGICAL: [phone]            Patient Demographics     Patient Name Sex          Age SSN Address Phone    Mary Ellen Cuba Female 1924 (89 year old) [id number] [facility] [address] [phone] (Home)      Emergency Contact(s)     Name Relation Home Work Mobile    Derek Mayer [phone]      BRII MANN Friend [phone]        Admission Information     Attending Provider Admitting Provider Admission Type Admission Date/Time    Cordell Villanueva MD Fordahl, Daniel Dean, MD Emergency 05/15/17  1759    Discharge Date Hospital Service Auth/Cert Status Service Area     Hospitalist [facility]    Unit Room/Bed Admission Status       [facility] MEDICAL SURGICAL  Admission (Confirmed)       Admission     Complaint    Fracture of inferior pubic ramus (H)      Hospital Account     Name Acct ID Class Status Primary Coverage    Mary Ellen Cuba [id number] Observation Open MEDICARE - MEDICARE RR FOR HB SUPPLEMENT            Guarantor Account (for Hospital Account #[id number])     Name Relation to Pt Service Area Active? Acct Type    Mary Ellen Cuba Self FCS Yes Personal/Family    Address Phone          [address] [place] [address] [phone](H)              Coverage Information (for Hospital Account #[id number])     1. MEDICARE/MEDICARE RR FOR HB SUPPLEMENT     F/O Payor/Plan Precert #    MEDICARE/MEDICARE RR FOR HB SUPPLEMENT     Subscriber Subscriber #    Mary Ellen Cuba [id number]    Address Phone    [address]  [facility] [place] [address] [phone]          2. HE[employer]ERS/[employer]ED[employer] PLAN     F/O Payor/Plan Precert #    [employer] FREED[employer] PLAN     Subscriber Subscriber #    Mary Ellen Cuba [id number]    Address Phone  "   [address]  [place] [address] [phone]                                                      INTERAGENCY TRANSFER FORM - PHYSICIAN ORDERS   5/15/2017                       [address] MEDICAL SURGICAL: [phone]            Attending Provider: Cordell Villanueva MD     Allergies:  Hmg-coa-r Inhibitors, Metformin    Infection:  None   Service:  HOSPITALIST    Ht:  1.575 m (5' 2\")   Wt:  66 kg (145 lb 8.1 oz)   Admission Wt:  61.2 kg (134 lb 14.7 oz)    BMI:  26.61 kg/m 2   BSA:  1.7 m 2            ED Clinical Impression     Diagnosis Description Comment Added By Time Added    Pubic ramus fracture, left, closed, initial encounter (H) [S32.592A] Pubic ramus fracture, left, closed, initial encounter (H) [S32.592A]  Jalen Loyola MD 5/15/2017  7:38 PM      Hospital Problems as of 5/17/2017              Priority Class Noted POA    Essential hypertension Medium  1/19/2017 Yes    Coronary artery disease involving native coronary artery of native heart without angina pectoris Medium  1/19/2017 Yes    Hypothyroidism due to acquired atrophy of thyroid Medium  1/19/2017 Yes    Type 2 diabetes mellitus without complication (H) Medium  1/19/2017 Yes    Chronic kidney disease, stage III (moderate) Medium  1/19/2017 Yes    Closed fracture of left inferior and superior pubic rami Medium  5/15/2017 Yes      Non-Hospital Problems as of 5/17/2017              Priority Class Noted    Hyperlipidemia LDL goal <100 Medium  1/19/2017    Hypernatremia Medium  1/19/2017    Anemia Medium  1/19/2017    Fractured hip, left, closed, initial encounter (H) Medium  1/20/2017    Urinary retention Medium  1/21/2017      Code Status History     Date Active Date Inactive Code Status Order ID Comments User Context    5/17/2017 10:40 AM  DNR [id number]  Cordell Fountain MD Outpatient    5/15/2017 11:41 PM 5/17/2017 10:40 AM DNR [id number]  Cordell Villanueva MD Inpatient    1/22/2017  8:51 AM 5/15/2017 11:41 " PM Full Code [id number]  Liborio Hoffman DO Outpatient    1/20/2017  1:38 PM 1/22/2017  8:51 AM Full Code [id number]  Liborio Hoffman DO Inpatient    1/19/2017  8:20 PM 1/20/2017  1:38 PM Full Code [id number]  Jaret Grey MD Inpatient      Current Code Status     Date Active Code Status Order ID Comments User Context       Prior         Medication Review      START taking        Dose / Directions Comments    oxyCODONE 5 MG IR tablet   Commonly known as:  ROXICODONE   Used for:  Pubic ramus fracture, left, closed, initial encounter (H), Fall on same level due to nature of surface, initial encounter        Dose:  2.5-5 mg   Take 0.5-1 tablets (2.5-5 mg) by mouth every 6 hours as needed for moderate to severe pain   Quantity:  30 tablet   Refills:  0          CONTINUE these medications which may have CHANGED, or have new prescriptions. If we are uncertain of the size of tablets/capsules you have at home, strength may be listed as something that might have changed.        Dose / Directions Comments    acetaminophen 500 MG tablet   Commonly known as:  TYLENOL   This may have changed:    - medication strength  - how much to take  - when to take this  - reasons to take this   Used for:  Closed left hip fracture, initial encounter (H)        Dose:  1000 mg   Take 2 tablets (1,000 mg) by mouth 3 times daily   Refills:  0          CONTINUE these medications which have NOT CHANGED        Dose / Directions Comments    allopurinol 100 MG tablet   Commonly known as:  ZYLOPRIM   Used for:  Chronic gout, unspecified cause, unspecified site        Dose:  100 mg   Take 1 tablet (100 mg) by mouth 2 times daily   Quantity:  30 tablet   Refills:  0        amLODIPine 5 MG tablet   Commonly known as:  NORVASC   Used for:  Benign essential hypertension        Dose:  5 mg   Take 1 tablet (5 mg) by mouth daily   Quantity:  30 tablet   Refills:  0        aspirin 325 MG tablet   Used for:  Closed left hip fracture,  initial encounter (H)        Dose:  325 mg   Take 1 tablet (325 mg) by mouth daily   Quantity:  120 tablet   Refills:  0    Hold while receiving Lovenox       CALCIUM + D PO        Dose:  1 tablet   Take 1 tablet by mouth daily.   Refills:  0        CENTRUM SILVER ULTRA WOMENS Tabs        Dose:  1 tablet   Take 1 tablet by mouth daily.   Refills:  0        clopidogrel 75 MG tablet   Commonly known as:  PLAVIX   Used for:  Coronary artery disease involving native coronary artery of native heart without angina pectoris        Dose:  75 mg   Take 1 tablet (75 mg) by mouth daily   Quantity:  30 tablet   Refills:  0        fenofibrate 160 MG tablet   Used for:  Hypercholesterolemia        Dose:  160 mg   Take 1 tablet (160 mg) by mouth daily   Quantity:  30 tablet   Refills:  0        folic acid 400 MCG tablet   Commonly known as:  FOLVITE        Dose:  400 mcg   Take 400 mcg by mouth daily.   Refills:  0        levothyroxine 112 MCG tablet   Commonly known as:  SYNTHROID/LEVOTHROID   Used for:  Other specified hypothyroidism        Dose:  112 mcg   Take 1 tablet (112 mcg) by mouth daily   Refills:  0        metoprolol 25 MG 24 hr tablet   Commonly known as:  TOPROL XL   Used for:  Benign essential hypertension        Dose:  25 mg   Take 1 tablet (25 mg) by mouth daily   Quantity:  30 tablet   Refills:  0        potassium citrate 5 MEQ (540 MG) CR tablet   Commonly known as:  UROCIT-K   Used for:  Hypokalemia        Dose:  10 mEq   Take 2 tablets (10 mEq) by mouth daily   Refills:  0        senna-docusate 8.6-50 MG per tablet   Commonly known as:  SENOKOT-S;PERICOLACE   Used for:  Closed left hip fracture, initial encounter (H)        Dose:  1-2 tablet   Take 1-2 tablets by mouth 2 times daily as needed (constipation )   Quantity:  100 tablet   Refills:  0    Indication: Constipation       simvastatin 10 MG tablet   Commonly known as:  ZOCOR   Used for:  Hypercholesterolemia        Dose:  10 mg   Take 1 tablet (10 mg) by  "mouth At Bedtime   Quantity:  30 tablet   Refills:  0        VITAMIN D (CHOLECALCIFEROL) PO        Take  by mouth once a week.   Refills:  0                After Care     Activity - Up with nursing assistance       Weight-bearing as tolerated with pelvic pain       Advance Diet as Tolerated       Follow this diet upon discharge: Moderate consistent carbohydrate (1682-4852 mona / 4-6 CHO units per meal)       Fall precautions           General info for SNF       Length of Stay Estimate: Short Term Care: Estimated # of Days 31-90  Condition at Discharge: Improving  Level of care:skilled   Rehabilitation Potential: Fair  Admission H&P remains valid and up-to-date: Yes  Recent Chemotherapy: N/A  Use Nursing Home Standing Orders: Yes       Mantoux instructions       Give two-step Mantoux (PPD) Per Facility Policy Yes             Referrals     Occupational Therapy Adult Consult       Evaluate and treat as clinically indicated.    Reason:  Pubic ramus fracture       Physical Therapy Adult Consult       Evaluate and treat as clinically indicated.    Reason:  Pubic ramus fracture             Follow-Up Appointment Instructions     Follow Up and recommended labs and tests       Follow up with primary care provider in 1-2 weeks.  No follow up labs or test are needed.  Follow up with specialist, Dr. Hoffman, in 2-3 weeks.             Statement of Approval     Ordered          05/17/17 1040  I have reviewed and agree with all the recommendations and orders detailed in this document.  EFFECTIVE NOW     Approved and electronically signed by:  Cordell Fountain MD                                                 INTERAGENCY TRANSFER FORM - NURSING   5/15/2017                       [address] MEDICAL SURGICAL: [phone]            Attending Provider: Cordell Villanueva MD     Allergies:  Hmg-coa-r Inhibitors, Metformin    Infection:  None   Service:  HOSPITALIST    Ht:  1.575 m (5' 2\")   Wt:  66 kg (145 lb 8.1 oz) "   Admission Wt:  61.2 kg (134 lb 14.7 oz)    BMI:  26.61 kg/m 2   BSA:  1.7 m 2            Advance Directives        Does patient have a scanned Advance Directive/ACP document in EPIC?           Yes        Immunizations     None      ASSESSMENT     Discharge Profile Flowsheet     EXPECTED DISCHARGE     Eating  0-->independent 05/17/17 1032    Expected Discharge Date  05/17/17 05/16/17 1617   Communication  0-->understands/communicates without difficulty 05/17/17 1032    DISCHARGE NEEDS ASSESSMENT     Swallowing  0-->swallows foods/liquids without difficulty 05/17/17 1032    Concerns To Be Addressed  all concerns addressed in this encounter 01/23/17 1316   Change in Functional Status Since Onset of Current Illness/Injury  yes 05/17/17 1032    Patient/family verbalizes understanding of discharge plan recommendations?  Yes 05/17/17 1128   COGNITIVE/PERCEPTUAL/DEVELOPMENTAL      Medical Team notified of plan?  yes 05/17/17 1128   Current Mental Status/Cognitive Functioning  no deficits noted 05/17/17 1032    Readmission Within The Last 30 Days  no previous admission in last 30 days 05/17/17 1128   Recent Changes in Mental Status/Cognitive Functioning  no changes 05/17/17 1032    Anticipated Changes Related to Illness  inability to care for self 05/17/17 1128   Developmental Stage (Eriksson's Stages of Development)  Stage 8 (65 years-death/Late Adulthood) Integrity vs. Despair 05/17/17 1032    Equipment Currently Used at Home  walker, rolling;cane, straight (with seat) 05/16/17 0948   GASTROINTESTINAL (ADULT,PEDIATRIC,OB)      Transportation Available  van, wheelchair accessible 05/17/17 1128   Last Bowel Movement  05/15/17 05/17/17 1032    Key Recommendations  TCU until able to be more mobile patient has friends that help her at home but no one that can stay and help 24/7 05/17/17 1128   COMMUNICATION ASSESSMENT      Does Patient Need a Referral for Clinic CC  No 05/17/17 1130   Patient's communication style  spoken  "language (English or Bilingual) 05/15/17 1802    Coordination Referral Criteria  Fragility 05/17/17 1128   FINAL RESOURCES      Discharge Planning Comments  Discharge to [facility] via [facility] van 05/17/17 1128   PAS Number  [id number] 05/17/17 1126    FUNCTIONAL LEVEL CURRENT     SKIN      Ambulation  3-->assistive equipment and person 05/17/17 1032   Inspection  Full 05/17/17 0844    Transferring  3-->assistive equipment and person 05/17/17 1032   Skin WDL  ex 05/17/17 0844    Toileting  3-->assistive equipment and person 05/17/17 1032   Skin Color/Characteristics  redness blanchable (buttocks) 05/17/17 0844    Bathing  3-->assistive equipment and person 05/17/17 1032   SAFETY      Dressing  2-->assistive person 05/17/17 1032   Safety WDL  WDL 05/17/17 0844                 Assessment WDL (Within Defined Limits) Definitions           Safety WDL     Effective: 09/28/15    Row Information: <b>WDL Definition:</b> Bed in low position, wheels locked; call light in reach; upper side rails up x 2; ID band on<br> <font color=\"gray\"><i>Item=AS safety wdl>>List=AS safety wdl>>Version=F14</i></font>      Skin WDL     Effective: 09/28/15    Row Information: <b>WDL Definition:</b> Warm; dry; intact; elastic; without discoloration; pressure points without redness<br> <font color=\"gray\"><i>Item=AS skin wdl>>List=AS skin wdl>>Version=F14</i></font>      Vitals     Vital Signs Flowsheet     VITAL SIGNS     Pain Control  partially effective 05/17/17 0926    Temp  97.6  F (36.4  C) 05/17/17 0744   Functioning  can do most things, but pain gets in the way of some 05/17/17 0845    Temp src  Oral 05/17/17 0744   Sleep  awake with occasional pain 05/17/17 0845    Resp  18 05/17/17 0744   ANALGESIA SIDE EFFECTS MONITORING      Pulse  54 05/17/17 0805   Side Effects Monitoring: Respiratory Quality  R 05/17/17 0926    Pulse/Heart Rate Source  Monitor 05/17/17 0744   Side Effects Monitoring: Respiratory Depth  N 05/17/17 0926    BP  184/75 05/17/17 " "0744   Side Effects Monitoring: Sedation Level  1 05/17/17 0926    BP Location  Right arm 05/17/17 0145   HEIGHT AND WEIGHT      OXYGEN THERAPY     Height  1.575 m (5' 2\") 05/15/17 2055    SpO2  95 % 05/17/17 0744   Height Method  Stated 05/15/17 1808    O2 Device  None (Room air) 05/17/17 0744   Weight  66 kg (145 lb 8.1 oz) 05/15/17 2055    PAIN/COMFORT     BSA (Calculated - sq m)  1.7 05/15/17 2055    Patient Currently in Pain  yes 05/16/17 1532   BMI (Calculated)  26.67 05/15/17 2055    Preferred Pain Scale  CAPA (Clinically Aligned Pain Assessment) ([facility] Adults Only) 05/16/17 0802   DAILY CARE      0-10 Pain Scale  5 05/16/17 1514   Activity Type  activity adjusted per tolerance;up in chair 05/17/17 0844    Pain Intervention(s)  Medication (See eMAR) 05/17/17 0845   Activity Level of Assistance  assistance, 2 people 05/17/17 0844    Response to Interventions  Decrease in pain 05/17/17 0926   Activity Assistive Device  gait belt;walker 05/17/17 0858    CLINICALLY ALIGNED PAIN ASSESSMENT (CAPA) ([facility] ADULTS ONLY)     POSITIONING      Comfort  comfortably manageable 05/17/17 0926   Body Position  right, side-lying 05/17/17 0145    Change in Pain  getting better 05/17/17 0926   Head of Bed (HOB)  HOB at 30-45 degrees 05/17/17 0056            Patient Lines/Drains/Airways Status    Active LINES/DRAINS/AIRWAYS     Name: Placement date: Placement time: Site: Days: Last dressing change:    Peripheral IV 05/15/17 Left Lower forearm 05/15/17   2020   Lower forearm   1     Incision/Surgical Site 01/20/17 Left Hip 01/20/17   1154    117             Patient Lines/Drains/Airways Status    Active PICC/CVC     None            Intake/Output Detail Report     Date Intake   Output Net    Shift P.O. I.V. Total Urine Total       Noc 05/15/17 2300 - 05/16/17 0659 360 -- 360 150 150 210    Day 05/16/17 0700 - 05/16/17 1459 -- -- -- -- -- 0    Kezia 05/16/17 1500 - 05/16/17 2259 -- -- -- -- -- 0 "    Noc 05/16/17 2300 - 05/17/17 0659 -- -- -- -- -- 0    Day 05/17/17 0700 - 05/17/17 1459 -- -- -- -- -- 0      Case Management/Discharge Planning     Case Management/Discharge Planning Flowsheet     REFERRAL INFORMATION     Major Change/Loss/Stressor  none 01/19/17 2103    Did the Initial Social Work Assessment result in a Social Work Case?  Yes 05/16/17 1024   EXPECTED DISCHARGE      Admission Type  observation 05/16/17 1024   Expected Discharge Date  05/17/17 05/16/17 1617    Arrived From  home or self-care 05/16/17 1024   ASSESSMENT/CONCERNS TO BE ADDRESSED      Referral Source  physician 05/16/17 1024   Concerns To Be Addressed  all concerns addressed in this encounter 01/23/17 1316    Reason For Consult  discharge planning 05/16/17 1024   DISCHARGE PLANNING      Record Reviewed  history and physical;medical record 05/16/17 1024   Patient/family verbalizes understanding of discharge plan recommendations?  Yes 05/17/17 1128     Assigned to Case  Jody  05/16/17 1024   Medical Team notified of plan?  yes 05/17/17 1128    Primary Care MD Name  Dr. Liborio Parks #[phone] 05/16/17 1024   Readmission Within The Last 30 Days  no previous admission in last 30 days 05/17/17 1128    LIVING ENVIRONMENT     Anticipated Changes Related to Illness  inability to care for self 05/17/17 1128    Lives With  alone 05/16/17 0948   Transportation Available  van, wheelchair accessible 05/17/17 1128    Living Arrangements  condominium 05/16/17 0948   Discharge Planning Comments  Discharge to [facility] via [facility] van 05/17/17 1128    Provides Primary Care For  no one 05/16/17 1024   Key Recommendations  TCU until able to be more mobile patient has friends that help her at home but no one that can stay and help 24/7 05/17/17 1128    Able to Return to Prior Living Arrangements  yes 05/16/17 1024   Does Patient Need a Referral for Clinic CC  No 05/17/17 1130    HOME SAFETY     Coordination Referral Criteria  Fragility 05/17/17  1128    Patient Feels Safe Living in Home?  yes 05/16/17 1024   FINAL RESOURCES      ASSESSMENT OF FAMILY/SOCIAL SUPPORT     Equipment Currently Used at Home  walker, rolling;cane, straight (with seat) 05/16/17 0948    Marital Status   05/16/17 1024   PAS Number  [id number] 05/17/17 1126    Who is your support system?  Sibling(s);Other (specify) (Friends- Don and Irene ) 05/16/17 1024   ABUSE RISK SCREEN      Description of Support System  Supportive;Involved 05/16/17 1024   QUESTION TO PATIENT:  Has a member of your family or a partner(now or in the past) intimidated, hurt, manipulated, or controlled you in any way?  no 05/15/17 1809    Quality of Family Relationships  supportive;involved 05/16/17 1024   QUESTION TO PATIENT: Do you feel safe going back to the place where you are living?  yes 05/15/17 1809    EMPLOYMENT     OBSERVATION: Is there reason to believe there has been maltreatment of a vulnerable adult (ie. Physical/Sexual/Emotional abuse, self neglect, lack of adequate food, shelter, medical care, or financial exploitation)?  no 05/15/17 1809    Do you work full or part-time?  no 05/16/17 1024   (R) MENTAL HEALTH SUICIDE RISK      COPING/STRESS     Are you depressed or being treated for depression?  No 05/15/17 2055                  [address] SURGICAL: [phone]            Medication Administration Report for Mary Ellen Cuba as of 05/17/17 1301   Legend:    Given Hold Not Given Due Canceled Entry Other Actions    Time Time (Time) Time  Time-Action       Inactive    Active    Linked        Medications 05/11/17 05/12/17 05/13/17 05/14/17 05/15/17 05/16/17 05/17/17    acetaminophen (TYLENOL) tablet 1,000 mg  Dose: 1,000 mg Freq: 3 TIMES DAILY Route: PO  Start: 05/16/17 1400   Admin Instructions: Maximum acetaminophen dose from all sources = 75 mg/kg/day not to exceed 4 gram          1420 (1,000 mg)-Given       2118 (1,000 mg)-Given        0804 (1,000 mg)-Given       [ ]  "1400       [ ] 2100           amLODIPine (NORVASC) tablet 5 mg  Dose: 5 mg Freq: DAILY Route: PO  Start: 05/16/17 0900         0748 (5 mg)-Given               0805 (5 mg)-Given           clopidogrel (PLAVIX) tablet 75 mg  Dose: 75 mg Freq: DAILY Route: PO  Start: 05/16/17 0900         0748 (75 mg)-Given               0805 (75 mg)-Given           glucose 40 % gel 15-30 g  Dose: 15-30 g Freq: EVERY 15 MIN PRN Route: PO  PRN Reason: low blood sugar  Start: 05/15/17 2340   Admin Instructions: Give 15 g for BG 51 to 69 mg/dL IF patient is conscious and able to swallow. Give 30 g for BG less than or equal to 50 mg/dL IF patient is conscious and able to swallow. Do NOT give glucose gel via enteral tube.  IF patient has enteral tube: give apple juice 120 mL (4 oz or 15 g of CHO) via enteral tube for BG 51 to 69 mg/dL.  Give apple juice 240 mL (8 oz or 30 g of CHO) via enteral tube for BG less than or equal to 50 mg/dL.              Or  dextrose 50 % injection 25-50 mL  Dose: 25-50 mL Freq: EVERY 15 MIN PRN Route: IV  PRN Reason: low blood sugar  Start: 05/15/17 2340   Admin Instructions: Use if have IV access, BG less than 70 mg/dL and meet dose criteria below:  Dose if conscious and alert (or disorientated) and NPO = 25 mL  Dose if unconscious / not alert = 50 mL  Vesicant.              Or  glucagon injection 1 mg  Dose: 1 mg Freq: EVERY 15 MIN PRN Route: SC  PRN Reason: low blood sugar  PRN Comment: May repeat x 1 only  Start: 05/15/17 2340   Admin Instructions: May give SQ or IM. IF BG less than or equal to 50 mg/dL and no IV access.  ONLY use glucagon IF patient has NO IV access AND is UNABLE to swallow.               lidocaine (LMX4) kit  Freq: EVERY 1 HOUR PRN Route: Top  PRN Reason: pain  PRN Comment: with VAD insertion or accessing implanted port.  Start: 05/15/17 2041   Admin Instructions: Do NOT give if patient has a history of allergy to any local anesthetic or any \"yair\" product.   Apply 30 minutes prior to VAD " "insertion or port access.  MAX Dose:  2.5 g (  of 5 g tube)               lidocaine 1 % 1 mL  Dose: 1 mL Freq: EVERY 1 HOUR PRN Route: OTHER  PRN Comment: mild pain with VAD insertion or accessing implanted port  Start: 05/15/17 2041   Admin Instructions: Do NOT give if patient has a history of allergy to any local anesthetic or any \"yair\" product. MAX dose 1 mL subcutaneous OR intradermal in divided doses.               metoprolol (TOPROL-XL) half-tab 12.5 mg  Dose: 12.5 mg Freq: DAILY Route: PO  Start: 05/17/17 0945   Admin Instructions: DO NOT CRUSH. Tablet may be split in half along score line.           1140 (12.5 mg)-Given           naloxone (NARCAN) injection 0.1-0.4 mg  Dose: 0.1-0.4 mg Freq: EVERY 2 MIN PRN Route: IV  PRN Reason: opioid reversal  Start: 05/15/17 2041   Admin Instructions: For respiratory rate LESS than or EQUAL to 8.  Partial reversal dose:  0.1 mg titrated q 2 minutes for Analgesia Side Effects Monitoring Sedation Level of 3 (frequently drowsy, arousable, drifts to sleep during conversation).Full reversal dose:  0.4 mg bolus for Analgesia Side Effects Monitoring Sedation Level of 4 (somnolent, minimal or no response to stimulation).               ondansetron (ZOFRAN-ODT) ODT tab 4 mg  Dose: 4 mg Freq: EVERY 6 HOURS PRN Route: PO  PRN Reason: nausea  Start: 05/15/17 2340   Admin Instructions: With dry hands, peel back foil backing and gently remove tablet; do not push oral disintegrating tablet through foil backing; administer immediately on tongue and oral disintegrating tablet dissolves in seconds; then swallow with saliva; liquid not required.               oxyCODONE (ROXICODONE) IR half-tab 2.5-5 mg  Dose: 2.5-5 mg Freq: EVERY 3 HOURS PRN Route: PO  PRN Reason: moderate to severe pain  Start: 05/16/17 1012         1201 (5 mg)-Given       1514 (5 mg)-Given       2118 (2.5 mg)-Given        0804 (2.5 mg)-Given           senna-docusate (SENOKOT-S;PERICOLACE) 8.6-50 MG per tablet 1-2 " tablet  Dose: 1-2 tablet Freq: 2 TIMES DAILY PRN Route: PO  PRN Comment: constipation   Start: 05/15/17 2041          0805 (1 tablet)-Given           sodium chloride (PF) 0.9% PF flush 3 mL  Dose: 3 mL Freq: EVERY 8 HOURS Route: IK  Start: 05/15/17 2045   Admin Instructions: And Q1H PRN, to lock peripheral IV dormant line.                 0458 (3 mL)-Given       1422 (3 mL)-Given       2118 (3 mL)-Given        (0720)-Not Given       [ ] 1400       [ ] 2200           sodium chloride (PF) 0.9% PF flush 3 mL  Dose: 3 mL Freq: EVERY 1 HOUR PRN Route: IK  PRN Reason: line flush  PRN Comment: for peripheral IV flush post IV meds  Start: 05/15/17 2041             Discontinued Medications  Medications 05/11/17 05/12/17 05/13/17 05/14/17 05/15/17 05/16/17 05/17/17         Dose: 650 mg Freq: EVERY 4 HOURS PRN Route: PO  PRN Reason: other  PRN Comment: surgical pain  Start: 05/15/17 2338   End: 05/16/17 1012   Admin Instructions: Maximum acetaminophen dose from all sources = 75 mg/kg/day not to exceed 4 grams/day.         2358 (650 mg)-Given        1012-Med Discontinued          Dose: 25 mcg Freq: ONCE Route: IV  Start: 05/15/17 1937   End: 05/15/17 2341        (2056)-Not Given       2341-Med Discontinued           Dose: 1 tablet Freq: EVERY 6 HOURS PRN Route: PO  PRN Reason: moderate to severe pain  Start: 05/15/17 2340   End: 05/16/17 1012   Admin Instructions: Maximum acetaminophen dose from all sources= 75 mg/kg/day not to exceed 4 grams          0623 (1 tablet)-Given       1012-Med Discontinued          Dose: 25 mg Freq: DAILY Route: PO  Start: 05/16/17 0900   End: 05/17/17 0939   Admin Instructions: DO NOT CRUSH. Tablet may be split in half along score line.          0747 (25 mg)-Given               0939-Med Discontinued  (1028)-Not Given             Dose: 10 mEq Freq: DAILY Route: PO  Start: 05/16/17 0900   End: 05/15/17 2341   Admin Instructions: DO NOT CRUSH.         2341-Med Discontinued      Medications 05/11/17  05/12/17 05/13/17 05/14/17 05/15/17 05/16/17 05/17/17               INTERAGENCY TRANSFER FORM - NOTES (H&P, Discharge Summary, Consults, Procedures, Therapies)   5/15/2017                       [address] SURGICAL: [phone]               History & Physicals      H&P by Cordell Villanueva MD at 5/15/2017 11:43 PM     Author:  Cordell Villanueva MD Service:  Hospitalist Author Type:  Physician    Filed:  5/15/2017 11:48 PM Date of Service:  5/15/2017 11:43 PM Note Created:  5/15/2017 11:43 PM    Status:  Signed :  Cordell Villanueva MD (Physician)         [facility]    History and Physical  Hospitalist       Date of Admission:  5/15/2017  Date of Service (when I saw the patient): 05/15/17    Assessment & Plan       Active Problems:    Closed fracture of left inferior and superior pubic rami    Assessment: suspect this is due to an osteoporotic fracture since she describes her leg just giving out and she fell to her knee without direct trauma to her pelvis.      Plan: register to observation, PT and ortho consults, SS consult for rehab placement, pain control with tylenol and oral narcotics for now since she has refused IV narcotics, she can weight bear as tolerated      Essential hypertension    Assessment: controlled    Plan: continue home meds      Coronary artery disease involving native coronary artery of native heart without angina pectoris    Assessment: asymptomatic    Plan: no change in meds      Type 2 diabetes mellitus without complication (H)    Assessment: diet controlled    Plan: ADA diet and glucose monitoring      Hypothyroidism due to acquired atrophy of thyroid    Assessment: chronic    Plan: resume replacement on discharge      Chronic kidney disease, stage III (moderate)    Assessment: stable    Plan: routine outpatient recheck    DVT Prophylaxis: anticipate less than 24 hour stay  Code Status: DNR - discussed on  admission    Disposition: Expected discharge in 1 days once PT and ortho consults complete, pain reasonably controlled and rehab bed available.    Cordell Villanueva MD    Primary Care Physician   Liborio Parks    Chief Complaint   Left groin pain    History is obtained from the patient    History of Present Illness   Mary Ellen Cuba is a 89 year old female who presents with left groin pain.  She was standing in her kitchen when she felt her left leg give out.  She went down to her knee but did not experience any pain initially. She got back up and went to the table and played solitaire.  She then tried to get up and again her left leg gave out and she went down on her knee a second time.  Now she was experiencing pain in the left side of her groin and she was brought to the ED by EMS.    Past Medical History    I have reviewed this patient's medical history and updated it with pertinent information if needed.   Past Medical History:   Diagnosis Date     CAD (coronary artery disease)     remote hx of stent placement in past     CVA (cerebral vascular accident) (H) 2013    some left sided deficits     Diabetes mellitus (H)      Hyperlipemia      Hypertension        Past Surgical History   I have reviewed this patient's surgical history and updated it with pertinent information if needed.  Past Surgical History:   Procedure Laterality Date     APPENDECTOMY       GI SURGERY      gwendolyn     GI SURGERY      hemicolectomy     GYN SURGERY      oopherectomy     LASER YAG CAPSULOTOMY  3/21/2013    Procedure: LASER YAG CAPSULOTOMY;  yag capsulotomy bilateral eyes;  Surgeon: Ja Nugent MD;  Location: [facility] OR     OPEN REDUCTION INTERNAL FIXATION HIP NAILING Left 1/20/2017    Procedure: OPEN REDUCTION INTERNAL FIXATION HIP NAILING;  Surgeon: Liborio Hoffman DO;  Location: [facility] OR     PHACOEMULSIFICATION WITH STANDARD INTRAOCULAR LENS IMPLANT  6/30/2011    Procedure:PHACOEMULSIFICATION WITH STANDARD  INTRAOCULAR LENS IMPLANT; Surgeon:DEANDRE DOS SANTOS; Location:[facility] OR     PHACOEMULSIFICATION WITH STANDARD INTRAOCULAR LENS IMPLANT  7/21/2011    Procedure:PHACOEMULSIFICATION WITH STANDARD INTRAOCULAR LENS IMPLANT; Surgeon:DEANDRE DOS SANTOS; Location:[facility] OR     STENT, CORONARY, DIOGENES         Prior to Admission Medications   Prior to Admission Medications   Prescriptions Last Dose Informant Patient Reported? Taking?   Calcium-Vitamin D (CALCIUM + D PO) 5/15/2017 at 1000  Yes Yes   Sig: Take 1 tablet by mouth daily.   Multiple Vitamins-Minerals (CENTRUM SILVER ULTRA WOMENS) TABS 5/15/2017 at 1000  Yes Yes   Sig: Take 1 tablet by mouth daily.   VITAMIN D, CHOLECALCIFEROL, PO Unknown at Unknown time  Yes No   Sig: Take  by mouth once a week.   acetaminophen (TYLENOL) 325 MG tablet   No No   Sig: Take 2 tablets (650 mg) by mouth every 4 hours as needed for other (surgical pain)   allopurinol (ZYLOPRIM) 100 MG tablet 5/15/2017 at 1000  No Yes   Sig: Take 1 tablet (100 mg) by mouth 2 times daily   amLODIPine (NORVASC) 5 MG tablet 5/15/2017 at 1000  No Yes   Sig: Take 1 tablet (5 mg) by mouth daily   aspirin 325 MG tablet 5/15/2017 at 1000  No Yes   Sig: Take 1 tablet (325 mg) by mouth daily   clopidogrel (PLAVIX) 75 MG tablet 5/15/2017 at 1000  No Yes   Sig: Take 1 tablet (75 mg) by mouth daily   fenofibrate 160 MG tablet 5/15/2017 at 1000  No Yes   Sig: Take 1 tablet (160 mg) by mouth daily   folic acid (FOLVITE) 400 MCG tablet 5/15/2017 at 1000  Yes Yes   Sig: Take 400 mcg by mouth daily.   levothyroxine (SYNTHROID/LEVOTHROID) 112 MCG tablet 5/15/2017 at 1000  No Yes   Sig: Take 1 tablet (112 mcg) by mouth daily   metoprolol (TOPROL XL) 25 MG 24 hr tablet 5/15/2017 at 1000  No Yes   Sig: Take 1 tablet (25 mg) by mouth daily   potassium citrate (UROCIT-K) 5 MEQ (540 MG) CR tablet 5/15/2017 at 1000  No Yes   Sig: Take 2 tablets (10 mEq) by mouth daily   senna-docusate (SENOKOT-S;PERICOLACE) 8.6-50 MG per tablet Unknown at Unknown  time  Yes No   Sig: Take 1-2 tablets by mouth 2 times daily as needed (constipation )   simvastatin (ZOCOR) 10 MG tablet 5/14/2017 at 2100  No Yes   Sig: Take 1 tablet (10 mg) by mouth At Bedtime      Facility-Administered Medications: None     Allergies   Allergies   Allergen Reactions     Hmg-Coa-R Inhibitors      Metformin GI Disturbance       Social History   I have reviewed this patient's social history and updated it with pertinent information if needed. Mary Ellen Cuba  reports that she has never smoked. She does not have any smokeless tobacco history on file. She reports that she does not drink alcohol or use illicit drugs.    Family History   I have reviewed this patient's family history and updated it with pertinent information if needed.   Family History   Problem Relation Age of Onset     Colon Cancer Mother      Prostate Cancer Brother      Peptic Ulcer Disease Father        Review of Systems   The 10 point Review of Systems is negative other than noted in the HPI or here.     Physical Exam   Temp: 96.8  F (36  C) Temp src: Oral BP: 169/79 Pulse: 60   Resp: 16 SpO2: 93 % O2 Device: None (Room air)    Vital Signs with Ranges  Temp:  [96.8  F (36  C)-97.2  F (36.2  C)] 96.8  F (36  C)  Pulse:  [60-72] 60  Resp:  [16] 16  BP: (169-198)/() 169/79  SpO2:  [92 %-93 %] 93 %  145 lbs 8.06 oz    Constitutional:   awake, alert, cooperative, no apparent distress, and appears stated age     Eyes:   Lids and lashes normal, pupils equal, round and reactive to light, extra ocular muscles intact, sclera clear, conjunctiva normal     ENT:   Normocephalic, without obvious abnormality, atraumatic, sinuses nontender on palpation, external ears without lesions, oral pharynx with moist mucous membranes, tonsils without erythema or exudates, gums normal and good dentition.     Neck:   Supple, symmetrical, trachea midline, no adenopathy, thyroid symmetric, not enlarged and no tenderness, skin normal     Hematologic /  Lymphatic:   no cervical lymphadenopathy and no supraclavicular lymphadenopathy     Back:   Symmetric, no curvature, spinous processes are non-tender on palpation, paraspinous muscles are non-tender on palpation, no costal vertebral tenderness     Lungs:   No increased work of breathing, good air exchange, clear to auscultation bilaterally, no crackles or wheezing     Cardiovascular:   Normal apical impulse, regular rate and rhythm, normal S1 and S2, no S3 or S4, and no murmur noted     Abdomen:    normal bowel sounds, soft, non-distended, non-tender, no masses palpated, no hepatosplenomegally     Neurologic:   Awake, alert, oriented to name, place and time.  Cranial nerves II-XII are grossly intact.  Motor is 5 out of 5 on her right hand and foot but 4/5 with  strength in her left hand.  Left leg not tested due to pain.  Sensory is intact.       Data   Data reviewed today:  I personally reviewed no images or EKG's today.  No lab results found in last 7 days.    Recent Results (from the past 24 hour(s))   Femur XR,  2 views, left    Narrative    LEFT FEMUR TWO VIEWS    5/15/2017 6:35 PM     HISTORY: Pain.    COMPARISON: None.      Impression    IMPRESSION: Previous open reduction internal fixation of proximal  femur. No evidence for any acute femur fracture. Degenerative changes  are noted in the hip and knee joint. Vascular calcifications also  noted.     CHRIS TOBIN MD   Pelvis XR, 1-2 views    Narrative    PELVIS ONE TO TWO VIEWS   5/15/2017 6:35 PM     HISTORY: Pain.    COMPARISON: 4/20/2017.      Impression    IMPRESSION: Questionable left superior inferior pubic rami fractures  are present. If clinically indicated, oblique views of the pelvis  could be helpful. Previous fixation of left hip noted along with  degenerative changes in both hips, lumbar spine, and symphysis pubis.     CHRIS TOBIN MD[DF1.1]          Revision History        User Key Date/Time User Provider Type Action    > DF1.1 5/15/2017  11:48 PM Cordell Villanueva MD Physician Sign                     Discharge Summaries      Discharge Summaries by Cordell Fountain MD at 5/17/2017 10:40 AM     Author:  Cordell Fountain MD Service:  Hospitalist Author Type:  Physician    Filed:  5/17/2017 10:45 AM Date of Service:  5/17/2017 10:40 AM Note Created:  5/17/2017 10:40 AM    Status:  Signed :  Cordell Fountain MD (Physician)         [facility] Discharge Summary    Mary Ellen Cuba MRN# [id number]   Age: 89 year old YOB: 1924     Date of Admission:  5/15/2017  Date of Discharge::  5/17/2017  Admitting Physician:  Cordell Villanueva MD  Discharge Physician:  Cordell Fountain MD, MD  Primary Physician: Liborio Parks  Transferring Facility: N/A     Home clinic:[DJ1.1] Liborio Parks[DJ1.2]          Admission Diagnoses:[DJ1.1]   Pubic ramus fracture, left, closed, initial encounter (H) [S32.658Y][DJ1.2]          Discharge Diagnosis:   Principle diagnosis:[DJ1.1] Pubic ramus fracture on left (superior and inferior, minimally displaced[DJ1.2]  Secondary diagnoses:[DJ1.1]  Active Problems:    Essential hypertension (1/19/2017)    Coronary artery disease involving native coronary artery of native heart without angina pectoris (1/19/2017)    Hypothyroidism due to acquired atrophy of thyroid (1/19/2017)    Type 2 diabetes mellitus without complication (H) (1/19/2017)    Chronic kidney disease, stage III (moderate) (1/19/2017)    Closed fracture of left inferior and superior pubic rami (5/15/2017)[DJ1.3]             Procedures:[DJ1.1]   No significant procedures performed during this admission[DJ1.2]         Allergies:      Allergies   Allergen Reactions     Hmg-Coa-R Inhibitors      Metformin GI Disturbance             Medications Prior to Admission:[DJ1.1]     Prescriptions Prior to Admission   Medication Sig Dispense Refill Last Dose     amLODIPine (NORVASC) 5 MG tablet Take 1 tablet (5 mg) by mouth daily 30  tablet  5/15/2017 at 1000     metoprolol (TOPROL XL) 25 MG 24 hr tablet Take 1 tablet (25 mg) by mouth daily 30 tablet  5/15/2017 at 1000     fenofibrate 160 MG tablet Take 1 tablet (160 mg) by mouth daily 30 tablet  5/15/2017 at 1000     simvastatin (ZOCOR) 10 MG tablet Take 1 tablet (10 mg) by mouth At Bedtime 30 tablet  5/14/2017 at 2100     allopurinol (ZYLOPRIM) 100 MG tablet Take 1 tablet (100 mg) by mouth 2 times daily 30 tablet  5/15/2017 at 1000     potassium citrate (UROCIT-K) 5 MEQ (540 MG) CR tablet Take 2 tablets (10 mEq) by mouth daily   5/15/2017 at 1000     clopidogrel (PLAVIX) 75 MG tablet Take 1 tablet (75 mg) by mouth daily 30 tablet  5/15/2017 at 1000     levothyroxine (SYNTHROID/LEVOTHROID) 112 MCG tablet Take 1 tablet (112 mcg) by mouth daily   5/15/2017 at 1000     aspirin 325 MG tablet Take 1 tablet (325 mg) by mouth daily 120 tablet 0 5/15/2017 at 1000     folic acid (FOLVITE) 400 MCG tablet Take 400 mcg by mouth daily.   5/15/2017 at 1000     Multiple Vitamins-Minerals (CENTRUM SILVER ULTRA WOMENS) TABS Take 1 tablet by mouth daily.   5/15/2017 at 1000     Calcium-Vitamin D (CALCIUM + D PO) Take 1 tablet by mouth daily.   5/15/2017 at 1000     senna-docusate (SENOKOT-S;PERICOLACE) 8.6-50 MG per tablet Take 1-2 tablets by mouth 2 times daily as needed (constipation ) 100 tablet 0 Unknown at Unknown time     VITAMIN D, CHOLECALCIFEROL, PO Take  by mouth once a week.   Unknown at Unknown time[DJ1.3]             Discharge Medications:[DJ1.1]     Current Discharge Medication List      START taking these medications    Details   oxyCODONE (ROXICODONE) 5 MG IR tablet Take 0.5-1 tablets (2.5-5 mg) by mouth every 6 hours as needed for moderate to severe pain  Qty: 30 tablet, Refills: 0    Associated Diagnoses: Pubic ramus fracture, left, closed, initial encounter (H); Fall on same level due to nature of surface, initial encounter         CONTINUE these medications which have CHANGED    Details    acetaminophen (TYLENOL) 500 MG tablet Take 2 tablets (1,000 mg) by mouth 3 times daily    Associated Diagnoses: Closed left hip fracture, initial encounter (H)         CONTINUE these medications which have NOT CHANGED    Details   amLODIPine (NORVASC) 5 MG tablet Take 1 tablet (5 mg) by mouth daily  Qty: 30 tablet    Associated Diagnoses: Benign essential hypertension      metoprolol (TOPROL XL) 25 MG 24 hr tablet Take 1 tablet (25 mg) by mouth daily  Qty: 30 tablet    Associated Diagnoses: Benign essential hypertension      fenofibrate 160 MG tablet Take 1 tablet (160 mg) by mouth daily  Qty: 30 tablet    Associated Diagnoses: Hypercholesterolemia      simvastatin (ZOCOR) 10 MG tablet Take 1 tablet (10 mg) by mouth At Bedtime  Qty: 30 tablet    Associated Diagnoses: Hypercholesterolemia      allopurinol (ZYLOPRIM) 100 MG tablet Take 1 tablet (100 mg) by mouth 2 times daily  Qty: 30 tablet    Associated Diagnoses: Chronic gout, unspecified cause, unspecified site      potassium citrate (UROCIT-K) 5 MEQ (540 MG) CR tablet Take 2 tablets (10 mEq) by mouth daily    Associated Diagnoses: Hypokalemia      clopidogrel (PLAVIX) 75 MG tablet Take 1 tablet (75 mg) by mouth daily  Qty: 30 tablet    Associated Diagnoses: Coronary artery disease involving native coronary artery of native heart without angina pectoris      levothyroxine (SYNTHROID/LEVOTHROID) 112 MCG tablet Take 1 tablet (112 mcg) by mouth daily    Associated Diagnoses: Other specified hypothyroidism      aspirin 325 MG tablet Take 1 tablet (325 mg) by mouth daily  Qty: 120 tablet, Refills: 0    Comments: Hold while receiving Lovenox  Associated Diagnoses: Closed left hip fracture, initial encounter (H)      folic acid (FOLVITE) 400 MCG tablet Take 400 mcg by mouth daily.      Multiple Vitamins-Minerals (CENTRUM SILVER ULTRA WOMENS) TABS Take 1 tablet by mouth daily.      Calcium-Vitamin D (CALCIUM + D PO) Take 1 tablet by mouth daily.      senna-docusate  (SENOKOT-S;PERICOLACE) 8.6-50 MG per tablet Take 1-2 tablets by mouth 2 times daily as needed (constipation )  Qty: 100 tablet, Refills: 0    Comments: Indication: Constipation  Associated Diagnoses: Closed left hip fracture, initial encounter (H)      VITAMIN D, CHOLECALCIFEROL, PO Take  by mouth once a week.[DJ1.3]                   Consultations:[DJ1.1]   Consultation during this admission received from orthopedics[DJ1.2]          Brief History of Presenting Illness:[DJ1.1]   Mary Ellen Cuba is a 89 year old female who presents with left groin pain. She was standing in her kitchen when she felt her left leg give out. She went down to her knee but did not experience any pain initially. She got back up and went to the table and played solitaire. She then tried to get up and again her left leg gave out and she went down on her knee a second time. Now she was experiencing pain in the left side of her groin[DJ1.2]          Hospital Course:[DJ1.1]   Pt had gradual improvement in her pain and mobility, but was still requiring significant assistance to get out of bed.  Pain was controlled.  Will continue with pain control, PT, and conservative care at the nursing home.  Follow up with specialist as recommended.         Physical Exam:   Vitals were reviewed[DJ1.2]  Temp: 97.6  F (36.4  C) Temp src: Oral BP: 184/75 Pulse: 54   Resp: 18 SpO2: 95 % O2 Device: None (Room air)[DJ1.4]    Constitutional:   awake, alert, cooperative, no apparent distress, and appears stated age     Lungs:   No increased work of breathing, good air exchange, clear to auscultation bilaterally, no crackles or wheezing     Cardiovascular:   Normal apical impulse, regular rate and rhythm, normal S1 and S2, no S3 or S4, and no murmur noted     Abdomen:   No scars, normal bowel sounds, soft, non-distended, non-tender, no masses palpated, no hepatosplenomegally     Musculoskeletal:   Pain with weight-bearing on left leg.  Otherwise, fairly normal range  of motion.  no lower extremity pitting edema present[DJ1.2]              Pending Tests at Discharge:[DJ1.1]   None[DJ1.2]         Discharge Instructions and Follow-Up:   Discharge diet:[DJ1.1] Regular[DJ1.2]   Discharge activity:[DJ1.1] Activity as tolerated, assistance for now.  Continue PT, Occupational Therapy.[DJ1.2]   Discharge follow-up:[DJ1.1] Follow up with primary care provider in 1-2 weeks, ortho in 2-3 weeks[DJ1.2]           Discharge Disposition:[DJ1.1]   Discharged to short-term care facility[DJ1.2]      Attestation:[DJ1.1]  I have reviewed today's vital signs, notes, medications, labs and imaging.  Amount of time performed on this discharge summary: 35 minutes.[DJ1.2]    Cordell Fountain MD, MD[DJ1.1]       Revision History        User Key Date/Time User Provider Type Action    > DJ1.4 5/17/2017 10:45 AM Cordell Fountain MD Physician Sign     DJ1.3 5/17/2017 10:42 AM Cordell Fountain MD Physician      DJ1.2 5/17/2017 10:41 AM Cordell Fountain MD Physician      DJ1.1 5/17/2017 10:40 AM Cordell Fountain MD Physician                      Consult Notes      Consults by Jody Hoyos at 5/16/2017 10:59 AM     Author:  Jody Hoyos Service:  Social Work Author Type:      Filed:  5/16/2017 10:59 AM Date of Service:  5/16/2017 10:59 AM Note Created:  5/16/2017 10:42 AM    Status:  Signed :  Jody Hoyos ()     Consult Orders:    1. Social Work IP Consult [[id number]] ordered by Cordell Villanueva MD at 05/15/17 2341                Care Transition Initial Assessment -   Reason For Consult: discharge planning  Met with: Patient[SF1.1]    Active Problems:    Essential hypertension    Coronary artery disease involving native coronary artery of native heart without angina pectoris    Hypothyroidism due to acquired atrophy of thyroid    Type 2 diabetes mellitus without complication (H)    Chronic kidney disease, stage III (moderate)    Closed  fracture of left inferior and superior pubic rami[SF1.2]         DATA  Lives With: alone  Living Arrangements: condominium  Description of Support System: Supportive, Involved  Who is your support system?: Sibling(s), Other (specify) (Friends- Don and Ireen)  Identified issues/concerns regarding health management: inability to care for self at home, d/c planning               Transportation Available: family or friend will provide      ASSESSMENT  Cognitive Status:  awake, alert and oriented  Concerns to be addressed: d/c planning .       PLAN  Financial costs for the patient includes: private pay cost for TCU placement.  Private pay cost for live in home care  .    Patient given options and choices for discharge: Yes. Option of TCU placement under private pay versus going home and paying to live-in home care.      Patient/family is agreeable to the plan?  is working with patient on options for d/c.  Patient has not decided on a plan as of yet.      Patient anticipates discharging to:  [facility] at this time.      has met with patient regarding d/c planning.  Will continue to see patient regarding options and costs for care.[SF1.1]        Revision History        User Key Date/Time User Provider Type Action    > SF1.2 5/16/2017 10:59 AM Jody Hoyos  Sign     SF1.1 5/16/2017 10:42 AM Jody Hoyos              Consults by Liborio Hoffman DO at 5/16/2017  7:56 AM     Author:  Liborio Hoffman DO Service:  Orthopedics Author Type:  Physician    Filed:  5/16/2017  8:02 AM Date of Service:  5/16/2017  7:56 AM Note Created:  5/16/2017  7:55 AM    Status:  Signed :  Liborio Hoffman DO (Physician)     Consult Orders:    1. Orthopedic Surgery IP Consult: Patient to be seen: Routine - within 24 hours; Call back #: I spoke with Dr Hoffman; left pubic rami fracture; Consultant may enter orders: Yes [[id number]] ordered by Nir  Jalen Worthington MD at 05/15/17 1941                IMP:  left closed minimally displaced superior/inferior pubic rami fracture in a 89 yo female    PLAN:  Pain management  WBAT-as per pain  Will need placement  Non operative treatment  Follow up with me in 2-3 weeks    Full consult:[id number]    Lio Hoffman D.O.[JL1.1]     Revision History        User Key Date/Time User Provider Type Action    > JL1.1 5/16/2017  8:02 AM Liborio Hoffman DO Physician Sign                     Progress Notes - Physician (Notes for yesterday and today)      Progress Notes by Jonathan Adams PA-C at 5/16/2017  3:44 PM     Author:  Jonathan Adams PA-C Service:  Hospitalist Author Type:  Physician Assistant - C    Filed:  5/16/2017  5:36 PM Date of Service:  5/16/2017  3:44 PM Note Created:  5/16/2017  3:44 PM    Status:  Attested :  Jonathan dAams PA-C (Physician Assistant - C)    Cosigner:  Liborio Hendrickson MD at 5/16/2017  6:00 PM        Attestation signed by Liborio Hendrickson MD at 5/16/2017  6:00 PM        Physician Attestation   I, Liborio Hendrickson, have reviewed and discussed with the advanced practice provider their history, physical and plan for Mary Ellen Cuba. I did not participate in a shared visit by interviewing or examining the patient and this should be billed as an advanced practice provider only visit.    Liborio Hendrickson  Date of Service (when I saw the patient): I did not personally see this patient today.                               [facility]    Hospitalist Progress Note    Date of Service (when I saw the patient): 05/16/2017     Assessment & Plan   Mary Ellen Cuba is a 89 year old female who was admitted on 5/15/2017.     Active Problems:    Closed fracture of left inferior and superior pubic rami    Assessment:[BD1.1] seen by ortho who recommends non surgical management, pain control. Needing assist of 2 to get off commode. Unable to bear weight on the  left leg. Agreeable to TCU placement.[BD1.2]     Plan:[BD1.1] D/C tomorrow to [facility] for ongoing rehab.[BD1.2]   Essential hypertension    Assessment:[BD1.1] mildly elevated likely secondary to pain[BD1.2]    Plan:[BD1.1] continue current medications[BD1.2]    Coronary artery disease involving native coronary artery of native heart without angina pectoris    Assessment:[BD1.1] No symptoms of ischemia[BD1.2]    Plan:[BD1.1] Continue current medications.[BD1.2]     Hypothyroidism due to acquired atrophy of thyroid    Assessment:[BD1.1] Chronic[BD1.2]    Plan:[BD1.1] resume medication upon discharge[BD1.2]    Type 2 diabetes mellitus without complication (H)    Assessment:[BD1.1] diet controlled, blood sugars in the 90's-130's[BD1.2]    Plan:[BD1.1] Continue ADA diet, glucose monitoring[BD1.2]    Chronic kidney disease, stage III (moderate)    Assessment:[BD1.1] stable[BD1.2]    Plan:[BD1.1] continue out patient management.[BD1.2]       Jonathan Sellers    Interval History[BD1.1]   Pain has been better controlled. Unable to bear much weight on the left leg. She accepts the need for TCU and ongoing rehab. Eating and voiding without problems. Eating okay, but states she gets too much food. No new concerns[BD1.2]    Physical Exam   Temp: 98.4  F (36.9  C) Temp src: Oral BP: 151/63 Pulse: 57   Resp: 20 SpO2: 93 % O2 Device: None (Room air)    Vitals:    05/15/17 1808 05/15/17 2045   Weight: 61.2 kg (134 lb 14.7 oz) 66 kg (145 lb 8.1 oz)     Vital Signs with Ranges  Temp:  [96.8  F (36  C)-98.4  F (36.9  C)] 98.4  F (36.9  C)  Pulse:  [57-72] 57  Resp:  [16-20] 20  BP: (151-198)/() 151/63  SpO2:  [92 %-94 %] 93 %  I/O last 3 completed shifts:  In: 360 [P.O.:360]  Out: 150 [Urine:150]    Constitutional:[BD1.1] awake, alert, cooperative, no apparent distress, and appears stated age[BD1.2]  Respiratory:[BD1.1] No increased work of breathing, good air exchange, clear to auscultation bilaterally, no crackles or  wheezing[BD1.2]  Cardiovascular:[BD1.1] regular rate and rhythm[BD1.2]  GI:[BD1.1] normal bowel sounds, soft, non-distended and non-tender[BD1.2]  Musculoskeletal:[BD1.1] 2+ pitting edema lower extremities[BD1.2]    Medications        acetaminophen  1,000 mg Oral TID     amLODIPine  5 mg Oral Daily     clopidogrel  75 mg Oral Daily     metoprolol  25 mg Oral Daily     sodium chloride (PF)  3 mL Intracatheter Q8H       Data[BD1.1]   Results for orders placed or performed during the hospital encounter of 05/15/17 (from the past 24 hour(s))   Femur XR,  2 views, left    Narrative    LEFT FEMUR TWO VIEWS    5/15/2017 6:35 PM     HISTORY: Pain.    COMPARISON: None.      Impression    IMPRESSION: Previous open reduction internal fixation of proximal  femur. No evidence for any acute femur fracture. Degenerative changes  are noted in the hip and knee joint. Vascular calcifications also  noted.     CHRIS TOBIN MD   Pelvis XR, 1-2 views    Narrative    PELVIS ONE TO TWO VIEWS   5/15/2017 6:35 PM     HISTORY: Pain.    COMPARISON: 4/20/2017.      Impression    IMPRESSION: Questionable left superior inferior pubic rami fractures  are present. If clinically indicated, oblique views of the pelvis  could be helpful. Previous fixation of left hip noted along with  degenerative changes in both hips, lumbar spine, and symphysis pubis.     CHRIS TOBIN MD   Orthopedic Surgery IP Consult: Patient to be seen: Routine - within 24 hours; Call back #: I spoke with Dr Hoffman; left pubic rami fracture; Consultant may enter orders: Yes    Narrative    Liborio Hoffman DO     5/16/2017  8:02 AM  IMP:  left closed minimally displaced superior/inferior pubic rami   fracture in a 89 yo female    PLAN:  Pain management  WBAT-as per pain  Will need placement  Non operative treatment  Follow up with me in 2-3 weeks    Full consult:[id number]    Lio Hoffman D.O.   Social Work IP Consult    Narrative    Jody Hoyos     5/16/2017 10:59 AM  Care  Transition Initial Assessment -   Reason For Consult: discharge planning  Met with: Patient    Active Problems:    Essential hypertension    Coronary artery disease involving native coronary artery of   native heart without angina pectoris    Hypothyroidism due to acquired atrophy of thyroid    Type 2 diabetes mellitus without complication (H)    Chronic kidney disease, stage III (moderate)    Closed fracture of left inferior and superior pubic rami         DATA  Lives With: alone  Living Arrangements: condominium  Description of Support System: Supportive, Involved  Who is your support system?: Sibling(s), Other (specify)   (Friends- Don and Irene)  Identified issues/concerns regarding health management: inability   to care for self at home, d/c planning               Transportation Available: family or friend will provide      ASSESSMENT  Cognitive Status:  awake, alert and oriented  Concerns to be addressed: d/c planning .       PLAN  Financial costs for the patient includes: private pay cost for   TCU placement.  Private pay cost for live in home care  .    Patient given options and choices for discharge: Yes. Option of   TCU placement under private pay versus going home and paying to   live-in home care.      Patient/family is agreeable to the plan?  is   working with patient on options for d/c.  Patient has not decided   on a plan as of yet.      Patient anticipates discharging to:  [facility] at this time.      has met with patient regarding d/c planning.    Will continue to see patient regarding options and costs for   care.      Glucose by meter   Result Value Ref Range    Glucose 96 70 - 99 mg/dL   Glucose by meter   Result Value Ref Range    Glucose 97 70 - 99 mg/dL   Glucose by meter   Result Value Ref Range    Glucose 93 70 - 99 mg/dL   Glucose by meter   Result Value Ref Range    Glucose 132 (H) 70 - 99 mg/dL[BD1.3]        Revision History        User Key Date/Time  User Provider Type Action    > BD1.3 5/16/2017  5:36 PM Jonathan Adams PA-C Physician Assistant - [facility] Sign     BD1.2 5/16/2017  5:25 PM Jonathan Adams PA-C Physician Assistant - [facility]      BD1.1 5/16/2017  3:44 PM Jonathan Adams PA-C Physician Assistant Candida HAIDER                   Procedure Notes     No notes of this type exist for this encounter.         Progress Notes - Therapies (Notes from 05/14/17 through 05/17/17)      Progress Notes by Sally Calles PT at 5/16/2017  9:58 AM     Author:  Sally Calles PT Service:  (none) Author Type:  Physical Therapist    Filed:  5/16/2017  9:59 AM Date of Service:  5/16/2017  9:58 AM Note Created:  5/16/2017  9:58 AM    Status:  Signed :  Sally Calles PT (Physical Therapist)          05/16/17 0800   Quick Adds   Type of Visit Initial PT Evaluation   Living Environment   Lives With alone   Living Arrangements condominium   Home Accessibility bed and bath on same level;grab bars present (bathtub);grab bars present (toilet)   Number of Stairs to Enter Home 0   Number of Stairs Within Home 0   Transportation Available family or friend will provide   Living Environment Comment quad home that she owns but senior living, neighbor helps her.   Self-Care   Dominant Hand right   Regular Exercise no   Equipment Currently Used at Home walker, rolling;cane, straight  (with seat)   Functional Level Prior   Ambulation 1-->assistive equipment   Transferring 1-->assistive equipment   Toileting 1-->assistive equipment   Bathing 0-->independent  (sponge bathing)   Dressing 0-->independent   Eating 0-->independent   Communication 0-->understands/communicates without difficulty   Cognition 0 - no cognition issues reported   Fall history within last six months yes   Which of the above functional risks had a recent onset or change? ambulation   General Information   Onset of Illness/Injury or Date of Surgery - Date 05/15/17   Referring Physician Dr. Jalen Loyola    Patient/Family Goals Statement get home ultimately   Pertinent History of Current Problem (include personal factors and/or comorbidities that impact the POC) Pt reports that she was watering a plant and she just fell she stated. She reports having problems with her left hip since she fx in Jan of this year . She was rehab and then came home.   Precautions/Limitations no known precautions/limitations   Weight-Bearing Status - LUE full weight-bearing   Weight-Bearing Status - RUE full weight-bearing   Weight-Bearing Status - LLE weight-bearing as tolerated   Weight-Bearing Status - RLE weight-bearing as tolerated   Cognitive Status Examination   Orientation orientation to person, place and time   Level of Consciousness alert   Follows Commands and Answers Questions 100% of the time   Personal Safety and Judgment intact   Memory intact   Pain Assessment   Patient Currently in Pain Yes, see Vital Sign flowsheet   Integumentary/Edema   Integumentary/Edema Comments bilateral feet   Posture    Posture Comments in bed but seems to be WFL   Range of Motion (ROM)   ROM Comment right was easy active. Left was limited 50% needing assist    Strength   Strength Comments pain limited 3/5 grossly right now.   Bed Mobility   Bed Mobility Comments max to mod assist with knee flexion and rolling to her side. She could not move to edge of bed per self.   Transfer Skills   Transfer Comments unable at this time due to pain. She would need mechanical assist   Gait   Gait Comments unable at this time   Sensory Examination   Sensory Perception Comments seems intact   Coordination   Coordination no deficits were identified   Muscle Tone   Muscle Tone no deficits were identified   General Therapy Interventions   Planned Therapy Interventions bed mobility training;gait training;transfer training;strengthening   Clinical Impression   Criteria for Skilled Therapeutic Intervention yes, treatment indicated   PT Diagnosis mobility dysfucntion in  "transitions, bed mocilty and gait dysfunction. Pain left pelvis and hip   Influenced by the following impairments Pain   Functional limitations due to impairments unable to move indep or with assist due to pain   Clinical Presentation Evolving/Changing   Clinical Decision Making (Complexity) Moderate complexity   Therapy Frequency` daily   Predicted Duration of Therapy Intervention (days/wks) hospital stay up to 3 to 4 days   Anticipated Discharge Disposition Transitional Care Facility   Risk & Benefits of therapy have been explained Yes   [employer] AM-[facility] TM \"6 Clicks\"   2016, Trustees of [employer], under license to [employer].  All rights reserved.   6 Clicks Short Forms Basic Mobility Inpatient Short Form   [employer] AM-[facility]  \"6 Clicks\" V.2 Basic Mobility Inpatient Short Form   1. Turning from your back to your side while in a flat bed without using bedrails? 1 - Total   2. Moving from lying on your back to sitting on the side of a flat bed without using bedrails? 1 - Total   3. Moving to and from a bed to a chair (including a wheelchair)? 1 - Total   4. Standing up from a chair using your arms (e.g., wheelchair, or bedside chair)? 1 - Total   5. To walk in hospital room? 1 - Total   6. Climbing 3-5 steps with a railing? 1 - Total   Basic Mobility Raw Score (Score out of 24.Lower scores equate to lower levels of function) 6   Total Evaluation Time   Total Evaluation Time (Minutes) 30[MB1.1]        Revision History        User Key Date/Time User Provider Type Action    > MB1.1 5/16/2017  9:59 AM Sally Calles, PT Physical Therapist Sign                                                      INTERAGENCY TRANSFER FORM - LAB / IMAGING / EKG / EMG RESULTS   5/15/2017                       [address] MEDICAL SURGICAL: [phone]            Unresulted Labs     None         Lab Results - 3 Days      Glucose by meter [[id number]] (Abnormal)  Resulted: 05/17/17 1206, Result status: " Final result    Ordering provider: Cordell Villanueva MD  05/17/17 1148 Resulting lab: POINT OF CARE TEST, GLUCOSE    Specimen Information    Type Source Collected On     05/17/17 1148          Components       Value Reference Range Flag Lab   Glucose 155 70 - 99 mg/dL H 170            Glucose by meter [[id number]]  Resulted: 05/17/17 0751, Result status: Final result    Ordering provider: Cordell Villanueva MD  05/17/17 0747 Resulting lab: POINT OF CARE TEST, GLUCOSE    Specimen Information    Type Source Collected On     05/17/17 0747          Components       Value Reference Range Flag Lab   Glucose 89 70 - 99 mg/dL  170            Glucose by meter [[id number]]  Resulted: 05/17/17 0131, Result status: Final result    Ordering provider: Cordell Villanueva MD  05/17/17 0122 Resulting lab: POINT OF CARE TEST, GLUCOSE    Specimen Information    Type Source Collected On     05/17/17 0122          Components       Value Reference Range Flag Lab   Glucose 84 70 - 99 mg/dL  170            Glucose by meter [[id number]] (Abnormal)  Resulted: 05/16/17 2116, Result status: Final result    Ordering provider: Cordell Villanueva MD  05/16/17 2113 Resulting lab: POINT OF CARE TEST, GLUCOSE    Specimen Information    Type Source Collected On     05/16/17 2113          Components       Value Reference Range Flag Lab   Glucose 122 70 - 99 mg/dL H 170            Glucose by meter [[id number]] (Abnormal)  Resulted: 05/16/17 1631, Result status: Final result    Ordering provider: Cordell Villanueva MD  05/16/17 1626 Resulting lab: POINT OF CARE TEST, GLUCOSE    Specimen Information    Type Source Collected On     05/16/17 1626          Components       Value Reference Range Flag Lab   Glucose 132 70 - 99 mg/dL H 170            Glucose by meter [[id number]]  Resulted: 05/16/17 1151, Result status: Final result    Ordering provider: Cordell Villanueva MD  05/16/17 1145 Resulting lab: POINT OF CARE TEST, GLUCOSE    Specimen  Information    Type Source Collected On     05/16/17 1145          Components       Value Reference Range Flag Lab   Glucose 93 70 - 99 mg/dL  170            Glucose by meter [[id number]]  Resulted: 05/16/17 0731, Result status: Final result    Ordering provider: Chris Villanueva MD  05/16/17 0727 Resulting lab: POINT OF CARE TEST, GLUCOSE    Specimen Information    Type Source Collected On     05/16/17 0727          Components       Value Reference Range Flag Lab   Glucose 97 70 - 99 mg/dL  170            Glucose by meter [[id number]]  Resulted: 05/16/17 0456, Result status: Final result    Ordering provider: Chris Villanueva MD  05/16/17 0449 Resulting lab: POINT OF CARE TEST, GLUCOSE    Specimen Information    Type Source Collected On     05/16/17 0449          Components       Value Reference Range Flag Lab   Glucose 96 70 - 99 mg/dL  170            Testing Performed By     Lab - Abbreviation Name Director Address Valid Date Range    170 - Unknown POINT OF CARE TEST, GLUCOSE Unknown Unknown 10/31/11 1114 - Present               Imaging Results - 3 Days      Pelvis XR, 1-2 views [[id number]]  Resulted: 05/15/17 1850, Result status: Final result    Ordering provider: Jalen Loyola MD  05/15/17 1804 Resulted by: Chris Tobin MD    Performed: 05/15/17 1820 - 05/15/17 1835 Resulting lab: RADIOLOGY RESULTS    Narrative:       PELVIS ONE TO TWO VIEWS   5/15/2017 6:35 PM     HISTORY: Pain.    COMPARISON: 4/20/2017.      Impression:       IMPRESSION: Questionable left superior inferior pubic rami fractures  are present. If clinically indicated, oblique views of the pelvis  could be helpful. Previous fixation of left hip noted along with  degenerative changes in both hips, lumbar spine, and symphysis pubis.     CHRIS TOBIN MD      Femur XR,  2 views, left [[id number]]  Resulted: 05/15/17 1850, Result status: Final result    Ordering provider: Jalen Loyola MD  05/15/17 1804 Resulted by:  Chris Tobin MD    Performed: 05/15/17 1816 - 05/15/17 1835 Resulting lab: RADIOLOGY RESULTS    Narrative:       LEFT FEMUR TWO VIEWS    5/15/2017 6:35 PM     HISTORY: Pain.    COMPARISON: None.      Impression:       IMPRESSION: Previous open reduction internal fixation of proximal  femur. No evidence for any acute femur fracture. Degenerative changes  are noted in the hip and knee joint. Vascular calcifications also  noted.     CHRIS TOBIN MD      Testing Performed By     Lab - Abbreviation Name Director Address Valid Date Range    104 - Rad Rslts RADIOLOGY RESULTS Unknown Unknown 02/16/05 1553 - Present            Encounter-Level Documents:     There are no encounter-level documents.      Order-Level Documents:     There are no order-level documents.

## 2017-05-15 NOTE — ED NOTES
"Pt presents via EMS with left hip and thigh pain. She reports that she was standing in her kitchen, had sudden onset of left groin and hip pain and her leg \"gave out\", causing her to go down on one knee.  She crawled to her bedroom and called EMS.  She reports hx of a left hemiarthroplasty in January 2017  "

## 2017-05-15 NOTE — ED PROVIDER NOTES
History     Chief Complaint   Patient presents with     Hip Pain     The history is provided by the patient and the EMS personnel.     Mary Ellen Cuba is a 89 year old female who is here with left leg and left hip pain.  She came in by ambulance.  She was standing in her kitchen when her left hip and left leg gave out.  She had no loss of consciousness.  She experienced no chest pain, no shortness of breath and no dizziness.  She said she was just standing there and the leg gave out and she fell to the floor.  She does have a history of left ORIF of the left hip.  She went down to her knees but was able to crawl across the floor, sitting in a chair and play cards for a short time.  She thought this would pass.  When she tried to stand up she again had pain in the left leg and fell to the floor.  She crawled to her bed and called 911.      Per EMS:  The patient did well in transfer.  She has no rotation or shortening of the left leg.  She had stable vital signs and was mentally alert and interactive during the entire trip here to the emergency department.    I have reviewed the Medications, Allergies, Past Medical and Surgical History, and Social History in the Epic system.    Review of Systems   Constitutional: Positive for activity change. Negative for chills and fever.   Respiratory: Negative for cough and shortness of breath.    Cardiovascular: Negative for chest pain.   Gastrointestinal: Negative for diarrhea, nausea and vomiting.   Musculoskeletal: Positive for gait problem. Negative for arthralgias, back pain, joint swelling and myalgias.        Left leg and hip pain   Neurological: Negative for dizziness, weakness and light-headedness.   All other systems reviewed and are negative.      Physical Exam      Physical Exam   Constitutional: She is oriented to person, place, and time. She appears well-developed and well-nourished. No distress.   HENT:   Head: Normocephalic and atraumatic.   Right Ear: External  ear normal.   Left Ear: External ear normal.   Nose: Nose normal.   Mouth/Throat: Oropharynx is clear and moist.   Eyes: Conjunctivae and EOM are normal.   Neck: Normal range of motion. Neck supple.   Pulmonary/Chest: No respiratory distress.   Abdominal: She exhibits no distension. There is no tenderness.   Musculoskeletal: She exhibits tenderness. She exhibits no edema or deformity.   Patient has tenderness in the area of the left hip and the proximal femur with palpation.   Neurological: She is alert and oriented to person, place, and time.   Skin: Skin is warm and dry. No rash noted. No erythema.   Nursing note and vitals reviewed.    Trauma:  Level of trauma activation: Trauma evaluation (consult) called at arrival  C-collar and immobilization: not indicated, cleared.  CSpine Clearance: C spine cleared clinically  GCS at arrival: 15  GCS at disposition: unchanged  Full Primary and Secondary survey with appropriate immobilization of spine completed in exam section.  Consults prior to admission or transfer: Orthopedics called at 1930  Procedures done in the ED: none    ED Course     ED Course     Procedures    Results for orders placed or performed during the hospital encounter of 05/15/17 (from the past 24 hour(s))   Femur XR,  2 views, left    Narrative    LEFT FEMUR TWO VIEWS    5/15/2017 6:35 PM     HISTORY: Pain.    COMPARISON: None.      Impression    IMPRESSION: Previous open reduction internal fixation of proximal  femur. No evidence for any acute femur fracture. Degenerative changes  are noted in the hip and knee joint. Vascular calcifications also  noted.     CHRIS TOBIN MD   Pelvis XR, 1-2 views    Narrative    PELVIS ONE TO TWO VIEWS   5/15/2017 6:35 PM     HISTORY: Pain.    COMPARISON: 4/20/2017.      Impression    IMPRESSION: Questionable left superior inferior pubic rami fractures  are present. If clinically indicated, oblique views of the pelvis  could be helpful. Previous fixation of left hip  noted along with  degenerative changes in both hips, lumbar spine, and symphysis pubis.     CHRIS TOBIN MD       Medications   fentaNYL Citrate (PF) (SUBLIMAZE) injection 25 mcg (not administered)         Assessments & Plan (with Medical Decision Making)  Mary Ellen came to the emergency department today after a fall at home.  She was standing in the kitchen and fell the floor.  She was unable to bear weight on the left leg so she sat in a chair and played cards for a short time.  She then tried to stand a 2nd time and fell.  She crawled to the bed and called 911.  Here in the ED she has a left superior inferior pubic rami fracture.  The hip does not appear to have a fracture in the femur is intact.  I did speak with Dr. Hoffman, orthopedics, about this patient and this is not a surgical hip.  We did review images together.  I spoke with Dr. Villanueva about this patient and she will be an observation admit.  I did write a consult for orthopedics and for physical therapy.  I will write observation orders.  I did speak to the patient and her friend about this plan.       I have reviewed the nursing notes.    I have reviewed the findings, diagnosis, plan and need for follow up with the patient.    New Prescriptions    No medications on file       Final diagnoses:   Pubic ramus fracture, left, closed, initial encounter (H)       5/15/2017   [facility] EMERGENCY DEPARTMENT     Jalen oLyola MD  05/15/17 1938       Jalen Loyola MD  05/15/17 1942

## 2017-05-15 NOTE — IP AVS SNAPSHOT
MRN:[id number]                      After Visit Summary   5/15/2017    Mary Ellen Cuba    MRN: [id number]           Thank you!     Thank you for choosing [facility] for your care. Our goal is always to provide you with excellent care. Hearing back from our patients is one way we can continue to improve our services. Please take a few minutes to complete the written survey that you may receive in the mail after you visit with us. Thank you!        Patient Information     Date Of Birth          1/20/1924        About your hospital stay     You were admitted on:  May 15, 2017 You last received care in the:  [address] [facility]    You were discharged on:  May 17, 2017       Who to Call     For medical emergencies, please call 911.  For non-urgent questions about your medical care, please call your primary care provider or clinic, [phone]          Attending Provider     Provider Specialty    Jalen Loyola MD [facility], Cordell Mary MD Internal Medicine       Primary Care Provider Office Phone # Fax #    Liborio Parks [phone]       [facility] [address] [facility] [address]        After Care Instructions     Activity - Up with nursing assistance       Weight-bearing as tolerated with pelvic pain            Advance Diet as Tolerated       Follow this diet upon discharge: Moderate consistent carbohydrate (6630-2471 mona / 4-6 CHO units per meal)            Fall precautions           General info for SNF       Length of Stay Estimate: Short Term Care: Estimated # of Days 31-90  Condition at Discharge: Improving  Level of care:skilled   Rehabilitation Potential: Fair  Admission H&P remains valid and up-to-date: Yes  Recent Chemotherapy: N/A  Use Nursing Home Standing Orders: Yes            Mantoux instructions       Give two-step Mantoux (PPD) Per Facility Policy Yes                  Follow-up Appointments     Follow Up and  "recommended labs and tests       Follow up with primary care provider in 1-2 weeks.  No follow up labs or test are needed.  Follow up with specialist, Dr. Hoffman, in 2-3 weeks.                  Additional Services     Occupational Therapy Adult Consult       Evaluate and treat as clinically indicated.    Reason:  Pubic ramus fracture            Physical Therapy Adult Consult       Evaluate and treat as clinically indicated.    Reason:  Pubic ramus fracture                  Pending Results     No orders found from 2017 to 2017.            Statement of Approval     Ordered          17 1040  I have reviewed and agree with all the recommendations and orders detailed in this document.  EFFECTIVE NOW     Approved and electronically signed by:  Cordell Fountain MD             Admission Information     Date & Time Provider Department Dept. Phone    5/15/2017 Cordell Villanueva MD [address] [phone]      Your Vitals Were     Blood Pressure Pulse Temperature Respirations Height Weight    184/75 54 97.6  F (36.4  C) (Oral) 18 1.575 m (5' 2\") 66 kg (145 lb 8.1 oz)    Pulse Oximetry BMI (Body Mass Index)                95% 26.61 kg/m2          MyChart Information     [employer] lets you send messages to your doctor, view your test results, renew your prescriptions, schedule appointments and more. To sign up, go to [email or web address] . Click on \"Log in\" on the left side of the screen, which will take you to the Welcome page. Then click on \"Sign up Now\" on the right side of the page.     You will be asked to enter the access code listed below, as well as some personal information. Please follow the directions to create your username and password.     Your access code is: [email or web address]  Expires: 2017  1:23 PM     Your access code will  in 90 days. If you need help or a new code, please call your [facility] or [phone].        Care EveryWhere ID     This " is your Care EveryWhere ID. This could be used by other organizations to access your [facility] medical records  [id number]           Review of your medicines      START taking        Dose / Directions    oxyCODONE 5 MG IR tablet   Commonly known as:  ROXICODONE   Used for:  Pubic ramus fracture, left, closed, initial encounter (H), Fall on same level due to nature of surface, initial encounter        Dose:  2.5-5 mg   Take 0.5-1 tablets (2.5-5 mg) by mouth every 6 hours as needed for moderate to severe pain   Quantity:  30 tablet   Refills:  0         CONTINUE these medicines which may have CHANGED, or have new prescriptions. If we are uncertain of the size of tablets/capsules you have at home, strength may be listed as something that might have changed.        Dose / Directions    acetaminophen 500 MG tablet   Commonly known as:  TYLENOL   This may have changed:    - medication strength  - how much to take  - when to take this  - reasons to take this   Used for:  Closed left hip fracture, initial encounter (H)        Dose:  1000 mg   Take 2 tablets (1,000 mg) by mouth 3 times daily   Refills:  0         CONTINUE these medicines which have NOT CHANGED        Dose / Directions    allopurinol 100 MG tablet   Commonly known as:  ZYLOPRIM   Used for:  Chronic gout, unspecified cause, unspecified site        Dose:  100 mg   Take 1 tablet (100 mg) by mouth 2 times daily   Quantity:  30 tablet   Refills:  0       amLODIPine 5 MG tablet   Commonly known as:  NORVASC   Used for:  Benign essential hypertension        Dose:  5 mg   Take 1 tablet (5 mg) by mouth daily   Quantity:  30 tablet   Refills:  0       aspirin 325 MG tablet   Used for:  Closed left hip fracture, initial encounter (H)        Dose:  325 mg   Take 1 tablet (325 mg) by mouth daily   Quantity:  120 tablet   Refills:  0       CALCIUM + D PO        Dose:  1 tablet   Take 1 tablet by mouth daily.   Refills:  0       CENTRUM SILVER ULTRA WOMENS Tabs         Dose:  1 tablet   Take 1 tablet by mouth daily.   Refills:  0       clopidogrel 75 MG tablet   Commonly known as:  PLAVIX   Used for:  Coronary artery disease involving native coronary artery of native heart without angina pectoris        Dose:  75 mg   Take 1 tablet (75 mg) by mouth daily   Quantity:  30 tablet   Refills:  0       fenofibrate 160 MG tablet   Used for:  Hypercholesterolemia        Dose:  160 mg   Take 1 tablet (160 mg) by mouth daily   Quantity:  30 tablet   Refills:  0       folic acid 400 MCG tablet   Commonly known as:  FOLVITE        Dose:  400 mcg   Take 400 mcg by mouth daily.   Refills:  0       levothyroxine 112 MCG tablet   Commonly known as:  SYNTHROID/LEVOTHROID   Used for:  Other specified hypothyroidism        Dose:  112 mcg   Take 1 tablet (112 mcg) by mouth daily   Refills:  0       metoprolol 25 MG 24 hr tablet   Commonly known as:  TOPROL XL   Used for:  Benign essential hypertension        Dose:  25 mg   Take 1 tablet (25 mg) by mouth daily   Quantity:  30 tablet   Refills:  0       potassium citrate 5 MEQ (540 MG) CR tablet   Commonly known as:  UROCIT-K   Used for:  Hypokalemia        Dose:  10 mEq   Take 2 tablets (10 mEq) by mouth daily   Refills:  0       senna-docusate 8.6-50 MG per tablet   Commonly known as:  SENOKOT-S;PERICOLACE   Used for:  Closed left hip fracture, initial encounter (H)        Dose:  1-2 tablet   Take 1-2 tablets by mouth 2 times daily as needed (constipation )   Quantity:  100 tablet   Refills:  0       simvastatin 10 MG tablet   Commonly known as:  ZOCOR   Used for:  Hypercholesterolemia        Dose:  10 mg   Take 1 tablet (10 mg) by mouth At Bedtime   Quantity:  30 tablet   Refills:  0       VITAMIN D (CHOLECALCIFEROL) PO        Take  by mouth once a week.   Refills:  0            Where to get your medicines      Some of these will need a paper prescription and others can be bought over the counter. Ask your nurse if you have questions.     Bring a  paper prescription for each of these medications     oxyCODONE 5 MG IR tablet       You don't need a prescription for these medications     acetaminophen 500 MG tablet                Protect others around you: Learn how to safely use, store and throw away your medicines at www.disposemymeds.org.             Medication List: This is a list of all your medications and when to take them. Check marks below indicate your daily home schedule. Keep this list as a reference.      Medications           Morning Afternoon Evening Bedtime As Needed    acetaminophen 500 MG tablet   Commonly known as:  TYLENOL   Take 2 tablets (1,000 mg) by mouth 3 times daily   Last time this was given:  1,000 mg on 5/17/2017  8:04 AM                                allopurinol 100 MG tablet   Commonly known as:  ZYLOPRIM   Take 1 tablet (100 mg) by mouth 2 times daily                                amLODIPine 5 MG tablet   Commonly known as:  NORVASC   Take 1 tablet (5 mg) by mouth daily   Last time this was given:  5 mg on 5/17/2017  8:05 AM                                aspirin 325 MG tablet   Take 1 tablet (325 mg) by mouth daily                                CALCIUM + D PO   Take 1 tablet by mouth daily.                                CENTRUM SILVER ULTRA WOMENS Tabs   Take 1 tablet by mouth daily.                                clopidogrel 75 MG tablet   Commonly known as:  PLAVIX   Take 1 tablet (75 mg) by mouth daily   Last time this was given:  75 mg on 5/17/2017  8:05 AM                                fenofibrate 160 MG tablet   Take 1 tablet (160 mg) by mouth daily                                folic acid 400 MCG tablet   Commonly known as:  FOLVITE   Take 400 mcg by mouth daily.                                levothyroxine 112 MCG tablet   Commonly known as:  SYNTHROID/LEVOTHROID   Take 1 tablet (112 mcg) by mouth daily                                metoprolol 25 MG 24 hr tablet   Commonly known as:  TOPROL XL   Take 1 tablet (25  mg) by mouth daily   Last time this was given:  12.5 mg on 5/17/2017 11:40 AM                                oxyCODONE 5 MG IR tablet   Commonly known as:  ROXICODONE   Take 0.5-1 tablets (2.5-5 mg) by mouth every 6 hours as needed for moderate to severe pain   Last time this was given:  2.5 mg on 5/17/2017  8:04 AM                                potassium citrate 5 MEQ (540 MG) CR tablet   Commonly known as:  UROCIT-K   Take 2 tablets (10 mEq) by mouth daily                                senna-docusate 8.6-50 MG per tablet   Commonly known as:  SENOKOT-S;PERICOLACE   Take 1-2 tablets by mouth 2 times daily as needed (constipation )   Last time this was given:  1 tablet on 5/17/2017  8:05 AM                                simvastatin 10 MG tablet   Commonly known as:  ZOCOR   Take 1 tablet (10 mg) by mouth At Bedtime                                VITAMIN D (CHOLECALCIFEROL) PO   Take  by mouth once a week.

## 2017-05-16 ENCOUNTER — APPOINTMENT (OUTPATIENT)
Dept: PHYSICAL THERAPY | Facility: CLINIC | Age: 82
End: 2017-05-16
Payer: MEDICARE

## 2017-05-16 ENCOUNTER — APPOINTMENT (OUTPATIENT)
Dept: PHYSICAL THERAPY | Facility: CLINIC | Age: 82
End: 2017-05-16
Attending: INTERNAL MEDICINE
Payer: MEDICARE

## 2017-05-16 LAB
GLUCOSE BLDC GLUCOMTR-MCNC: 122 MG/DL (ref 70–99)
GLUCOSE BLDC GLUCOMTR-MCNC: 132 MG/DL (ref 70–99)
GLUCOSE BLDC GLUCOMTR-MCNC: 93 MG/DL (ref 70–99)
GLUCOSE BLDC GLUCOMTR-MCNC: 96 MG/DL (ref 70–99)
GLUCOSE BLDC GLUCOMTR-MCNC: 97 MG/DL (ref 70–99)

## 2017-05-16 PROCEDURE — 00000146 ZZHCL STATISTIC GLUCOSE BY METER IP

## 2017-05-16 PROCEDURE — 25000132 ZZH RX MED GY IP 250 OP 250 PS 637: Mod: GY | Performed by: FAMILY MEDICINE

## 2017-05-16 PROCEDURE — G0378 HOSPITAL OBSERVATION PER HR: HCPCS

## 2017-05-16 PROCEDURE — 40000193 ZZH STATISTIC PT WARD VISIT

## 2017-05-16 PROCEDURE — A9270 NON-COVERED ITEM OR SERVICE: HCPCS | Mod: GY | Performed by: FAMILY MEDICINE

## 2017-05-16 PROCEDURE — 99225 ZZC SUBSEQUENT OBSERVATION CARE,LEVEL II: CPT | Performed by: PHYSICIAN ASSISTANT

## 2017-05-16 PROCEDURE — 99207 ZZC CDG-CODE CATEGORY CHANGED: CPT | Performed by: PHYSICIAN ASSISTANT

## 2017-05-16 PROCEDURE — A9270 NON-COVERED ITEM OR SERVICE: HCPCS | Mod: GY | Performed by: INTERNAL MEDICINE

## 2017-05-16 PROCEDURE — A9270 NON-COVERED ITEM OR SERVICE: HCPCS | Mod: GY | Performed by: PEDIATRICS

## 2017-05-16 PROCEDURE — 25000132 ZZH RX MED GY IP 250 OP 250 PS 637: Mod: GY | Performed by: INTERNAL MEDICINE

## 2017-05-16 PROCEDURE — 25000132 ZZH RX MED GY IP 250 OP 250 PS 637: Mod: GY | Performed by: PEDIATRICS

## 2017-05-16 PROCEDURE — 99213 OFFICE O/P EST LOW 20 MIN: CPT | Performed by: ORTHOPAEDIC SURGERY

## 2017-05-16 PROCEDURE — 97162 PT EVAL MOD COMPLEX 30 MIN: CPT | Mod: GP

## 2017-05-16 PROCEDURE — 97530 THERAPEUTIC ACTIVITIES: CPT | Mod: GP

## 2017-05-16 RX ORDER — ACETAMINOPHEN 500 MG
1000 TABLET ORAL 3 TIMES DAILY
Status: DISCONTINUED | OUTPATIENT
Start: 2017-05-16 | End: 2017-05-17 | Stop reason: HOSPADM

## 2017-05-16 RX ADMIN — OXYCODONE HYDROCHLORIDE 2.5 MG: 5 TABLET ORAL at 21:18

## 2017-05-16 RX ADMIN — OXYCODONE HYDROCHLORIDE 5 MG: 5 TABLET ORAL at 12:01

## 2017-05-16 RX ADMIN — ACETAMINOPHEN 1000 MG: 500 TABLET ORAL at 14:20

## 2017-05-16 RX ADMIN — OXYCODONE HYDROCHLORIDE 5 MG: 5 TABLET ORAL at 15:14

## 2017-05-16 RX ADMIN — METOPROLOL SUCCINATE 25 MG: 25 TABLET, EXTENDED RELEASE ORAL at 07:47

## 2017-05-16 RX ADMIN — CLOPIDOGREL 75 MG: 75 TABLET, FILM COATED ORAL at 07:48

## 2017-05-16 RX ADMIN — HYDROCODONE BITARTRATE AND ACETAMINOPHEN 1 TABLET: 5; 325 TABLET ORAL at 06:23

## 2017-05-16 RX ADMIN — ACETAMINOPHEN 1000 MG: 500 TABLET ORAL at 21:18

## 2017-05-16 RX ADMIN — AMLODIPINE BESYLATE 5 MG: 5 TABLET ORAL at 07:48

## 2017-05-16 NOTE — CONSULTS
IMP:  left closed minimally displaced superior/inferior pubic rami fracture in a 89 yo female    PLAN:  Pain management  WBAT-as per pain  Will need placement  Non operative treatment  Follow up with me in 2-3 weeks    Full consult:[id number]    Lio Hoffman D.O.

## 2017-05-16 NOTE — PLAN OF CARE
visited with patient again regarding d/c planning.      Discussed that [facility] in [place] has a TCU bed available today.  Patient is aware that it will be private pay and is still undecided about a plan.  Had also discussed the cost of in-home care.      PT is scheduled to see patient again at 1425 today.  Patient has a visitor now and she plans to talk with about planning with her.      will see patient again later this afternoon.        1420- patient not ready for d/c to today.  [facility] anticipates still having a TCU bed available tomorrow.  Patient tired this afternoon.  Did talk about maybe having a friend stay with her at home, but will have to evaluate how she is able to move herself.   will continue to assess and assist patient with d/c planning.      1613- patient now in agreement with TCU placement at [facility] under private pay.  Patient has realized that she will not be able to manage at home and her friends cannot provide 24/7 care for her.  Writer explained that her friend Irene has called writer and stated she wishes she could help, but she cannot.  Patient understanding of this.      Discussed that patient most likely will need van transport to get to the TCU tomorrow and that is a private pay cost as well.  Discussed that she will need to talk to Irene or someone else to bring her checkbook here or to the nursing home to pay the  and pay the nursing home.      Patient thanked writer for all of the visits and help with planning today.

## 2017-05-16 NOTE — CONSULTS
Care Transition Initial Assessment -   Reason For Consult: discharge planning  Met with: Patient    Active Problems:    Essential hypertension    Coronary artery disease involving native coronary artery of native heart without angina pectoris    Hypothyroidism due to acquired atrophy of thyroid    Type 2 diabetes mellitus without complication (H)    Chronic kidney disease, stage III (moderate)    Closed fracture of left inferior and superior pubic rami         DATA  Lives With: alone  Living Arrangements: condominium  Description of Support System: Supportive, Involved  Who is your support system?: Sibling(s), Other (specify) (Friends- Don and Irene)  Identified issues/concerns regarding health management: inability to care for self at home, d/c planning               Transportation Available: family or friend will provide      ASSESSMENT  Cognitive Status:  awake, alert and oriented  Concerns to be addressed: d/c planning .       PLAN  Financial costs for the patient includes: private pay cost for TCU placement.  Private pay cost for live in home care  .    Patient given options and choices for discharge: Yes. Option of TCU placement under private pay versus going home and paying to live-in home care.      Patient/family is agreeable to the plan?  is working with patient on options for d/c.  Patient has not decided on a plan as of yet.      Patient anticipates discharging to:  [facility] at this time.      has met with patient regarding d/c planning.  Will continue to see patient regarding options and costs for care.

## 2017-05-16 NOTE — PLAN OF CARE
Problem: Goal Outcome Summary  Goal: Goal Outcome Summary  Pt has been on bedrest this shift per MD order. Pain is controlled by current pain regimen. VS are stable. Pt is afebrile. Pt denies pain when resting pain gets worse when moving in bed. Bilateral ankles are swollen. Pedal pulses +2 bilaterally. All needs are met at this time will cont to monitor call light within reach

## 2017-05-16 NOTE — PROGRESS NOTES
[facility]    Hospitalist Progress Note    Date of Service (when I saw the patient): 05/16/2017     Assessment & Plan   Mary Ellen Cuba is a 89 year old female who was admitted on 5/15/2017.     Active Problems:    Closed fracture of left inferior and superior pubic rami    Assessment: seen by ortho who recommends non surgical management, pain control. Needing assist of 2 to get off commode. Unable to bear weight on the left leg. Agreeable to TCU placement.     Plan: D/C tomorrow to [facility] for ongoing rehab.   Essential hypertension    Assessment: mildly elevated likely secondary to pain    Plan: continue current medications    Coronary artery disease involving native coronary artery of native heart without angina pectoris    Assessment: No symptoms of ischemia    Plan: Continue current medications.     Hypothyroidism due to acquired atrophy of thyroid    Assessment: Chronic    Plan: resume medication upon discharge    Type 2 diabetes mellitus without complication (H)    Assessment: diet controlled, blood sugars in the 90's-130's    Plan: Continue ADA diet, glucose monitoring    Chronic kidney disease, stage III (moderate)    Assessment: stable    Plan: continue out patient management.       Jonathan Sellers    Interval History   Pain has been better controlled. Unable to bear much weight on the left leg. She accepts the need for TCU and ongoing rehab. Eating and voiding without problems. Eating okay, but states she gets too much food. No new concerns    Physical Exam   Temp: 98.4  F (36.9  C) Temp src: Oral BP: 151/63 Pulse: 57   Resp: 20 SpO2: 93 % O2 Device: None (Room air)    Vitals:    05/15/17 1808 05/15/17 2045   Weight: 61.2 kg (134 lb 14.7 oz) 66 kg (145 lb 8.1 oz)     Vital Signs with Ranges  Temp:  [96.8  F (36  C)-98.4  F (36.9  C)] 98.4  F (36.9  C)  Pulse:  [57-72] 57  Resp:  [16-20] 20  BP: (151-198)/() 151/63  SpO2:  [92 %-94 %] 93 %  I/O last 3  completed shifts:  In: 360 [P.O.:360]  Out: 150 [Urine:150]    Constitutional: awake, alert, cooperative, no apparent distress, and appears stated age  Respiratory: No increased work of breathing, good air exchange, clear to auscultation bilaterally, no crackles or wheezing  Cardiovascular: regular rate and rhythm  GI: normal bowel sounds, soft, non-distended and non-tender  Musculoskeletal: 2+ pitting edema lower extremities    Medications        acetaminophen  1,000 mg Oral TID     amLODIPine  5 mg Oral Daily     clopidogrel  75 mg Oral Daily     metoprolol  25 mg Oral Daily     sodium chloride (PF)  3 mL Intracatheter Q8H       Data   Results for orders placed or performed during the hospital encounter of 05/15/17 (from the past 24 hour(s))   Femur XR,  2 views, left    Narrative    LEFT FEMUR TWO VIEWS    5/15/2017 6:35 PM     HISTORY: Pain.    COMPARISON: None.      Impression    IMPRESSION: Previous open reduction internal fixation of proximal  femur. No evidence for any acute femur fracture. Degenerative changes  are noted in the hip and knee joint. Vascular calcifications also  noted.     CHRIS TOBIN MD   Pelvis XR, 1-2 views    Narrative    PELVIS ONE TO TWO VIEWS   5/15/2017 6:35 PM     HISTORY: Pain.    COMPARISON: 4/20/2017.      Impression    IMPRESSION: Questionable left superior inferior pubic rami fractures  are present. If clinically indicated, oblique views of the pelvis  could be helpful. Previous fixation of left hip noted along with  degenerative changes in both hips, lumbar spine, and symphysis pubis.     CHRIS TOBIN MD   Orthopedic Surgery IP Consult: Patient to be seen: Routine - within 24 hours; Call back #: I spoke with Dr Hoffman; left pubic rami fracture; Consultant may enter orders: Yes    Liborio Ware DO     5/16/2017  8:02 AM  IMP:  left closed minimally displaced superior/inferior pubic rami   fracture in a 89 yo female    PLAN:  Pain management  WBAT-as per  pain  Will need placement  Non operative treatment  Follow up with me in 2-3 weeks    Full consult:[id number]    Lio Hoffman D.O.   Social Work IP Consult    Narrative    Jody Hoyos     5/16/2017 10:59 AM  Care Transition Initial Assessment -   Reason For Consult: discharge planning  Met with: Patient    Active Problems:    Essential hypertension    Coronary artery disease involving native coronary artery of   native heart without angina pectoris    Hypothyroidism due to acquired atrophy of thyroid    Type 2 diabetes mellitus without complication (H)    Chronic kidney disease, stage III (moderate)    Closed fracture of left inferior and superior pubic rami         DATA  Lives With: alone  Living Arrangements: condominium  Description of Support System: Supportive, Involved  Who is your support system?: Sibling(s), Other (specify)   (Friends- Don and Irene)  Identified issues/concerns regarding health management: inability   to care for self at home, d/c planning               Transportation Available: family or friend will provide      ASSESSMENT  Cognitive Status:  awake, alert and oriented  Concerns to be addressed: d/c planning .       PLAN  Financial costs for the patient includes: private pay cost for   TCU placement.  Private pay cost for live in home care  .    Patient given options and choices for discharge: Yes. Option of   TCU placement under private pay versus going home and paying to   live-in home care.      Patient/family is agreeable to the plan?  is   working with patient on options for d/c.  Patient has not decided   on a plan as of yet.      Patient anticipates discharging to:  [facility] at this time.      has met with patient regarding d/c planning.    Will continue to see patient regarding options and costs for   care.      Glucose by meter   Result Value Ref Range    Glucose 96 70 - 99 mg/dL   Glucose by meter   Result Value Ref Range    Glucose 97 70 - 99  mg/dL   Glucose by meter   Result Value Ref Range    Glucose 93 70 - 99 mg/dL   Glucose by meter   Result Value Ref Range    Glucose 132 (H) 70 - 99 mg/dL

## 2017-05-16 NOTE — PROGRESS NOTES
05/16/17 0800   Quick Adds   Type of Visit Initial PT Evaluation   Living Environment   Lives With alone   Living Arrangements condominium   Home Accessibility bed and bath on same level;grab bars present (bathtub);grab bars present (toilet)   Number of Stairs to Enter Home 0   Number of Stairs Within Home 0   Transportation Available family or friend will provide   Living Environment Comment quad home that she owns but senior living, neighbor helps her.   Self-Care   Dominant Hand right   Regular Exercise no   Equipment Currently Used at Home walker, rolling;cane, straight  (with seat)   Functional Level Prior   Ambulation 1-->assistive equipment   Transferring 1-->assistive equipment   Toileting 1-->assistive equipment   Bathing 0-->independent  (sponge bathing)   Dressing 0-->independent   Eating 0-->independent   Communication 0-->understands/communicates without difficulty   Cognition 0 - no cognition issues reported   Fall history within last six months yes   Which of the above functional risks had a recent onset or change? ambulation   General Information   Onset of Illness/Injury or Date of Surgery - Date 05/15/17   Referring Physician Dr. Jalen Loyola   Patient/Family Goals Statement get home ultimately   Pertinent History of Current Problem (include personal factors and/or comorbidities that impact the POC) Pt reports that she was watering a plant and she just fell she stated. She reports having problems with her left hip since she fx in Jan of this year . She was rehab and then came home.   Precautions/Limitations no known precautions/limitations   Weight-Bearing Status - LUE full weight-bearing   Weight-Bearing Status - RUE full weight-bearing   Weight-Bearing Status - LLE weight-bearing as tolerated   Weight-Bearing Status - RLE weight-bearing as tolerated   Cognitive Status Examination   Orientation orientation to person, place and time   Level of Consciousness alert   Follows Commands and Answers  "Questions 100% of the time   Personal Safety and Judgment intact   Memory intact   Pain Assessment   Patient Currently in Pain Yes, see Vital Sign flowsheet   Integumentary/Edema   Integumentary/Edema Comments bilateral feet   Posture    Posture Comments in bed but seems to be WFL   Range of Motion (ROM)   ROM Comment right was easy active. Left was limited 50% needing assist    Strength   Strength Comments pain limited 3/5 grossly right now.   Bed Mobility   Bed Mobility Comments max to mod assist with knee flexion and rolling to her side. She could not move to edge of bed per self.   Transfer Skills   Transfer Comments unable at this time due to pain. She would need mechanical assist   Gait   Gait Comments unable at this time   Sensory Examination   Sensory Perception Comments seems intact   Coordination   Coordination no deficits were identified   Muscle Tone   Muscle Tone no deficits were identified   General Therapy Interventions   Planned Therapy Interventions bed mobility training;gait training;transfer training;strengthening   Clinical Impression   Criteria for Skilled Therapeutic Intervention yes, treatment indicated   PT Diagnosis mobility dysfucntion in transitions, bed mocilty and gait dysfunction. Pain left pelvis and hip   Influenced by the following impairments Pain   Functional limitations due to impairments unable to move indep or with assist due to pain   Clinical Presentation Evolving/Changing   Clinical Decision Making (Complexity) Moderate complexity   Therapy Frequency` daily   Predicted Duration of Therapy Intervention (days/wks) hospital stay up to 3 to 4 days   Anticipated Discharge Disposition Transitional Care Facility   Risk & Benefits of therapy have been explained Yes   [facility] [employer] TM \"6 Clicks\"   2016, Trustees of [facility], under license to [employer].  All rights reserved.   6 Clicks Short Forms Basic Mobility Inpatient Short Form   [facility] AM-PAC  \"6 " "Clicks\" V.2 Basic Mobility Inpatient Short Form   1. Turning from your back to your side while in a flat bed without using bedrails? 1 - Total   2. Moving from lying on your back to sitting on the side of a flat bed without using bedrails? 1 - Total   3. Moving to and from a bed to a chair (including a wheelchair)? 1 - Total   4. Standing up from a chair using your arms (e.g., wheelchair, or bedside chair)? 1 - Total   5. To walk in hospital room? 1 - Total   6. Climbing 3-5 steps with a railing? 1 - Total   Basic Mobility Raw Score (Score out of 24.Lower scores equate to lower levels of function) 6   Total Evaluation Time   Total Evaluation Time (Minutes) 30     "

## 2017-05-16 NOTE — ED NOTES
ED Nursing criteria listed below was addressed during verbal handoff:     Abnormal vitals: No  Abnormal results: Yes  Med Reconciliation completed: Yes  Meds given in ED: Yes  Any Overdue Meds: No  Core Measures: N/A  Isolation: N/A  Special needs: Yes-vision loss  Skin assessment: No    Observation Patient  Education provided: Yes      To room 250

## 2017-05-16 NOTE — PLAN OF CARE
Problem: Goal Outcome Summary  Goal: Goal Outcome Summary  Outcome: Therapy, progress toward functional goals as expected  S-(situation): shift note      B-(background): Fx Pelvic     A-(assessment): Pt is A&O. VSS.  Afebrile.  Having left groin pain-oxycodone has been effective for her discomfort.  Up on commode x1 with walker and 2 assist.  Only able to pivot transfer- states knee feels like it is going to buckle.  Does not bear much weight on that knee.       R-(recommendations): Will cont to monitor - pt aware that she can not go back to her apartment at this time.

## 2017-05-16 NOTE — PLAN OF CARE
Problem: Goal Outcome Summary  Goal: Goal Outcome Summary  Patient is a 89 year old year old female.      Prior to admission, patient was living modified indep (neighbors would help with groceries and errands for her due to macular degeneration). She reports having no stairs to enter or within home. She reports  using a single end cane or a 4 wheel walker for mobility, and requiring no assist for ADLs.      Currently, patient is requiring max assistance for functional mobility and unable to ambulate at this time. We will assess again this pm to see if pain med changes allow better mobility.      Barriers to return to previous living situation include weakness and pain.       Patient equipment needs at discharge include none at this point.       Recommend discharge to short term rehab with medical transport.

## 2017-05-16 NOTE — PROGRESS NOTES
S-(situation): Patient registered to Observation. Patient arrived to room 250 via cart from ED    B-(background): left pubic rami fracture    A-(assessment): patient has pain with movement, but does not want anything for pain at this time. 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 Has a pea sized non-blanchable spot on her right buttock.     R-(recommendations): Orders and observation goals reviewed with patient    Nursing Observation criteria listed below was met:    Skin issues/needs documented:Yes  Isolation needs addressed, if appropriate: NA  Fall Prevention: Education given and documented: Yes  Education Assessment documented:Yes  Education Documented (Pre-existing chronic infection such as, MRSA/VRE need education on admission): Yes  New medication patient education completed and documented (Possible Side Effects of Common Medications handout): Yes  Home medications if not able to send immediately home with family stored here: NA  Reminder note placed in discharge instructions: NA  Patient has discharge needs (If yes, please explain): Yes, will need placement for rehab.

## 2017-05-16 NOTE — PROGRESS NOTES
SPIRITUAL HEALTH SERVICES  SPIRITUAL ASSESSMENT Progress Note  [facility]      PRIMARY FOCUS:     Goals of care    Symptom/pain management    Emotional/spiritual/Episcopal distress    Support for coping    ILLNESS CIRCUMSTANCES:     Reviewed documentation.     Context of Serious Illness/Symptoms - Pelvic fx    Resources for Support - neighbors, tiarra    DISTRESS:     Emotional/Spiritual/Existential Distress - Pt expressed fear and frustration regarding the fact her health insurance would not be covering everything.    Yazdanism Distress - Not Applicable    Social/Cultural/Economic Distress - Pt wondered how she would be able to pay for her care.    SPIRITUAL/Restoration COPING:     Scientologist/Tiarra - Presbyterian - Pt indicated she was raised Presbyterian.    Spiritual Practice - Prayer.   provided a prayer.    GOALS OF CARE:    Goals of Care - recover from pelvic fracture.    Meaning/Sense-Making - Pt talked about the importance of having had a job with the [employer] for 41 yrs and the sense of community the job provided.    PLAN:  is available for pt/family needs.    Rosas Bowie M.Div., [facility]  Staff   Office tel: [phone]

## 2017-05-17 VITALS
TEMPERATURE: 97.6 F | HEART RATE: 63 BPM | RESPIRATION RATE: 18 BRPM | WEIGHT: 145.5 LBS | BODY MASS INDEX: 26.78 KG/M2 | HEIGHT: 62 IN | DIASTOLIC BLOOD PRESSURE: 75 MMHG | OXYGEN SATURATION: 95 % | SYSTOLIC BLOOD PRESSURE: 184 MMHG

## 2017-05-17 LAB
GLUCOSE BLDC GLUCOMTR-MCNC: 155 MG/DL (ref 70–99)
GLUCOSE BLDC GLUCOMTR-MCNC: 84 MG/DL (ref 70–99)
GLUCOSE BLDC GLUCOMTR-MCNC: 89 MG/DL (ref 70–99)

## 2017-05-17 PROCEDURE — G0378 HOSPITAL OBSERVATION PER HR: HCPCS

## 2017-05-17 PROCEDURE — A9270 NON-COVERED ITEM OR SERVICE: HCPCS | Mod: GY | Performed by: PEDIATRICS

## 2017-05-17 PROCEDURE — 00000146 ZZHCL STATISTIC GLUCOSE BY METER IP

## 2017-05-17 PROCEDURE — 99217 ZZC OBSERVATION CARE DISCHARGE: CPT | Performed by: FAMILY MEDICINE

## 2017-05-17 PROCEDURE — 99207 ZZC MOONLIGHTING INDICATOR: CPT | Performed by: FAMILY MEDICINE

## 2017-05-17 PROCEDURE — 25000132 ZZH RX MED GY IP 250 OP 250 PS 637: Performed by: FAMILY MEDICINE

## 2017-05-17 PROCEDURE — 40000193 ZZH STATISTIC PT WARD VISIT

## 2017-05-17 PROCEDURE — 25000132 ZZH RX MED GY IP 250 OP 250 PS 637: Mod: GY | Performed by: PEDIATRICS

## 2017-05-17 PROCEDURE — A9270 NON-COVERED ITEM OR SERVICE: HCPCS | Performed by: FAMILY MEDICINE

## 2017-05-17 PROCEDURE — 99207 ZZC CDG-CODE CATEGORY CHANGED: CPT | Performed by: FAMILY MEDICINE

## 2017-05-17 PROCEDURE — A9270 NON-COVERED ITEM OR SERVICE: HCPCS | Mod: GY | Performed by: FAMILY MEDICINE

## 2017-05-17 PROCEDURE — 25000132 ZZH RX MED GY IP 250 OP 250 PS 637: Mod: GY | Performed by: FAMILY MEDICINE

## 2017-05-17 RX ORDER — OXYCODONE HYDROCHLORIDE 5 MG/1
2.5-5 TABLET ORAL EVERY 6 HOURS PRN
Qty: 30 TABLET | Refills: 0 | Status: SHIPPED | OUTPATIENT
Start: 2017-05-17 | End: 2017-07-20

## 2017-05-17 RX ORDER — ACETAMINOPHEN 500 MG
1000 TABLET ORAL 3 TIMES DAILY
Status: ON HOLD | DISCHARGE
Start: 2017-05-17 | End: 2018-03-02

## 2017-05-17 RX ADMIN — CLOPIDOGREL 75 MG: 75 TABLET, FILM COATED ORAL at 08:05

## 2017-05-17 RX ADMIN — OXYCODONE HYDROCHLORIDE 2.5 MG: 5 TABLET ORAL at 08:04

## 2017-05-17 RX ADMIN — ACETAMINOPHEN 1000 MG: 500 TABLET ORAL at 08:04

## 2017-05-17 RX ADMIN — METOPROLOL TARTRATE 12.5 MG: 25 TABLET ORAL at 11:40

## 2017-05-17 RX ADMIN — AMLODIPINE BESYLATE 5 MG: 5 TABLET ORAL at 08:05

## 2017-05-17 RX ADMIN — SENNOSIDES AND DOCUSATE SODIUM 1 TABLET: 8.6; 5 TABLET ORAL at 08:05

## 2017-05-17 NOTE — DISCHARGE SUMMARY
[facility] Discharge Summary    Mary Ellen Cuba MRN# [id number]   Age: 89 year old YOB: 1924     Date of Admission:  5/15/2017  Date of Discharge::  5/17/2017  Admitting Physician:  Cordell Villanueva MD  Discharge Physician:  Cordell Fountain MD, MD  Primary Physician: [facility]  Transferring Facility: N/A     Home clinic: [facility]          Admission Diagnoses:   Pubic ramus fracture, left, closed, initial encounter (H) [S32.592A]          Discharge Diagnosis:   Principle diagnosis: Pubic ramus fracture on left (superior and inferior, minimally displaced  Secondary diagnoses:  Active Problems:    Essential hypertension (1/19/2017)    Coronary artery disease involving native coronary artery of native heart without angina pectoris (1/19/2017)    Hypothyroidism due to acquired atrophy of thyroid (1/19/2017)    Type 2 diabetes mellitus without complication (H) (1/19/2017)    Chronic kidney disease, stage III (moderate) (1/19/2017)    Closed fracture of left inferior and superior pubic rami (5/15/2017)             Procedures:   No significant procedures performed during this admission         Allergies:      Allergies   Allergen Reactions     Hmg-Coa-R Inhibitors      Metformin GI Disturbance             Medications Prior to Admission:     Prescriptions Prior to Admission   Medication Sig Dispense Refill Last Dose     amLODIPine (NORVASC) 5 MG tablet Take 1 tablet (5 mg) by mouth daily 30 tablet  5/15/2017 at 1000     metoprolol (TOPROL XL) 25 MG 24 hr tablet Take 1 tablet (25 mg) by mouth daily 30 tablet  5/15/2017 at 1000     fenofibrate 160 MG tablet Take 1 tablet (160 mg) by mouth daily 30 tablet  5/15/2017 at 1000     simvastatin (ZOCOR) 10 MG tablet Take 1 tablet (10 mg) by mouth At Bedtime 30 tablet  5/14/2017 at 2100     allopurinol (ZYLOPRIM) 100 MG tablet Take 1 tablet (100 mg) by mouth 2 times daily 30 tablet  5/15/2017 at 1000     potassium citrate (UROCIT-K) 5 MEQ  (540 MG) CR tablet Take 2 tablets (10 mEq) by mouth daily   5/15/2017 at 1000     clopidogrel (PLAVIX) 75 MG tablet Take 1 tablet (75 mg) by mouth daily 30 tablet  5/15/2017 at 1000     levothyroxine (SYNTHROID/LEVOTHROID) 112 MCG tablet Take 1 tablet (112 mcg) by mouth daily   5/15/2017 at 1000     aspirin 325 MG tablet Take 1 tablet (325 mg) by mouth daily 120 tablet 0 5/15/2017 at 1000     folic acid (FOLVITE) 400 MCG tablet Take 400 mcg by mouth daily.   5/15/2017 at 1000     Multiple Vitamins-Minerals (CENTRUM SILVER ULTRA WOMENS) TABS Take 1 tablet by mouth daily.   5/15/2017 at 1000     Calcium-Vitamin D (CALCIUM + D PO) Take 1 tablet by mouth daily.   5/15/2017 at 1000     senna-docusate (SENOKOT-S;PERICOLACE) 8.6-50 MG per tablet Take 1-2 tablets by mouth 2 times daily as needed (constipation ) 100 tablet 0 Unknown at Unknown time     VITAMIN D, CHOLECALCIFEROL, PO Take  by mouth once a week.   Unknown at Unknown time             Discharge Medications:     Current Discharge Medication List      START taking these medications    Details   oxyCODONE (ROXICODONE) 5 MG IR tablet Take 0.5-1 tablets (2.5-5 mg) by mouth every 6 hours as needed for moderate to severe pain  Qty: 30 tablet, Refills: 0    Associated Diagnoses: Pubic ramus fracture, left, closed, initial encounter (H); Fall on same level due to nature of surface, initial encounter         CONTINUE these medications which have CHANGED    Details   acetaminophen (TYLENOL) 500 MG tablet Take 2 tablets (1,000 mg) by mouth 3 times daily    Associated Diagnoses: Closed left hip fracture, initial encounter (H)         CONTINUE these medications which have NOT CHANGED    Details   amLODIPine (NORVASC) 5 MG tablet Take 1 tablet (5 mg) by mouth daily  Qty: 30 tablet    Associated Diagnoses: Benign essential hypertension      metoprolol (TOPROL XL) 25 MG 24 hr tablet Take 1 tablet (25 mg) by mouth daily  Qty: 30 tablet    Associated Diagnoses: Benign essential  hypertension      fenofibrate 160 MG tablet Take 1 tablet (160 mg) by mouth daily  Qty: 30 tablet    Associated Diagnoses: Hypercholesterolemia      simvastatin (ZOCOR) 10 MG tablet Take 1 tablet (10 mg) by mouth At Bedtime  Qty: 30 tablet    Associated Diagnoses: Hypercholesterolemia      allopurinol (ZYLOPRIM) 100 MG tablet Take 1 tablet (100 mg) by mouth 2 times daily  Qty: 30 tablet    Associated Diagnoses: Chronic gout, unspecified cause, unspecified site      potassium citrate (UROCIT-K) 5 MEQ (540 MG) CR tablet Take 2 tablets (10 mEq) by mouth daily    Associated Diagnoses: Hypokalemia      clopidogrel (PLAVIX) 75 MG tablet Take 1 tablet (75 mg) by mouth daily  Qty: 30 tablet    Associated Diagnoses: Coronary artery disease involving native coronary artery of native heart without angina pectoris      levothyroxine (SYNTHROID/LEVOTHROID) 112 MCG tablet Take 1 tablet (112 mcg) by mouth daily    Associated Diagnoses: Other specified hypothyroidism      aspirin 325 MG tablet Take 1 tablet (325 mg) by mouth daily  Qty: 120 tablet, Refills: 0    Comments: Hold while receiving Lovenox  Associated Diagnoses: Closed left hip fracture, initial encounter (H)      folic acid (FOLVITE) 400 MCG tablet Take 400 mcg by mouth daily.      Multiple Vitamins-Minerals (CENTRUM SILVER ULTRA WOMENS) TABS Take 1 tablet by mouth daily.      Calcium-Vitamin D (CALCIUM + D PO) Take 1 tablet by mouth daily.      senna-docusate (SENOKOT-S;PERICOLACE) 8.6-50 MG per tablet Take 1-2 tablets by mouth 2 times daily as needed (constipation )  Qty: 100 tablet, Refills: 0    Comments: Indication: Constipation  Associated Diagnoses: Closed left hip fracture, initial encounter (H)      VITAMIN D, CHOLECALCIFEROL, PO Take  by mouth once a week.                   Consultations:   Consultation during this admission received from orthopedics          Brief History of Presenting Illness:   Mary Ellen Cuba is a 89 year old female who presents with  left groin pain. She was standing in her kitchen when she felt her left leg give out. She went down to her knee but did not experience any pain initially. She got back up and went to the table and played solitaire. She then tried to get up and again her left leg gave out and she went down on her knee a second time. Now she was experiencing pain in the left side of her groin          Hospital Course:   Pt had gradual improvement in her pain and mobility, but was still requiring significant assistance to get out of bed.  Pain was controlled.  Will continue with pain control, PT, and conservative care at the nursing home.  Follow up with specialist as recommended.         Physical Exam:   Vitals were reviewed  Temp: 97.6  F (36.4  C) Temp src: Oral BP: 184/75 Pulse: 54   Resp: 18 SpO2: 95 % O2 Device: None (Room air)    Constitutional:   awake, alert, cooperative, no apparent distress, and appears stated age     Lungs:   No increased work of breathing, good air exchange, clear to auscultation bilaterally, no crackles or wheezing     Cardiovascular:   Normal apical impulse, regular rate and rhythm, normal S1 and S2, no S3 or S4, and no murmur noted     Abdomen:   No scars, normal bowel sounds, soft, non-distended, non-tender, no masses palpated, no hepatosplenomegally     Musculoskeletal:   Pain with weight-bearing on left leg.  Otherwise, fairly normal range of motion.  no lower extremity pitting edema present              Pending Tests at Discharge:   None         Discharge Instructions and Follow-Up:   Discharge diet: Regular   Discharge activity: Activity as tolerated, assistance for now.  Continue PT, Occupational Therapy.   Discharge follow-up: Follow up with primary care provider in 1-2 weeks, ortho in 2-3 weeks           Discharge Disposition:   Discharged to short-term care facility      Attestation:  I have reviewed today's vital signs, notes, medications, labs and imaging.  Amount of time performed on this  discharge summary: 35 minutes.    Cordell Fountain MD, MD

## 2017-05-17 NOTE — PROGRESS NOTES
S-(situation): Patient discharged to [facility] via wheelchair with handivan    B-(background): pubic ramus fracture, left    A-(assessment): patient reports pain controlled with Tylenol and oxycodone. Up with assist of 2 for pivot transfers. Weight bearing as tolerated.  Last bowel movement: 5/17/17     R-(recommendations):Report called to Ann. Listed belongings gathered and sent with patient.     Discharge Nursing Criteria:     Care Plan and Patient education resolved: Yes    MISC  Home and hospital aquired medications returned to patient: NA  Medication Bin checked and emptied on discharge Yes  All paperwork sent with patient/Copy of AVS given to patient or family Yes.

## 2017-05-17 NOTE — PLAN OF CARE
Problem: Goal Outcome Summary  Goal: Goal Outcome Summary  Outcome: Therapy, progress towards functional goals is fair  VSS with elevated BP.  A&O x4.  Afebrile.  Having left sided pelvic pain, has received one 2.5mg dose of Oxycodone, declined more since.  Up to commode x1 with walker and 2 assist with walker and GB.  Unable to pivot transfer, staff had to move bed and bring commode to Pt.  BG- 84, had snack at 0200.  Occasional sleep, denying pain.

## 2017-05-17 NOTE — PLAN OF CARE
Patient will discharge to [facility] via Hand[facility] van at 1300 via wheelchair writer left message with friend Sally Alexander to bring patients check book. STEVE Tejada RN

## 2017-05-18 NOTE — CONSULTS
ORTHOPEDIC CONSULTATION      REQUESTING PHYSICIAN:  Jalen Loyola MD      REASON FOR CONSULTATION:  Pubic rami fractures.      HISTORY OF PRESENT ILLNESS:  Mary Ellen Cuba is a 89-year-old female who normally is an independent ambulator, who reports that her leg gave out, although she thinks she may have tripped on something, landing on her right side.  Evaluated in the ER, diagnosed with pubic rami fractures.  She is well known to myself.  I had previously performed an intramedullary nail fixation of her left hip.  She had done well afterwards.  Currently complains of pain to her pelvis area.  Denies any other injuries.  Denies loss of consciousness.  She was evaluated on the second floor.  She reports that with just lying there, she has very minimal pain, but with any weightbearing, her leg feels weak and she has severe pain to the left groin area.      PAST MEDICAL HISTORY:  Hypertension, diabetes, coronary artery disease, hypothyroidism, chronic kidney disease.      PAST SURGICAL HISTORY:  Appendectomy, hemicolectomy, oophorectomy, cataracts, coronary artery stent, intramedullary nail fixation of left hip.      PRIOR TO ADMISSION MEDICATIONS:  Calcium, vitamin D, Tylenol, allopurinol, Norvasc, aspirin, fenofibrate, folic acid, levothyroxine, metoprolol, potassium citrate, Colace, Zocor.      ALLERGIES:  Metformin, HMG-CoA reductase inhibitors.      SOCIAL HISTORY:  No tobacco, ETOH or illicits.  Lives independently.      FAMILY HISTORY:  Colon cancer, prostate cancer, peptic ulcer disease.      REVIEW OF SYSTEMS:  A 10-point review of systems was performed and otherwise unremarkable or as per HPI.      PHYSICAL EXAM:   GENERAL:  She is awake, alert, conversant, in no acute distress.  She is nontoxic.  She is pleasant.   VITAL SIGNS:  Temperature is 97.3, pulse is 65, blood pressure is 173/79, respiratory rate is 18, with 92% room air saturation.   HEENT:  Normocephalic, atraumatic.  She has no pain with  cervical spine palpation or range of motion.   CHEST:  Equal chest rise and fall with no audible wheezing or retraction or respiratory distress.   ABDOMEN:  Soft, nontender, nondistended.   MUSCULOSKELETAL:  Bilateral upper extremities have no pain with palpation or pain with range of motion.  Bilateral lower extremities have +1 pitting edema, equal bilateral.  She has no peripheral pain along the knees, lower legs and ankles.  The skin is intact.  She has no pain directly over her lateral left hip.  The previous incisions are well-healed.  She does have pain along the ramus.  There is no appreciable posterior discomfort along the sacrum.      IMAGING:  X-rays reviewed show minimally displaced superior and inferior pubic rami fracture.  There is a previously placed cephalomedullary nail in good position with no evidence of loosening or position changes.  There is no evidence of acute hip fracture.  There is some heterotopic ossification noted along the greater trochanter.  There are some underlying degenerative changes of the hip joints.      IMPRESSION:  This is a 89-year-old female with a ground level fall, sustaining left minimally displaced superior and inferior pubic rami fracture.      PLAN:  The above was reviewed at length with the patient.  I also discussed the case with Dr. Jalen Loyola.  I have recommended weightbearing as pain dictates.  Most likely will need placement, as this can be painful up to a couple of months.  It is nonoperatively treated.  Pain management as per Internal Medicine.  Recommend she see me in approximately 2-3 weeks.         HARRISON DURHAM DO             D: 2017 08:02   T: 2017 09:44   MT: EM#[id number]      Name:     MINESH RUTHERFORD   MRN:      [id number]        Account:       [id number]   :      1924           Consult Date:  2017      Document: [id number]       cc: Jalen Loyola MD

## 2017-05-26 ENCOUNTER — APPOINTMENT (OUTPATIENT)
Dept: GENERAL RADIOLOGY | Facility: CLINIC | Age: 82
DRG: 291 | End: 2017-05-26
Attending: EMERGENCY MEDICINE
Payer: MEDICARE

## 2017-05-26 ENCOUNTER — APPOINTMENT (OUTPATIENT)
Dept: CARDIOLOGY | Facility: CLINIC | Age: 82
DRG: 291 | End: 2017-05-26
Attending: FAMILY MEDICINE
Payer: MEDICARE

## 2017-05-26 ENCOUNTER — HOSPITAL ENCOUNTER (INPATIENT)
Facility: CLINIC | Age: 82
LOS: 4 days | Discharge: NURSING, REHAB OR LONG-TERM CARE | DRG: 291 | End: 2017-05-30
Attending: EMERGENCY MEDICINE | Admitting: FAMILY MEDICINE
Payer: MEDICARE

## 2017-05-26 DIAGNOSIS — E78.00 HYPERCHOLESTEROLEMIA: ICD-10-CM
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
## 2017-05-26 DIAGNOSIS — M1A.9XX0 CHRONIC GOUT, UNSPECIFIED CAUSE, UNSPECIFIED SITE: ICD-10-CM

## 2017-05-26 DIAGNOSIS — S72.002A CLOSED LEFT HIP FRACTURE, INITIAL ENCOUNTER (H): ICD-10-CM

## 2017-05-26 DIAGNOSIS — R50.81 FEVER PRESENTING WITH CONDITIONS CLASSIFIED ELSEWHERE: ICD-10-CM

## 2017-05-26 DIAGNOSIS — E87.6 HYPOKALEMIA: ICD-10-CM

## 2017-05-26 DIAGNOSIS — I50.41 ACUTE COMBINED SYSTOLIC AND DIASTOLIC CONGESTIVE HEART FAILURE (H): ICD-10-CM

## 2017-05-26 DIAGNOSIS — I10 BENIGN ESSENTIAL HYPERTENSION: ICD-10-CM

## 2017-05-26 DIAGNOSIS — E03.8 OTHER SPECIFIED HYPOTHYROIDISM: ICD-10-CM

## 2017-05-26 DIAGNOSIS — T50.2X5A DIURETICS CAUSING ADVERSE EFFECT IN THERAPEUTIC USE, INITIAL ENCOUNTER: Primary | ICD-10-CM

## 2017-05-26 DIAGNOSIS — J81.0 ACUTE PULMONARY EDEMA (H): ICD-10-CM

## 2017-05-26 DIAGNOSIS — S32.592A PUBIC RAMUS FRACTURE, LEFT, CLOSED, INITIAL ENCOUNTER (H): ICD-10-CM

## 2017-05-26 PROBLEM — I50.9 ACUTE CONGESTIVE HEART FAILURE (H): Status: ACTIVE | Noted: 2017-05-26

## 2017-05-26 LAB
ALBUMIN SERPL-MCNC: 2.2 G/DL (ref 3.4–5)
ALBUMIN UR-MCNC: 100 MG/DL
ALP SERPL-CCNC: 142 U/L (ref 40–150)
ALT SERPL W P-5'-P-CCNC: 23 U/L (ref 0–50)
ANION GAP SERPL CALCULATED.3IONS-SCNC: 7 MMOL/L (ref 3–14)
APPEARANCE UR: CLEAR
AST SERPL W P-5'-P-CCNC: 40 U/L (ref 0–45)
BASOPHILS # BLD AUTO: 0 10E9/L (ref 0–0.2)
BASOPHILS NFR BLD AUTO: 0.2 %
BILIRUB SERPL-MCNC: 0.2 MG/DL (ref 0.2–1.3)
BILIRUB UR QL STRIP: NEGATIVE
BUN SERPL-MCNC: 28 MG/DL (ref 7–30)
CALCIUM SERPL-MCNC: 7.6 MG/DL (ref 8.5–10.1)
CHLORIDE SERPL-SCNC: 108 MMOL/L (ref 94–109)
CO2 SERPL-SCNC: 28 MMOL/L (ref 20–32)
COLOR UR AUTO: YELLOW
CREAT SERPL-MCNC: 1.79 MG/DL (ref 0.52–1.04)
CRP SERPL-MCNC: 27.7 MG/L (ref 0–8)
D DIMER PPP FEU-MCNC: 2.2 UG/ML FEU (ref 0–0.5)
DIFFERENTIAL METHOD BLD: ABNORMAL
EOSINOPHIL # BLD AUTO: 0.1 10E9/L (ref 0–0.7)
EOSINOPHIL NFR BLD AUTO: 1.2 %
ERYTHROCYTE [DISTWIDTH] IN BLOOD BY AUTOMATED COUNT: 19 % (ref 10–15)
GFR SERPL CREATININE-BSD FRML MDRD: 26 ML/MIN/1.7M2
GLUCOSE BLDC GLUCOMTR-MCNC: 101 MG/DL (ref 70–99)
GLUCOSE BLDC GLUCOMTR-MCNC: 104 MG/DL (ref 70–99)
GLUCOSE BLDC GLUCOMTR-MCNC: 148 MG/DL (ref 70–99)
GLUCOSE BLDC GLUCOMTR-MCNC: 91 MG/DL (ref 70–99)
GLUCOSE SERPL-MCNC: 104 MG/DL (ref 70–99)
GLUCOSE UR STRIP-MCNC: NEGATIVE MG/DL
HCT VFR BLD AUTO: 34.1 % (ref 35–47)
HGB BLD-MCNC: 10.7 G/DL (ref 11.7–15.7)
HGB UR QL STRIP: NEGATIVE
HYALINE CASTS #/AREA URNS LPF: 2 /LPF (ref 0–2)
IMM GRANULOCYTES # BLD: 0 10E9/L (ref 0–0.4)
IMM GRANULOCYTES NFR BLD: 0.3 %
KETONES UR STRIP-MCNC: NEGATIVE MG/DL
LACTATE BLD-SCNC: 1.3 MMOL/L (ref 0.7–2.1)
LEUKOCYTE ESTERASE UR QL STRIP: NEGATIVE
LYMPHOCYTES # BLD AUTO: 2.1 10E9/L (ref 0.8–5.3)
LYMPHOCYTES NFR BLD AUTO: 23.1 %
MCH RBC QN AUTO: 29.1 PG (ref 26.5–33)
MCHC RBC AUTO-ENTMCNC: 31.4 G/DL (ref 31.5–36.5)
MCV RBC AUTO: 93 FL (ref 78–100)
MONOCYTES # BLD AUTO: 0.6 10E9/L (ref 0–1.3)
MONOCYTES NFR BLD AUTO: 6.8 %
MUCOUS THREADS #/AREA URNS LPF: PRESENT /LPF
NEUTROPHILS # BLD AUTO: 6.4 10E9/L (ref 1.6–8.3)
NEUTROPHILS NFR BLD AUTO: 68.4 %
NITRATE UR QL: NEGATIVE
NT-PROBNP SERPL-MCNC: ABNORMAL PG/ML (ref 0–1800)
PH UR STRIP: 7 PH (ref 5–7)
PLATELET # BLD AUTO: 269 10E9/L (ref 150–450)
POTASSIUM SERPL-SCNC: 4.1 MMOL/L (ref 3.4–5.3)
PROT SERPL-MCNC: 5.6 G/DL (ref 6.8–8.8)
RBC # BLD AUTO: 3.68 10E12/L (ref 3.8–5.2)
RBC #/AREA URNS AUTO: 1 /HPF (ref 0–2)
SODIUM SERPL-SCNC: 143 MMOL/L (ref 133–144)
SP GR UR STRIP: 1.01 (ref 1–1.03)
TROPONIN I SERPL-MCNC: 0.04 UG/L (ref 0–0.04)
URN SPEC COLLECT METH UR: ABNORMAL
UROBILINOGEN UR STRIP-MCNC: 0 MG/DL (ref 0–2)
WBC # BLD AUTO: 9.3 10E9/L (ref 4–11)
WBC #/AREA URNS AUTO: 7 /HPF (ref 0–2)

## 2017-05-26 PROCEDURE — 87086 URINE CULTURE/COLONY COUNT: CPT | Performed by: EMERGENCY MEDICINE

## 2017-05-26 PROCEDURE — 85379 FIBRIN DEGRADATION QUANT: CPT | Performed by: EMERGENCY MEDICINE

## 2017-05-26 PROCEDURE — 99285 EMERGENCY DEPT VISIT HI MDM: CPT | Mod: 25

## 2017-05-26 PROCEDURE — 99285 EMERGENCY DEPT VISIT HI MDM: CPT | Mod: 25 | Performed by: EMERGENCY MEDICINE

## 2017-05-26 PROCEDURE — 81001 URINALYSIS AUTO W/SCOPE: CPT | Performed by: EMERGENCY MEDICINE

## 2017-05-26 PROCEDURE — 25000128 H RX IP 250 OP 636: Performed by: EMERGENCY MEDICINE

## 2017-05-26 PROCEDURE — 93010 ELECTROCARDIOGRAM REPORT: CPT | Mod: Z6 | Performed by: EMERGENCY MEDICINE

## 2017-05-26 PROCEDURE — 25000128 H RX IP 250 OP 636: Performed by: FAMILY MEDICINE

## 2017-05-26 PROCEDURE — 93005 ELECTROCARDIOGRAM TRACING: CPT

## 2017-05-26 PROCEDURE — 71020 XR CHEST 2 VW: CPT | Mod: TC

## 2017-05-26 PROCEDURE — 83880 ASSAY OF NATRIURETIC PEPTIDE: CPT | Performed by: EMERGENCY MEDICINE

## 2017-05-26 PROCEDURE — 25000131 ZZH RX MED GY IP 250 OP 636 PS 637: Mod: GY | Performed by: FAMILY MEDICINE

## 2017-05-26 PROCEDURE — 87081 CULTURE SCREEN ONLY: CPT | Performed by: FAMILY MEDICINE

## 2017-05-26 PROCEDURE — 83605 ASSAY OF LACTIC ACID: CPT | Performed by: EMERGENCY MEDICINE

## 2017-05-26 PROCEDURE — 25000132 ZZH RX MED GY IP 250 OP 250 PS 637: Mod: GY | Performed by: FAMILY MEDICINE

## 2017-05-26 PROCEDURE — 85025 COMPLETE CBC W/AUTO DIFF WBC: CPT | Performed by: EMERGENCY MEDICINE

## 2017-05-26 PROCEDURE — 12000000 ZZH R&B MED SURG/OB

## 2017-05-26 PROCEDURE — 99222 1ST HOSP IP/OBS MODERATE 55: CPT | Mod: AI | Performed by: FAMILY MEDICINE

## 2017-05-26 PROCEDURE — 00000146 ZZHCL STATISTIC GLUCOSE BY METER IP

## 2017-05-26 PROCEDURE — 80053 COMPREHEN METABOLIC PANEL: CPT | Performed by: EMERGENCY MEDICINE

## 2017-05-26 PROCEDURE — 96374 THER/PROPH/DIAG INJ IV PUSH: CPT

## 2017-05-26 PROCEDURE — 93306 TTE W/DOPPLER COMPLETE: CPT | Mod: 26 | Performed by: INTERNAL MEDICINE

## 2017-05-26 PROCEDURE — A9270 NON-COVERED ITEM OR SERVICE: HCPCS | Mod: GY | Performed by: FAMILY MEDICINE

## 2017-05-26 PROCEDURE — 87040 BLOOD CULTURE FOR BACTERIA: CPT | Performed by: EMERGENCY MEDICINE

## 2017-05-26 PROCEDURE — 93306 TTE W/DOPPLER COMPLETE: CPT

## 2017-05-26 PROCEDURE — 84484 ASSAY OF TROPONIN QUANT: CPT | Performed by: EMERGENCY MEDICINE

## 2017-05-26 PROCEDURE — 86140 C-REACTIVE PROTEIN: CPT | Performed by: EMERGENCY MEDICINE

## 2017-05-26 RX ORDER — ACETAMINOPHEN 500 MG
1000 TABLET ORAL 3 TIMES DAILY
Status: DISCONTINUED | OUTPATIENT
Start: 2017-05-26 | End: 2017-05-30 | Stop reason: HOSPADM

## 2017-05-26 RX ORDER — CLOPIDOGREL BISULFATE 75 MG/1
75 TABLET ORAL DAILY
Status: DISCONTINUED | OUTPATIENT
Start: 2017-05-26 | End: 2017-05-30 | Stop reason: HOSPADM

## 2017-05-26 RX ORDER — NALOXONE HYDROCHLORIDE 0.4 MG/ML
.1-.4 INJECTION, SOLUTION INTRAMUSCULAR; INTRAVENOUS; SUBCUTANEOUS
Status: DISCONTINUED | OUTPATIENT
Start: 2017-05-26 | End: 2017-05-30 | Stop reason: HOSPADM

## 2017-05-26 RX ORDER — DEXTROSE MONOHYDRATE 25 G/50ML
25-50 INJECTION, SOLUTION INTRAVENOUS
Status: DISCONTINUED | OUTPATIENT
Start: 2017-05-26 | End: 2017-05-30 | Stop reason: HOSPADM

## 2017-05-26 RX ORDER — ONDANSETRON 4 MG/1
4 TABLET, ORALLY DISINTEGRATING ORAL EVERY 6 HOURS PRN
Status: DISCONTINUED | OUTPATIENT
Start: 2017-05-26 | End: 2017-05-30 | Stop reason: HOSPADM

## 2017-05-26 RX ORDER — METOPROLOL SUCCINATE 25 MG/1
25 TABLET, EXTENDED RELEASE ORAL DAILY
Status: DISCONTINUED | OUTPATIENT
Start: 2017-05-26 | End: 2017-05-30 | Stop reason: HOSPADM

## 2017-05-26 RX ORDER — NICOTINE POLACRILEX 4 MG
15-30 LOZENGE BUCCAL
Status: DISCONTINUED | OUTPATIENT
Start: 2017-05-26 | End: 2017-05-30 | Stop reason: HOSPADM

## 2017-05-26 RX ORDER — FUROSEMIDE 10 MG/ML
40 INJECTION INTRAMUSCULAR; INTRAVENOUS ONCE
Status: COMPLETED | OUTPATIENT
Start: 2017-05-26 | End: 2017-05-26

## 2017-05-26 RX ORDER — AMOXICILLIN 250 MG
1-2 CAPSULE ORAL 2 TIMES DAILY PRN
Status: DISCONTINUED | OUTPATIENT
Start: 2017-05-26 | End: 2017-05-30 | Stop reason: HOSPADM

## 2017-05-26 RX ORDER — OXYCODONE HYDROCHLORIDE 5 MG/1
5 TABLET ORAL EVERY 6 HOURS PRN
Status: DISCONTINUED | OUTPATIENT
Start: 2017-05-26 | End: 2017-05-28

## 2017-05-26 RX ORDER — ONDANSETRON 2 MG/ML
4 INJECTION INTRAMUSCULAR; INTRAVENOUS EVERY 6 HOURS PRN
Status: DISCONTINUED | OUTPATIENT
Start: 2017-05-26 | End: 2017-05-30 | Stop reason: HOSPADM

## 2017-05-26 RX ORDER — LEVOTHYROXINE SODIUM 112 UG/1
112 TABLET ORAL DAILY
Status: DISCONTINUED | OUTPATIENT
Start: 2017-05-26 | End: 2017-05-30 | Stop reason: HOSPADM

## 2017-05-26 RX ORDER — ASPIRIN 325 MG
325 TABLET ORAL DAILY
Status: DISCONTINUED | OUTPATIENT
Start: 2017-05-26 | End: 2017-05-30 | Stop reason: HOSPADM

## 2017-05-26 RX ORDER — POTASSIUM CITRATE 10 MEQ/1
10 TABLET, EXTENDED RELEASE ORAL DAILY
Status: DISCONTINUED | OUTPATIENT
Start: 2017-05-26 | End: 2017-05-30 | Stop reason: HOSPADM

## 2017-05-26 RX ORDER — ALLOPURINOL 100 MG/1
100 TABLET ORAL 2 TIMES DAILY
Status: DISCONTINUED | OUTPATIENT
Start: 2017-05-26 | End: 2017-05-30 | Stop reason: HOSPADM

## 2017-05-26 RX ORDER — LANOLIN ALCOHOL/MO/W.PET/CERES
400 CREAM (GRAM) TOPICAL DAILY
Status: DISCONTINUED | OUTPATIENT
Start: 2017-05-26 | End: 2017-05-30 | Stop reason: HOSPADM

## 2017-05-26 RX ORDER — AMLODIPINE BESYLATE 5 MG/1
5 TABLET ORAL DAILY
Status: DISCONTINUED | OUTPATIENT
Start: 2017-05-26 | End: 2017-05-28

## 2017-05-26 RX ORDER — FUROSEMIDE 10 MG/ML
20 INJECTION INTRAMUSCULAR; INTRAVENOUS
Status: DISCONTINUED | OUTPATIENT
Start: 2017-05-26 | End: 2017-05-28

## 2017-05-26 RX ORDER — SIMVASTATIN 5 MG
10 TABLET ORAL AT BEDTIME
Status: DISCONTINUED | OUTPATIENT
Start: 2017-05-26 | End: 2017-05-30 | Stop reason: HOSPADM

## 2017-05-26 RX ORDER — LIDOCAINE 40 MG/G
CREAM TOPICAL
Status: DISCONTINUED | OUTPATIENT
Start: 2017-05-26 | End: 2017-05-26

## 2017-05-26 RX ORDER — MULTIPLE VITAMINS W/ MINERALS TAB 9MG-400MCG
1 TAB ORAL DAILY
Status: DISCONTINUED | OUTPATIENT
Start: 2017-05-26 | End: 2017-05-30 | Stop reason: HOSPADM

## 2017-05-26 RX ADMIN — FUROSEMIDE 20 MG: 10 INJECTION, SOLUTION INTRAVENOUS at 16:30

## 2017-05-26 RX ADMIN — AMLODIPINE BESYLATE 5 MG: 5 TABLET ORAL at 08:17

## 2017-05-26 RX ADMIN — INSULIN ASPART 1 UNITS: 100 INJECTION, SOLUTION INTRAVENOUS; SUBCUTANEOUS at 12:29

## 2017-05-26 RX ADMIN — ALLOPURINOL 100 MG: 100 TABLET ORAL at 08:18

## 2017-05-26 RX ADMIN — METOPROLOL SUCCINATE 25 MG: 25 TABLET, EXTENDED RELEASE ORAL at 08:18

## 2017-05-26 RX ADMIN — ACETAMINOPHEN 400 MCG: 400 TABLET ORAL at 08:18

## 2017-05-26 RX ADMIN — CLOPIDOGREL 75 MG: 75 TABLET, FILM COATED ORAL at 08:18

## 2017-05-26 RX ADMIN — SIMVASTATIN 10 MG: 5 TABLET, FILM COATED ORAL at 20:41

## 2017-05-26 RX ADMIN — ACETAMINOPHEN 1000 MG: 500 TABLET ORAL at 20:41

## 2017-05-26 RX ADMIN — Medication 1 TABLET: at 08:18

## 2017-05-26 RX ADMIN — FUROSEMIDE 40 MG: 10 INJECTION, SOLUTION INTRAVENOUS at 04:36

## 2017-05-26 RX ADMIN — ASPIRIN 325 MG ORAL TABLET 325 MG: 325 PILL ORAL at 08:18

## 2017-05-26 RX ADMIN — ACETAMINOPHEN 1000 MG: 500 TABLET ORAL at 08:18

## 2017-05-26 RX ADMIN — FUROSEMIDE 20 MG: 10 INJECTION, SOLUTION INTRAVENOUS at 08:18

## 2017-05-26 RX ADMIN — ALLOPURINOL 100 MG: 100 TABLET ORAL at 20:41

## 2017-05-26 RX ADMIN — ACETAMINOPHEN 1000 MG: 500 TABLET ORAL at 13:53

## 2017-05-26 RX ADMIN — LEVOTHYROXINE SODIUM 112 MCG: 112 TABLET ORAL at 08:17

## 2017-05-26 RX ADMIN — POTASSIUM CITRATE 10 MEQ: 10 TABLET ORAL at 08:22

## 2017-05-26 ASSESSMENT — ENCOUNTER SYMPTOMS
SHORTNESS OF BREATH: 1
FEVER: 1
COUGH: 1
BACK PAIN: 1
NECK PAIN: 1
CHILLS: 1
CONFUSION: 0
WHEEZING: 1

## 2017-05-26 NOTE — IP AVS SNAPSHOT
"[address] MEDICAL SURGICAL: [phone]                                              INTERAGENCY TRANSFER FORM - PHYSICIAN ORDERS   2017                   CHF Orders/Instructions for Nursing Home/TCU       Patient self - management For CHF Education Purposes  1.  Have the patient get a scale to put in their room and then use at home  2.  Post weight chart or calendar in room next to the scale   3.  If able, ask patient to weigh themselves daily first thing when they get up in the morning, before breakfast, with only their night gown on and record on the chart/calendar     Nursing care management orders  1. Record admission weight   2. Record daily am weight, with same clothes every day (If in wheel chair, note weight of wheel chair)  3. Provide patient CHF education  4. Notify providers (NP/MD) if:     HR <50 bpm, SBP <90mmHg, or O2 Sat < 90%    Weight is up 3 lbs in 1 day or 5 lbs in 1 week from \"dry\" weight    Symptoms of CHF such as worsening dyspnea or leg edema  5. Keep legs elevated as needed for swelling, if leg swelling is uncomfortable or not controlled, apply ACE-wraps or Jobst stockings to bilateral lower extremities, knee high, prn edema. On in am and off at hs or discuss with physician.  6. Diet: 2 gm sodium cardiac diet, with additional diet modifications if ordered elsewhere  7. PT to notify RN if wheelchair equipment has been modified (change in weight which should also be noted on the patients weight calendar)     Follow up instructions and Discharge planning  Make a follow up appointment with the Primary Care Provider 14 days after hospital discharge if still in SNF/TCU.            Hospital Admission Date: 2017  MINESH RUTHERFORD   : 1924  Sex: Female        Attending Provider: Gurvinder Diaz MD     Allergies:  Hmg-coa-r Inhibitors, Metformin    Infection:  None   Service:  HOSPITALIST    Ht:  1.6 m (5' 3\")   Wt:  61.5 kg (135 lb 9.3 oz)   Admission Wt:  61.2 kg " (135 lb)    BMI:  24.02 kg/m 2   BSA:  1.65 m 2            Patient PCP Information     Provider PCP Type    Samantha Celis MD General      ED Clinical Impression     Diagnosis Description Comment Added By Time Added    Acute combined systolic and diastolic congestive heart failure (H) [I50.41] Acute combined systolic and diastolic congestive heart failure (H) [I50.41]  Bala Kenney MD 5/26/2017  4:27 AM    Acute pulmonary edema (H) [J81.0] Acute pulmonary edema (H) [J81.0]  Bala Kenney MD 5/26/2017  4:28 AM    Fever presenting with conditions classified elsewhere [R50.81] Fever presenting with conditions classified elsewhere [R50.81]  Bala Kenney MD 5/26/2017  4:28 AM      Hospital Problems as of 5/30/2017              Priority Class Noted POA    Essential hypertension Medium  1/19/2017 Yes    Coronary artery disease involving native coronary artery of native heart without angina pectoris Medium  1/19/2017 Yes    Hyperlipidemia LDL goal <100 Medium  1/19/2017 Yes    Hypothyroidism due to acquired atrophy of thyroid Medium  1/19/2017 Yes    Type 2 diabetes mellitus without complication (H) Medium  1/19/2017 Yes    Chronic kidney disease, stage III (moderate) Medium  1/19/2017 Yes    Anemia Medium  1/19/2017 Yes    Acute congestive heart failure (H) Medium  5/26/2017 Yes    CHF (congestive heart failure) (H) Medium  5/26/2017 Yes      Non-Hospital Problems as of 5/30/2017              Priority Class Noted    Hypernatremia Medium  1/19/2017    Fractured hip, left, closed, initial encounter (H) Medium  1/20/2017    Urinary retention Medium  1/21/2017    Closed fracture of left inferior and superior pubic rami Medium  5/15/2017      Code Status History     Date Active Date Inactive Code Status Order ID Comments User Context    5/30/2017  9:30 AM  DNR [id number]  Gurvinder Diaz MD Outpatient    5/26/2017  6:00 AM 5/30/2017  9:30 AM DNR [id number]  Jaret Grey MD Inpatient     5/17/2017 10:40 AM 5/26/2017  6:00 AM DNR [id number]  Cordell Fountain MD Outpatient    5/15/2017 11:41 PM 5/17/2017 10:40 AM DNR [id number]  Cordell Villanueva MD Inpatient    1/22/2017  8:51 AM 5/15/2017 11:41 PM Full Code [id number]  Liborio Hoffman,  Outpatient    1/20/2017  1:38 PM 1/22/2017  8:51 AM Full Code [id number]  Liborio Hoffman,  Inpatient    1/19/2017  8:20 PM 1/20/2017  1:38 PM Full Code [id number]  Jaret Grey MD Inpatient         Medication Review      START taking        Dose / Directions Comments    furosemide 20 MG tablet   Commonly known as:  LASIX   Used for:  Acute combined systolic and diastolic congestive heart failure (H), Benign essential hypertension        Dose:  20 mg   Take 1 tablet (20 mg) by mouth 2 times daily   Quantity:  30 tablet   Refills:  0          CONTINUE these medications which may have CHANGED, or have new prescriptions. If we are uncertain of the size of tablets/capsules you have at home, strength may be listed as something that might have changed.        Dose / Directions Comments    amLODIPine 5 MG tablet   Commonly known as:  NORVASC   This may have changed:  how much to take   Used for:  Benign essential hypertension        Dose:  10 mg   Take 2 tablets (10 mg) by mouth daily   Quantity:  30 tablet   Refills:  1        potassium citrate 5 MEQ (540 MG) CR tablet   Commonly known as:  UROCIT-K   This may have changed:  how much to take   Used for:  Hypokalemia        Dose:  20 mEq   Take 4 tablets (20 mEq) by mouth daily   Quantity:  120 tablet   Refills:  1          CONTINUE these medications which have NOT CHANGED        Dose / Directions Comments    acetaminophen 500 MG tablet   Commonly known as:  TYLENOL   Used for:  Closed left hip fracture, initial encounter (H)        Dose:  1000 mg   Take 2 tablets (1,000 mg) by mouth 3 times daily   Refills:  0        allopurinol 100 MG tablet   Commonly known as:  ZYLOPRIM   Used for:   Chronic gout, unspecified cause, unspecified site        Dose:  100 mg   Take 1 tablet (100 mg) by mouth 2 times daily   Quantity:  30 tablet   Refills:  0        aspirin 325 MG tablet   Used for:  Closed left hip fracture, initial encounter (H)        Dose:  325 mg   Take 1 tablet (325 mg) by mouth daily   Quantity:  120 tablet   Refills:  0    Hold while receiving Lovenox       CALCIUM + D PO        Dose:  1 tablet   Take 1 tablet by mouth daily.   Refills:  0        CENTRUM SILVER ULTRA WOMENS Tabs        Dose:  1 tablet   Take 1 tablet by mouth daily.   Refills:  0        clopidogrel 75 MG tablet   Commonly known as:  PLAVIX   Used for:  Coronary artery disease involving native coronary artery of native heart without angina pectoris        Dose:  75 mg   Take 1 tablet (75 mg) by mouth daily   Quantity:  30 tablet   Refills:  0        fenofibrate 160 MG tablet   Used for:  Hypercholesterolemia        Dose:  160 mg   Take 1 tablet (160 mg) by mouth daily   Quantity:  30 tablet   Refills:  0        folic acid 400 MCG tablet   Commonly known as:  FOLVITE        Dose:  400 mcg   Take 400 mcg by mouth daily.   Refills:  0        levothyroxine 112 MCG tablet   Commonly known as:  SYNTHROID/LEVOTHROID   Used for:  Other specified hypothyroidism        Dose:  112 mcg   Take 1 tablet (112 mcg) by mouth daily   Refills:  0        metoprolol 25 MG 24 hr tablet   Commonly known as:  TOPROL XL   Used for:  Benign essential hypertension        Dose:  25 mg   Take 1 tablet (25 mg) by mouth daily   Quantity:  30 tablet   Refills:  0        oxyCODONE 5 MG IR tablet   Commonly known as:  ROXICODONE   Used for:  Pubic ramus fracture, left, closed, initial encounter (H), Fall on same level due to nature of surface, initial encounter        Dose:  2.5-5 mg   Take 0.5-1 tablets (2.5-5 mg) by mouth every 6 hours as needed for moderate to severe pain   Quantity:  30 tablet   Refills:  0        senna-docusate 8.6-50 MG per tablet    Commonly known as:  SENOKOT-S;PERICOLACE   Used for:  Closed left hip fracture, initial encounter (H)        Dose:  1-2 tablet   Take 1-2 tablets by mouth 2 times daily as needed (constipation )   Quantity:  100 tablet   Refills:  0    Indication: Constipation       simvastatin 10 MG tablet   Commonly known as:  ZOCOR   Used for:  Hypercholesterolemia        Dose:  10 mg   Take 1 tablet (10 mg) by mouth At Bedtime   Quantity:  30 tablet   Refills:  0        VITAMIN D (CHOLECALCIFEROL) PO        Take  by mouth once a week.   Refills:  0                Summary of Visit     Reason for your hospital stay       Acute heart failure             After Care     Activity - Up with nursing assistance       And per physical therapy for advancement of activity       Advance Diet as Tolerated       Follow this diet upon discharge: Orders Placed This Encounter      Moderate Consistent CHO Diet and 2g sodium       Daily weights       Call Provider for weight gain of more than 2 pounds per day or 5 pounds per week.       Fall precautions           General info for SNF       Length of Stay Estimate: Short Term Care: Estimated # of Days <30  Condition at Discharge: Improving  Level of care:skilled   Rehabilitation Potential: Good  Admission H&P remains valid and up-to-date: Yes  Recent Chemotherapy: N/A  Use Nursing Home Standing Orders: Yes       Mantoux instructions       Give two-step Mantoux (PPD) Per Facility Policy Yes             Referrals     Occupational Therapy Adult Consult       Evaluate and treat as clinically indicated.    Reason:  Hip pain       Physical Therapy Adult Consult       Evaluate and treat as clinically indicated.    Reason:  Hip pain             Your next 10 appointments already scheduled     Jun 05, 2017 10:10 AM CDT   [facility] DO   [facility]    [address]  [place] [facility] [address]   [phone]             "  Follow-Up Appointment Instructions     Future Labs/Procedures    Follow Up and recommended labs and tests     Comments:    Follow up with longterm physician.  The following labs/tests are recommended: BMP in 2-3 days.      Follow-Up Appointment Instructions     Follow Up and recommended labs and tests       Follow up with longterm physician.  The following labs/tests are recommended: BMP in 2-3 days.             Statement of Approval     Ordered          05/30/17 1020  I have reviewed and agree with all the recommendations and orders detailed in this document.  EFFECTIVE NOW     Approved and electronically signed by:  Gurvinder Diaz MD               General Recommendations To Control Heart Failure When You Get Home (Give at DC from TCU)     Instructions To Patients and Families: Please read and check off each of these important instructions as you do them when you get home.           Weight and symptoms      ___ Put a scale in your bathroom  ___ Post a weight chart or calendar next to the scale  ___Weigh yourself every day as soon as you get up in the morning. You should only be wearing your pajamas. Write your weight on the chart/calendar.  ___ Bring your weight chart/calendar with you to all appointments    ___Call your doctor if you gain 2 pounds in 1 day or 5 pounds in 1 week from your \"dry\" weight (baseline weight). Also call your doctor if you have shortness of breath that gets worse over time, leg swelling or fatigue.         Medicines and diet     ___ Make sure to take your medicines as prescribed.    ___Bring a current list of your medicines and all of your medicine bottles with you to all appointments.    ___ Limit fluids if you still have swelling or shortness of breath, or if your doctor tells you to do so.  ___ Eat less than 2000 mg of sodium (salt) every day. Read food labels, and do not add salt to meals.   ___ Heart healthy diet with low fat and low cholesterol          Activity and " suggested lifestyle changes    ___ Stay active. Talk to your doctor about an exercise program that is safe for your heart.    ___ Stop smoking. Reduce alcohol use.      ___ Lose weight if you are overweight. Extra weight puts a lot of stress on the heart.          Control for Leg Swelling   ___ Keep your legs elevated (raised) as needed for swelling. If swelling is uncomfortable or elevation doesn t help, ask your doctor about using ACE wrap or Jobst stockings.          Follow-up appointments   ____ Follow up with your doctor within 7-10 days after discharge.    ___ Make sure to take your medications as prescribed and bring an accurate list of your medications and your weight chart/calendar to your follow up appointment.

## 2017-05-26 NOTE — IP AVS SNAPSHOT
MRN:[id number]                      After Visit Summary   5/26/2017    Mary Ellen Cuba    MRN: [id number]           Thank you!     Thank you for choosing [facility] for your care. Our goal is always to provide you with excellent care. Hearing back from our patients is one way we can continue to improve our services. Please take a few minutes to complete the written survey that you may receive in the mail after you visit with us. Thank you!        Patient Information     Date Of Birth          1/20/1924        Designated Caregiver       Most Recent Value    Caregiver    Will someone help with your care after discharge? yes    Name of designated caregiver GIGI- [place]    Phone number of caregiver [employer]    Caregiver address [facility] [place]      About your hospital stay     You were admitted on:  May 26, 2017 You last received care in the:  [address]    You were discharged on:  May 30, 2017        Reason for your hospital stay       Acute heart failure                  Who to Call     For medical emergencies, please call 911.  For non-urgent questions about your medical care, please call your primary care provider or clinic, [phone]          Attending Provider     Provider Specialty    Bala Kenney MD Emergency Medicine    Grye, Jaret Crowder MD Family Practice    Le, Gurvinder Alegre MD Internal Medicine       Primary Care Provider Office Phone # Fax #    Samantha Celis [phone]      After Care Instructions     Activity - Up with nursing assistance       And per physical therapy for advancement of activity            Advance Diet as Tolerated       Follow this diet upon discharge: Orders Placed This Encounter      Moderate Consistent CHO Diet and 2g sodium            Daily weights       Call Provider for weight gain of more than 2 pounds per day or 5 pounds per week.            Fall precautions           General info for SNF        "Length of Stay Estimate: Short Term Care: Estimated # of Days <30  Condition at Discharge: Improving  Level of care:skilled   Rehabilitation Potential: Good  Admission H&P remains valid and up-to-date: Yes  Recent Chemotherapy: N/A  Use Nursing Home Standing Orders: Yes            Mantoux instructions       Give two-step Mantoux (PPD) Per Facility Policy Yes                  Follow-up Appointments     Follow Up and recommended labs and tests       Follow up with custodial physician.  The following labs/tests are recommended: BMP in 2-3 days.                  Your next 10 appointments already scheduled     Jun 05, 2017 10:10 AM CDT   Return Visit with Liborio Hoffman DO   [facility]    [address]  [facility] [address] [phone]              Additional Services     Occupational Therapy Adult Consult       Evaluate and treat as clinically indicated.    Reason:  Hip pain            Physical Therapy Adult Consult       Evaluate and treat as clinically indicated.    Reason:  Hip pain                  General Recommendations To Control Heart Failure When You Get Home     Instructions To Patients and Families: Please read and check off each of these important instructions as you do them when you get home.           Weight and symptoms      ___ Put a scale in your bathroom  ___ Post a weight chart or calendar next to the scale  ___Weigh yourself every day as soon as you get up in the morning. You should only be wearing your pajamas. Write your weight on the chart/calendar.  ___ Bring your weight chart/calendar with you to all appointments    ___Call your doctor if you gain 2 pounds in 1 day or 5 pounds in 1 week from your \"dry\" weight (baseline weight). Also call your doctor if you have shortness of breath that gets worse over time, leg swelling or fatigue.         Medicines and diet     ___ Make sure to take your medicines as " "prescribed.    ___Bring a current list of your medicines and all of your medicine bottles with you to all appointments.    ___ Limit fluids if you still have swelling or shortness of breath, or if your doctor tells you to do so.  ___ Eat less than 2000 mg of sodium (salt) every day. Read food labels, and do not add salt to meals.   ___ Heart healthy diet with low fat and low cholesterol          Activity and suggested lifestyle changes    ___ Stay active. Talk to your doctor about an exercise program that is safe for your heart.    ___ Stop smoking. Reduce alcohol use.      ___ Lose weight if you are overweight. Extra weight puts a lot of stress on the heart.          Control for Leg Swelling   ___ Keep your legs elevated (raised) as needed for swelling. If swelling is uncomfortable or elevation doesn t help, ask your doctor about using ACE wrap or Jobst stockings.          Follow-up appointments   ____ Follow up with your doctor within 7-10 days after discharge.    ___ Make sure to take your medications as prescribed and bring an accurate list of your medications and your weight chart/calendar to your follow up appointment.           Pending Results     Date and Time Order Name Status Description    5/26/2017 0328 Blood culture Preliminary     5/26/2017 0328 Blood culture Preliminary             Statement of Approval     Ordered          05/30/17 1020  I have reviewed and agree with all the recommendations and orders detailed in this document.  EFFECTIVE NOW     Approved and electronically signed by:  Gurivnder Diaz MD             Admission Information     Date & Time Provider Department Dept. Phone    5/26/2017 Gurvinder Diaz MD [address] [facility] [phone]      Your Vitals Were     Blood Pressure Pulse Temperature Respirations Height Weight    178/72 (BP Location: Right arm) 67 97.5  F (36.4  C) (Oral) 18 1.6 m (5' 3\") 61.5 kg (135 lb 9.3 oz)    Pulse Oximetry BMI (Body Mass Index)          " "      92% 24.02 kg/m2          [employer] Information     [employer] lets you send messages to your doctor, view your test results, renew your prescriptions, schedule appointments and more. To sign up, go to [email or web address] . Click on \"Log in\" on the left side of the screen, which will take you to the Welcome page. Then click on \"Sign up Now\" on the right side of the page.     You will be asked to enter the access code listed below, as well as some personal information. Please follow the directions to create your username and password.     Your access code is: [email or web address]  Expires: 2017  1:23 PM     Your access code will  in 90 days. If you need help or a new code, please call your [place] clinic or [phone].        Care EveryWhere ID     This is your Care EveryWhere ID. This could be used by other organizations to access your [place] medical records  [id number]           Review of your medicines      START taking        Dose / Directions    furosemide 20 MG tablet   Commonly known as:  LASIX   Used for:  Acute combined systolic and diastolic congestive heart failure (H), Benign essential hypertension        Dose:  20 mg   Take 1 tablet (20 mg) by mouth 2 times daily   Quantity:  30 tablet   Refills:  0         CONTINUE these medicines which may have CHANGED, or have new prescriptions. If we are uncertain of the size of tablets/capsules you have at home, strength may be listed as something that might have changed.        Dose / Directions    amLODIPine 5 MG tablet   Commonly known as:  NORVASC   This may have changed:  how much to take   Used for:  Benign essential hypertension        Dose:  10 mg   Take 2 tablets (10 mg) by mouth daily   Quantity:  30 tablet   Refills:  1       potassium citrate 5 MEQ (540 MG) CR tablet   Commonly known as:  UROCIT-K   This may have changed:  how much to take   Used for:  Hypokalemia        Dose:  20 mEq   Take 4 tablets (20 mEq) by mouth daily   Quantity:  " 120 tablet   Refills:  1         CONTINUE these medicines which have NOT CHANGED        Dose / Directions    acetaminophen 500 MG tablet   Commonly known as:  TYLENOL   Used for:  Closed left hip fracture, initial encounter (H)        Dose:  1000 mg   Take 2 tablets (1,000 mg) by mouth 3 times daily   Refills:  0       allopurinol 100 MG tablet   Commonly known as:  ZYLOPRIM   Used for:  Chronic gout, unspecified cause, unspecified site        Dose:  100 mg   Take 1 tablet (100 mg) by mouth 2 times daily   Quantity:  30 tablet   Refills:  0       aspirin 325 MG tablet   Used for:  Closed left hip fracture, initial encounter (H)        Dose:  325 mg   Take 1 tablet (325 mg) by mouth daily   Quantity:  120 tablet   Refills:  0       CALCIUM + D PO        Dose:  1 tablet   Take 1 tablet by mouth daily.   Refills:  0       CENTRUM SILVER ULTRA WOMENS Tabs        Dose:  1 tablet   Take 1 tablet by mouth daily.   Refills:  0       clopidogrel 75 MG tablet   Commonly known as:  PLAVIX   Used for:  Coronary artery disease involving native coronary artery of native heart without angina pectoris        Dose:  75 mg   Take 1 tablet (75 mg) by mouth daily   Quantity:  30 tablet   Refills:  0       fenofibrate 160 MG tablet   Used for:  Hypercholesterolemia        Dose:  160 mg   Take 1 tablet (160 mg) by mouth daily   Quantity:  30 tablet   Refills:  0       folic acid 400 MCG tablet   Commonly known as:  FOLVITE        Dose:  400 mcg   Take 400 mcg by mouth daily.   Refills:  0       levothyroxine 112 MCG tablet   Commonly known as:  SYNTHROID/LEVOTHROID   Used for:  Other specified hypothyroidism        Dose:  112 mcg   Take 1 tablet (112 mcg) by mouth daily   Refills:  0       metoprolol 25 MG 24 hr tablet   Commonly known as:  TOPROL XL   Used for:  Benign essential hypertension        Dose:  25 mg   Take 1 tablet (25 mg) by mouth daily   Quantity:  30 tablet   Refills:  0       oxyCODONE 5 MG IR tablet   Commonly known as:   ROXICODONE   Used for:  Pubic ramus fracture, left, closed, initial encounter (H), Fall on same level due to nature of surface, initial encounter        Dose:  2.5-5 mg   Take 0.5-1 tablets (2.5-5 mg) by mouth every 6 hours as needed for moderate to severe pain   Quantity:  30 tablet   Refills:  0       senna-docusate 8.6-50 MG per tablet   Commonly known as:  SENOKOT-S;PERICOLACE   Used for:  Closed left hip fracture, initial encounter (H)        Dose:  1-2 tablet   Take 1-2 tablets by mouth 2 times daily as needed (constipation )   Quantity:  100 tablet   Refills:  0       simvastatin 10 MG tablet   Commonly known as:  ZOCOR   Used for:  Hypercholesterolemia        Dose:  10 mg   Take 1 tablet (10 mg) by mouth At Bedtime   Quantity:  30 tablet   Refills:  0       VITAMIN D (CHOLECALCIFEROL) PO        Take  by mouth once a week.   Refills:  0            Where to get your medicines      Some of these will need a paper prescription and others can be bought over the counter. Ask your nurse if you have questions.     Bring a paper prescription for each of these medications     furosemide 20 MG tablet       You don't need a prescription for these medications     amLODIPine 5 MG tablet    potassium citrate 5 MEQ (540 MG) CR tablet                Protect others around you: Learn how to safely use, store and throw away your medicines at www.disposemymeds.org.             Medication List: This is a list of all your medications and when to take them. Check marks below indicate your daily home schedule. Keep this list as a reference.      Medications           Morning Afternoon Evening Bedtime As Needed    acetaminophen 500 MG tablet   Commonly known as:  TYLENOL   Take 2 tablets (1,000 mg) by mouth 3 times daily   Last time this was given:  1,000 mg on 5/30/2017  8:31 AM                                allopurinol 100 MG tablet   Commonly known as:  ZYLOPRIM   Take 1 tablet (100 mg) by mouth 2 times daily   Last time this  was given:  100 mg on 5/30/2017  8:31 AM                                amLODIPine 5 MG tablet   Commonly known as:  NORVASC   Take 2 tablets (10 mg) by mouth daily   Last time this was given:  10 mg on 5/30/2017  8:31 AM                                aspirin 325 MG tablet   Take 1 tablet (325 mg) by mouth daily   Last time this was given:  325 mg on 5/30/2017  8:30 AM                                CALCIUM + D PO   Take 1 tablet by mouth daily.                                CENTRUM SILVER ULTRA WOMENS Tabs   Take 1 tablet by mouth daily.   Last time this was given:  1 tablet on 5/30/2017  8:31 AM                                clopidogrel 75 MG tablet   Commonly known as:  PLAVIX   Take 1 tablet (75 mg) by mouth daily   Last time this was given:  75 mg on 5/30/2017  8:31 AM                                fenofibrate 160 MG tablet   Take 1 tablet (160 mg) by mouth daily                                folic acid 400 MCG tablet   Commonly known as:  FOLVITE   Take 400 mcg by mouth daily.   Last time this was given:  400 mcg on 5/30/2017  8:30 AM                                furosemide 20 MG tablet   Commonly known as:  LASIX   Take 1 tablet (20 mg) by mouth 2 times daily   Last time this was given:  20 mg on 5/30/2017  8:31 AM                                levothyroxine 112 MCG tablet   Commonly known as:  SYNTHROID/LEVOTHROID   Take 1 tablet (112 mcg) by mouth daily   Last time this was given:  112 mcg on 5/30/2017  8:31 AM                                metoprolol 25 MG 24 hr tablet   Commonly known as:  TOPROL XL   Take 1 tablet (25 mg) by mouth daily   Last time this was given:  25 mg on 5/30/2017  8:31 AM                                oxyCODONE 5 MG IR tablet   Commonly known as:  ROXICODONE   Take 0.5-1 tablets (2.5-5 mg) by mouth every 6 hours as needed for moderate to severe pain   Last time this was given:  2.5 mg on 5/28/2017  5:14 AM                                potassium citrate 5 MEQ (540 MG) CR  tablet   Commonly known as:  UROCIT-K   Take 4 tablets (20 mEq) by mouth daily   Last time this was given:  10 mEq on 5/30/2017  8:30 AM                                senna-docusate 8.6-50 MG per tablet   Commonly known as:  SENOKOT-S;PERICOLACE   Take 1-2 tablets by mouth 2 times daily as needed (constipation )                                simvastatin 10 MG tablet   Commonly known as:  ZOCOR   Take 1 tablet (10 mg) by mouth At Bedtime   Last time this was given:  10 mg on 5/29/2017  8:31 PM                                VITAMIN D (CHOLECALCIFEROL) PO   Take  by mouth once a week.

## 2017-05-26 NOTE — ED PROVIDER NOTES
"  History     Chief Complaint   Patient presents with     Chest Pain     Cough     The history is provided by the EMS personnel and the patient.     Mary Ellen Cuba is a 89 year old female who presents to the ED via EMS from [facility] with complaint of 3 days of cough and chest pain with coughing. Patient states that she has had a cough productive of tan-brown sputum \"if I can get enough force to get it out\". According to EMS, nursing staff at [facility] were not aware of symptoms until approximately 15 minutes prior to calling EMS on the scene. Per EMS, on arrival, patient was noted to be febrile to 101 F, hypertensive with systolic as high as 204 mmHg and dyspneic with diminished breath sounds throughout all lung fields. Patient complained of chest pain and was given aspirin and nitroglycerin en route with improvement in her pain. She was also given several duonebs en route resulting in improvement in airflow resulting in more audible wheezing. O2 saturations were up to 96% on room air. Upon arrival patient reports that \"I have a broken neck\" and reports pain with moving from stretcher to the bed. On recheck, patient denies broken neck but reports pain in her neck as states that \"everything is connected\". She also reports pain in the left pelvis and left lower ribs. Of note, patient was recently hospitalized from 5/15-5/17/2017 following a fall at home resulting in left pubic ramus fracture. She was discharged to [facility] nursing home. Patient is DNR/DNI.    I have reviewed the Medications, Allergies, Past Medical and Surgical History, and Social History in the [employer] system.  Past Medical History:   Diagnosis Date     CAD (coronary artery disease)     remote hx of stent placement in past     CVA (cerebral vascular accident) (H) 2013    some left sided deficits     Diabetes mellitus (H)      Hyperlipemia      Hypertension      Past Surgical History:   Procedure Laterality Date     APPENDECTOMY   "     GI SURGERY      gwendolyn     GI SURGERY      hemicolectomy     GYN SURGERY      oopherectomy     LASER YAG CAPSULOTOMY  3/21/2013    Procedure: LASER YAG CAPSULOTOMY;  yag capsulotomy bilateral eyes;  Surgeon: Deandre Nugent MD;  [facility]     OPEN REDUCTION INTERNAL FIXATION HIP NAILING Left 1/20/2017    Procedure: OPEN REDUCTION INTERNAL FIXATION HIP NAILING;  Surgeon: Liborio Hoffman DO;  Location: [facility]     PHACOEMULSIFICATION WITH STANDARD INTRAOCULAR LENS IMPLANT  6/30/2011    Procedure:PHACOEMULSIFICATION WITH STANDARD INTRAOCULAR LENS IMPLANT; Surgeon:DEANDRE NUGENT; Location:[facility]     PHACOEMULSIFICATION WITH STANDARD INTRAOCULAR LENS IMPLANT  7/21/2011    Procedure:PHACOEMULSIFICATION WITH STANDARD INTRAOCULAR LENS IMPLANT; Surgeon:DEANDRE NUGENT; Location:[facility] OR     STENT, CORONARY, DIOGENES       Social History   Substance Use Topics     Smoking status: Never Smoker     Smokeless tobacco: Not on file     Alcohol use No     Review of Systems   Constitutional: Positive for chills and fever.   Respiratory: Positive for cough, shortness of breath and wheezing.    Cardiovascular: Positive for chest pain (with coughing) and leg swelling.   Musculoskeletal: Positive for back pain and neck pain.        Left hip pain   Psychiatric/Behavioral: Negative for confusion.   All other systems reviewed and are negative.    Physical Exam   BP: 145/72  Heart Rate: 84  Temp: 99.4  F (37.4  C)  Resp: 16  Weight: 61.2 kg (135 lb)  SpO2: 93 %  Physical Exam   Constitutional: She is oriented to person, place, and time. She is cooperative.  Non-toxic appearance. She appears ill.   HENT:   Head: Normocephalic and atraumatic.   Eyes: Conjunctivae are normal.   Neck: Trachea normal. Neck supple. JVD present. No rigidity.   Cardiovascular: Normal rate, regular rhythm, normal heart sounds and intact distal pulses.  Exam reveals no gallop and no friction rub.    No murmur heard.  Pulmonary/Chest: Effort normal. No  accessory muscle usage. No tachypnea. No respiratory distress. She has wheezes (occasional). She has rhonchi in the right lower field. She exhibits bony tenderness (to palpation of the left lower ribs at the midaxillary line).   Musculoskeletal: She exhibits edema (2+ pitting edema to the distal shins bilaterally). She exhibits no deformity.   Neurological: She is alert and oriented to person, place, and time.   Skin: Skin is warm and dry.   Psychiatric: She has a normal mood and affect. Her behavior is normal. Thought content normal.     ED Course     ED Course     Procedures             EKG Interpretation:      Interpreted by Bala Kenney  Time reviewed: 03:40 AM 5/26/2017  Symptoms at time of EKG: cough, chest pain, shortness of breath   Rhythm: normal sinus   Rate: Normal  Axis: Normal  Ectopy: Occasional ectopic ventricular beat  Conduction: normal  ST Segments/ T Waves: No acute ischemic changes  Q Waves: none  Comparison to prior: Unchanged from 01/17/2017    Clinical Impression: normal EKG    Critical Care time:  none   Results for orders placed or performed during the hospital encounter of 05/26/17 (from the past 24 hour(s))   CBC with platelets differential   Result Value Ref Range    WBC 9.3 4.0 - 11.0 10e9/L    RBC Count 3.68 (L) 3.8 - 5.2 10e12/L    Hemoglobin 10.7 (L) 11.7 - 15.7 g/dL    Hematocrit 34.1 (L) 35.0 - 47.0 %    MCV 93 78 - 100 fl    MCH 29.1 26.5 - 33.0 pg    MCHC 31.4 (L) 31.5 - 36.5 g/dL    RDW 19.0 (H) 10.0 - 15.0 %    Platelet Count 269 150 - 450 10e9/L    Diff Method Automated Method     % Neutrophils 68.4 %    % Lymphocytes 23.1 %    % Monocytes 6.8 %    % Eosinophils 1.2 %    % Basophils 0.2 %    % Immature Granulocytes 0.3 %    Absolute Neutrophil 6.4 1.6 - 8.3 10e9/L    Absolute Lymphocytes 2.1 0.8 - 5.3 10e9/L    Absolute Monocytes 0.6 0.0 - 1.3 10e9/L    Absolute Eosinophils 0.1 0.0 - 0.7 10e9/L    Absolute Basophils 0.0 0.0 - 0.2 10e9/L    Abs Immature Granulocytes 0.0  0 - 0.4 10e9/L   Comprehensive metabolic panel   Result Value Ref Range    Sodium 143 133 - 144 mmol/L    Potassium 4.1 3.4 - 5.3 mmol/L    Chloride 108 94 - 109 mmol/L    Carbon Dioxide 28 20 - 32 mmol/L    Anion Gap 7 3 - 14 mmol/L    Glucose 104 (H) 70 - 99 mg/dL    Urea Nitrogen 28 7 - 30 mg/dL    Creatinine 1.79 (H) 0.52 - 1.04 mg/dL    GFR Estimate 26 (L) >60 mL/min/1.7m2    GFR Estimate If Black 32 (L) >60 mL/min/1.7m2    Calcium 7.6 (L) 8.5 - 10.1 mg/dL    Bilirubin Total 0.2 0.2 - 1.3 mg/dL    Albumin 2.2 (L) 3.4 - 5.0 g/dL    Protein Total 5.6 (L) 6.8 - 8.8 g/dL    Alkaline Phosphatase 142 40 - 150 U/L    ALT 23 0 - 50 U/L    AST 40 0 - 45 U/L   Lactic acid whole blood   Result Value Ref Range    Lactic Acid 1.3 0.7 - 2.1 mmol/L   Troponin I   Result Value Ref Range    Troponin I ES 0.038 0.000 - 0.045 ug/L   CRP inflammation   Result Value Ref Range    CRP Inflammation 27.7 (H) 0.0 - 8.0 mg/L   Nt probnp inpatient (BNP)   Result Value Ref Range    N-Terminal Pro BNP Inpatient 68194 (H) 0 - 1800 pg/mL   XR Chest 2 Views    Narrative    CHEST 2 VIEWS  5/26/2017 4:12 AM     HISTORY: Fever. Shortness of breath. Cough.    COMPARISON: None.    FINDINGS: Small bilateral pleural effusions with adjacent opacities  that most likely represent atelectasis. The lungs are otherwise clear.  Mild cardiomegaly. Atherosclerotic calcification in the thoracic  aorta.      Impression    IMPRESSION:  1. Small bilateral pleural effusions with adjacent atelectasis.  2. The lungs are otherwise clear.    JESSA LEE MD     Assessments & Plan (with Medical Decision Making)  Mary Ellen Cuba is a 89 year old female with history of HTN, T2DM with Stage III CKD, anemia, and hypothyroidism who presents to the ED via EMS for 3 days of dyspnea and productive cough with chest pain. DDx includes but is not limited to pneumonia, CHF, MI and PE. On exam she is afebrile (although EMS reported temp to 101 F) and with elevated blood  pressure. She is ill-appearing but in no acute distress. Exam reveals crackles and egophony in the right lower lung field and occasional wheezes throughout the other lung fields. Heart is RRR with no murmurs. JVD is present and there is 2+ LE edema to the distal shins bilaterally. EKG on arrival reveals no evidence of ischemia. Labs are notable for elevated BNP of >70141, consistent with new onset CHF. Troponin is negative so no evidence for MI. Patient also had a normal WBC count and normal lactic acid so not concerned for sepsis at this time; blood cultures were obtained initially however given concern for possible sepsis with report of elevated temperature by EMS. CRP is mildly elevated. CMP is notable for elevated creatinine of 1.79 (GFR 26) which is indicative of worsening renal function as last creatinine 1/23/17 was 1.58. A chest xray was ordered which revealed bilateral pleural effusions with atelectasis but no evidence of pneumonia. Suspect symptoms due to new onset CHF.    Patient was given 40 mg of IV lasix while in the ED and Lewis catheter was inserted for collection of strict I&O's. Urine was also sent for UA to rule out concurrent UTI. The hospitalist was notified and I spoke with Dr. Grey at 4:41 AM regarding the patient and he did accept patient for admission.     I have reviewed the nursing notes.    I have reviewed the findings, diagnosis, plan and need for follow up with the patient.  New Prescriptions    No medications on file     Final diagnoses:   Acute combined systolic and diastolic congestive heart failure (H)   Acute pulmonary edema (H)   Fever presenting with conditions classified elsewhere     I, Shaylee Paniagua, MS4, am serving as a scribe; to document services personally performed by Bala Kenney MD - based on data collection and the provider's statements to me.    Provider Disclosure:  I agree with above History, Review of Systems, Physical exam and Plan. I have reviewed the  content of the documentation and have edited it as needed. I have personally performed the services documented here and the documentation accurately represents those services and the decisions I have made.    Electronically signed by:    5/26/2017   [facility] EMERGENCY DEPARTMENT     Bala Kenney MD  05/26/17 0511

## 2017-05-26 NOTE — PROGRESS NOTES
SPIRITUAL HEALTH SERVICES  SPIRITUAL ASSESSMENT Progress Note  [facility]      Pt declined a visit from .   is available for pt/family needs.    Rosas Bowie M.Div., [facility]  Staff   Office tel: [phone]

## 2017-05-26 NOTE — H&P
[facility]    History and Physical  Hospitalist       Date of Admission:  5/26/2017  Date of Service (when I saw the patient): 05/26/17    Assessment & Plan   Mary Ellen Cuba is a 89 year old female who presents with increasing shortness of breath and cough over the past week. She's currently rehabbing at [facility] (formerly [facility]) after a pelvic fracture last week. Evaluation in the emergency department is concerning for new onset CHF with increased peripheral edema, elevated DNP at 11,792 with known history of CAD with stent placement in the past. Chest x-ray showing bilateral vascular congestion and even though she's had a cough, there's been no signs of an infectious process. Patient will be 8 admitted to inpatient status and will start on a diuretic. Will order and echocardiogram for later today to evaluate her systolic function. With her inactivity, some concern on the possible pulmonary embolism although with her elevated creatinine, she would be not a good candidate for chest CT. Look for right-sided pressure increase on Echo and if present may need a VQ scan. Expect she will be in the hospital for at least 2 days as we diaries are and then likely discharge back to rehab.    Active Problems:    Acute congestive heart failure (H)    Assessment: presenting with increasing shortness of breath and cough with an elevated BNP and some peripheral edema. She had normal echocardiogram in 2013 and nothing that suggests an acute trigger. She has been immobile in the nursing home after a pelvis fracture. Worry somewhat about possibly having a pulmonary embolism,     Plan: admit with echocardiogram later this morning. Continue her beta blocker. Start diuresis with Lasix. Hold ACE/ARB until Echo back.Check d-dimer, consider VQ scan if right-sided ushers increased    Essential hypertension    Assessment: stable, taking Norvasc and beta blocker    Plan: continue    Coronary artery disease  involving native coronary artery of native heart without angina pectoris    Assessment: history of MI with stent placement in past. No acute changes on EKG with normal troponin to suggest new process    Plan: follow, continue Plavix    Hypothyroidism due to acquired atrophy of thyroid    Assessment: taking oral Synthroid    Plan: continue    Type 2 diabetes mellitus without complication (H)    Assessment: currently diet controlled. Previously on glipizide    Plan: follow blood sugars with sliding scale coverage    Chronic kidney disease, stage III (moderate)    Assessment: creatinine up somewhat over the past several months    Plan: follow    Anemia    Assessment: stable    Plan: follow    Hyperlipidemia LDL goal <100    Assessment: taking Zocor    Plan: continue  DVT Prophylaxis: Pneumatic Compression Devices  Code Status: DNR per previous discussions    Disposition: Expected discharge in 2-3 days once breathing better,.    Jaret Grey MD    Primary Care Physician   Samantha Celis    Chief Complaint   89-year-old female with increasing cough and shortness of breath    History is obtained from the patient, electronic health record and emergency department physician    History of Present Illness   Mary Ellen Cuba is a 89 year old female who presents with cough with increasing shortness of breath over the past 5 to 7 days. Currently rehabbing a [facility] after a fall with a pelvis fracture. Prior to that it had a hip fracture in January. She complains of congestion or chest with inability to cough it up. Feeling more short of air. Has known history of previous MI with stent placement years ago, continues on Plavix. Normal echocardiogram with normal LV function in 2013. She complains some chest tightness with attempt to cough. Some chest pain with coughing. She denies fever, chills, sweats. Complains of increased swelling in her legs. She denies abdominal pain, nausea vomiting. As an unable to  get up and walk because a recent fractures.    Past Medical History    I have reviewed this patient's medical history and updated it with pertinent information if needed.   Past Medical History:   Diagnosis Date     CAD (coronary artery disease)     remote hx of stent placement in past     CVA (cerebral vascular accident) (H) 2013    some left sided deficits     Diabetes mellitus (H)      Hyperlipemia      Hypertension        Past Surgical History   I have reviewed this patient's surgical history and updated it with pertinent information if needed.  Past Surgical History:   Procedure Laterality Date     APPENDECTOMY       GI SURGERY      gwendolyn     GI SURGERY      hemicolectomy     GYN SURGERY      oopherectomy     LASER YAG CAPSULOTOMY  3/21/2013    Procedure: LASER YAG CAPSULOTOMY;  yag capsulotomy bilateral eyes;  Surgeon: Deandre Nugent MD;  [facility]     OPEN REDUCTION INTERNAL FIXATION HIP NAILING Left 1/20/2017    Procedure: OPEN REDUCTION INTERNAL FIXATION HIP NAILING;  Surgeon: Liborio Hoffman DO;  Location: [facility]     PHACOEMULSIFICATION WITH STANDARD INTRAOCULAR LENS IMPLANT  6/30/2011    Procedure:PHACOEMULSIFICATION WITH STANDARD INTRAOCULAR LENS IMPLANT; Surgeon:DEANDRE NUGENT; Location:[facility]     PHACOEMULSIFICATION WITH STANDARD INTRAOCULAR LENS IMPLANT  7/21/2011    Procedure:PHACOEMULSIFICATION WITH STANDARD INTRAOCULAR LENS IMPLANT; Surgeon:DEANDRE NUGENT; Location:[facility] OR     STENT, CORONARY, DIOGENES         Prior to Admission Medications   Prior to Admission Medications   Prescriptions Last Dose Informant Patient Reported? Taking?   Calcium-Vitamin D (CALCIUM + D PO) 5/21/17 at 1700  Yes No   Sig: Take 1 tablet by mouth daily.   Multiple Vitamins-Minerals (CENTRUM SILVER ULTRA WOMENS) TABS 5/21/17 at 1700  Yes No   Sig: Take 1 tablet by mouth daily.   VITAMIN D, CHOLECALCIFEROL, PO 5/21/17 at 1700  Yes No   Sig: Take  by mouth once a week.   acetaminophen (TYLENOL) 500 MG tablet  5/25/2017 at 1700  No Yes   Sig: Take 2 tablets (1,000 mg) by mouth 3 times daily   allopurinol (ZYLOPRIM) 100 MG tablet 5/25/2017 at 1700  No Yes   Sig: Take 1 tablet (100 mg) by mouth 2 times daily   amLODIPine (NORVASC) 5 MG tablet 5/22/17 at 0800  No Yes   Sig: Take 1 tablet (5 mg) by mouth daily   aspirin 325 MG tablet 5/25/2017 at 1700  No Yes   Sig: Take 1 tablet (325 mg) by mouth daily   clopidogrel (PLAVIX) 75 MG tablet 5/25/2017 at 0800  No Yes   Sig: Take 1 tablet (75 mg) by mouth daily   fenofibrate 160 MG tablet 5/22/17 at 0800  No Yes   Sig: Take 1 tablet (160 mg) by mouth daily   folic acid (FOLVITE) 400 MCG tablet 5/21/17 at 1700  Yes No   Sig: Take 400 mcg by mouth daily.   levothyroxine (SYNTHROID/LEVOTHROID) 112 MCG tablet 5/25/2017 at 0730  No Yes   Sig: Take 1 tablet (112 mcg) by mouth daily   metoprolol (TOPROL XL) 25 MG 24 hr tablet 5/25/2017 at 1200  No Yes   Sig: Take 1 tablet (25 mg) by mouth daily   oxyCODONE (ROXICODONE) 5 MG IR tablet 5/24/17 at 2000  No No   Sig: Take 0.5-1 tablets (2.5-5 mg) by mouth every 6 hours as needed for moderate to severe pain   potassium citrate (UROCIT-K) 5 MEQ (540 MG) CR tablet 5/25/2017 at 1700  No Yes   Sig: Take 2 tablets (10 mEq) by mouth daily   senna-docusate (SENOKOT-S;PERICOLACE) 8.6-50 MG per tablet 5/22/17 at 0800  Yes No   Sig: Take 1-2 tablets by mouth 2 times daily as needed (constipation )   simvastatin (ZOCOR) 10 MG tablet 5/25/2017 at 2000  No Yes   Sig: Take 1 tablet (10 mg) by mouth At Bedtime      Facility-Administered Medications: None     Allergies   Allergies   Allergen Reactions     Hmg-Coa-R Inhibitors      Metformin GI Disturbance       Social History   I have reviewed this patient's social history and updated it with pertinent information if needed. Mary Ellen Cuba  reports that she has never smoked. She does not have any smokeless tobacco history on file. She reports that she does not drink alcohol or use illicit  drugs.    Family History   I have reviewed this patient's family history and updated it with pertinent information if needed.   Family History   Problem Relation Age of Onset     Colon Cancer Mother      Prostate Cancer Brother      Peptic Ulcer Disease Father        Review of Systems   The 10 point Review of Systems is negative other than noted in the HPI or here.     Physical Exam   Temp: 99.4  F (37.4  C) Temp src: Oral BP: 108/73   Heart Rate: 84 Resp: 16 SpO2: 92 % O2 Device: None (Room air)    Vital Signs with Ranges  Temp:  [99.4  F (37.4  C)] 99.4  F (37.4  C)  Heart Rate:  [84] 84  Resp:  [16] 16  BP: (108-180)/(66-79) 108/73  SpO2:  [92 %-93 %] 92 %  135 lbs 0 oz    EXAM:  Constitutional: alert, mild distress and cooperative   Cardiovascular: PMI normal. No lifts, heaves, or thrills. RRR. No murmurs, clicks gallops or rub, negative findings: no JVD  Respiratory: crackle at bases  Psychiatric: mentation appears normal and affect normal/bright  Head: Normocephalic. No masses, lesions, tenderness or abnormalities  Neck: Neck supple. No adenopathy. Thyroid symmetric, normal size,  ENT: ENT exam normal, no neck nodes or sinus tenderness  Abdomen: Abdomen soft, non-tender. BS normal. No masses, organomegaly  NEURO:  Reflexes normal and symmetric. Sensation grossly WNL.  SKIN: no suspicious lesions or rashes  LYMPH: Normal cervical lymph nodes  JOINT/EXTREMITIES: normal muscle tone and positive  ankle edema     Data   Data reviewed today:  I personally reviewed the EKG tracing showing Regular sinus rhythm with no acute changes and the chest x-ray image(s) showing Bilateral small pleural effusion with atelectasis.    Recent Labs  Lab 05/26/17  0342   WBC 9.3   HGB 10.7*   MCV 93         POTASSIUM 4.1   CHLORIDE 108   CO2 28   BUN 28   CR 1.79*   ANIONGAP 7   ZULEMA 7.6*   *   ALBUMIN 2.2*   PROTTOTAL 5.6*   BILITOTAL 0.2   ALKPHOS 142   ALT 23   AST 40   TROPI 0.038       Recent Results (from the  past 24 hour(s))   XR Chest 2 Views    Narrative    CHEST 2 VIEWS  5/26/2017 4:12 AM     HISTORY: Fever. Shortness of breath. Cough.    COMPARISON: None.    FINDINGS: Small bilateral pleural effusions with adjacent opacities  that most likely represent atelectasis. The lungs are otherwise clear.  Mild cardiomegaly. Atherosclerotic calcification in the thoracic  aorta.      Impression    IMPRESSION:  1. Small bilateral pleural effusions with adjacent atelectasis.  2. The lungs are otherwise clear.    JESSA LEE MD

## 2017-05-26 NOTE — IP AVS SNAPSHOT
Mary Ellen Cuba #[id number] (CSN: [id number])  (89 year old F)  (Adm: 17)     [id number]               [address] MEDICAL SURGICAL: [phone]            Patient Demographics     Patient Name Sex          Age SSN Address Phone    Mary Ellen Cuba Female 1924 (89 year old) [id number] [address] [facility] [address] [phone] (Home)      Emergency Contact(s)     Name Relation Home Work Mobile    Sally Alexander Other [phone]      Sukhjinder Mayer Other [phone]      Derek Mayer Brother [phone]      BRII MANN Friend [phone]        Admission Information     Attending Provider Admitting Provider Admission Type Admission Date/Time    Gurvinder Diaz MD Gould, Wilfred Edwin, MD Emergency 17  0324    Discharge Date Hospital Service Auth/Cert Status Service Area     Hospitalist [facility]    Unit Room/Bed Admission Status       [facility] MEDICAL SURGICAL  Admission (Confirmed)       Admission     Complaint    CHF (congestive heart failure) (H)      Hospital Account     Name Acct ID Class Status Primary Coverage    Mary Ellen Cuba [id number] Inpatient Open MEDICARE - MEDICARE RR FOR HB SUPPLEMENT            Guarantor Account (for Hospital Account #[id number])     Name Relation to Pt Service Area Active? Acct Type    Mary Ellen Cuba Self FCS Yes Personal/Family    Address Phone          [address] [place] [address] [phone](H)              Coverage Information (for Hospital Account #[id number])     1. MEDICARE/MEDICARE RR FOR HB SUPPLEMENT     F/O Payor/Plan Precert #    MEDICARE/MEDICARE RR FOR HB SUPPLEMENT     Subscriber Subscriber #    Mary Ellen Cuba [id number]    Address Phone    [address]  [place] [address] [phone]          2. HE[employer] PART[employer]ERS/[employer] FREEDOM PLAN     F/O Payor/Plan Precert #    [employer]REED[employer] PLAN      "Subscriber Subscriber #    Mary Ellen Cuba [id number]    Address Phone    [address]  [place] [address] [phone]                                                      INTERAGENCY TRANSFER FORM - PHYSICIAN ORDERS   5/26/2017                       [address] SURGICAL: [phone]            Attending Provider: Gurvinder Diaz MD     Allergies:  Hmg-coa-r Inhibitors, Metformin    Infection:  None   Service:  HOSPITALIST    Ht:  1.6 m (5' 3\")   Wt:  61.5 kg (135 lb 9.3 oz)   Admission Wt:  61.2 kg (135 lb)    BMI:  24.02 kg/m 2   BSA:  1.65 m 2            ED Clinical Impression     Diagnosis Description Comment Added By Time Added    Acute combined systolic and diastolic congestive heart failure (H) [I50.41] Acute combined systolic and diastolic congestive heart failure (H) [I50.41]  Bala Kenney MD 5/26/2017  4:27 AM    Acute pulmonary edema (H) [J81.0] Acute pulmonary edema (H) [J81.0]  Bala Kenney MD 5/26/2017  4:28 AM    Fever presenting with conditions classified elsewhere [R50.81] Fever presenting with conditions classified elsewhere [R50.81]  Bala Kenney MD 5/26/2017  4:28 AM      Hospital Problems as of 5/30/2017              Priority Class Noted POA    Essential hypertension Medium  1/19/2017 Yes    Coronary artery disease involving native coronary artery of native heart without angina pectoris Medium  1/19/2017 Yes    Hyperlipidemia LDL goal <100 Medium  1/19/2017 Yes    Hypothyroidism due to acquired atrophy of thyroid Medium  1/19/2017 Yes    Type 2 diabetes mellitus without complication (H) Medium  1/19/2017 Yes    Chronic kidney disease, stage III (moderate) Medium  1/19/2017 Yes    Anemia Medium  1/19/2017 Yes    Acute congestive heart failure (H) Medium  5/26/2017 Yes    CHF (congestive heart failure) (H) Medium  5/26/2017 Yes      Non-Hospital Problems as of 5/30/2017              Priority Class Noted    Hypernatremia Medium  " 1/19/2017    Fractured hip, left, closed, initial encounter (H) Medium  1/20/2017    Urinary retention Medium  1/21/2017    Closed fracture of left inferior and superior pubic rami Medium  5/15/2017      Code Status History     Date Active Date Inactive Code Status Order ID Comments User Context    5/30/2017  9:30 AM  DNR [id number]  Gurvinder Diaz MD Outpatient    5/26/2017  6:00 AM 5/30/2017  9:30 AM DNR [id number]  Jaret Grey MD Inpatient    5/17/2017 10:40 AM 5/26/2017  6:00 AM DNR [id number]  Cordell Fountain MD Outpatient    5/15/2017 11:41 PM 5/17/2017 10:40 AM DNR [id number]  Cordell Villanueva MD Inpatient    1/22/2017  8:51 AM 5/15/2017 11:41 PM Full Code [id number]  Liborio Hoffman DO Outpatient    1/20/2017  1:38 PM 1/22/2017  8:51 AM Full Code [id number]  Liborio Hoffman DO Inpatient    1/19/2017  8:20 PM 1/20/2017  1:38 PM Full Code [id number]  Jaret Grey MD Inpatient      Current Code Status     Date Active Code Status Order ID Comments User Context       Prior      Summary of Visit     Reason for your hospital stay       Acute heart failure                Medication Review      START taking        Dose / Directions Comments    furosemide 20 MG tablet   Commonly known as:  LASIX   Used for:  Acute combined systolic and diastolic congestive heart failure (H), Benign essential hypertension        Dose:  20 mg   Take 1 tablet (20 mg) by mouth 2 times daily   Quantity:  30 tablet   Refills:  0          CONTINUE these medications which may have CHANGED, or have new prescriptions. If we are uncertain of the size of tablets/capsules you have at home, strength may be listed as something that might have changed.        Dose / Directions Comments    amLODIPine 5 MG tablet   Commonly known as:  NORVASC   This may have changed:  how much to take   Used for:  Benign essential hypertension        Dose:  10 mg   Take 2 tablets (10 mg) by mouth daily   Quantity:  30 tablet    Refills:  1        potassium citrate 5 MEQ (540 MG) CR tablet   Commonly known as:  UROCIT-K   This may have changed:  how much to take   Used for:  Hypokalemia        Dose:  20 mEq   Take 4 tablets (20 mEq) by mouth daily   Quantity:  120 tablet   Refills:  1          CONTINUE these medications which have NOT CHANGED        Dose / Directions Comments    acetaminophen 500 MG tablet   Commonly known as:  TYLENOL   Used for:  Closed left hip fracture, initial encounter (H)        Dose:  1000 mg   Take 2 tablets (1,000 mg) by mouth 3 times daily   Refills:  0        allopurinol 100 MG tablet   Commonly known as:  ZYLOPRIM   Used for:  Chronic gout, unspecified cause, unspecified site        Dose:  100 mg   Take 1 tablet (100 mg) by mouth 2 times daily   Quantity:  30 tablet   Refills:  0        aspirin 325 MG tablet   Used for:  Closed left hip fracture, initial encounter (H)        Dose:  325 mg   Take 1 tablet (325 mg) by mouth daily   Quantity:  120 tablet   Refills:  0    Hold while receiving Lovenox       CALCIUM + D PO        Dose:  1 tablet   Take 1 tablet by mouth daily.   Refills:  0        CENTRUM SILVER ULTRA WOMENS Tabs        Dose:  1 tablet   Take 1 tablet by mouth daily.   Refills:  0        clopidogrel 75 MG tablet   Commonly known as:  PLAVIX   Used for:  Coronary artery disease involving native coronary artery of native heart without angina pectoris        Dose:  75 mg   Take 1 tablet (75 mg) by mouth daily   Quantity:  30 tablet   Refills:  0        fenofibrate 160 MG tablet   Used for:  Hypercholesterolemia        Dose:  160 mg   Take 1 tablet (160 mg) by mouth daily   Quantity:  30 tablet   Refills:  0        folic acid 400 MCG tablet   Commonly known as:  FOLVITE        Dose:  400 mcg   Take 400 mcg by mouth daily.   Refills:  0        levothyroxine 112 MCG tablet   Commonly known as:  SYNTHROID/LEVOTHROID   Used for:  Other specified hypothyroidism        Dose:  112 mcg   Take 1 tablet (112  mcg) by mouth daily   Refills:  0        metoprolol 25 MG 24 hr tablet   Commonly known as:  TOPROL XL   Used for:  Benign essential hypertension        Dose:  25 mg   Take 1 tablet (25 mg) by mouth daily   Quantity:  30 tablet   Refills:  0        oxyCODONE 5 MG IR tablet   Commonly known as:  ROXICODONE   Used for:  Pubic ramus fracture, left, closed, initial encounter (H), Fall on same level due to nature of surface, initial encounter        Dose:  2.5-5 mg   Take 0.5-1 tablets (2.5-5 mg) by mouth every 6 hours as needed for moderate to severe pain   Quantity:  30 tablet   Refills:  0        senna-docusate 8.6-50 MG per tablet   Commonly known as:  SENOKOT-S;PERICOLACE   Used for:  Closed left hip fracture, initial encounter (H)        Dose:  1-2 tablet   Take 1-2 tablets by mouth 2 times daily as needed (constipation )   Quantity:  100 tablet   Refills:  0    Indication: Constipation       simvastatin 10 MG tablet   Commonly known as:  ZOCOR   Used for:  Hypercholesterolemia        Dose:  10 mg   Take 1 tablet (10 mg) by mouth At Bedtime   Quantity:  30 tablet   Refills:  0        VITAMIN D (CHOLECALCIFEROL) PO        Take  by mouth once a week.   Refills:  0                After Care     Activity - Up with nursing assistance       And per physical therapy for advancement of activity       Advance Diet as Tolerated       Follow this diet upon discharge: Orders Placed This Encounter      Moderate Consistent CHO Diet and 2g sodium       Daily weights       Call Provider for weight gain of more than 2 pounds per day or 5 pounds per week.       Fall precautions           General info for SNF       Length of Stay Estimate: Short Term Care: Estimated # of Days <30  Condition at Discharge: Improving  Level of care:skilled   Rehabilitation Potential: Good  Admission H&P remains valid and up-to-date: Yes  Recent Chemotherapy: N/A  Use Nursing Home Standing Orders: Yes       Mantoux instructions       Give two-step  "Andraex (PPD) Per Facility Policy Yes             General Recommendations To Control Heart Failure When You Get Home (Give at DC from U)     Instructions To Patients and Families: Please read and check off each of these important instructions as you do them when you get home.           Weight and symptoms      ___ Put a scale in your bathroom  ___ Post a weight chart or calendar next to the scale  ___Weigh yourself every day as soon as you get up in the morning. You should only be wearing your pajamas. Write your weight on the chart/calendar.  ___ Bring your weight chart/calendar with you to all appointments    ___Call your doctor if you gain 2 pounds in 1 day or 5 pounds in 1 week from your \"dry\" weight (baseline weight). Also call your doctor if you have shortness of breath that gets worse over time, leg swelling or fatigue.         Medicines and diet     ___ Make sure to take your medicines as prescribed.    ___Bring a current list of your medicines and all of your medicine bottles with you to all appointments.    ___ Limit fluids if you still have swelling or shortness of breath, or if your doctor tells you to do so.  ___ Eat less than 2000 mg of sodium (salt) every day. Read food labels, and do not add salt to meals.   ___ Heart healthy diet with low fat and low cholesterol          Activity and suggested lifestyle changes    ___ Stay active. Talk to your doctor about an exercise program that is safe for your heart.    ___ Stop smoking. Reduce alcohol use.      ___ Lose weight if you are overweight. Extra weight puts a lot of stress on the heart.          Control for Leg Swelling   ___ Keep your legs elevated (raised) as needed for swelling. If swelling is uncomfortable or elevation doesn t help, ask your doctor about using ACE wrap or Jobst stockings.          Follow-up appointments   ____ Follow up with your doctor within 7-10 days after discharge.    ___ Make sure to take your medications as prescribed and " "bring an accurate list of your medications and your weight chart/calendar to your follow up appointment.           Referrals     Occupational Therapy Adult Consult       Evaluate and treat as clinically indicated.    Reason:  Hip pain       Physical Therapy Adult Consult       Evaluate and treat as clinically indicated.    Reason:  Hip pain             Follow-Up Appointment Instructions     Follow Up and recommended labs and tests       Follow up with senior living physician.  The following labs/tests are recommended: BMP in 2-3 days.             Your next 10 appointments already scheduled     Jun 05, 2017 10:10 AM CDT   [facility]    [address]  [address]  [facility] [address]   [phone]              Statement of Approval     Ordered          05/30/17 1020  I have reviewed and agree with all the recommendations and orders detailed in this document.  EFFECTIVE NOW     Approved and electronically signed by:  Gurvinder Diaz MD                                                 INTERAGENCY TRANSFER FORM - NURSING   5/26/2017                       [address] MEDICAL SURGICAL: [phone]            Attending Provider: Gurvinder Diaz MD     Allergies:  Hmg-coa-r Inhibitors, Metformin    Infection:  None   Service:  HOSPITALIST    Ht:  1.6 m (5' 3\")   Wt:  61.5 kg (135 lb 9.3 oz)   Admission Wt:  61.2 kg (135 lb)    BMI:  24.02 kg/m 2   BSA:  1.65 m 2            Advance Directives        Does patient have a scanned Advance Directive/ACP document in EPIC?           Yes        Immunizations     None      ASSESSMENT     Discharge Profile Flowsheet     EXPECTED DISCHARGE     Swallowing  0-->swallows foods/liquids without difficulty 05/30/17 1117    Expected Discharge Date  05/30/17 05/28/17 1023   Change in Functional Status Since Onset of Current Illness/Injury  yes 05/17/17 1032    DISCHARGE NEEDS ASSESSMENT   "   GASTROINTESTINAL (ADULT,PEDIATRIC,OB)      Concerns To Be Addressed  all concerns addressed in this encounter 01/23/17 1316   GI WDL  WDL 05/30/17 0800    Patient/family verbalizes understanding of discharge plan recommendations?  Yes 05/29/17 2355   Last Bowel Movement  05/27/17 05/29/17 1752    Medical Team notified of plan?  yes 05/29/17 2355   Passing flatus  yes 05/29/17 1752    Readmission Within The Last 30 Days  current reason for admission unrelated to previous admission 05/29/17 2355   COMMUNICATION ASSESSMENT      Outpatient/Agency/Support Group Needs  inpatient rehabilitation facility (specify);skilled nursing facility (specify) 05/26/17 0640   Patient's communication style  spoken language (English or Bilingual) 05/26/17 0623    Anticipated Changes Related to Illness  inability to care for self 05/29/17 2355   FINAL RESOURCES      Equipment Currently Used at Home  wheelchair;cane, straight 05/27/17 1043   PAS Number  [id number] 05/17/17 1126    Transportation Available  van, wheelchair accessible 05/29/17 2355   SKIN      Key Recommendations  TCU until able to be more mobile patient has friends that help her at home but no one that can stay and help 24/7 05/17/17 1128   Inspection  Full 05/30/17 0800    Does Patient Need a Referral for Clinic CC  No 05/17/17 1130   Skin WDL  ex 05/30/17 0800    FUNCTIONAL LEVEL CURRENT     Skin Color/Characteristics  redness blanchable 05/30/17 0800    Ambulation  3-->assistive equipment and person 05/30/17 1117   Skin Temperature  warm 05/30/17 0058    Transferring  3-->assistive equipment and person 05/30/17 1117   Skin Moisture  dry 05/30/17 0058    Toileting  3-->assistive equipment and person 05/30/17 1117   Skin Elasticity  quick return to original state 05/30/17 0058    Bathing  2-->assistive person 05/30/17 1117   Skin Integrity  intact 05/30/17 0800    Dressing  2-->assistive person 05/30/17 1117   SAFETY      Eating  0-->independent 05/30/17 1117   Safety WDL  " WDL 05/30/17 0800    Communication  0-->understands/communicates without difficulty 05/30/17 1117                      Assessment WDL (Within Defined Limits) Definitions           Safety WDL     Effective: 09/28/15    Row Information: <b>WDL Definition:</b> Bed in low position, wheels locked; call light in reach; upper side rails up x 2; ID band on<br> <font color=\"gray\"><i>Item=AS safety wdl>>List=AS safety wdl>>Version=F14</i></font>      Skin WDL     Effective: 09/28/15    Row Information: <b>WDL Definition:</b> Warm; dry; intact; elastic; without discoloration; pressure points without redness<br> <font color=\"gray\"><i>Item=AS skin wdl>>List=AS skin wdl>>Version=F14</i></font>      Vitals     Vital Signs Flowsheet     VITAL SIGNS     Pain Control  fully effective 05/27/17 1306    Temp  97.5  F (36.4  C) 05/30/17 0743   Functioning  can do everything I need to 05/27/17 1306    Temp src  Oral 05/30/17 0743   Sleep  normal sleep 05/27/17 1306    Resp  18 05/30/17 0743   HEIGHT AND WEIGHT      Pulse  67 05/30/17 0743   Height  1.6 m (5' 3\") 05/26/17 0619    Heart Rate  67 05/30/17 0743   Height Method  Estimated 05/26/17 0326    Pulse/Heart Rate Source  Monitor 05/30/17 0743   Weight  61.5 kg (135 lb 9.3 oz) 05/30/17 0553    BP  178/72 05/30/17 0743   BSA (Calculated - sq m)  1.69 05/26/17 0619    BP Location  Right arm 05/30/17 0743   BMI (Calculated)  25.12 05/26/17 0619    OXYGEN THERAPY     POSITIONING      SpO2  92 % 05/30/17 0743   Body Position  left, side-lying 05/30/17 0058    O2 Device  None (Room air) 05/30/17 0743   Head of Bed (HOB)  HOB at 20 degrees 05/30/17 0058    PAIN/COMFORT     Positioning/Transfer Devices  pillows 05/30/17 0058    Patient Currently in Pain  denies 05/29/17 1537   Chair  Upright in chair 05/29/17 0817    Preferred Pain Scale  CAPA (Clinically Aligned Pain Assessment) ([facility], [facility] Adults Only) 05/27/17 1306   DAILY CARE      0-10 Pain Scale  10 05/28/17 9523   " Activity Type  up in chair 05/30/17 0800    Pain Location  Leg 05/27/17 1306   Activity Level of Assistance  assistance, 1 person 05/30/17 0058    Pain Orientation  Left 05/27/17 1306   Activity Assistive Device  gait belt;walker 05/30/17 0058    Pain Descriptors  Aching 05/27/17 1306   Additional Documentation  Activity Device Assistance (Row) 05/28/17 1800    Pain Intervention(s)  Repositioned 05/27/17 1306   EKG MONITORING      CLINICALLY ALIGNED PAIN ASSESSMENT (CAPA) ([facility], [facility] AND [facility] ADULTS ONLY)     Cardiac Regularity  Regular 05/27/17 1156    Comfort  negligible pain 05/27/17 1306   Cardiac Rhythm  NSR 05/27/17 1156    Change in Pain  getting better 05/27/17 1306                 Patient Lines/Drains/Airways Status    Active LINES/DRAINS/AIRWAYS     Name: Placement date: Placement time: Site: Days: Last dressing change:    Incision/Surgical Site 01/20/17 Left Hip 01/20/17   1154    130             Patient Lines/Drains/Airways Status    Active PICC/CVC     None            Intake/Output Detail Report     Date Intake     Output Net    Shift P.O. I.V. IV Piggyback Total Urine Total       Noc 05/28/17 2300 - 05/29/17 0659 300 -- -- 300 700 700 -400    Day 05/29/17 0700 - 05/29/17 1459 1020 -- -- 1020 100 100 920    Kezia 05/29/17 1500 - 05/29/17 2259 100 -- -- 100 250 250 -150    Noc 05/29/17 2300 - 05/30/17 0659 -- -- -- -- 300 300 -300    Day 05/30/17 0700 - 05/30/17 1459 -- -- -- -- -- -- 0      Last Void/BM       Most Recent Value    Urine Occurrence 1 at 05/29/2017 1200    Stool Occurrence 1 at 05/29/2017 1200      Case Management/Discharge Planning     Case Management/Discharge Planning Flowsheet     REFERRAL INFORMATION     Expected Discharge Date  05/30/17 05/28/17 1023    Did the Initial Social Work Assessment result in a Social Work Case?  Yes 05/27/17 1020   ASSESSMENT/CONCERNS TO BE ADDRESSED      Admission Type  inpatient 05/27/17 1020   Concerns To Be Addressed  all concerns addressed in this  encounter 01/23/17 1316    Arrived From  skilled nursing facility 05/27/17 1020   DISCHARGE PLANNING      Referral Source  physician 05/27/17 1020   Patient/family verbalizes understanding of discharge plan recommendations?  Yes 05/29/17 2355    Reason For Consult  discharge planning 05/27/17 1020   Medical Team notified of plan?  yes 05/29/17 2355    Record Reviewed  history and physical;medical record 05/27/17 1020   Readmission Within The Last 30 Days  current reason for admission unrelated to previous admission 05/29/17 2355     Assigned to Case  Jody  05/27/17 1020   Anticipated Changes Related to Illness  inability to care for self 05/29/17 2355    Primary Care Clinic Name  [phone] 05/27/17 1020   Transportation Available  van, wheelchair accessible 05/29/17 2355    Primary Care MD Name  Dr. Samantha Celis  05/27/17 1020   Key Recommendations  TCU until able to be more mobile patient has friends that help her at home but no one that can stay and help 24/7 05/17/17 1128    LIVING ENVIRONMENT     Does Patient Need a Referral for Clinic CC  No 05/17/17 1130    Lives With  facility resident 05/27/17 1043   Outpatient/Agency/Support Group Needs  inpatient rehabilitation facility (specify);skilled nursing facility (specify) 05/26/17 0640    Living Arrangements  extended care facility 05/27/17 1043   Discharge Plan Concerns  concerns to be addressed (return to [facility] in [place] for rehab) 05/26/17 0640    Provides Primary Care For  no one 05/27/17 1020   FINAL NOTE      Primary Care Provided By  other (see comments) (Staff at the TCU at [facility] ) 05/27/17 1020   Final Note  D/C back to [facility] via [employer] 05/30/17 1142    Quality Of Family Relationships  supportive;involved 05/27/17 1020   FINAL RESOURCES      Able to Return to Prior Living Arrangements  yes (Patient has held her bed at [facility] ) 05/27/17 1020   Equipment Currently Used at Home  wheelchair;cane,  straight 05/27/17 1043    HOME SAFETY     Lists of hospitals in the [place] Number  [phone] 05/17/17 1126    Patient Feels Safe Living in Home?  yes 05/27/17 1020   ABUSE RISK SCREEN      ASSESSMENT OF FAMILY/SOCIAL SUPPORT     QUESTION TO PATIENT:  Has a member of your family or a partner(now or in the past) intimidated, hurt, manipulated, or controlled you in any way?  patient declined to answer or is unable to answer 05/26/17 0327    Marital Status   05/27/17 1020   QUESTION TO PATIENT: Do you feel safe going back to the place where you are living?  patient declined to answer or is unable to answer 05/26/17 0327    Who is your support system?  Other (specify) (Nephew, Sukhjinder, and close friends ) 05/27/17 1020   OBSERVATION: Is there reason to believe there has been maltreatment of a vulnerable adult (ie. Physical/Sexual/Emotional abuse, self neglect, lack of adequate food, shelter, medical care, or financial exploitation)?  no 05/26/17 0327    Description of Support System  Supportive;Involved 05/27/17 1020   (R) MENTAL HEALTH SUICIDE RISK      Quality of Family Relationships  supportive;involved;evident 05/27/17 1020   Are you depressed or being treated for depression?  No 05/26/17 0632    COPING/STRESS     HOMICIDE RISK      Major Change/Loss/Stressor  hospitalization 05/26/17 0632   Homicidal Ideation  no 05/26/17 0327    EXPECTED DISCHARGE                         [address] SURGICAL: [phone]            Medication Administration Report for Mary Ellen Cuba as of 05/30/17 1797   Legend:    Given Hold Not Given Due Canceled Entry Other Actions    Time Time (Time) Time  Time-Action       Inactive    Active    Linked        Medications 05/24/17 05/25/17 05/26/17 05/27/17 05/28/17 05/29/17 05/30/17    acetaminophen (TYLENOL) tablet 1,000 mg  Dose: 1,000 mg Freq: 3 TIMES DAILY Route: PO  Start: 05/26/17 0900   Admin Instructions: Maximum acetaminophen dose from all sources = 75 mg/kg/day not to exceed 4 gram        0818 (1,000 mg)-Given       1353 (1,000 mg)-Given       2041 (1,000 mg)-Given        0908 (1,000 mg)-Given       1421 (1,000 mg)-Given       2008 (1,000 mg)-Given        0825 (1,000 mg)-Given       1329 (1,000 mg)-Given       2128 (1,000 mg)-Given        0822 (1,000 mg)-Given       1344 (1,000 mg)-Given       2031 (1,000 mg)-Given        0831 (1,000 mg)-Given       [ ] 1400       [ ] 2100           allopurinol (ZYLOPRIM) tablet 100 mg  Dose: 100 mg Freq: 2 TIMES DAILY Route: PO  Start: 05/26/17 0900      0818 (100 mg)-Given       2041 (100 mg)-Given        0908 (100 mg)-Given       2008 (100 mg)-Given        0825 (100 mg)-Given       2129 (100 mg)-Given        0823 (100 mg)-Given       2031 (100 mg)-Given        0831 (100 mg)-Given       [ ] 2100           amLODIPine (NORVASC) tablet 10 mg  Dose: 10 mg Freq: DAILY Route: PO  Start: 05/28/17 0900        0826 (10 mg)-Given        0823 (10 mg)-Given        0831 (10 mg)-Given           aspirin tablet 325 mg  Dose: 325 mg Freq: DAILY Route: PO  Start: 05/26/17 0900      0818 (325 mg)-Given        0907 (325 mg)-Given        0825 (325 mg)-Given        0823 (325 mg)-Given        0830 (325 mg)-Given           calcium citrate-vitamin D (CITRACAL) 315-250 MG-UNIT per tablet 1 tablet  Dose: 1 tablet Freq: DAILY Route: PO  Start: 05/26/17 0900      0818 (1 tablet)-Given        0907 (1 tablet)-Given        0825 (1 tablet)-Given        0823 (1 tablet)-Given        0831 (1 tablet)-Given           clopidogrel (PLAVIX) tablet 75 mg  Dose: 75 mg Freq: DAILY Route: PO  Start: 05/26/17 0900      0818 (75 mg)-Given        0908 (75 mg)-Given        0826 (75 mg)-Given        0823 (75 mg)-Given        0831 (75 mg)-Given           Continuing beta blocker from home medication list OR beta blocker order already placed during this visit  Freq: DOES NOT GO TO MAR Route: XX  PRN Reason: other  Start: 05/26/17 0600   Admin Instructions: Continuing beta blocker from home medication list OR beta  blocker order already placed during this visit               docusate sodium (COLACE) capsule 100 mg  Dose: 100 mg Freq: 2 TIMES DAILY Route: PO  Start: 05/27/17 0900       0908 (100 mg)-Given       2008 (100 mg)-Given        0825 (100 mg)-Given       2129 (100 mg)-Given        0823 (100 mg)-Given       2030 (100 mg)-Given        0830 (100 mg)-Given       [ ] 2100           folic acid (FOLVITE) tablet 400 mcg  Dose: 400 mcg Freq: DAILY Route: PO  Start: 05/26/17 0900      0818 (400 mcg)-Given        0907 (400 mcg)-Given        0825 (400 mcg)-Given        0823 (400 mcg)-Given        0830 (400 mcg)-Given           furosemide (LASIX) tablet 20 mg  Dose: 20 mg Freq: 2 TIMES DAILY Route: PO  Start: 05/29/17 2100 2032 (20 mg)-Given        0831 (20 mg)-Given       [ ] 2100           glucose 40 % gel 15-30 g  Dose: 15-30 g Freq: EVERY 15 MIN PRN Route: PO  PRN Reason: low blood sugar  Start: 05/26/17 0600   Admin Instructions: Give 15 g for BG 51 to 69 mg/dL IF patient is conscious and able to swallow. Give 30 g for BG less than or equal to 50 mg/dL IF patient is conscious and able to swallow. Do NOT give glucose gel via enteral tube.  IF patient has enteral tube: give apple juice 120 mL (4 oz or 15 g of CHO) via enteral tube for BG 51 to 69 mg/dL.  Give apple juice 240 mL (8 oz or 30 g of CHO) via enteral tube for BG less than or equal to 50 mg/dL.              Or  dextrose 50 % injection 25-50 mL  Dose: 25-50 mL Freq: EVERY 15 MIN PRN Route: IV  PRN Reason: low blood sugar  Start: 05/26/17 0600   Admin Instructions: Use if have IV access, BG less than 70 mg/dL and meet dose criteria below:  Dose if conscious and alert (or disorientated) and NPO = 25 mL  Dose if unconscious / not alert = 50 mL  Vesicant.              Or  glucagon injection 1 mg  Dose: 1 mg Freq: EVERY 15 MIN PRN Route: SC  PRN Reason: low blood sugar  PRN Comment: May repeat x 1 only  Start: 05/26/17 0600   Admin Instructions: May give SQ or IM. IF  BG less than or equal to 50 mg/dL and no IV access.  ONLY use glucagon IF patient has NO IV access AND is UNABLE to swallow.               insulin aspart (NovoLOG) inj (RAPID ACTING)  Dose: 1-5 Units Freq: AT BEDTIME Route: SC  Start: 05/26/17 2100   Admin Instructions: MEDIUM INSULIN RESISTANCE DOSING    Do Not give Bedtime Correction Insulin if BG less than  200.   For  - 249 give 1 units.   For  - 299 give 2 units.   For  - 349 give 3 units.   For  -399 give 4 units.   For BG greater than or equal to 400 give 5 units.  Notify provider if glucose greater than or equal to 350 mg/dL after administration of correction dose.  If given at mealtime, must be administered 5 min before meal or immediately after.       (2205)-Not Given [C]        (2157)-Not Given [C]        (2129)-Not Given [C]        (2113)-Not Given        [ ] 2100           insulin aspart (NovoLOG) inj (RAPID ACTING)  Dose: 1-7 Units Freq: 3 TIMES DAILY BEFORE MEALS Route: SC  Start: 05/26/17 0800   Admin Instructions: Correction Scale - MEDIUM INSULIN RESISTANCE DOSING     Do Not give Correction Insulin if Pre-Meal BG less than 140.   For Pre-Meal  - 189 give 1 unit.   For Pre-Meal  - 239 give 2 units.   For Pre-Meal  - 289 give 3 units.   For Pre-Meal  - 339 give 4 units.   For Pre-Meal - 399 give 5 units.   For Pre-Meal -449 give 6 units  For Pre-Meal BG greater than or equal to 450 give 7 units.   To be given with prandial insulin, and based on pre-meal blood glucose.    Notify provider if glucose greater than or equal to 350 mg/dL after administration of correction dose.  If given at mealtime, must be administered 5 min before meal or immediately after.       (0819)-Not Given       1229 (1 Units)-Given       (1640)-Not Given [C]        (0908)-Not Given       (1159)-Not Given       (1635)-Not Given [C]        (0826)-Not Given       (1158)-Not Given       (1928)-Not Given        (0824)-Not  Given       (1131)-Not Given       (1715)-Not Given        (0832)-Not Given       (1258)-Not Given       [ ] 1700           levothyroxine (SYNTHROID/LEVOTHROID) tablet 112 mcg  Dose: 112 mcg Freq: DAILY Route: PO  Start: 05/26/17 0700      0817 (112 mcg)-Given        0620 (112 mcg)-Given        0810 (112 mcg)-Given        0651 (112 mcg)-Given        0831 (112 mcg)-Given           metoprolol (TOPROL-XL) 24 hr tablet 25 mg  Dose: 25 mg Freq: DAILY Route: PO  Start: 05/26/17 0900   Admin Instructions: DO NOT CRUSH. Tablet may be split in half along score line.       0818 (25 mg)-Given        0908 (25 mg)-Given        0825 (25 mg)-Given        0823 (25 mg)-Given        0831 (25 mg)-Given           multivitamin, therapeutic with minerals (THERA-VIT-M) tablet 1 tablet  Dose: 1 tablet Freq: DAILY Route: PO  Start: 05/26/17 0900      0818 (1 tablet)-Given        0908 (1 tablet)-Given        0825 (1 tablet)-Given        0822 (1 tablet)-Given        0831 (1 tablet)-Given           naloxone (NARCAN) injection 0.1-0.4 mg  Dose: 0.1-0.4 mg Freq: EVERY 2 MIN PRN Route: IV  PRN Reason: opioid reversal  Start: 05/26/17 0600   Admin Instructions: For respiratory rate LESS than or EQUAL to 8.  Partial reversal dose:  0.1 mg titrated q 2 minutes for Analgesia Side Effects Monitoring Sedation Level of 3 (frequently drowsy, arousable, drifts to sleep during conversation).Full reversal dose:  0.4 mg bolus for Analgesia Side Effects Monitoring Sedation Level of 4 (somnolent, minimal or no response to stimulation).               ondansetron (ZOFRAN-ODT) ODT tab 4 mg  Dose: 4 mg Freq: EVERY 6 HOURS PRN Route: PO  PRN Reason: nausea  Start: 05/26/17 0600   Admin Instructions: This is Step 1 of nausea and vomiting management.  If nausea not resolved in 15 minutes, go to Step 2 prochlorperazine (COMPAZINE). Do not push through foil backing. Peel back foil and gently remove. Place on tongue immediately. Administration with liquid unnecessary               Or  ondansetron (ZOFRAN) injection 4 mg  Dose: 4 mg Freq: EVERY 6 HOURS PRN Route: IV  PRN Reasons: nausea,vomiting  Start: 05/26/17 0600   Admin Instructions: This is Step 1 of nausea and vomiting management.  If nausea not resolved in 15 minutes, go to Step 2 prochlorperazine (COMPAZINE).  Irritant.               oxyCODONE (ROXICODONE) IR tablet 5-10 mg  Dose: 5-10 mg Freq: EVERY 4 HOURS PRN Route: PO  PRN Reason: moderate to severe pain  Start: 05/28/17 0615              potassium citrate (UROCIT-K) CR tablet 10 mEq  Dose: 10 mEq Freq: DAILY Route: PO  Start: 05/26/17 0900   Admin Instructions: DO NOT CRUSH.       0822 (10 mEq)-Given        0907 (10 mEq)-Given        0827 (10 mEq)-Given        0822 (10 mEq)-Given        0830 (10 mEq)-Given           Reason ACE/ARB order not selected  Freq: DOES NOT GO TO MAR Route: OTHER  PRN Reason: other  Start: 05/26/17 0600              senna-docusate (SENOKOT-S;PERICOLACE) 8.6-50 MG per tablet 1-2 tablet  Dose: 1-2 tablet Freq: 2 TIMES DAILY PRN Route: PO  PRN Comment: constipation   Start: 05/26/17 0600              simvastatin (ZOCOR) tablet 10 mg  Dose: 10 mg Freq: AT BEDTIME Route: PO  Start: 05/26/17 2100 2041 (10 mg)-Given        2008 (10 mg)-Given        2128 (10 mg)-Given        2031 (10 mg)-Given        [ ] 2100          Discontinued Medications  Medications 05/24/17 05/25/17 05/26/17 05/27/17 05/28/17 05/29/17 05/30/17         Dose: 5 mg Freq: DAILY Route: PO  Start: 05/26/17 0900   End: 05/28/17 0745      0817 (5 mg)-Given        0907 (5 mg)-Given        0745-Med Discontinued           Dose: 20 mg Freq: 2 TIMES DAILY. Route: IV  Start: 05/28/17 1600   End: 05/29/17 1059        1628 (20 mg)-Given        0823 (20 mg)-Given       1059-Med Discontinued          Dose: 20 mg Freq: 2 TIMES DAILY. Route: IV  Start: 05/26/17 0800   End: 05/28/17 0952      0818 (20 mg)-Given       1630 (20 mg)-Given        0908 (20 mg)-Given       1614 (20 mg)-Given         0826 (20 mg)-Given       0952-Med Discontinued           Dose: 40 mg Freq: 2 TIMES DAILY. Route: IV  Start: 05/28/17 1600   End: 05/28/17 0953        0953-Med Discontinued           Freq: HOLD Route: XX  Start: 05/26/17 0600   End: 05/27/17 1759   Admin Instructions: IF patient has received sildenafil (VIAGRA/REVATIO) within the last 8 hours, avanafil (STENDRA) within the last 8 hours, vardenafil (LEVITRA/STAXYN) within the last 18 hours, or tadalafil (CIALIS/ADCIRCA) within the last 36 hours.  Inform provider if patient has taken one of those medications.        1759-Med Discontinued            Dose: 2.5 mg Freq: EVERY 6 HOURS PRN Route: PO  PRN Reason: moderate to severe pain  Start: 05/26/17 0606   End: 05/28/17 0609        0443 (2.5 mg)-Given       0514 (2.5 mg)-Given [C]       0609-Med Discontinued        Or    Dose: 5 mg Freq: EVERY 6 HOURS PRN Route: PO  PRN Reason: moderate to severe pain  Start: 05/26/17 0606   End: 05/28/17 0609                      0609-Med Discontinued           Dose: 20 mEq Freq: 2 TIMES DAILY Route: PO  Start: 05/30/17 0900   End: 05/30/17 0930   Admin Instructions: Dissolve packet contents in 4-8 ounces of cold water or juice.           0830 (20 mEq)-Given       0930-Med Discontinued    Medications 05/24/17 05/25/17 05/26/17 05/27/17 05/28/17 05/29/17 05/30/17               INTERAGENCY TRANSFER FORM - NOTES (H&P, Discharge Summary, Consults, Procedures, Therapies)   5/26/2017                       [address] SURGICAL: [phone]               History & Physicals      H&P by Jaret Grey MD at 5/26/2017  5:00 AM     Author:  Jaret Grey MD Service:  Hospitalist Author Type:  Physician    Filed:  5/26/2017  5:43 AM Date of Service:  5/26/2017  5:00 AM Creation Time:  5/26/2017  5:29 AM    Status:  Signed :  Jaret Grey MD (Physician)         [facility]    History and Physical  Hospitalist       Date  of Admission:  5/26/2017  Date of Service (when I saw the patient): 05/26/17    Assessment & Plan   Mary Ellen Cuba is a 89 year old female who presents with increasing shortness of breath and cough over the past week. She's currently rehabbing at [facility] after a pelvic fracture last week. Evaluation in the emergency department is concerning for new onset CHF with increased peripheral edema, elevated DNP at 11,792 with known history of CAD with stent placement in the past. Chest x-ray showing bilateral vascular congestion and even though she's had a cough, there's been no signs of an infectious process. Patient will be 8 admitted to inpatient status and will start on a diuretic. Will order and echocardiogram for later today to evaluate her systolic function. With her inactivity, some concern on the possible pulmonary embolism although with her elevated creatinine, she would be not a good candidate for chest CT. Look for right-sided pressure increase on Echo and if present may need a VQ scan. Expect she will be in the hospital for at least 2 days as we diaries are and then likely discharge back to rehab.[WG1.1]    Active Problems:    Acute congestive heart failure (H)    Assessment:[WG1.2] presenting with increasing shortness of breath and cough with an elevated BNP and some peripheral edema. She had normal echocardiogram in 2013 and nothing that suggests an acute trigger. She has been immobile in the nursing home after a pelvis fracture. Worry some[WG1.1]what[WG1.3] about possibly hav[WG1.1]ing[WG1.3] a pulmonary embolism[WG1.1],[WG1.3]     Plan:[WG1.2] admit with echocardiogram later this morning. Continue her beta blocker. Start diuresis with Lasix. Hold ACE/ARB until Echo back.Check d-dimer, consider VQ scan if right-sided ushers increased[WG1.3]    Essential hypertension    Assessment:[WG1.2] stable, taking Norvasc and beta blocker[WG1.3]    Plan:[WG1.2] continue[WG1.3]    Coronary artery  disease involving native coronary artery of native heart without angina pectoris    Assessment:[WG1.2] history of MI with stent placement in past. No acute changes on EKG with normal troponin to suggest new process[WG1.3]    Plan:[WG1.2] follow, continue Plavix[WG1.3]    Hypothyroidism due to acquired atrophy of thyroid    Assessment:[WG1.2] taking oral Synthroid[WG1.3]    Plan:[WG1.2] continue[WG1.3]    Type 2 diabetes mellitus without complication (H)    Assessment:[WG1.2] currently diet controlled. Previously on glipizide[WG1.3]    Plan:[WG1.2] follow blood sugars with sliding scale coverage[WG1.3]    Chronic kidney disease, stage III (moderate)    Assessment:[WG1.2] creatinine up somewhat over the past several months[WG1.3]    Plan:[WG1.2] follow[WG1.3]    Anemia    Assessment:[WG1.2] stable[WG1.3]    Plan:[WG1.2] follow[WG1.3]    Hyperlipidemia LDL goal <100    Assessment:[WG1.2] taking Zocor[WG1.3]    Plan:[WG1.2] continue[WG1.3]  DVT Prophylaxis:[WG1.1] Pneumatic Compression Devices[WG1.3]  Code Status:[WG1.1] DNR per previous discussions[WG1.3]    Disposition: Expected discharge in[WG1.1] 2-3[WG1.3] days once[WG1.1] breathing better,[WG1.3].    Jaret Grey MD    Primary Care Physician   Samantha Celis    Chief Complaint[WG1.1]   89-year-old female with increasing cough and shortness of breath    History is obtained from the patient, electronic health record and emergency department physician[WG1.3]    History of Present Illness   Mary Ellen Cuba is a 89 year old female who presents with[WG1.1] cough with increasing shortness of breath over the past 5 to 7 days. Currently rehabbing a [facility] after a fall with a pelvis fracture. Prior to that it had a hip fracture in January. She complains of congestion or chest with inability to cough it up. Feeling more short of air. Has known history of previous MI with stent placement years ago, continues on Plavix. Normal echocardiogram with  normal LV function in 2013. She complains some chest tightness with attempt to cough. Some chest pain with coughing. She denies fever, chills, sweats. Complains of increased swelling in her legs. She denies abdominal pain, nausea vomiting. As an unable to get up and walk because a recent fractures.[WG1.3]    Past Medical History    I have reviewed this patient's medical history and updated it with pertinent information if needed.   Past Medical History:   Diagnosis Date     CAD (coronary artery disease)     remote hx of stent placement in past     CVA (cerebral vascular accident) (H) 2013    some left sided deficits     Diabetes mellitus (H)      Hyperlipemia      Hypertension        Past Surgical History   I have reviewed this patient's surgical history and updated it with pertinent information if needed.  Past Surgical History:   Procedure Laterality Date     APPENDECTOMY       GI SURGERY      gwendolyn     GI SURGERY      hemicolectomy     GYN SURGERY      oopherectomy     LASER YAG CAPSULOTOMY  3/21/2013    Procedure: LASER YAG CAPSULOTOMY;  yag capsulotomy bilateral eyes;  Surgeon: Deandre Nugent MD;  Location: [facility] OR     OPEN REDUCTION INTERNAL FIXATION HIP NAILING Left 1/20/2017    Procedure: OPEN REDUCTION INTERNAL FIXATION HIP NAILING;  Surgeon: Liborio Hoffman DO;  Location: [facility] OR     PHACOEMULSIFICATION WITH STANDARD INTRAOCULAR LENS IMPLANT  6/30/2011    Procedure:PHACOEMULSIFICATION WITH STANDARD INTRAOCULAR LENS IMPLANT; Surgeon:DEANDRE NUGENT; Location:[facility] OR     PHACOEMULSIFICATION WITH STANDARD INTRAOCULAR LENS IMPLANT  7/21/2011    Procedure:PHACOEMULSIFICATION WITH STANDARD INTRAOCULAR LENS IMPLANT; Surgeon:DEANDRE NUGENT; Location:[facility] OR     STENT, CORONARY, DIOGENES         Prior to Admission Medications   Prior to Admission Medications   Prescriptions Last Dose Informant Patient Reported? Taking?   Calcium-Vitamin D (CALCIUM + D PO) 5/21/17 at 1700  Yes No   Sig: Take 1 tablet by mouth  daily.   Multiple Vitamins-Minerals (CENTRUM SILVER ULTRA WOMENS) TABS 5/21/17 at 1700  Yes No   Sig: Take 1 tablet by mouth daily.   VITAMIN D, CHOLECALCIFEROL, PO 5/21/17 at 1700  Yes No   Sig: Take  by mouth once a week.   acetaminophen (TYLENOL) 500 MG tablet 5/25/2017 at 1700  No Yes   Sig: Take 2 tablets (1,000 mg) by mouth 3 times daily   allopurinol (ZYLOPRIM) 100 MG tablet 5/25/2017 at 1700  No Yes   Sig: Take 1 tablet (100 mg) by mouth 2 times daily   amLODIPine (NORVASC) 5 MG tablet 5/22/17 at 0800  No Yes   Sig: Take 1 tablet (5 mg) by mouth daily   aspirin 325 MG tablet 5/25/2017 at 1700  No Yes   Sig: Take 1 tablet (325 mg) by mouth daily   clopidogrel (PLAVIX) 75 MG tablet 5/25/2017 at 0800  No Yes   Sig: Take 1 tablet (75 mg) by mouth daily   fenofibrate 160 MG tablet 5/22/17 at 0800  No Yes   Sig: Take 1 tablet (160 mg) by mouth daily   folic acid (FOLVITE) 400 MCG tablet 5/21/17 at 1700  Yes No   Sig: Take 400 mcg by mouth daily.   levothyroxine (SYNTHROID/LEVOTHROID) 112 MCG tablet 5/25/2017 at 0730  No Yes   Sig: Take 1 tablet (112 mcg) by mouth daily   metoprolol (TOPROL XL) 25 MG 24 hr tablet 5/25/2017 at 1200  No Yes   Sig: Take 1 tablet (25 mg) by mouth daily   oxyCODONE (ROXICODONE) 5 MG IR tablet 5/24/17 at 2000  No No   Sig: Take 0.5-1 tablets (2.5-5 mg) by mouth every 6 hours as needed for moderate to severe pain   potassium citrate (UROCIT-K) 5 MEQ (540 MG) CR tablet 5/25/2017 at 1700  No Yes   Sig: Take 2 tablets (10 mEq) by mouth daily   senna-docusate (SENOKOT-S;PERICOLACE) 8.6-50 MG per tablet 5/22/17 at 0800  Yes No   Sig: Take 1-2 tablets by mouth 2 times daily as needed (constipation )   simvastatin (ZOCOR) 10 MG tablet 5/25/2017 at 2000  No Yes   Sig: Take 1 tablet (10 mg) by mouth At Bedtime      Facility-Administered Medications: None     Allergies   Allergies   Allergen Reactions     Hmg-Coa-R Inhibitors      Metformin GI Disturbance       Social History   I have reviewed this  patient's social history and updated it with pertinent information if needed. Mary Ellen Cuba  reports that she has never smoked. She does not have any smokeless tobacco history on file. She reports that she does not drink alcohol or use illicit drugs.    Family History   I have reviewed this patient's family history and updated it with pertinent information if needed.   Family History   Problem Relation Age of Onset     Colon Cancer Mother      Prostate Cancer Brother      Peptic Ulcer Disease Father        Review of Systems[WG1.1]   The 10 point Review of Systems is negative other than noted in the HPI or here.[WG1.3]     Physical Exam   Temp: 99.4  F (37.4  C) Temp src: Oral BP: 108/73   Heart Rate: 84 Resp: 16 SpO2: 92 % O2 Device: None (Room air)    Vital Signs with Ranges  Temp:  [99.4  F (37.4  C)] 99.4  F (37.4  C)  Heart Rate:  [84] 84  Resp:  [16] 16  BP: (108-180)/(66-79) 108/73  SpO2:  [92 %-93 %] 92 %  135 lbs 0 oz    EXAM:[WG1.1]  Constitutional: alert, mild distress and cooperative   Cardiovascular: PMI normal. No lifts, heaves, or thrills. RRR. No murmurs, clicks gallops or rub, negative findings: no JVD  Respiratory: crackle at bases  Psychiatric: mentation appears normal and affect normal/bright  Head: Normocephalic. No masses, lesions, tenderness or abnormalities  Neck: Neck supple. No adenopathy. Thyroid symmetric, normal size,  ENT: ENT exam normal, no neck nodes or sinus tenderness  Abdomen: Abdomen soft, non-tender. BS normal. No masses, organomegaly  NEURO:  Reflexes normal and symmetric. Sensation grossly WNL.  SKIN: no suspicious lesions or rashes  LYMPH: Normal cervical lymph nodes  JOINT/EXTREMITIES: normal muscle tone and positive  ankle edema[WG1.3]     Data   Data reviewed today:  I personally reviewed[WG1.1] the EKG tracing showing Regular sinus rhythm with no acute changes and the chest x-ray image(s) showing Bilateral small pleural effusion with atelectasis[WG1.3].    Recent  Labs  Lab 05/26/17  0342   WBC 9.3   HGB 10.7*   MCV 93         POTASSIUM 4.1   CHLORIDE 108   CO2 28   BUN 28   CR 1.79*   ANIONGAP 7   ZULEMA 7.6*   *   ALBUMIN 2.2*   PROTTOTAL 5.6*   BILITOTAL 0.2   ALKPHOS 142   ALT 23   AST 40   TROPI 0.038       Recent Results (from the past 24 hour(s))   XR Chest 2 Views    Narrative    CHEST 2 VIEWS  5/26/2017 4:12 AM     HISTORY: Fever. Shortness of breath. Cough.    COMPARISON: None.    FINDINGS: Small bilateral pleural effusions with adjacent opacities  that most likely represent atelectasis. The lungs are otherwise clear.  Mild cardiomegaly. Atherosclerotic calcification in the thoracic  aorta.      Impression    IMPRESSION:  1. Small bilateral pleural effusions with adjacent atelectasis.  2. The lungs are otherwise clear.    JESSA LEE MD[WG1.1]          Revision History        User Key Date/Time User Provider Type Action    > WG1.3 5/26/2017  5:43 AM Jaret Grey MD Physician Sign     WG1.2 5/26/2017  5:34 AM Jaret Grey MD Physician      WG1.1 5/26/2017  5:29 AM Jaret Grey MD Physician                      Discharge Summaries      Discharge Summaries by Gurvinder Diaz MD at 5/30/2017 12:03 PM     Author:  Gurvinder Diaz MD Service:  Internal Medicine Author Type:  Physician    Filed:  5/30/2017 12:06 PM Date of Service:  5/30/2017 12:03 PM Creation Time:  5/30/2017 11:56 AM    Status:  Signed :  Gurvinder Diaz MD (Physician)         [facility]    Discharge Summary  Hospitalist    Date of Admission:  5/26/2017  Date of Discharge:  5/30/2017  Discharging Provider: Gurvinder Diaz MD    Discharge Diagnoses   Acute on chronic diastolic HF    History of Present Illness   Mary Ellen Cuba is an 89 year old female who presented with increasing shortness of breath and cough over the past week PTA. Evaluation in the emergency department is concerning for new onset CHF with increased  peripheral edema, elevated DNP at 11,792 with known history of CAD with stent placement.  Pt was admitted and placed on IV lasix for diuresing.  She tolerated the diuresing well with no drop in BP nor worsening of her Cr.  She was transition to PO lasix for a day and will continue with PO lasix as out patient.  Her potassium supplement was increased to 20meq daily.  Her blood pressure has also been a little bit elevated, but unsure if this is truly baseline or from her pain due to recent hip fracture.  Her amlodipine was increased and will need further out pt BP monitoring.     Hospital Course   Mary Ellen Cuba was admitted on 5/26/2017.  The following problems were addressed during her hospitalization:    Active Problems:    Acute on chronic diastolic congestive heart failure (H)    Assessment: presenting with increasing shortness of breath and cough with an elevated BNP and some peripheral edema. Echo noted preserved ef but has LVH and RV has no strain. No ACE/ARB as ef is preserved.    Plan: Continue her beta blocker. Cont diuresis but will switch to PO Lasix 20mg bid.      Essential hypertension    Assessment: still elevated, could be from pain, taking Norvasc and beta blocker    Plan: cont increase Norvasc to 10mg and cont BB as same dose as her HR can not tolerate increase in BB dose.  Will need f/u out pt management when hip pain is more improved/healed.     Coronary artery disease involving native coronary artery of native heart without angina pectoris    Assessment: history of MI with stent placement in past. No acute changes on EKG with normal troponin to suggest new process    Plan: continue BB, Plavix, statin     Hypothyroidism due to acquired atrophy of thyroid    Assessment: taking oral Synthroid    Plan: continue same dose     Type 2 diabetes mellitus without complication (H)    Assessment: currently diet controlled. Previously on glipizide, a1c 5.3    Plan: No change, can be on regular diet[HL1.1]  if pt prefers[HL1.2]      Chronic kidney disease, stage III (moderate)    Assessment: creatinine up somewhat over the past several months, but has been stable    Plan: follow out pt     Anemia    Assessment: stable, no evidence of bleeding    Plan: follow     Hyperlipidemia LDL goal <100    Assessment: taking Zocor    Plan: continue     Left superior inferior pubic rami fractures - has been rehabbing at [facility]    Assessment: has pain with PT    Plan: cont PT treatment while pt is here. Cont oxy q4h prn       Gurvinder Diaz MD    Significant Results and Procedures[HL1.1]   None[HL1.2]    Pending Results   Unresulted Labs Ordered in the Past 30 Days of this Admission     Date and Time Order Name Status Description    2017 Blood culture Preliminary     2017 Blood culture Preliminary           Code Status[HL1.1]   DNR[HL1.2]       Primary Care Physician   Samantha Celis    Physical Exam   Temp: 97.5  F (36.4  C) Temp src: Oral BP: 178/72 Pulse: 67 Heart Rate: 67 Resp: 18 SpO2: 92 % O2 Device: None (Room air)    Vitals:    17 0751 17 0351 17 0500   Weight: 64.5 kg (142 lb 3.2 oz) 66 kg (145 lb 8.1 oz) 61.5 kg (135 lb 9.3 oz)     Vital Signs with Ranges  Temp:  [97.1  F (36.2  C)-98.5  F (36.9  C)] 97.5  F (36.4  C)  Pulse:  [65-70] 67  Heart Rate:  [65-70] 67  Resp:  [18-22] 18  BP: (137-178)/(64-72) 178/72  SpO2:  [92 %-96 %] 92 %  I/O last 3 completed shifts:  In: 1120 [P.O.:1120]  Out: 650 [Urine:650][HL1.1]    Constitutional: alert, in NAD   Head: Normocephalic.  ENT: moist oral mucosa  Cardiovascular: RRR. No murmurs, or rub  Respiratory: no crackle at bases, no wheezing  Abdomen: Abdomen soft, non-tender. BS normal. No masses, organomegaly  NEURO:  A&O to self, , place and year. Sensation grossly WNL.  SKIN: no visible rash  JOINT/EXTREMITIES: mild b/l edema, mostly in b/l foot up to ankles, normal muscle tone   [HL1.2]    Discharge Disposition[HL1.1]   Discharged to  rehabilitation facility[HL1.2]  Condition at discharge:[HL1.1] Stable[HL1.2]    Consultations This Hospital Stay   CARE COORDINATOR IP CONSULT  NUTRITION SERVICES ADULT IP CONSULT  PHYSICAL THERAPY ADULT IP CONSULT  PHYSICAL THERAPY ADULT IP CONSULT  OCCUPATIONAL THERAPY ADULT IP CONSULT    Time Spent on this Encounter[HL1.1]   Gurvinder MENDOZA, personally saw the patient today and spent greater than 30 minutes discharging this patient.[HL1.2]    Discharge Orders     General info for SNF   Length of Stay Estimate: Short Term Care: Estimated # of Days <30  Condition at Discharge: Improving  Level of care:skilled   Rehabilitation Potential: Good  Admission H&P remains valid and up-to-date: Yes  Recent Chemotherapy: N/A  Use Nursing Home Standing Orders: Yes     Mantoux instructions   Give two-step Mantoux (PPD) Per Facility Policy Yes     Reason for your hospital stay   Acute heart failure     Daily weights   Call Provider for weight gain of more than 2 pounds per day or 5 pounds per week.     Activity - Up with nursing assistance   And per physical therapy for advancement of activity     Follow Up and recommended labs and tests   Follow up with halfway physician.  The following labs/tests are recommended: BMP in 2-3 days.     DNR (Do Not Resuscitate)     Physical Therapy Adult Consult   Evaluate and treat as clinically indicated.    Reason:  Hip pain     Occupational Therapy Adult Consult   Evaluate and treat as clinically indicated.    Reason:  Hip pain     Fall precautions     Advance Diet as Tolerated   Follow this diet upon discharge: Orders Placed This Encounter     Moderate Consistent CHO Diet and 2g sodium       Discharge Medications   Current Discharge Medication List      START taking these medications    Details   furosemide (LASIX) 20 MG tablet Take 1 tablet (20 mg) by mouth 2 times daily  Qty: 30 tablet, Refills: 0    Associated Diagnoses: Acute combined systolic and diastolic congestive heart failure  (H); Benign essential hypertension         CONTINUE these medications which have CHANGED    Details   amLODIPine (NORVASC) 5 MG tablet Take 2 tablets (10 mg) by mouth daily  Qty: 30 tablet, Refills: 1    Associated Diagnoses: Benign essential hypertension      potassium citrate (UROCIT-K) 5 MEQ (540 MG) CR tablet Take 4 tablets (20 mEq) by mouth daily  Qty: 120 tablet, Refills: 1    Associated Diagnoses: Hypokalemia         CONTINUE these medications which have NOT CHANGED    Details   acetaminophen (TYLENOL) 500 MG tablet Take 2 tablets (1,000 mg) by mouth 3 times daily    Associated Diagnoses: Closed left hip fracture, initial encounter (H)      metoprolol (TOPROL XL) 25 MG 24 hr tablet Take 1 tablet (25 mg) by mouth daily  Qty: 30 tablet    Associated Diagnoses: Benign essential hypertension      fenofibrate 160 MG tablet Take 1 tablet (160 mg) by mouth daily  Qty: 30 tablet    Associated Diagnoses: Hypercholesterolemia      simvastatin (ZOCOR) 10 MG tablet Take 1 tablet (10 mg) by mouth At Bedtime  Qty: 30 tablet    Associated Diagnoses: Hypercholesterolemia      allopurinol (ZYLOPRIM) 100 MG tablet Take 1 tablet (100 mg) by mouth 2 times daily  Qty: 30 tablet    Associated Diagnoses: Chronic gout, unspecified cause, unspecified site      clopidogrel (PLAVIX) 75 MG tablet Take 1 tablet (75 mg) by mouth daily  Qty: 30 tablet    Associated Diagnoses: Coronary artery disease involving native coronary artery of native heart without angina pectoris      levothyroxine (SYNTHROID/LEVOTHROID) 112 MCG tablet Take 1 tablet (112 mcg) by mouth daily    Associated Diagnoses: Other specified hypothyroidism      aspirin 325 MG tablet Take 1 tablet (325 mg) by mouth daily  Qty: 120 tablet, Refills: 0    Comments: Hold while receiving Lovenox  Associated Diagnoses: Closed left hip fracture, initial encounter (H)      oxyCODONE (ROXICODONE) 5 MG IR tablet Take 0.5-1 tablets (2.5-5 mg) by mouth every 6 hours as needed for moderate  to severe pain  Qty: 30 tablet, Refills: 0    Associated Diagnoses: Pubic ramus fracture, left, closed, initial encounter (H); Fall on same level due to nature of surface, initial encounter      senna-docusate (SENOKOT-S;PERICOLACE) 8.6-50 MG per tablet Take 1-2 tablets by mouth 2 times daily as needed (constipation )  Qty: 100 tablet, Refills: 0    Comments: Indication: Constipation  Associated Diagnoses: Closed left hip fracture, initial encounter (H)      VITAMIN D, CHOLECALCIFEROL, PO Take  by mouth once a week.      folic acid (FOLVITE) 400 MCG tablet Take 400 mcg by mouth daily.      Multiple Vitamins-Minerals (CENTRUM SILVER ULTRA WOMENS) TABS Take 1 tablet by mouth daily.      Calcium-Vitamin D (CALCIUM + D PO) Take 1 tablet by mouth daily.           Allergies   Allergies   Allergen Reactions     Hmg-Coa-R Inhibitors      Metformin GI Disturbance     Data   Most Recent 3 CBC's:  Recent Labs   Lab Test  05/27/17   0533  05/26/17   0342  01/23/17   0540  01/22/17   0625   01/19/17   1750   WBC  6.4  9.3   --    --    --   9.7   HGB  9.2*  10.7*   --   7.4*   < >  10.1*   MCV  94  93   --    --    --   90   PLT  224  269  180   --    --   213    < > = values in this interval not displayed.      Most Recent 3 BMP's:  Recent Labs   Lab Test  05/30/17   0604  05/29/17   0600  05/28/17   0540   NA  144  147*  141   POTASSIUM  3.7  3.6  3.7   CHLORIDE  106  109  105   CO2  33*  30  31   BUN  28  28  29   CR  1.63*  1.70*  1.78*   ANIONGAP  5  8  5   ZULEMA  7.8*  7.7*  7.5*   GLC  80  115*  103*     Most Recent 2 LFT's:  Recent Labs   Lab Test  05/26/17   0342  01/19/17   1750   AST  40  25   ALT  23  21   ALKPHOS  142  56   BILITOTAL  0.2  0.7     Most Recent INR's and Anticoagulation Dosing History:  Anticoagulation Dose History     Recent Dosing and Labs Latest Ref Rng & Units 1/19/2017    INR 0.86 - 1.14 1.05        Most Recent 3 Troponin's:  Recent Labs   Lab Test  05/26/17   0342   TROPI  0.038     Most Recent  Cholesterol Panel:No lab results found.  Most Recent 6 Bacteria Isolates From Any Culture (See EPIC Reports for Culture Details):  Recent Labs   Lab Test  05/26/17   0630  05/26/17   0430  05/26/17   0353  05/26/17   0342  01/19/17   2127   CULT  No MRSA isolated  No growth  No growth after 4 days  No growth after 4 days  No MRSA isolated     Most Recent TSH, T4 and A1c Labs:  Recent Labs   Lab Test  05/27/17   0533   A1C  5.3     Results for orders placed or performed during the hospital encounter of 05/26/17   XR Chest 2 Views    Narrative    CHEST 2 VIEWS  5/26/2017 4:12 AM     HISTORY: Fever. Shortness of breath. Cough.    COMPARISON: None.    FINDINGS: Small bilateral pleural effusions with adjacent opacities  that most likely represent atelectasis. The lungs are otherwise clear.  Mild cardiomegaly. Atherosclerotic calcification in the thoracic  aorta.      Impression    IMPRESSION:  1. Small bilateral pleural effusions with adjacent atelectasis.  2. The lungs are otherwise clear.    JESSA LEE MD[HL1.1]          Revision History        User Key Date/Time User Provider Type Action    > HL1.2 5/30/2017 12:06 PM Gurvinder Diaz MD Physician Sign     HL1.1 5/30/2017 11:56 AM Gurvinder Diaz MD Physician                      Consult Notes      Consults by Jody Hoyos at 5/27/2017 11:49 AM     Author:  Jody Hoyos Service:  Social Work Author Type:      Filed:  5/27/2017 11:49 AM Date of Service:  5/27/2017 11:49 AM Creation Time:  5/27/2017 11:45 AM    Status:  Signed :  Jody Hoyos ()     Consult Orders:    1. Care Coordinator IP Consult [[id number]] ordered by Jaret Grey MD at 05/26/17 0521                Care Transition Initial Assessment -   Reason For Consult: discharge planning  Met with: Patient    Active Problems:    Essential hypertension    Coronary artery disease involving native coronary artery of native heart without angina pectoris    Hyperlipidemia  "LDL goal <100    Hypothyroidism due to acquired atrophy of thyroid    Type 2 diabetes mellitus without complication (H)    Chronic kidney disease, stage III (moderate)    Anemia    Acute congestive heart failure (H)    CHF (congestive heart failure) (H)         DATA  Lives With: facility resident  Living Arrangements: extended care facility  Description of Support System: Supportive, Involved  Who is your support system?: Other (specify) (Nephew, Sukhjinder, and close friends )     Identified issues/concerns regarding health management: unable to care for self due to illness and fractures            Quality Of Family Relationships: supportive, involved  Transportation Available: van, wheelchair accessible    ASSESSMENT  Cognitive Status:  awake, alert and oriented  Concerns to be addressed: d/c planning back to the TCU at [facility] .       PLAN  Patient given options and choices for discharge: Yes. Patient plans to return to the TCU .  Patient/family is agreeable to the plan?  Yes  Patient Goals and Preferences: Return to the TCU at [facility] in [place] .  Patient anticipates discharging to:  TCU at [facility].     visited with patient regarding her d/c plan for after the hospital.     Discussed that her nephew, Sukhjinder, had called .  Patient has given permission for writer to speak with Sukhjinder regarding her medical status and d/c planning.  She stated, \"He is the like the son I never had.\"  Sukhjinder [place], so is not able to see patient very often, but is in contact with her over the phone frequently.       will continue to follow.[SF1.1]         Revision History        User Key Date/Time User Provider Type Action    > SF1.1 5/27/2017 11:49 AM Jody Hoyos  Sign                     Progress Notes - Physician (Notes for yesterday and today)      Progress Notes by Gurvinder Diaz MD at 5/29/2017  7:25 AM     Author:  Gurvinder Diaz MD " Service:  Internal Medicine Author Type:  Physician    Filed:  5/29/2017 11:01 AM Date of Service:  5/29/2017  7:25 AM Creation Time:  5/29/2017  7:25 AM    Status:  Signed :  Gurvinder Diaz MD (Physician)         [facility]    Hospitalist Progress Note    Assessment & Plan     Mary Ellen Cuba is a 89 year old female who was admitted on 5/26/2017. She presented with increasing shortness of breath and cough over the past week. She's currently rehabbing at [facility] after a pelvic fracture last week. Evaluation in the emergency department is concerning for new onset CHF with increased peripheral edema, elevated DNP at 11,792 with known history of CAD with stent placement in the past. Chest x-ray showing bilateral vascular congestion pt was admitted to inpatient status and started on a diuretic. Echo showed preserved ef with LVH and no RV strain.      Active Problems:    Acute on chronic diastolic congestive heart failure (H)    Assessment: presenting with increasing shortness of breath and cough with an elevated BNP and some peripheral edema. Echo noted preserved ef but has LVH and RV has no strain. Net -2[HL1.1].6[HL1.2]L out so far    Plan: Continue her beta blocker. Cont diuresis but will switch to PO Lasix 20mg bid. No ACE/ARB as ef is preserved. Monitor daily wt/I/O/Cr. Encouraged IS/acapella.       Essential hypertension    Assessment: still elevated, could be from pain, taking Norvasc and beta blocker    Plan: cont increase Norvasc to 10mg and cont BB as same dose as her HR can not tolerate increase in BB dose.  Will need f/u out pt management when hip pain is more improved/healed.      Coronary artery disease involving native coronary artery of native heart without angina pectoris    Assessment: history of MI with stent placement in past. No acute changes on EKG with normal troponin to suggest new process    Plan: continue BB, Plavix, statin       Hypothyroidism due to acquired atrophy of thyroid    Assessment: taking oral Synthroid    Plan: continue same dose    Type 2 diabetes mellitus without complication (H)    Assessment: currently diet controlled. Previously on glipizide, a1c 5.3    Plan: follow blood sugars with sliding scale coverage    Chronic kidney disease, stage III (moderate)    Assessment: creatinine up somewhat over the past several months    Plan: follow    Anemia    Assessment: stable, no evidence of bleeding    Plan: follow    Hyperlipidemia LDL goal <100    Assessment: taking Zocor    Plan: continue    Left superior inferior pubic rami fractures - has been rehabbing at [facility]    Assessment: has pain with PT    Plan: cont PT treatment while pt is here. cont Increased oxy 5-10mg q4h prn    DVT Prophylaxis: Pneumatic Compression Devices  Code Status: DNR per previous discussions     Disposition: Expected discharge in 1 day if remain stable on oral lasix.    DVT Prophylaxis: Pneumatic Compression Devices  Code Status: DNR      Gurvinder Diaz MD    Interval History   Pt had[HL1.1] minimal[HL1.2] pain from her recent hip fracture[HL1.1] if not moving much.  Pain worsen wit PT, but tolerable with pain meds[HL1.2]. Otherwise stating that she is breathing better.[HL1.1]  Doing IS and acapella.[HL1.2] She tolerated PO with no N/V.  No abd pain. No CP.    -Data reviewed today: I reviewed all new labs and imaging results over the last 24 hours. I personally reviewed no images or EKG's today.    Physical Exam   Temp: 96.6  F (35.9  C) Temp src: Oral BP: 175/77 Pulse: 64 Heart Rate: 64 Resp: 20 SpO2: 95 % O2 Device: None (Room air)    Vitals:    05/28/17 0443 05/28/17 0751 05/29/17 0351   Weight: 65.5 kg (144 lb 6.4 oz) 64.5 kg (142 lb 3.2 oz) 66 kg (145 lb 8.1 oz)     Vital Signs with Ranges  Temp:  [96.6  F (35.9  C)-98.2  F (36.8  C)] 96.6  F (35.9  C)  Pulse:  [62-68] 64  Heart Rate:  [62-68] 64  Resp:  [16-20] 20  BP: (144-175)/(62-77)  175/77  SpO2:  [92 %-95 %] 95 %  I/O last 3 completed shifts:  In: 1180 [P.O.:1180]  Out: 1800 [Urine:1800]    Constitutional: alert, in NAD   Head: Normocephalic.  ENT: moist oral mucosa  Cardiovascular: RRR. No murmurs, or rub  Respiratory:[HL1.1] some[HL1.2] crackle at bases, no wheezing  Abdomen: Abdomen soft, non-tender. BS normal. No masses, organomegaly  NEURO:  A&O to self, , place and year. Sensation grossly WNL.  SKIN: no visible rash  JOINT/EXTREMITIES: b/l edema, mostly in b/l foot up about 1 in above ankles, normal muscle tone    Medications     Reason ACE/ARB order not selected       - MEDICATION INSTRUCTIONS -         amLODIPine  10 mg Oral Daily     furosemide  20 mg Intravenous BID     docusate sodium  100 mg Oral BID     acetaminophen  1,000 mg Oral TID     allopurinol  100 mg Oral BID     aspirin  325 mg Oral Daily     calcium citrate-vitamin D  1 tablet Oral Daily     clopidogrel  75 mg Oral Daily     folic acid  400 mcg Oral Daily     levothyroxine  112 mcg Oral Daily     metoprolol  25 mg Oral Daily     multivitamin, therapeutic with minerals  1 tablet Oral Daily     potassium citrate  10 mEq Oral Daily     simvastatin  10 mg Oral At Bedtime     insulin aspart  1-7 Units Subcutaneous TID AC     insulin aspart  1-5 Units Subcutaneous At Bedtime       Data     Recent Labs  Lab 17  0600 17  0540 17  0533 17  0342   WBC  --   --  6.4 9.3   HGB  --   --  9.2* 10.7*   MCV  --   --  94 93   PLT  --   --  224 269   * 141 145* 143   POTASSIUM 3.6 3.7 4.5 4.1   CHLORIDE 109 105 108 108   CO2 30 31 31 28   BUN 28 29 30 28   CR 1.70* 1.78* 1.85* 1.79*   ANIONGAP 8 5 6 7   ZULEMA 7.7* 7.5* 7.6* 7.6*   * 103* 102* 104*   ALBUMIN  --   --   --  2.2*   PROTTOTAL  --   --   --  5.6*   BILITOTAL  --   --   --  0.2   ALKPHOS  --   --   --  142   ALT  --   --   --  23   AST  --   --   --  40   TROPI  --   --   --  0.038       No results found for this or any previous visit  (from the past 24 hour(s)).[HL1.1]       Revision History        User Key Date/Time User Provider Type Action    > HL1.2 5/29/2017 11:01 AM Gurvinder Diaz MD Physician Sign     HL1.1 5/29/2017  7:25 AM Gurvinder Diaz MD Physician                   Procedure Notes     No notes of this type exist for this encounter.         Progress Notes - Therapies (Notes from 05/27/17 through 05/30/17)      Progress Notes by Ena Butler PT at 5/27/2017 11:40 AM     Author:  Ena Butler PT Service:  (none) Author Type:  Physical Therapist    Filed:  5/27/2017 11:41 AM Date of Service:  5/27/2017 11:40 AM Creation Time:  5/27/2017 11:40 AM    Status:  Signed :  Ena Butler PT (Physical Therapist)         Inpatient Physical Therapy Evaluation     05/27/17 1041   Quick Adds   Type of Visit Initial PT Evaluation       Present no   Living Environment   Lives With facility resident   Living Arrangements extended care facility   Home Accessibility bed and bath on same level;grab bars present (bathtub);grab bars present (toilet)   Transportation Available van, wheelchair accessible   Self-Care   Dominant Hand right   Regular Exercise yes   Activity/Exercise Type other (see comments)  (Therapy at [facility])   Equipment Currently Used at Home wheelchair;cane, straight   Functional Level Prior   Ambulation 3-->assistive equipment and person   Transferring 3-->assistive equipment and person   Toileting 3-->assistive equipment and person   Bathing 2-->assistive person   Dressing 2-->assistive person   Eating 0-->independent   Communication 0-->understands/communicates without difficulty   Swallowing 0-->swallows foods/liquids without difficulty   Cognition 0 - no cognition issues reported   Fall history within last six months yes   Number of times patient has fallen within last six months 2   Which of the above functional risks had a recent onset or change? ambulation;transferring;toileting;bathing;dressing    General Information   Onset of Illness/Injury or Date of Surgery - Date 05/27/17   Referring Physician Dr. Diaz   Patient/Family Goals Statement back to rehab facility, ultimately to go home   Pertinent History of Current Problem (include personal factors and/or comorbidities that impact the POC) Mary Ellen Cuba is a 89 year old female who went to ED due to increasing shortness of breath and cough over the past week. Admitted due to acute CHF. She's currently rehabbing at [facility] after a pelvic fracture last week. History of CAD, hypertension, hyperlipidemia, CKD, hypothyroidism, and type 2 diabetes.    Precautions/Limitations fall precautions   Weight-Bearing Status - LLE weight-bearing as tolerated   General Observations Pt resting in bed refusing to get out of bed today. Bed alarm on at beginning and end of session.    General Info Comments PT orders received for evaluation and treatment as indicated for continued pain therapy and recent hip fracture. Activity orders up with assist.    Cognitive Status Examination   Orientation orientation to person, place and time   Level of Consciousness alert   Personal Safety and Judgment intact   Memory intact   Pain Assessment   Patient Currently in Pain No   Integumentary/Edema   Integumentary/Edema Comments Moderate edema in bilateral calves and feet   Range of Motion (ROM)   ROM Comment UEs WFLs grossly. Right lacking 10 dg knee extension, ankle DF to -10 dg on right. Hip flexion 70 dg, 90 dg knee flxion on right. Hip flexion 70 dg left, knee flexion WFLs. Left ankle DF ~ negative 20 dg.    Strength   Strength Comments Right SLR 3+/5, bilateral ankle DF 5/5, hip and knee extnesion 3+/5 grossly (taken in supine). Unable to perform left heel slide wihtout assist and no SLR wtihout assist. PF 4/5 on left. Right hip abduction 4-/5, left <3/5 (required assist).    Bed Mobility   Bed Mobility Comments Per pt, able to do without person assist (uses side  "rails) but needs help adjusting covers. Demonstrated right roll with assist only to place pillows, used rail. Unwilling to demosntrate left roll as hip achey    Transfer Skills   Transfer Comments Per nursing SBA   Gait   Gait Comments Not assessed as pt declined sitting.    Balance   Balance Comments Not assessed as pt declined sitting.    General Therapy Interventions   Planned Therapy Interventions ROM;strengthening;stretching;progressive activity/exercise;gait training;bed mobility training;balance training   Clinical Impression   Criteria for Skilled Therapeutic Intervention yes, treatment indicated   PT Diagnosis Weakness and pain due to left pelvic fracture (admitted d/t acute CHF) limiting her safe functional mobiltiy   Influenced by the following impairments pain, weakness, decreased ROM, impaired mobility   Functional limitations due to impairments bed mobiltiy, transfers, standing, gait   Clinical Presentation Evolving/Changing   Clinical Presentation Rationale New onset of acute CHF with anticipated improvement, complicated by original issue of left pelvic fracture   Clinical Decision Making (Complexity) Moderate complexity   Therapy Frequency` 3 times/week   Predicted Duration of Therapy Intervention (days/wks) hospital stay up to 2 to 3 days   Anticipated Equipment Needs at Discharge (None)   Anticipated Discharge Disposition Long Term Care Facility;Transitional Care Facility   Risk & Benefits of therapy have been explained Yes   Patient, Family & other staff in agreement with plan of care Yes   [facility] AM-PAC TM \"6 Clicks\"   2016, Trustees of [facility], under license to [employer].  All rights reserved.   6 Clicks Short Forms Basic Mobility Inpatient Short Form   [facility] AM-PAC  \"6 Clicks\" V.2 Basic Mobility Inpatient Short Form   1. Turning from your back to your side while in a flat bed without using bedrails? 3 - A Little   2. Moving from lying on your back to sitting " on the side of a flat bed without using bedrails? 2 - A Lot   3. Moving to and from a bed to a chair (including a wheelchair)? 3 - A Little   4. Standing up from a chair using your arms (e.g., wheelchair, or bedside chair)? 3 - A Little   5. To walk in hospital room? 3 - A Little   6. Climbing 3-5 steps with a railing? 1 - Total   Basic Mobility Raw Score (Score out of 24.Lower scores equate to lower levels of function) 15   Total Evaluation Time   Total Evaluation Time (Minutes) 10       Ena Butler, PT, DPT  [phone]  [facility][SH1.1]       Revision History        User Key Date/Time User Provider Type Action    > SH1.1 5/27/2017 11:41 AM Ena Butler, PT Physical Therapist Sign                                                      INTERAGENCY TRANSFER FORM - LAB / IMAGING / EKG / EMG RESULTS   5/26/2017                       [address] SURGICAL: [phone]            Unresulted Labs (24h ago through future)    Start       Ordered    Unscheduled  Potassium  CONDITIONAL X 1 STAT,   STAT     Comments:  RN to release order IF pronounced diuresis response greater than 1000 mL or new onset of arrythmia.    05/26/17 0600         Lab Results - 3 Days      Glucose by meter [[id number]] (Abnormal)  Resulted: 05/30/17 1146, Result status: Final result    Ordering provider: Jaret Grey MD  05/30/17 1142 Resulting lab: POINT OF CARE TEST, GLUCOSE    Specimen Information    Type Source Collected On     05/30/17 1142          Components       Value Reference Range Flag Lab   Glucose 116 70 - 99 mg/dL H 170            Blood culture [[id number]]  Resulted: 05/30/17 0825, Result status: Preliminary result    Ordering provider: Bala Keneny MD  05/26/17 0328 Resulting lab: [facility]    Specimen Information    Type Source Collected On   Blood Arm, Right 05/26/17 0342          Components       Value Reference Range Flag Lab   Specimen  Description Right Arm   [facility]   Special Requests DG   [facility]   Culture Micro No growth after 4 days   [facility]   Micro Report Status Pending   [facility]            Blood culture [[id number]]  Resulted: 05/30/17 0825, Result status: Preliminary result    Ordering provider: Bala Kenney MD  05/26/17 0328 Resulting lab: [facility]    Specimen Information    Type Source Collected On   Blood Hand, Left 05/26/17 0353          Components       Value Reference Range Flag Lab   Specimen Description Left Hand   [facility]   Special Requests DG   [facility]   Culture Micro No growth after 4 days   [facility]   Micro Report Status Pending   [facility]            Glucose by meter [[id number]]  Resulted: 05/30/17 0751, Result status: Final result    Ordering provider: Jaret Grey MD  05/30/17 0746 Resulting lab: POINT OF CARE TEST, GLUCOSE    Specimen Information    Type Source Collected On     05/30/17 0746          Components       Value Reference Range Flag Lab   Glucose 85 70 - 99 mg/dL  170            Basic metabolic panel [[id number]] (Abnormal)  Resulted: 05/30/17 0624, Result status: Final result    Ordering provider: Gurvinder Diaz MD  05/30/17 0000 Resulting lab: [facility]    Specimen Information    Type Source Collected On   Blood  05/30/17 0604          Components       Value Reference Range Flag Lab   Sodium 144 133 - 144 mmol/L  [facility]   Potassium 3.7 3.4 - 5.3 mmol/L  [facility]   Chloride 106 94 - 109 mmol/L  [facility]   Carbon Dioxide 33 20 - 32 mmol/L H [facility]   Anion Gap 5 3 - 14 mmol/L  [facility]   Glucose 80 70 - 99 mg/dL  [facility]   Urea Nitrogen 28 7 - 30 mg/dL  [facility]   Creatinine 1.63 0.52 - 1.04 mg/dL H [facility]   GFR Estimate 29 >60 mL/min/1.7m2 L [facility]   Comment:  Non  GFR Calc   GFR Estimate If Black 36 >60 mL/min/1.7m2 L [facility]   Comment:  African American GFR Calc   Calcium 7.8 8.5 - 10.1 mg/dL L [facility]            Glucose by meter  [[id number]] (Abnormal)  Resulted: 05/29/17 2040, Result status: Final result    Ordering provider: Jaret Grey MD  05/29/17 2038 Resulting lab: POINT OF CARE TEST, GLUCOSE    Specimen Information    Type Source Collected On     05/29/17 2038          Components       Value Reference Range Flag Lab   Glucose 100 70 - 99 mg/dL H 170            Glucose by meter [[id number]] (Abnormal)  Resulted: 05/29/17 1721, Result status: Final result    Ordering provider: Jaret Grey MD  05/29/17 1714 Resulting lab: POINT OF CARE TEST, GLUCOSE    Specimen Information    Type Source Collected On     05/29/17 1714          Components       Value Reference Range Flag Lab   Glucose 123 70 - 99 mg/dL H 170            Glucose by meter [[id number]] (Abnormal)  Resulted: 05/29/17 1131, Result status: Final result    Ordering provider: Jaret Grey MD  05/29/17 1127 Resulting lab: POINT OF CARE TEST, GLUCOSE    Specimen Information    Type Source Collected On     05/29/17 1127          Components       Value Reference Range Flag Lab   Glucose 100 70 - 99 mg/dL H 170            Glucose by meter [[id number]]  Resulted: 05/29/17 0746, Result status: Final result    Ordering provider: Jaret Grey MD  05/29/17 0740 Resulting lab: POINT OF CARE TEST, GLUCOSE    Specimen Information    Type Source Collected On     05/29/17 0740          Components       Value Reference Range Flag Lab   Glucose 81 70 - 99 mg/dL  170            Urine Culture [[id number]]  Resulted: 05/29/17 0732, Result status: Final result    Ordering provider: Bala Kenney MD  05/26/17 0439 Resulting lab: [facility]    Specimen Information    Type Source Collected On   Urine Urine catheter 05/26/17 0430          Components       Value Reference Range Flag Lab   Specimen Description Catheterized Urine   [facility]   Culture Micro No growth   [facility]   Micro Report Status FINAL 05/29/2017   [facility]            Basic  metabolic panel [[id number]] (Abnormal)  Resulted: 05/29/17 0622, Result status: Final result    Ordering provider: Gurvinder Diaz MD  05/29/17 0000 Resulting lab: [facility]    Specimen Information    Type Source Collected On   Blood  05/29/17 0600          Components       Value Reference Range Flag Lab   Sodium 147 133 - 144 mmol/L H [facility]   Potassium 3.6 3.4 - 5.3 mmol/L  [facility]   Chloride 109 94 - 109 mmol/L  [facility]   Carbon Dioxide 30 20 - 32 mmol/L  [facility]   Anion Gap 8 3 - 14 mmol/L  [facility]   Glucose 115 70 - 99 mg/dL H [facility]   Urea Nitrogen 28 7 - 30 mg/dL  [facility]   Creatinine 1.70 0.52 - 1.04 mg/dL H [facility]   GFR Estimate 28 >60 mL/min/1.7m2 L [facility]   Comment:  Non  GFR Calc   GFR Estimate If Black 34 >60 mL/min/1.7m2 L [facility]   Comment:  African American GFR Calc   Calcium 7.7 8.5 - 10.1 mg/dL L [facility]            Glucose by meter [[id number]] (Abnormal)  Resulted: 05/28/17 2141, Result status: Final result    Ordering provider: Jaret Grey MD  05/28/17 2127 Resulting lab: POINT OF CARE TEST, GLUCOSE    Specimen Information    Type Source Collected On     05/28/17 2127          Components       Value Reference Range Flag Lab   Glucose 126 70 - 99 mg/dL H 170            Glucose by meter [[id number]] (Abnormal)  Resulted: 05/28/17 1646, Result status: Final result    Ordering provider: Jaret Grey MD  05/28/17 1640 Resulting lab: POINT OF CARE TEST, GLUCOSE    Specimen Information    Type Source Collected On     05/28/17 1640          Components       Value Reference Range Flag Lab   Glucose 104 70 - 99 mg/dL H 170            Glucose by meter [[id number]] (Abnormal)  Resulted: 05/28/17 1141, Result status: Final result    Ordering provider: Jaret Grey MD  05/28/17 1138 Resulting lab: POINT OF CARE TEST, GLUCOSE    Specimen Information    Type Source Collected On     05/28/17 1138          Components       Value Reference Range  Flag Lab   Glucose 101 70 - 99 mg/dL H 170            Glucose by meter [[id number]]  Resulted: 05/28/17 0811, Result status: Final result    Ordering provider: Jaret Grey MD  05/28/17 0807 Resulting lab: POINT OF CARE TEST, GLUCOSE    Specimen Information    Type Source Collected On     05/28/17 0807          Components       Value Reference Range Flag Lab   Glucose 73 70 - 99 mg/dL  170            Basic metabolic panel [[id number]] (Abnormal)  Resulted: 05/28/17 0606, Result status: Final result    Ordering provider: Gurvinder Diaz MD  05/28/17 0000 Resulting lab: [facility]    Specimen Information    Type Source Collected On   Blood  05/28/17 0540          Components       Value Reference Range Flag Lab   Sodium 141 133 - 144 mmol/L  [facility]   Potassium 3.7 3.4 - 5.3 mmol/L  [facility]   Chloride 105 94 - 109 mmol/L  [facility]   Carbon Dioxide 31 20 - 32 mmol/L  [facility]   Anion Gap 5 3 - 14 mmol/L  [facility]   Glucose 103 70 - 99 mg/dL H [facility]   Urea Nitrogen 29 7 - 30 mg/dL  [facility]   Creatinine 1.78 0.52 - 1.04 mg/dL H [facility]   GFR Estimate 27 >60 mL/min/1.7m2 L [facility]   Comment:  Non  GFR Calc   GFR Estimate If Black 32 >60 mL/min/1.7m2 L [facility]   Comment:  African American GFR Calc   Calcium 7.5 8.5 - 10.1 mg/dL L [facility]            Glucose by meter [[id number]]  Resulted: 05/27/17 2031, Result status: Final result    Ordering provider: Jaret Grey MD  05/27/17 2023 Resulting lab: POINT OF CARE TEST, GLUCOSE    Specimen Information    Type Source Collected On     05/27/17 2023          Components       Value Reference Range Flag Lab   Glucose 99 70 - 99 mg/dL  170            Methicillin resistant staph aureus cult [[id number]]  Resulted: 05/27/17 1852, Result status: Final result    Ordering provider: Jaret Grey MD  05/26/17 0633 Resulting lab: [facility]    Specimen Information    Type Source Collected On   Nose  05/26/17  0630          Components       Value Reference Range Flag Lab   Specimen Description Carlota   [facility]   Culture Micro No MRSA isolated   [facility]   Micro Report Status FINAL 05/27/2017   [facility]            Glucose by meter [[id number]] (Abnormal)  Resulted: 05/27/17 1636, Result status: Final result    Ordering provider: Jaret Grey MD  05/27/17 1632 Resulting lab: POINT OF CARE TEST, GLUCOSE    Specimen Information    Type Source Collected On     05/27/17 1632          Components       Value Reference Range Flag Lab   Glucose 101 70 - 99 mg/dL H 170            Glucose by meter [[id number]] (Abnormal)  Resulted: 05/27/17 1150, Result status: Final result    Ordering provider: Jaret Grey MD  05/27/17 1143 Resulting lab: POINT OF CARE TEST, GLUCOSE    Specimen Information    Type Source Collected On     05/27/17 1143          Components       Value Reference Range Flag Lab   Glucose 101 70 - 99 mg/dL H 170            Glucose by meter [[id number]]  Resulted: 05/27/17 0751, Result status: Final result    Ordering provider: Jaret Grey MD  05/27/17 0745 Resulting lab: POINT OF CARE TEST, GLUCOSE    Specimen Information    Type Source Collected On     05/27/17 0745          Components       Value Reference Range Flag Lab   Glucose 82 70 - 99 mg/dL  170            Hemoglobin A1c [[id number]]  Resulted: 05/27/17 0632, Result status: Final result    Ordering provider: Jaret Grey MD  05/27/17 0000 Resulting lab: [facility]    Specimen Information    Type Source Collected On   Blood  05/27/17 0533          Components       Value Reference Range Flag Lab   Hemoglobin A1C 5.3 4.3 - 6.0 %  [facility]            Basic metabolic panel [[id number]] (Abnormal)  Resulted: 05/27/17 0557, Result status: Final result    Ordering provider: Jaret Grey MD  05/27/17 0000 Resulting lab: [facility]    Specimen Information    Type Source Collected On    Blood  05/27/17 0533          Components       Value Reference Range Flag Lab   Sodium 145 133 - 144 mmol/L H [facility]   Potassium 4.5 3.4 - 5.3 mmol/L  [facility]   Chloride 108 94 - 109 mmol/L  [facility]   Carbon Dioxide 31 20 - 32 mmol/L  [facility]   Anion Gap 6 3 - 14 mmol/L  [facility]   Glucose 102 70 - 99 mg/dL H [facility]   Urea Nitrogen 30 7 - 30 mg/dL  [facility]   Creatinine 1.85 0.52 - 1.04 mg/dL H [facility]   GFR Estimate 25 >60 mL/min/1.7m2 L [facility]   Comment:  Non  GFR Calc   GFR Estimate If Black 31 >60 mL/min/1.7m2 L [facility]   Comment:  African American GFR Calc   Calcium 7.6 8.5 - 10.1 mg/dL L [facility]            CBC with platelets [[id number]] (Abnormal)  Resulted: 05/27/17 0541, Result status: Final result    Ordering provider: Jaret Grey MD  05/27/17 0000 Resulting lab: [facility]    Specimen Information    Type Source Collected On   Blood  05/27/17 0533          Components       Value Reference Range Flag Lab   WBC 6.4 4.0 - 11.0 10e9/L  [facility]   RBC Count 3.19 3.8 - 5.2 10e12/L L [facility]   Hemoglobin 9.2 11.7 - 15.7 g/dL L [facility]   Hematocrit 30.0 35.0 - 47.0 % L [facility]   MCV 94 78 - 100 fl  [facility]   MCH 28.8 26.5 - 33.0 pg  [facility]   MCHC 30.7 31.5 - 36.5 g/dL L [facility]   RDW 18.7 10.0 - 15.0 % H [facility]   Platelet Count 224 150 - 450 10e9/L  [facility]            Testing Performed By     Lab - Abbreviation Name Director Address Valid Date Range    22 - [facility] Unknown [address] [place] [address] 05/08/15 1057 - Present    170 - Unknown POINT OF CARE TEST, GLUCOSE Unknown Unknown 10/31/11 1114 - Present               ECG/EMG Results      Echocardiogram - Routine [[id number]]  Resulted: 05/26/17 0829, Result status: Edited Result - FINAL    Ordering provider: Jaret Grey MD  05/26/17 0600 Resulted by: Yesy Ortega MD    Performed: 05/26/17 0908 - 05/26/17 0908 Resulting lab: RADIOLOGY RESULTS     Narrative:       [id number]  [facility]  [id number]  [id number]^ANGELINA^ERASMO^ROSEMARY           [facility]  Echocardiography Laboratory  [address] [place] [address]        Name: MINESH RUTHERFORD  MRN: [id number]  : 1924  Study Date: 2017 08:29 AM  Age: 89 yrs  Gender: Female  Patient Location: [facility]  Reason For Study: CHF  History: CVA, HTN, Hyperlipidemia, DM, CAD PTCA with Stent   Ordering Physician: ERASMO ALFARO  Referring Physician: Samantha Celis  Performed By: Neymar Murphy     BSA: 1.7 m2  Height: 65 in  Weight: 141 lb  HR: 80  BP: 128/80 mmHg  _____________________________________________________________________________  __        Procedure  Complete Portable Echo Adult. Complete Echo Adult.  _____________________________________________________________________________  __        Interpretation Summary     Left ventricular systolic function is normal. The visual ejection fraction is  estimated at 55-60%. There is mild to moderate concentric left ventricular  hypertrophy. Septal motion is consistent with conduction abnormality. The left  atrium is moderately dilated.  Small right pleural effusion noted.  Lipomatous hypertrophy of the interatrial septum is noted.  There is biventricular hypertrophy with an increased echodensity; an  infitrative disorder could be present.  _____________________________________________________________________________  __        Left Ventricle  The left ventricular cavity is small. There is mild to moderate concentric  left ventricular hypertrophy. Left ventricular systolic function is normal.  The visual ejection fraction is estimated at 55-60%. E by E prime ratio is  between 8 and 15, which is indeterminate for assessment of left ventricular  filling pressures. Septal motion is consistent with conduction abnormality.     Right Ventricle  The right ventricular cavity is small. There is mild to moderate right  ventricular hypertrophy.  The right ventricular systolic function is normal.     Atria  The left atrium is moderately dilated. Right atrial size is normal. Lipomatous  hypertrophy of the interatrial septum is noted.     Mitral Valve  The mitral valve leaflets appear thickened, but open well. There is mild to  moderate mitral annular calcification. There is trace mitral regurgitation.        Tricuspid Valve  There is trace tricuspid regurgitation. The right ventricular systolic  pressure is approximated at 29mmHg plus the right atrial pressure. Small IVC  (<1.5cm) with normal respiratory collapse; right atrial pressure is estimated  at 0-5mmHg.     Aortic Valve  No aortic regurgitation is present. No hemodynamically significant valvular  aortic stenosis.     Pulmonic Valve  There is no pulmonic valvular stenosis.     Vessels  The aortic root is normal size.     Pericardium  The pericardium appears normal. Small right pleural effusion.        Rhythm  The rhythm was normal sinus.  _____________________________________________________________________________  __  MMode/2D Measurements & Calculations  IVSd: 0.98 cm     LVIDd: 4.0 cm  LVIDs: 2.8 cm  LVPWd: 1.1 cm  FS: 31.1 %  EDV(Teich): 70.0 ml  ESV(Teich): 28.4 ml  LV mass(C)d: 138.0 grams  LV mass(C)dI: 81.0 grams/m2  Ao root diam: 3.3 cm  LA dimension: 4.5 cm  asc Aorta Diam: 3.3 cm  LA/Ao: 1.4        Doppler Measurements & Calculations  MV E max adin: 76.4 cm/sec  MV A max adin: 100.8 cm/sec  MV E/A: 0.76  MV max PG: 3.4 mmHg  MV mean P.5 mmHg  MV V2 VTI: 22.6 cm  MV dec time: 0.31 sec  Ao V2 max: 127.3 cm/sec  Ao max P.5 mmHg  PA acc time: 0.08 sec  TR max adin: 270.1 cm/sec  TR max P.2 mmHg  Lateral E/e': 10.0  Medial E/e': 15.1              _____________________________________________________________________________  __           Report approved by: Marisol Núñez 2017 11:25 AM       1    Type Source Collected On     17 0829          View Image (below)               Encounter-Level Documents:     There are no encounter-level documents.      Order-Level Documents:     There are no order-level documents.

## 2017-05-26 NOTE — PLAN OF CARE
Problem: Goal Outcome Summary  Goal: Goal Outcome Summary  Pt.has been SR per telemetry.No ectopy noted.Pts.blood sugars have been 91 and 148 this shift.Pt.has some blanchable redness noted to tailbone which is prominent.She has been encouraged to reposition frequently and is helped.Lungs are clear.

## 2017-05-26 NOTE — PROGRESS NOTES
"Reason for Assessment:  Nutrition education regarding 2gm Sodium Diet.  Diet History:  Pt reports \"loving salt.\" She stated \"I guess you can call me a salt-aholic.\" She loves cheese and marinated meat. She has not followed a low sodium diet in the past. Her neighbor was in the room when writer spoke with patient and reported that she helps pt with cares such as running errands and making meals. Pt does not dine out for meals. She reports her appetite has changed since her stroke in 2013, stating \" I used to be a bigger eater, now not so much.\" She also reports using Mrs. Roy at home and likes it.   Nutrition Diagnosis:  Food- and nutrition-related knowledge deficit r/t no previous knowledge of 2gm Sodium Diet.  Interventions:  Provided instruction on 2gm Sodium Diet, limiting intake of high-salt foods such as cured meat, processed food, vegetables canned in salt and salt shaker. Will make sure Mrs. [employer] is sent on meal trays.  Provided handouts Low Sodium Nutrition therapy.   Goals:   Patient will verbalize understanding of Low Sodium Diet.  Follow-up:    Patient to ask any further nutrition-related questions before discharge.  In addition, pt may request outpatient RD appointment.    Kaur Roberto RD, LD  Clinical Dietitian  [phone]    "
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
## 2017-05-26 NOTE — PROGRESS NOTES
Called [facility] regarding dose of Vitamin D and that nurse stated they received orders to discontinue it on the 22nd.[facility] pharmacist notified of findings.

## 2017-05-27 ENCOUNTER — APPOINTMENT (OUTPATIENT)
Dept: PHYSICAL THERAPY | Facility: CLINIC | Age: 82
DRG: 291 | End: 2017-05-27
Payer: MEDICARE

## 2017-05-27 LAB
ANION GAP SERPL CALCULATED.3IONS-SCNC: 6 MMOL/L (ref 3–14)
BACTERIA SPEC CULT: NORMAL
BUN SERPL-MCNC: 30 MG/DL (ref 7–30)
CALCIUM SERPL-MCNC: 7.6 MG/DL (ref 8.5–10.1)
CHLORIDE SERPL-SCNC: 108 MMOL/L (ref 94–109)
CO2 SERPL-SCNC: 31 MMOL/L (ref 20–32)
CREAT SERPL-MCNC: 1.85 MG/DL (ref 0.52–1.04)
ERYTHROCYTE [DISTWIDTH] IN BLOOD BY AUTOMATED COUNT: 18.7 % (ref 10–15)
GFR SERPL CREATININE-BSD FRML MDRD: 25 ML/MIN/1.7M2
GLUCOSE BLDC GLUCOMTR-MCNC: 101 MG/DL (ref 70–99)
GLUCOSE BLDC GLUCOMTR-MCNC: 101 MG/DL (ref 70–99)
GLUCOSE BLDC GLUCOMTR-MCNC: 82 MG/DL (ref 70–99)
GLUCOSE BLDC GLUCOMTR-MCNC: 99 MG/DL (ref 70–99)
GLUCOSE SERPL-MCNC: 102 MG/DL (ref 70–99)
HBA1C MFR BLD: 5.3 % (ref 4.3–6)
HCT VFR BLD AUTO: 30 % (ref 35–47)
HGB BLD-MCNC: 9.2 G/DL (ref 11.7–15.7)
MCH RBC QN AUTO: 28.8 PG (ref 26.5–33)
MCHC RBC AUTO-ENTMCNC: 30.7 G/DL (ref 31.5–36.5)
MCV RBC AUTO: 94 FL (ref 78–100)
MICRO REPORT STATUS: NORMAL
PLATELET # BLD AUTO: 224 10E9/L (ref 150–450)
POTASSIUM SERPL-SCNC: 4.5 MMOL/L (ref 3.4–5.3)
RBC # BLD AUTO: 3.19 10E12/L (ref 3.8–5.2)
SODIUM SERPL-SCNC: 145 MMOL/L (ref 133–144)
SPECIMEN SOURCE: NORMAL
WBC # BLD AUTO: 6.4 10E9/L (ref 4–11)

## 2017-05-27 PROCEDURE — 00000146 ZZHCL STATISTIC GLUCOSE BY METER IP

## 2017-05-27 PROCEDURE — 12000007 ZZH R&B INTERMEDIATE

## 2017-05-27 PROCEDURE — 25000132 ZZH RX MED GY IP 250 OP 250 PS 637: Mod: GY | Performed by: FAMILY MEDICINE

## 2017-05-27 PROCEDURE — 94799 UNLISTED PULMONARY SVC/PX: CPT

## 2017-05-27 PROCEDURE — 97162 PT EVAL MOD COMPLEX 30 MIN: CPT | Mod: GP | Performed by: PHYSICAL THERAPIST

## 2017-05-27 PROCEDURE — A9270 NON-COVERED ITEM OR SERVICE: HCPCS | Mod: GY | Performed by: FAMILY MEDICINE

## 2017-05-27 PROCEDURE — 25000128 H RX IP 250 OP 636: Performed by: FAMILY MEDICINE

## 2017-05-27 PROCEDURE — 36415 COLL VENOUS BLD VENIPUNCTURE: CPT | Performed by: FAMILY MEDICINE

## 2017-05-27 PROCEDURE — 40000193 ZZH STATISTIC PT WARD VISIT: Performed by: PHYSICAL THERAPIST

## 2017-05-27 PROCEDURE — 97530 THERAPEUTIC ACTIVITIES: CPT | Mod: GP | Performed by: PHYSICAL THERAPIST

## 2017-05-27 PROCEDURE — 25000132 ZZH RX MED GY IP 250 OP 250 PS 637: Mod: GY | Performed by: INTERNAL MEDICINE

## 2017-05-27 PROCEDURE — 80048 BASIC METABOLIC PNL TOTAL CA: CPT | Performed by: FAMILY MEDICINE

## 2017-05-27 PROCEDURE — 85027 COMPLETE CBC AUTOMATED: CPT | Performed by: FAMILY MEDICINE

## 2017-05-27 PROCEDURE — 99232 SBSQ HOSP IP/OBS MODERATE 35: CPT | Performed by: INTERNAL MEDICINE

## 2017-05-27 PROCEDURE — A9270 NON-COVERED ITEM OR SERVICE: HCPCS | Mod: GY | Performed by: INTERNAL MEDICINE

## 2017-05-27 PROCEDURE — 83036 HEMOGLOBIN GLYCOSYLATED A1C: CPT | Performed by: FAMILY MEDICINE

## 2017-05-27 PROCEDURE — 97110 THERAPEUTIC EXERCISES: CPT | Mod: GP | Performed by: PHYSICAL THERAPIST

## 2017-05-27 RX ORDER — DOCUSATE SODIUM 100 MG/1
100 CAPSULE, LIQUID FILLED ORAL 2 TIMES DAILY
Status: DISCONTINUED | OUTPATIENT
Start: 2017-05-27 | End: 2017-05-30 | Stop reason: HOSPADM

## 2017-05-27 RX ADMIN — AMLODIPINE BESYLATE 5 MG: 5 TABLET ORAL at 09:07

## 2017-05-27 RX ADMIN — FUROSEMIDE 20 MG: 10 INJECTION, SOLUTION INTRAVENOUS at 16:14

## 2017-05-27 RX ADMIN — ACETAMINOPHEN 400 MCG: 400 TABLET ORAL at 09:07

## 2017-05-27 RX ADMIN — SIMVASTATIN 10 MG: 5 TABLET, FILM COATED ORAL at 20:08

## 2017-05-27 RX ADMIN — ALLOPURINOL 100 MG: 100 TABLET ORAL at 20:08

## 2017-05-27 RX ADMIN — ACETAMINOPHEN 1000 MG: 500 TABLET ORAL at 09:08

## 2017-05-27 RX ADMIN — ASPIRIN 325 MG ORAL TABLET 325 MG: 325 PILL ORAL at 09:07

## 2017-05-27 RX ADMIN — POTASSIUM CITRATE 10 MEQ: 10 TABLET ORAL at 09:07

## 2017-05-27 RX ADMIN — ACETAMINOPHEN 1000 MG: 500 TABLET ORAL at 14:21

## 2017-05-27 RX ADMIN — CLOPIDOGREL 75 MG: 75 TABLET, FILM COATED ORAL at 09:08

## 2017-05-27 RX ADMIN — ACETAMINOPHEN 1000 MG: 500 TABLET ORAL at 20:08

## 2017-05-27 RX ADMIN — Medication 1 TABLET: at 09:08

## 2017-05-27 RX ADMIN — DOCUSATE SODIUM 100 MG: 100 CAPSULE ORAL at 20:08

## 2017-05-27 RX ADMIN — Medication 1 TABLET: at 09:07

## 2017-05-27 RX ADMIN — METOPROLOL SUCCINATE 25 MG: 25 TABLET, EXTENDED RELEASE ORAL at 09:08

## 2017-05-27 RX ADMIN — LEVOTHYROXINE SODIUM 112 MCG: 112 TABLET ORAL at 06:20

## 2017-05-27 RX ADMIN — ALLOPURINOL 100 MG: 100 TABLET ORAL at 09:08

## 2017-05-27 RX ADMIN — DOCUSATE SODIUM 100 MG: 100 CAPSULE ORAL at 09:08

## 2017-05-27 RX ADMIN — FUROSEMIDE 20 MG: 10 INJECTION, SOLUTION INTRAVENOUS at 09:08

## 2017-05-27 NOTE — PROGRESS NOTES
[facility]    Hospitalist Progress Note    Assessment & Plan     Mary Ellen Cuba is a 89 year old female who was admitted on 5/26/2017. She presented with increasing shortness of breath and cough over the past week. She's currently rehabbing at [facility] after a pelvic fracture last week. Evaluation in the emergency department is concerning for new onset CHF with increased peripheral edema, elevated DNP at 11,792 with known history of CAD with stent placement in the past. Chest x-ray showing bilateral vascular congestion pt was admitted to inpatient status and started on a diuretic. Echo showed preserved ef with LVH and no RV strain.      Active Problems:    Acute on chronic diastolic congestive heart failure (H)    Assessment: presenting with increasing shortness of breath and cough with an elevated BNP and some peripheral edema. Echo noted preserved ef but has LVH and RV has no strain.    Plan: Continue her beta blocker. Cont diuresis with IV Lasix 20mg bid. No ACE/ARB as ef is preserved. Monitor daily wt/I/O/Cr. Encouraged IS/acapella.  Placed for PT eval/treat      Essential hypertension    Assessment: stable, taking Norvasc and beta blocker    Plan: continue      Coronary artery disease involving native coronary artery of native heart without angina pectoris    Assessment: history of MI with stent placement in past. No acute changes on EKG with normal troponin to suggest new process    Plan: continue BB, Plavix, statin      Hypothyroidism due to acquired atrophy of thyroid    Assessment: taking oral Synthroid    Plan: continue    Type 2 diabetes mellitus without complication (H)    Assessment: currently diet controlled. Previously on glipizide    Plan: follow blood sugars with sliding scale coverage    Chronic kidney disease, stage III (moderate)    Assessment: creatinine up somewhat over the past several months    Plan: follow    Anemia    Assessment: stable,  no evidence of bleeding    Plan: follow    Hyperlipidemia LDL goal <100    Assessment: taking Zocor    Plan: continue    Left superior inferior pubic rami fractures - has been rehabbing at [facility]    Assessment: no change    Plan: place PT treatment while pt is here    DVT Prophylaxis: Pneumatic Compression Devices  Code Status: DNR per previous discussions     Disposition: Expected discharge in 2 days once breathing better    DVT Prophylaxis: Pneumatic Compression Devices  Code Status: DNR      Gurvinder Diaz MD    Interval History   Pt is feeling 'positive' this AM stating that she is breathing better.  She tolerated PO with no N/V.  No abd pain. No CP. She voiced that sometimes her foot is swollen because she often tighten her shoes lace to hard. Denied any hip pain.    -Data reviewed today: I reviewed all new labs and imaging results over the last 24 hours. I personally reviewed no images or EKG's today.    Physical Exam   Temp: 98.3  F (36.8  C) Temp src: Oral BP: 173/66 Pulse: 71 Heart Rate: 71 Resp: 16 SpO2: 94 % O2 Device: None (Room air)    Vitals:    17 0325 17 0612 17 0100   Weight: 61.2 kg (135 lb) 64.2 kg (141 lb 8 oz) 62 kg (136 lb 11 oz)     Vital Signs with Ranges  Temp:  [98  F (36.7  C)-98.4  F (36.9  C)] 98.3  F (36.8  C)  Pulse:  [71] 71  Heart Rate:  [65-74] 71  Resp:  [16-18] 16  BP: (139-173)/(59-66) 173/66  SpO2:  [93 %-94 %] 94 %  I/O last 3 completed shifts:  In: 940 [P.O.:940]  Out:  [Urine:]    Constitutional: alert, in NAD   Head: Normocephalic.  ENT: moist oral mucosa  Cardiovascular: RRR. No murmurs, or rub, no JVD  Respiratory: crackle at bases, no wheezing  Abdomen: Abdomen soft, non-tender. BS normal. No masses, organomegaly  NEURO:  A&O to self, , place and year. Sensation grossly WNL.  SKIN: no visible rash  JOINT/EXTREMITIES: b/l edema, mostly in b/l foot up to 2 in above ankles, normal muscle tone and positive    Medications     Reason ACE/ARB  order not selected       - MEDICATION INSTRUCTIONS -         acetaminophen  1,000 mg Oral TID     allopurinol  100 mg Oral BID     amLODIPine  5 mg Oral Daily     aspirin  325 mg Oral Daily     calcium citrate-vitamin D  1 tablet Oral Daily     clopidogrel  75 mg Oral Daily     folic acid  400 mcg Oral Daily     levothyroxine  112 mcg Oral Daily     metoprolol  25 mg Oral Daily     multivitamin, therapeutic with minerals  1 tablet Oral Daily     potassium citrate  10 mEq Oral Daily     simvastatin  10 mg Oral At Bedtime     insulin aspart  1-7 Units Subcutaneous TID AC     insulin aspart  1-5 Units Subcutaneous At Bedtime     furosemide  20 mg Intravenous BID       Data     Recent Labs  Lab 05/27/17  0533 05/26/17  0342   WBC 6.4 9.3   HGB 9.2* 10.7*   MCV 94 93    269   * 143   POTASSIUM 4.5 4.1   CHLORIDE 108 108   CO2 31 28   BUN 30 28   CR 1.85* 1.79*   ANIONGAP 6 7   ZULEMA 7.6* 7.6*   * 104*   ALBUMIN  --  2.2*   PROTTOTAL  --  5.6*   BILITOTAL  --  0.2   ALKPHOS  --  142   ALT  --  23   AST  --  40   TROPI  --  0.038       No results found for this or any previous visit (from the past 24 hour(s)).

## 2017-05-27 NOTE — PLAN OF CARE
Problem: Goal Outcome Summary  Goal: Goal Outcome Summary  Outcome: Improving  Pt.had some rhonchi with some coarse breath sounds this morning.She has an occasional non-productive cough.Her pedal edema is increased from yesterday.She is confused at time but can carry on a conversation easily.Her tele has been a SR with occasional PVC noted on the monitor none caught on her strips.Her blood sugars have been 82 and 101.

## 2017-05-27 NOTE — PLAN OF CARE
Problem: Goal Outcome Summary  Goal: Goal Outcome Summary  Physical Therapy evaluation completed. Patient is a 89 year old year old female. Prior to admission, patient was living at rehab facility,  using walker for mobility, and requiring one person assist for ADLs. Currently, patient is requiring moderate assistance for functional mobility and did not ambulate today.     No barriers to return to rehab facility.      No new equipment needs at discharge     Recommend discharge to rehab facility with wheelchair van transport.      Patient will be seen by PT 3x/week while admitted to hospital.      Ena Butler, PT, DPT  [phone]  [facility]

## 2017-05-27 NOTE — PROGRESS NOTES
Pt.removes her own fecal impaction when she needs to have a bowel movement.I advised her against this due to history of heart problems and also due to potential for tissue damage to her rectum.She states she has done this for years.Order received for colace.

## 2017-05-27 NOTE — PLAN OF CARE
Problem: Goal Outcome Summary  Goal: Goal Outcome Summary  Outcome: No Change  Pt oriented to self only. Vitals stable, afebrile. Pt very lethargic throughout shift. Pt would wake for meds or to answer questions, but would fall right back asleep. 2-3+ edema to bilateral lower extremities. Pt has loose cough and runny nose. Crackles in lower bases of lungs. Pt denies feeling short of breath. Blood sugars with in range, no coverage needed. Lewis patent with good urine output. Tele showing SR.

## 2017-05-27 NOTE — PROGRESS NOTES
Inpatient Physical Therapy Evaluation     05/27/17 1041   Quick Adds   Type of Visit Initial PT Evaluation       Present no   Living Environment   Lives With facility resident   Living Arrangements extended care facility   Home Accessibility bed and bath on same level;grab bars present (bathtub);grab bars present (toilet)   Transportation Available van, wheelchair accessible   Self-Care   Dominant Hand right   Regular Exercise yes   Activity/Exercise Type other (see comments)  (Therapy at [facility])   Equipment Currently Used at Home wheelchair;cane, straight   Functional Level Prior   Ambulation 3-->assistive equipment and person   Transferring 3-->assistive equipment and person   Toileting 3-->assistive equipment and person   Bathing 2-->assistive person   Dressing 2-->assistive person   Eating 0-->independent   Communication 0-->understands/communicates without difficulty   Swallowing 0-->swallows foods/liquids without difficulty   Cognition 0 - no cognition issues reported   Fall history within last six months yes   Number of times patient has fallen within last six months 2   Which of the above functional risks had a recent onset or change? ambulation;transferring;toileting;bathing;dressing   General Information   Onset of Illness/Injury or Date of Surgery - Date 05/27/17   Referring Physician Dr. Diaz   Patient/Family Goals Statement back to rehab facility, ultimately to go home   Pertinent History of Current Problem (include personal factors and/or comorbidities that impact the POC) Mary Ellen Cuba is a 89 year old female who went to ED due to increasing shortness of breath and cough over the past week. Admitted due to acute CHF. She's currently rehabbing at [facility] after a pelvic fracture last week. History of CAD, hypertension, hyperlipidemia, CKD, hypothyroidism, and type 2 diabetes.    Precautions/Limitations fall precautions   Weight-Bearing Status - LLE weight-bearing  as tolerated   General Observations Pt resting in bed refusing to get out of bed today. Bed alarm on at beginning and end of session.    General Info Comments PT orders received for evaluation and treatment as indicated for continued pain therapy and recent hip fracture. Activity orders up with assist.    Cognitive Status Examination   Orientation orientation to person, place and time   Level of Consciousness alert   Personal Safety and Judgment intact   Memory intact   Pain Assessment   Patient Currently in Pain No   Integumentary/Edema   Integumentary/Edema Comments Moderate edema in bilateral calves and feet   Range of Motion (ROM)   ROM Comment UEs WFLs grossly. Right lacking 10 dg knee extension, ankle DF to -10 dg on right. Hip flexion 70 dg, 90 dg knee flxion on right. Hip flexion 70 dg left, knee flexion WFLs. Left ankle DF ~ negative 20 dg.    Strength   Strength Comments Right SLR 3+/5, bilateral ankle DF 5/5, hip and knee extnesion 3+/5 grossly (taken in supine). Unable to perform left heel slide wihtout assist and no SLR wtihout assist. PF 4/5 on left. Right hip abduction 4-/5, left <3/5 (required assist).    Bed Mobility   Bed Mobility Comments Per pt, able to do without person assist (uses side rails) but needs help adjusting covers. Demonstrated right roll with assist only to place pillows, used rail. Unwilling to demosntrate left roll as hip achey    Transfer Skills   Transfer Comments Per nursing SBA   Gait   Gait Comments Not assessed as pt declined sitting.    Balance   Balance Comments Not assessed as pt declined sitting.    General Therapy Interventions   Planned Therapy Interventions ROM;strengthening;stretching;progressive activity/exercise;gait training;bed mobility training;balance training   Clinical Impression   Criteria for Skilled Therapeutic Intervention yes, treatment indicated   PT Diagnosis Weakness and pain due to left pelvic fracture (admitted d/t acute CHF) limiting her safe  "functional mobiltiy   Influenced by the following impairments pain, weakness, decreased ROM, impaired mobility   Functional limitations due to impairments bed mobiltiy, transfers, standing, gait   Clinical Presentation Evolving/Changing   Clinical Presentation Rationale New onset of acute CHF with anticipated improvement, complicated by original issue of left pelvic fracture   Clinical Decision Making (Complexity) Moderate complexity   Therapy Frequency` 3 times/week   Predicted Duration of Therapy Intervention (days/wks) hospital stay up to 2 to 3 days   Anticipated Equipment Needs at Discharge (None)   Anticipated Discharge Disposition Long Term Care Facility;Transitional Care Facility   Risk & Benefits of therapy have been explained Yes   Patient, Family & other staff in agreement with plan of care Yes   [facility]-PAC TM \"6 Clicks\"   2016, Trustees of [facility], under license to [employer].  All rights reserved.   6 Clicks Short Forms Basic Mobility Inpatient Short Form   [facility] AM-PAC  \"6 Clicks\" V.2 Basic Mobility Inpatient Short Form   1. Turning from your back to your side while in a flat bed without using bedrails? 3 - A Little   2. Moving from lying on your back to sitting on the side of a flat bed without using bedrails? 2 - A Lot   3. Moving to and from a bed to a chair (including a wheelchair)? 3 - A Little   4. Standing up from a chair using your arms (e.g., wheelchair, or bedside chair)? 3 - A Little   5. To walk in hospital room? 3 - A Little   6. Climbing 3-5 steps with a railing? 1 - Total   Basic Mobility Raw Score (Score out of 24.Lower scores equate to lower levels of function) 15   Total Evaluation Time   Total Evaluation Time (Minutes) 10       Ena Butler, PT, DPT  [phone]  [facility]    "

## 2017-05-27 NOTE — CONSULTS
"Care Transition Initial Assessment -   Reason For Consult: discharge planning  Met with: Patient    Active Problems:    Essential hypertension    Coronary artery disease involving native coronary artery of native heart without angina pectoris    Hyperlipidemia LDL goal <100    Hypothyroidism due to acquired atrophy of thyroid    Type 2 diabetes mellitus without complication (H)    Chronic kidney disease, stage III (moderate)    Anemia    Acute congestive heart failure (H)    CHF (congestive heart failure) (H)         DATA  Lives With: facility resident  Living Arrangements: extended care facility  Description of Support System: Supportive, Involved  Who is your support system?: Other (specify) (Nephew, Sukhjinder, and close friends )     Identified issues/concerns regarding health management: unable to care for self due to illness and fractures            Quality Of Family Relationships: supportive, involved  Transportation Available: van, wheelchair accessible    ASSESSMENT  Cognitive Status:  awake, alert and oriented  Concerns to be addressed: d/c planning back to the TCU at [facility] .       PLAN  Patient given options and choices for discharge: Yes. Patient plans to return to the TCU .  Patient/family is agreeable to the plan?  Yes  Patient Goals and Preferences: Return to the TCU at [facility] in [place] .  Patient anticipates discharging to:  TCU at [facility].     visited with patient regarding her d/c plan for after the hospital.     Discussed that her nephew, Sukhjinder, had called .  Patient has given permission for writer to speak with Sukhjinder regarding her medical status and d/c planning.  She stated, \"He is the like the son I never had.\"  [place]ukhjinder [place], so is not able to see patient very often, but is in contact with her over the phone frequently.       will continue to follow.      "

## 2017-05-28 LAB
ANION GAP SERPL CALCULATED.3IONS-SCNC: 5 MMOL/L (ref 3–14)
BUN SERPL-MCNC: 29 MG/DL (ref 7–30)
CALCIUM SERPL-MCNC: 7.5 MG/DL (ref 8.5–10.1)
CHLORIDE SERPL-SCNC: 105 MMOL/L (ref 94–109)
CO2 SERPL-SCNC: 31 MMOL/L (ref 20–32)
CREAT SERPL-MCNC: 1.78 MG/DL (ref 0.52–1.04)
GFR SERPL CREATININE-BSD FRML MDRD: 27 ML/MIN/1.7M2
GLUCOSE BLDC GLUCOMTR-MCNC: 101 MG/DL (ref 70–99)
GLUCOSE BLDC GLUCOMTR-MCNC: 104 MG/DL (ref 70–99)
GLUCOSE BLDC GLUCOMTR-MCNC: 126 MG/DL (ref 70–99)
GLUCOSE BLDC GLUCOMTR-MCNC: 73 MG/DL (ref 70–99)
GLUCOSE SERPL-MCNC: 103 MG/DL (ref 70–99)
POTASSIUM SERPL-SCNC: 3.7 MMOL/L (ref 3.4–5.3)
SODIUM SERPL-SCNC: 141 MMOL/L (ref 133–144)

## 2017-05-28 PROCEDURE — 25000128 H RX IP 250 OP 636: Performed by: FAMILY MEDICINE

## 2017-05-28 PROCEDURE — 25000132 ZZH RX MED GY IP 250 OP 250 PS 637: Mod: GY | Performed by: FAMILY MEDICINE

## 2017-05-28 PROCEDURE — 12000000 ZZH R&B MED SURG/OB

## 2017-05-28 PROCEDURE — A9270 NON-COVERED ITEM OR SERVICE: HCPCS | Mod: GY | Performed by: FAMILY MEDICINE

## 2017-05-28 PROCEDURE — 00000146 ZZHCL STATISTIC GLUCOSE BY METER IP

## 2017-05-28 PROCEDURE — 36415 COLL VENOUS BLD VENIPUNCTURE: CPT | Performed by: INTERNAL MEDICINE

## 2017-05-28 PROCEDURE — A9270 NON-COVERED ITEM OR SERVICE: HCPCS | Mod: GY | Performed by: INTERNAL MEDICINE

## 2017-05-28 PROCEDURE — 80048 BASIC METABOLIC PNL TOTAL CA: CPT | Performed by: INTERNAL MEDICINE

## 2017-05-28 PROCEDURE — 99232 SBSQ HOSP IP/OBS MODERATE 35: CPT | Performed by: INTERNAL MEDICINE

## 2017-05-28 PROCEDURE — 25000128 H RX IP 250 OP 636: Performed by: INTERNAL MEDICINE

## 2017-05-28 PROCEDURE — 25000132 ZZH RX MED GY IP 250 OP 250 PS 637: Mod: GY | Performed by: INTERNAL MEDICINE

## 2017-05-28 RX ORDER — OXYCODONE HYDROCHLORIDE 5 MG/1
5-10 TABLET ORAL EVERY 4 HOURS PRN
Status: DISCONTINUED | OUTPATIENT
Start: 2017-05-28 | End: 2017-05-30 | Stop reason: HOSPADM

## 2017-05-28 RX ORDER — FUROSEMIDE 10 MG/ML
20 INJECTION INTRAMUSCULAR; INTRAVENOUS
Status: DISCONTINUED | OUTPATIENT
Start: 2017-05-28 | End: 2017-05-29

## 2017-05-28 RX ORDER — AMLODIPINE BESYLATE 10 MG/1
10 TABLET ORAL DAILY
Status: DISCONTINUED | OUTPATIENT
Start: 2017-05-28 | End: 2017-05-30 | Stop reason: HOSPADM

## 2017-05-28 RX ORDER — FUROSEMIDE 10 MG/ML
40 INJECTION INTRAMUSCULAR; INTRAVENOUS
Status: DISCONTINUED | OUTPATIENT
Start: 2017-05-28 | End: 2017-05-28

## 2017-05-28 RX ADMIN — ACETAMINOPHEN 1000 MG: 500 TABLET ORAL at 21:28

## 2017-05-28 RX ADMIN — DOCUSATE SODIUM 100 MG: 100 CAPSULE ORAL at 21:29

## 2017-05-28 RX ADMIN — POTASSIUM CITRATE 10 MEQ: 10 TABLET ORAL at 08:27

## 2017-05-28 RX ADMIN — AMLODIPINE BESYLATE 10 MG: 10 TABLET ORAL at 08:26

## 2017-05-28 RX ADMIN — Medication 1 TABLET: at 08:25

## 2017-05-28 RX ADMIN — ACETAMINOPHEN 1000 MG: 500 TABLET ORAL at 13:29

## 2017-05-28 RX ADMIN — ASPIRIN 325 MG ORAL TABLET 325 MG: 325 PILL ORAL at 08:25

## 2017-05-28 RX ADMIN — CLOPIDOGREL 75 MG: 75 TABLET, FILM COATED ORAL at 08:26

## 2017-05-28 RX ADMIN — METOPROLOL SUCCINATE 25 MG: 25 TABLET, EXTENDED RELEASE ORAL at 08:25

## 2017-05-28 RX ADMIN — OXYCODONE HYDROCHLORIDE 2.5 MG: 5 TABLET ORAL at 04:43

## 2017-05-28 RX ADMIN — DOCUSATE SODIUM 100 MG: 100 CAPSULE ORAL at 08:25

## 2017-05-28 RX ADMIN — FUROSEMIDE 20 MG: 10 INJECTION, SOLUTION INTRAVENOUS at 16:28

## 2017-05-28 RX ADMIN — ACETAMINOPHEN 400 MCG: 400 TABLET ORAL at 08:25

## 2017-05-28 RX ADMIN — ALLOPURINOL 100 MG: 100 TABLET ORAL at 21:29

## 2017-05-28 RX ADMIN — SIMVASTATIN 10 MG: 5 TABLET, FILM COATED ORAL at 21:28

## 2017-05-28 RX ADMIN — FUROSEMIDE 20 MG: 10 INJECTION, SOLUTION INTRAVENOUS at 08:26

## 2017-05-28 RX ADMIN — ALLOPURINOL 100 MG: 100 TABLET ORAL at 08:25

## 2017-05-28 RX ADMIN — LEVOTHYROXINE SODIUM 112 MCG: 112 TABLET ORAL at 08:10

## 2017-05-28 RX ADMIN — ACETAMINOPHEN 1000 MG: 500 TABLET ORAL at 08:25

## 2017-05-28 RX ADMIN — OXYCODONE HYDROCHLORIDE 2.5 MG: 5 TABLET ORAL at 05:14

## 2017-05-28 NOTE — PLAN OF CARE
Problem: Goal Outcome Summary  Goal: Goal Outcome Summary  Outcome: Improving  Pt is alert and oriented. BP elevated at 174/70. All other vital signs within normal limits. Lewis patent with good urine output. 2+ edema to both feet. Crackles heard in bases of both lungs. Pt has a frequent loose cough with thick yellow sputum.  Pt report pain in hip and groin, pt rating it a 10/10. Prn Oxycodone given.  Blanchable redness to tailbone, pt repositioned every 2hrs.

## 2017-05-28 NOTE — PLAN OF CARE
has reviewed patient's d/c plan with her today.    Patient still planning to d/c back to the TCU at [facility] in [place] when medically stable.  It is anticipated that patient may be ready for d/c on Tuesday, 5/30/17.      Discussed recommendation for van transport at the private pay cost for this.      Writer has tried to contact Sukhjinder, patient's nephew and POA, be telephone.  No answer and no way to leave a voicemail.       will continue to follow for d/c planning.

## 2017-05-28 NOTE — PLAN OF CARE
Problem: Goal Outcome Summary  Goal: Goal Outcome Summary  Outcome: Improving  Pt.has less edema today in her feet and lower legs.Her lungs are clear with fine crackles in bilateral bases.She was assisted with 1 to the chair at bedside for both meals.She is transferring better each time.Her blood sugars were 73 and 101.

## 2017-05-28 NOTE — PROGRESS NOTES
[facility]    Hospitalist Progress Note    Assessment & Plan     Mary Ellen Cuba is a 89 year old female who was admitted on 5/26/2017. She presented with increasing shortness of breath and cough over the past week. She's currently rehabbing at [facility] after a pelvic fracture last week. Evaluation in the emergency department is concerning for new onset CHF with increased peripheral edema, elevated DNP at 11,792 with known history of CAD with stent placement in the past. Chest x-ray showing bilateral vascular congestion pt was admitted to inpatient status and started on a diuretic. Echo showed preserved ef with LVH and no RV strain.      Active Problems:    Acute on chronic diastolic congestive heart failure (H)    Assessment: presenting with increasing shortness of breath and cough with an elevated BNP and some peripheral edema. Echo noted preserved ef but has LVH and RV has no strain. Net -2L out so far    Plan: Continue her beta blocker. Would go slowly on diuresing as pt's Cr elevated. Cont diuresis with IV Lasix 20mg bid. Plan to transition her to oral lasix tomorrow and monitor for a day. No ACE/ARB as ef is preserved. Monitor daily wt/I/O/Cr. Encouraged IS/acapella.       Essential hypertension    Assessment: still elevated, could be from pain vs baseline uncontrolled, taking Norvasc and beta blocker    Plan: increase Norvasc to 10mg and cont BB as same dose as her HR can not tolerate increase in dose      Coronary artery disease involving native coronary artery of native heart without angina pectoris    Assessment: history of MI with stent placement in past. No acute changes on EKG with normal troponin to suggest new process    Plan: continue BB, Plavix, statin      Hypothyroidism due to acquired atrophy of thyroid    Assessment: taking oral Synthroid    Plan: continue same dose    Type 2 diabetes mellitus without complication (H)    Assessment: currently  diet controlled. Previously on glipizide    Plan: follow blood sugars with sliding scale coverage    Chronic kidney disease, stage III (moderate)    Assessment: creatinine up somewhat over the past several months    Plan: follow    Anemia    Assessment: stable, no evidence of bleeding    Plan: follow    Hyperlipidemia LDL goal <100    Assessment: taking Zocor    Plan: continue    Left superior inferior pubic rami fractures - has been rehabbing at [facility]    Assessment: has pain with PT    Plan: cont PT treatment while pt is here. Increased oxy to 5-10mg q4h prn    DVT Prophylaxis: Pneumatic Compression Devices  Code Status: DNR per previous discussions     Disposition: Expected discharge in 2-3 days once back on to oral lasix and remain stable on oral lasix.    DVT Prophylaxis: Pneumatic Compression Devices  Code Status: DNR      Gurvinder Diaz MD    Interval History   Pt had pain yesterday from her recent hip fracture with PT. Otherwise stating that she is breathing better.  She tolerated PO with no N/V.  No abd pain. No CP. Has enjoyed doing her IS and acapella.     -Data reviewed today: I reviewed all new labs and imaging results over the last 24 hours. I personally reviewed no images or EKG's today.    Physical Exam   Temp: 97.3  F (36.3  C) Temp src: Oral BP: 173/73 Pulse: 68 Heart Rate: 68 Resp: 16 SpO2: 92 % O2 Device: None (Room air)    Vitals:    05/27/17 0100 05/28/17 0443 05/28/17 0751   Weight: 62 kg (136 lb 11 oz) 65.5 kg (144 lb 6.4 oz) 64.5 kg (142 lb 3.2 oz)     Vital Signs with Ranges  Temp:  [97  F (36.1  C)-98.4  F (36.9  C)] 97.3  F (36.3  C)  Pulse:  [66-68] 68  Heart Rate:  [66-69] 68  Resp:  [16-18] 16  BP: (163-174)/(70-73) 173/73  SpO2:  [92 %-94 %] 92 %  I/O last 3 completed shifts:  In: 1350 [P.O.:1350]  Out: 2225 [Urine:2225]    Constitutional: alert, in NAD   Head: Normocephalic.  ENT: moist oral mucosa  Cardiovascular: RRR. No murmurs, or rub, no JVD  Respiratory: crackle at bases,  no wheezing  Abdomen: Abdomen soft, non-tender. BS normal. No masses, organomegaly  NEURO:  A&O to self, , place and year. Sensation grossly WNL.  SKIN: no visible rash  JOINT/EXTREMITIES: b/l edema, mostly in b/l foot up about 1 in above ankles, normal muscle tone and positive    Medications     Reason ACE/ARB order not selected       - MEDICATION INSTRUCTIONS -         amLODIPine  10 mg Oral Daily     docusate sodium  100 mg Oral BID     acetaminophen  1,000 mg Oral TID     allopurinol  100 mg Oral BID     aspirin  325 mg Oral Daily     calcium citrate-vitamin D  1 tablet Oral Daily     clopidogrel  75 mg Oral Daily     folic acid  400 mcg Oral Daily     levothyroxine  112 mcg Oral Daily     metoprolol  25 mg Oral Daily     multivitamin, therapeutic with minerals  1 tablet Oral Daily     potassium citrate  10 mEq Oral Daily     simvastatin  10 mg Oral At Bedtime     insulin aspart  1-7 Units Subcutaneous TID AC     insulin aspart  1-5 Units Subcutaneous At Bedtime     furosemide  20 mg Intravenous BID       Data     Recent Labs  Lab 17  0540 17  0533 17  0342   WBC  --  6.4 9.3   HGB  --  9.2* 10.7*   MCV  --  94 93   PLT  --  224 269    145* 143   POTASSIUM 3.7 4.5 4.1   CHLORIDE 105 108 108   CO2 31 31 28   BUN 29 30 28   CR 1.78* 1.85* 1.79*   ANIONGAP 5 6 7   ZULEMA 7.5* 7.6* 7.6*   * 102* 104*   ALBUMIN  --   --  2.2*   PROTTOTAL  --   --  5.6*   BILITOTAL  --   --  0.2   ALKPHOS  --   --  142   ALT  --   --  23   AST  --   --  40   TROPI  --   --  0.038       No results found for this or any previous visit (from the past 24 hour(s)).

## 2017-05-28 NOTE — PLAN OF CARE
Problem: Goal Outcome Summary  Goal: Goal Outcome Summary  Outcome: Improving  VSS, afebrile. Pt up with 1 assist to bathroom.  and 99 this evening. 675 out of catheter this shift. Denies pain.

## 2017-05-29 ENCOUNTER — APPOINTMENT (OUTPATIENT)
Dept: PHYSICAL THERAPY | Facility: CLINIC | Age: 82
DRG: 291 | End: 2017-05-29
Payer: MEDICARE

## 2017-05-29 LAB
ANION GAP SERPL CALCULATED.3IONS-SCNC: 8 MMOL/L (ref 3–14)
BACTERIA SPEC CULT: NO GROWTH
BUN SERPL-MCNC: 28 MG/DL (ref 7–30)
CALCIUM SERPL-MCNC: 7.7 MG/DL (ref 8.5–10.1)
CHLORIDE SERPL-SCNC: 109 MMOL/L (ref 94–109)
CO2 SERPL-SCNC: 30 MMOL/L (ref 20–32)
CREAT SERPL-MCNC: 1.7 MG/DL (ref 0.52–1.04)
GFR SERPL CREATININE-BSD FRML MDRD: 28 ML/MIN/1.7M2
GLUCOSE BLDC GLUCOMTR-MCNC: 100 MG/DL (ref 70–99)
GLUCOSE BLDC GLUCOMTR-MCNC: 100 MG/DL (ref 70–99)
GLUCOSE BLDC GLUCOMTR-MCNC: 123 MG/DL (ref 70–99)
GLUCOSE BLDC GLUCOMTR-MCNC: 81 MG/DL (ref 70–99)
GLUCOSE SERPL-MCNC: 115 MG/DL (ref 70–99)
MICRO REPORT STATUS: NORMAL
POTASSIUM SERPL-SCNC: 3.6 MMOL/L (ref 3.4–5.3)
SODIUM SERPL-SCNC: 147 MMOL/L (ref 133–144)
SPECIMEN SOURCE: NORMAL

## 2017-05-29 PROCEDURE — 40000193 ZZH STATISTIC PT WARD VISIT: Performed by: PHYSICAL THERAPIST

## 2017-05-29 PROCEDURE — A9270 NON-COVERED ITEM OR SERVICE: HCPCS | Mod: GY | Performed by: INTERNAL MEDICINE

## 2017-05-29 PROCEDURE — 25000132 ZZH RX MED GY IP 250 OP 250 PS 637: Mod: GY | Performed by: FAMILY MEDICINE

## 2017-05-29 PROCEDURE — 97116 GAIT TRAINING THERAPY: CPT | Mod: GP | Performed by: PHYSICAL THERAPIST

## 2017-05-29 PROCEDURE — 99232 SBSQ HOSP IP/OBS MODERATE 35: CPT | Performed by: INTERNAL MEDICINE

## 2017-05-29 PROCEDURE — 36415 COLL VENOUS BLD VENIPUNCTURE: CPT | Performed by: INTERNAL MEDICINE

## 2017-05-29 PROCEDURE — 00000146 ZZHCL STATISTIC GLUCOSE BY METER IP

## 2017-05-29 PROCEDURE — 80048 BASIC METABOLIC PNL TOTAL CA: CPT | Performed by: INTERNAL MEDICINE

## 2017-05-29 PROCEDURE — 25000132 ZZH RX MED GY IP 250 OP 250 PS 637: Mod: GY | Performed by: INTERNAL MEDICINE

## 2017-05-29 PROCEDURE — 12000000 ZZH R&B MED SURG/OB

## 2017-05-29 PROCEDURE — 25000128 H RX IP 250 OP 636: Performed by: INTERNAL MEDICINE

## 2017-05-29 PROCEDURE — A9270 NON-COVERED ITEM OR SERVICE: HCPCS | Mod: GY | Performed by: FAMILY MEDICINE

## 2017-05-29 RX ORDER — FUROSEMIDE 20 MG
20 TABLET ORAL 2 TIMES DAILY
Status: DISCONTINUED | OUTPATIENT
Start: 2017-05-29 | End: 2017-05-30 | Stop reason: HOSPADM

## 2017-05-29 RX ADMIN — METOPROLOL SUCCINATE 25 MG: 25 TABLET, EXTENDED RELEASE ORAL at 08:23

## 2017-05-29 RX ADMIN — FUROSEMIDE 20 MG: 10 INJECTION, SOLUTION INTRAVENOUS at 08:23

## 2017-05-29 RX ADMIN — ASPIRIN 325 MG ORAL TABLET 325 MG: 325 PILL ORAL at 08:23

## 2017-05-29 RX ADMIN — DOCUSATE SODIUM 100 MG: 100 CAPSULE ORAL at 20:30

## 2017-05-29 RX ADMIN — DOCUSATE SODIUM 100 MG: 100 CAPSULE ORAL at 08:23

## 2017-05-29 RX ADMIN — POTASSIUM CITRATE 10 MEQ: 10 TABLET ORAL at 08:22

## 2017-05-29 RX ADMIN — ACETAMINOPHEN 1000 MG: 500 TABLET ORAL at 13:44

## 2017-05-29 RX ADMIN — LEVOTHYROXINE SODIUM 112 MCG: 112 TABLET ORAL at 06:51

## 2017-05-29 RX ADMIN — ACETAMINOPHEN 400 MCG: 400 TABLET ORAL at 08:23

## 2017-05-29 RX ADMIN — ACETAMINOPHEN 1000 MG: 500 TABLET ORAL at 20:31

## 2017-05-29 RX ADMIN — ALLOPURINOL 100 MG: 100 TABLET ORAL at 20:31

## 2017-05-29 RX ADMIN — Medication 1 TABLET: at 08:23

## 2017-05-29 RX ADMIN — SIMVASTATIN 10 MG: 5 TABLET, FILM COATED ORAL at 20:31

## 2017-05-29 RX ADMIN — FUROSEMIDE 20 MG: 20 TABLET ORAL at 20:32

## 2017-05-29 RX ADMIN — ALLOPURINOL 100 MG: 100 TABLET ORAL at 08:23

## 2017-05-29 RX ADMIN — Medication 1 TABLET: at 08:22

## 2017-05-29 RX ADMIN — CLOPIDOGREL 75 MG: 75 TABLET, FILM COATED ORAL at 08:23

## 2017-05-29 RX ADMIN — ACETAMINOPHEN 1000 MG: 500 TABLET ORAL at 08:22

## 2017-05-29 RX ADMIN — AMLODIPINE BESYLATE 10 MG: 10 TABLET ORAL at 08:23

## 2017-05-29 NOTE — PLAN OF CARE
Problem: Goal Outcome Summary  Goal: Goal Outcome Summary  Outcome: Adequate for Discharge Date Met:  05/29/17     Goal status:  1)Bed mobility not assessed  2)CGA for sit<>stand with FWW  3)Gait goal MET, CGA for 30 ft with FWW     Functional status: Requires CGA for sit<>stand transfers, and short distance gait.      Areas of progress:Gait 30 ft today, willing to perform transfers    Barrier to discharge Home: No barriers to return to rehab facility she was at to recover from a pelvic fracture     Equipment needs: No new     Discharge disposition/rationale: Return to rehab facility for further assistance to recover from pelvic fracture.      Transport recommendations: Wheelchair assessable van     Physical Therapy Discharge Summary     Reason for therapy discharge:    Is to discharge tomorrow to rehab facility. PT does not anticipate any acute inpatient PT needs for the remainder of her stay.      Progress towards therapy goal(s). See goals on [facility] record for goal details.   Goals partially met.  Barriers to achieving goals:   limited tolerance for therapy.     Therapy recommendation(s):    Continued therapy is recommended.  Rationale/Recommendations:  weakness, decreased gait distance, balance deficits, poor endurance due to previous pelvic fracture.      Ena Butler, PT, DPT  [phone]  [facility]

## 2017-05-29 NOTE — PLAN OF CARE
Problem: Goal Outcome Summary  Goal: Goal Outcome Summary  Outcome: Improving  BP elevated at 177/77 all other vitals within normal limits. Lungs have crackles in the bases bilaterally. Pt has frequent loose, productive cough with yellow sputum. Bilateral edema to feet and legs. Pt is eating and drinking well. Good urine output.

## 2017-05-29 NOTE — PROGRESS NOTES
[facility]    Hospitalist Progress Note    Assessment & Plan     Mary Ellen Cuba is a 89 year old female who was admitted on 5/26/2017. She presented with increasing shortness of breath and cough over the past week. She's currently rehabbing at [facility] after a pelvic fracture last week. Evaluation in the emergency department is concerning for new onset CHF with increased peripheral edema, elevated DNP at 11,792 with known history of CAD with stent placement in the past. Chest x-ray showing bilateral vascular congestion pt was admitted to inpatient status and started on a diuretic. Echo showed preserved ef with LVH and no RV strain.      Active Problems:    Acute on chronic diastolic congestive heart failure (H)    Assessment: presenting with increasing shortness of breath and cough with an elevated BNP and some peripheral edema. Echo noted preserved ef but has LVH and RV has no strain. Net -2.6L out so far    Plan: Continue her beta blocker. Cont diuresis but will switch to PO Lasix 20mg bid. No ACE/ARB as ef is preserved. Monitor daily wt/I/O/Cr. Encouraged IS/acapella.       Essential hypertension    Assessment: still elevated, could be from pain, taking Norvasc and beta blocker    Plan: cont increase Norvasc to 10mg and cont BB as same dose as her HR can not tolerate increase in BB dose.  Will need f/u out pt management when hip pain is more improved/healed.      Coronary artery disease involving native coronary artery of native heart without angina pectoris    Assessment: history of MI with stent placement in past. No acute changes on EKG with normal troponin to suggest new process    Plan: continue BB, Plavix, statin      Hypothyroidism due to acquired atrophy of thyroid    Assessment: taking oral Synthroid    Plan: continue same dose    Type 2 diabetes mellitus without complication (H)    Assessment: currently diet controlled. Previously on glipizide, a1c  5.3    Plan: follow blood sugars with sliding scale coverage    Chronic kidney disease, stage III (moderate)    Assessment: creatinine up somewhat over the past several months    Plan: follow    Anemia    Assessment: stable, no evidence of bleeding    Plan: follow    Hyperlipidemia LDL goal <100    Assessment: taking Zocor    Plan: continue    Left superior inferior pubic rami fractures - has been rehabbing at [facility]    Assessment: has pain with PT    Plan: cont PT treatment while pt is here. cont Increased oxy 5-10mg q4h prn    DVT Prophylaxis: Pneumatic Compression Devices  Code Status: DNR per previous discussions     Disposition: Expected discharge in 1 day if remain stable on oral lasix.    DVT Prophylaxis: Pneumatic Compression Devices  Code Status: DNR      Gurvinder Diaz MD    Interval History   Pt had minimal pain from her recent hip fracture if not moving much.  Pain worsen wit PT, but tolerable with pain meds. Otherwise stating that she is breathing better.  Doing IS and acapella. She tolerated PO with no N/V.  No abd pain. No CP.    -Data reviewed today: I reviewed all new labs and imaging results over the last 24 hours. I personally reviewed no images or EKG's today.    Physical Exam   Temp: 96.6  F (35.9  C) Temp src: Oral BP: 175/77 Pulse: 64 Heart Rate: 64 Resp: 20 SpO2: 95 % O2 Device: None (Room air)    Vitals:    05/28/17 0443 05/28/17 0751 05/29/17 0351   Weight: 65.5 kg (144 lb 6.4 oz) 64.5 kg (142 lb 3.2 oz) 66 kg (145 lb 8.1 oz)     Vital Signs with Ranges  Temp:  [96.6  F (35.9  C)-98.2  F (36.8  C)] 96.6  F (35.9  C)  Pulse:  [62-68] 64  Heart Rate:  [62-68] 64  Resp:  [16-20] 20  BP: (144-175)/(62-77) 175/77  SpO2:  [92 %-95 %] 95 %  I/O last 3 completed shifts:  In: 1180 [P.O.:1180]  Out: 1800 [Urine:1800]    Constitutional: alert, in NAD   Head: Normocephalic.  ENT: moist oral mucosa  Cardiovascular: RRR. No murmurs, or rub  Respiratory: some crackle at bases, no wheezing  Abdomen:  Abdomen soft, non-tender. BS normal. No masses, organomegaly  NEURO:  A&O to self, , place and year. Sensation grossly WNL.  SKIN: no visible rash  JOINT/EXTREMITIES: b/l edema, mostly in b/l foot up about 1 in above ankles, normal muscle tone    Medications     Reason ACE/ARB order not selected       - MEDICATION INSTRUCTIONS -         amLODIPine  10 mg Oral Daily     furosemide  20 mg Intravenous BID     docusate sodium  100 mg Oral BID     acetaminophen  1,000 mg Oral TID     allopurinol  100 mg Oral BID     aspirin  325 mg Oral Daily     calcium citrate-vitamin D  1 tablet Oral Daily     clopidogrel  75 mg Oral Daily     folic acid  400 mcg Oral Daily     levothyroxine  112 mcg Oral Daily     metoprolol  25 mg Oral Daily     multivitamin, therapeutic with minerals  1 tablet Oral Daily     potassium citrate  10 mEq Oral Daily     simvastatin  10 mg Oral At Bedtime     insulin aspart  1-7 Units Subcutaneous TID AC     insulin aspart  1-5 Units Subcutaneous At Bedtime       Data     Recent Labs  Lab 17  0600 17  0540 17  0533 17  0342   WBC  --   --  6.4 9.3   HGB  --   --  9.2* 10.7*   MCV  --   --  94 93   PLT  --   --  224 269   * 141 145* 143   POTASSIUM 3.6 3.7 4.5 4.1   CHLORIDE 109 105 108 108   CO2 30 31 31 28   BUN 28 29 30 28   CR 1.70* 1.78* 1.85* 1.79*   ANIONGAP 8 5 6 7   ZULEMA 7.7* 7.5* 7.6* 7.6*   * 103* 102* 104*   ALBUMIN  --   --   --  2.2*   PROTTOTAL  --   --   --  5.6*   BILITOTAL  --   --   --  0.2   ALKPHOS  --   --   --  142   ALT  --   --   --  23   AST  --   --   --  40   TROPI  --   --   --  0.038       No results found for this or any previous visit (from the past 24 hour(s)).

## 2017-05-29 NOTE — PLAN OF CARE
Problem: Goal Outcome Summary  Goal: Goal Outcome Summary  Outcome: Improving  Pt.has been up in the chair for meals and ambulated to the bathroom to void after catheter removed and is transferring with assist of 1.Her appetite is good she is alert but does get confused at times on where she is at.Her lungs have fine crackles in the bases.She still has edema in her feet.Blood sugars are stable.

## 2017-05-29 NOTE — PLAN OF CARE
Problem: Goal Outcome Summary  Goal: Goal Outcome Summary  Outcome: Improving  VSS, afebrile. Pt denies pain this shift, repositioned for comfort. Denies any pain. Good urine output this shift.

## 2017-05-30 VITALS
DIASTOLIC BLOOD PRESSURE: 72 MMHG | RESPIRATION RATE: 18 BRPM | HEART RATE: 67 BPM | WEIGHT: 135.58 LBS | TEMPERATURE: 97.5 F | SYSTOLIC BLOOD PRESSURE: 178 MMHG | OXYGEN SATURATION: 92 % | HEIGHT: 63 IN | BODY MASS INDEX: 24.02 KG/M2

## 2017-05-30 LAB
ANION GAP SERPL CALCULATED.3IONS-SCNC: 5 MMOL/L (ref 3–14)
BUN SERPL-MCNC: 28 MG/DL (ref 7–30)
CALCIUM SERPL-MCNC: 7.8 MG/DL (ref 8.5–10.1)
CHLORIDE SERPL-SCNC: 106 MMOL/L (ref 94–109)
CO2 SERPL-SCNC: 33 MMOL/L (ref 20–32)
CREAT SERPL-MCNC: 1.63 MG/DL (ref 0.52–1.04)
GFR SERPL CREATININE-BSD FRML MDRD: 29 ML/MIN/1.7M2
GLUCOSE BLDC GLUCOMTR-MCNC: 116 MG/DL (ref 70–99)
GLUCOSE BLDC GLUCOMTR-MCNC: 85 MG/DL (ref 70–99)
GLUCOSE SERPL-MCNC: 80 MG/DL (ref 70–99)
POTASSIUM SERPL-SCNC: 3.7 MMOL/L (ref 3.4–5.3)
SODIUM SERPL-SCNC: 144 MMOL/L (ref 133–144)

## 2017-05-30 PROCEDURE — 36415 COLL VENOUS BLD VENIPUNCTURE: CPT | Performed by: INTERNAL MEDICINE

## 2017-05-30 PROCEDURE — 80048 BASIC METABOLIC PNL TOTAL CA: CPT | Performed by: INTERNAL MEDICINE

## 2017-05-30 PROCEDURE — 25000132 ZZH RX MED GY IP 250 OP 250 PS 637: Mod: GY | Performed by: INTERNAL MEDICINE

## 2017-05-30 PROCEDURE — 00000146 ZZHCL STATISTIC GLUCOSE BY METER IP

## 2017-05-30 PROCEDURE — 99239 HOSP IP/OBS DSCHRG MGMT >30: CPT | Performed by: INTERNAL MEDICINE

## 2017-05-30 PROCEDURE — A9270 NON-COVERED ITEM OR SERVICE: HCPCS | Mod: GY | Performed by: INTERNAL MEDICINE

## 2017-05-30 PROCEDURE — A9270 NON-COVERED ITEM OR SERVICE: HCPCS | Mod: GY | Performed by: FAMILY MEDICINE

## 2017-05-30 PROCEDURE — 25000132 ZZH RX MED GY IP 250 OP 250 PS 637: Mod: GY | Performed by: FAMILY MEDICINE

## 2017-05-30 RX ORDER — POTASSIUM CHLORIDE 1.5 G/1.58G
20 POWDER, FOR SOLUTION ORAL 2 TIMES DAILY
Status: DISCONTINUED | OUTPATIENT
Start: 2017-05-30 | End: 2017-05-30

## 2017-05-30 RX ORDER — POTASSIUM CITRATE 5 MEQ/1
20 TABLET, EXTENDED RELEASE ORAL DAILY
Qty: 120 TABLET | Refills: 1 | Status: ON HOLD
Start: 2017-05-30 | End: 2018-02-26

## 2017-05-30 RX ORDER — FUROSEMIDE 20 MG
20 TABLET ORAL 2 TIMES DAILY
Qty: 30 TABLET | Refills: 0 | Status: ON HOLD | OUTPATIENT
Start: 2017-05-30 | End: 2017-12-13

## 2017-05-30 RX ORDER — AMLODIPINE BESYLATE 5 MG/1
10 TABLET ORAL DAILY
Qty: 30 TABLET | Refills: 1 | Status: ON HOLD
Start: 2017-05-30 | End: 2017-12-13

## 2017-05-30 RX ADMIN — ACETAMINOPHEN 400 MCG: 400 TABLET ORAL at 08:30

## 2017-05-30 RX ADMIN — ASPIRIN 325 MG ORAL TABLET 325 MG: 325 PILL ORAL at 08:30

## 2017-05-30 RX ADMIN — Medication 1 TABLET: at 08:31

## 2017-05-30 RX ADMIN — CLOPIDOGREL 75 MG: 75 TABLET, FILM COATED ORAL at 08:31

## 2017-05-30 RX ADMIN — ACETAMINOPHEN 1000 MG: 500 TABLET ORAL at 08:31

## 2017-05-30 RX ADMIN — LEVOTHYROXINE SODIUM 112 MCG: 112 TABLET ORAL at 08:31

## 2017-05-30 RX ADMIN — ALLOPURINOL 100 MG: 100 TABLET ORAL at 08:31

## 2017-05-30 RX ADMIN — AMLODIPINE BESYLATE 10 MG: 10 TABLET ORAL at 08:31

## 2017-05-30 RX ADMIN — POTASSIUM CHLORIDE 20 MEQ: 1.5 POWDER, FOR SOLUTION ORAL at 08:30

## 2017-05-30 RX ADMIN — POTASSIUM CITRATE 10 MEQ: 10 TABLET ORAL at 08:30

## 2017-05-30 RX ADMIN — METOPROLOL SUCCINATE 25 MG: 25 TABLET, EXTENDED RELEASE ORAL at 08:31

## 2017-05-30 RX ADMIN — DOCUSATE SODIUM 100 MG: 100 CAPSULE ORAL at 08:30

## 2017-05-30 RX ADMIN — FUROSEMIDE 20 MG: 20 TABLET ORAL at 08:31

## 2017-05-30 NOTE — PLAN OF CARE
Problem: Goal Outcome Summary  Goal: Goal Outcome Summary  Outcome: Improving  Walked in ellis with writer , walker, and gait belt for 50 feet. Up to bathroom to urinate. Lungs diminished. Repositioned every two hours. Stated she did not have pain unless she moved suddenly. Poor appetite for supper. Used acapella and IS up to 1000ml. Blood sugars were 123 and 100. Talkative. SCD's on. Will continue to monitor labs, lung sounds, I&O, blood sugars, activity tolerance, pain. Bed alarm for safety.

## 2017-05-30 NOTE — PROGRESS NOTES
S-(situation): Patient discharged to [facility] via wheelchair  with     B-(background): CHF    A-(assessment): Pt.discharged back to the care center.Report was given earlier.    R-(recommendations): Discharge instructions reviewed with pt.. Listed belongings gathered and returned to patient.          Discharge Nursing Criteria:     Care Plan and Patient education resolved: Yes    New Medications- pt has been educated about purpose and side effects: Yes    Vaccines  Pneumonia Vaccine verified at discharge: Yes  Influenza status verified at discharge:  No          MISC  Prescriptions if needed, hard copies sent with patient  NA  Home and hospital aquired medications returned to patient: Yes  Medication Bin checked and emptied on discharge Yes  Patient reports post-discharge pain management plan is effective: Yes

## 2017-05-30 NOTE — PROGRESS NOTES
Mary Ellen IBRAHIM Bereket  Gender: female  : 1924  [address] [facility] [address]  [phone] (home)     Medical Record: [id number]  Pharmacy: [facility] [address] [place], [place] - [address] [facility]  Primary Care Provider: Samantha Celis    Parent's names are: Data Unavailable (mother) and Data Unavailable (father).      [facility]  May 30, 2017     Discharge Phone Call:  Key Words/Key Times    Discharged to [facility].  Herbert Yeboah RN

## 2017-05-30 NOTE — PROGRESS NOTES
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      SOFIA Camacho  [facility] [phone]/ [facility] [phone]

## 2017-05-30 NOTE — DISCHARGE SUMMARY
[facility]    Discharge Summary  Hospitalist    Date of Admission:  5/26/2017  Date of Discharge:  5/30/2017  Discharging Provider: Gurvinder Diaz MD    Discharge Diagnoses   Acute on chronic diastolic HF    History of Present Illness   Mary Ellen Cuba is an 89 year old female who presented with increasing shortness of breath and cough over the past week PTA. Evaluation in the emergency department is concerning for new onset CHF with increased peripheral edema, elevated DNP at 11,792 with known history of CAD with stent placement.  Pt was admitted and placed on IV lasix for diuresing.  She tolerated the diuresing well with no drop in BP nor worsening of her Cr.  She was transition to PO lasix for a day and will continue with PO lasix as out patient.  Her potassium supplement was increased to 20meq daily.  Her blood pressure has also been a little bit elevated, but unsure if this is truly baseline or from her pain due to recent hip fracture.  Her amlodipine was increased and will need further out pt BP monitoring.     Hospital Course   Mary Ellen Cuba was admitted on 5/26/2017.  The following problems were addressed during her hospitalization:    Active Problems:    Acute on chronic diastolic congestive heart failure (H)    Assessment: presenting with increasing shortness of breath and cough with an elevated BNP and some peripheral edema. Echo noted preserved ef but has LVH and RV has no strain. No ACE/ARB as ef is preserved.    Plan: Continue her beta blocker. Cont diuresis but will switch to PO Lasix 20mg bid.      Essential hypertension    Assessment: still elevated, could be from pain, taking Norvasc and beta blocker    Plan: cont increase Norvasc to 10mg and cont BB as same dose as her HR can not tolerate increase in BB dose.  Will need f/u out pt management when hip pain is more improved/healed.     Coronary artery disease involving native coronary artery of native heart without  angina pectoris    Assessment: history of MI with stent placement in past. No acute changes on EKG with normal troponin to suggest new process    Plan: continue BB, Plavix, statin     Hypothyroidism due to acquired atrophy of thyroid    Assessment: taking oral Synthroid    Plan: continue same dose     Type 2 diabetes mellitus without complication (H)    Assessment: currently diet controlled. Previously on glipizide, a1c 5.3    Plan: No change, can be on regular diet if pt prefers      Chronic kidney disease, stage III (moderate)    Assessment: creatinine up somewhat over the past several months, but has been stable    Plan: follow out pt     Anemia    Assessment: stable, no evidence of bleeding    Plan: follow     Hyperlipidemia LDL goal <100    Assessment: taking Zocor    Plan: continue     Left superior inferior pubic rami fractures - has been rehabbing at [facility]    Assessment: has pain with PT    Plan: cont PT treatment while pt is here. Cont oxy q4h prn       Gurvinder Diaz MD    Significant Results and Procedures   None    Pending Results   Unresulted Labs Ordered in the Past 30 Days of this Admission     Date and Time Order Name Status Description    5/26/2017 0328 Blood culture Preliminary     5/26/2017 0328 Blood culture Preliminary           Code Status   DNR       Primary Care Physician   Samantha Celis    Physical Exam   Temp: 97.5  F (36.4  C) Temp src: Oral BP: 178/72 Pulse: 67 Heart Rate: 67 Resp: 18 SpO2: 92 % O2 Device: None (Room air)    Vitals:    05/28/17 0751 05/29/17 0351 05/30/17 0500   Weight: 64.5 kg (142 lb 3.2 oz) 66 kg (145 lb 8.1 oz) 61.5 kg (135 lb 9.3 oz)     Vital Signs with Ranges  Temp:  [97.1  F (36.2  C)-98.5  F (36.9  C)] 97.5  F (36.4  C)  Pulse:  [65-70] 67  Heart Rate:  [65-70] 67  Resp:  [18-22] 18  BP: (137-178)/(64-72) 178/72  SpO2:  [92 %-96 %] 92 %  I/O last 3 completed shifts:  In: 1120 [P.O.:1120]  Out: 650 [Urine:650]    Constitutional: alert, in NAD   Head:  Normocephalic.  ENT: moist oral mucosa  Cardiovascular: RRR. No murmurs, or rub  Respiratory: no crackle at bases, no wheezing  Abdomen: Abdomen soft, non-tender. BS normal. No masses, organomegaly  NEURO:  A&O to self, , place and year. Sensation grossly WNL.  SKIN: no visible rash  JOINT/EXTREMITIES: mild b/l edema, mostly in b/l foot up to ankles, normal muscle tone       Discharge Disposition   Discharged to rehabilitation facility  Condition at discharge: Stable    Consultations This Hospital Stay   CARE COORDINATOR IP CONSULT  NUTRITION SERVICES ADULT IP CONSULT  PHYSICAL THERAPY ADULT IP CONSULT  PHYSICAL THERAPY ADULT IP CONSULT  OCCUPATIONAL THERAPY ADULT IP CONSULT    Time Spent on this Encounter   IGurvinder, personally saw the patient today and spent greater than 30 minutes discharging this patient.    Discharge Orders     General info for SNF   Length of Stay Estimate: Short Term Care: Estimated # of Days <30  Condition at Discharge: Improving  Level of care:skilled   Rehabilitation Potential: Good  Admission H&P remains valid and up-to-date: Yes  Recent Chemotherapy: N/A  Use Nursing Home Standing Orders: Yes     Mantoux instructions   Give two-step Mantoux (PPD) Per Facility Policy Yes     Reason for your hospital stay   Acute heart failure     Daily weights   Call Provider for weight gain of more than 2 pounds per day or 5 pounds per week.     Activity - Up with nursing assistance   And per physical therapy for advancement of activity     Follow Up and recommended labs and tests   Follow up with assisted physician.  The following labs/tests are recommended: BMP in 2-3 days.     DNR (Do Not Resuscitate)     Physical Therapy Adult Consult   Evaluate and treat as clinically indicated.    Reason:  Hip pain     Occupational Therapy Adult Consult   Evaluate and treat as clinically indicated.    Reason:  Hip pain     Fall precautions     Advance Diet as Tolerated   Follow this diet upon discharge:  Orders Placed This Encounter     Moderate Consistent CHO Diet and 2g sodium       Discharge Medications   Current Discharge Medication List      START taking these medications    Details   furosemide (LASIX) 20 MG tablet Take 1 tablet (20 mg) by mouth 2 times daily  Qty: 30 tablet, Refills: 0    Associated Diagnoses: Acute combined systolic and diastolic congestive heart failure (H); Benign essential hypertension         CONTINUE these medications which have CHANGED    Details   amLODIPine (NORVASC) 5 MG tablet Take 2 tablets (10 mg) by mouth daily  Qty: 30 tablet, Refills: 1    Associated Diagnoses: Benign essential hypertension      potassium citrate (UROCIT-K) 5 MEQ (540 MG) CR tablet Take 4 tablets (20 mEq) by mouth daily  Qty: 120 tablet, Refills: 1    Associated Diagnoses: Hypokalemia         CONTINUE these medications which have NOT CHANGED    Details   acetaminophen (TYLENOL) 500 MG tablet Take 2 tablets (1,000 mg) by mouth 3 times daily    Associated Diagnoses: Closed left hip fracture, initial encounter (H)      metoprolol (TOPROL XL) 25 MG 24 hr tablet Take 1 tablet (25 mg) by mouth daily  Qty: 30 tablet    Associated Diagnoses: Benign essential hypertension      fenofibrate 160 MG tablet Take 1 tablet (160 mg) by mouth daily  Qty: 30 tablet    Associated Diagnoses: Hypercholesterolemia      simvastatin (ZOCOR) 10 MG tablet Take 1 tablet (10 mg) by mouth At Bedtime  Qty: 30 tablet    Associated Diagnoses: Hypercholesterolemia      allopurinol (ZYLOPRIM) 100 MG tablet Take 1 tablet (100 mg) by mouth 2 times daily  Qty: 30 tablet    Associated Diagnoses: Chronic gout, unspecified cause, unspecified site      clopidogrel (PLAVIX) 75 MG tablet Take 1 tablet (75 mg) by mouth daily  Qty: 30 tablet    Associated Diagnoses: Coronary artery disease involving native coronary artery of native heart without angina pectoris      levothyroxine (SYNTHROID/LEVOTHROID) 112 MCG tablet Take 1 tablet (112 mcg) by mouth daily     Associated Diagnoses: Other specified hypothyroidism      aspirin 325 MG tablet Take 1 tablet (325 mg) by mouth daily  Qty: 120 tablet, Refills: 0    Comments: Hold while receiving Lovenox  Associated Diagnoses: Closed left hip fracture, initial encounter (H)      oxyCODONE (ROXICODONE) 5 MG IR tablet Take 0.5-1 tablets (2.5-5 mg) by mouth every 6 hours as needed for moderate to severe pain  Qty: 30 tablet, Refills: 0    Associated Diagnoses: Pubic ramus fracture, left, closed, initial encounter (H); Fall on same level due to nature of surface, initial encounter      senna-docusate (SENOKOT-S;PERICOLACE) 8.6-50 MG per tablet Take 1-2 tablets by mouth 2 times daily as needed (constipation )  Qty: 100 tablet, Refills: 0    Comments: Indication: Constipation  Associated Diagnoses: Closed left hip fracture, initial encounter (H)      VITAMIN D, CHOLECALCIFEROL, PO Take  by mouth once a week.      folic acid (FOLVITE) 400 MCG tablet Take 400 mcg by mouth daily.      Multiple Vitamins-Minerals (CENTRUM SILVER ULTRA WOMENS) TABS Take 1 tablet by mouth daily.      Calcium-Vitamin D (CALCIUM + D PO) Take 1 tablet by mouth daily.           Allergies   Allergies   Allergen Reactions     Hmg-Coa-R Inhibitors      Metformin GI Disturbance     Data   Most Recent 3 CBC's:  Recent Labs   Lab Test  05/27/17   0533  05/26/17   0342  01/23/17   0540  01/22/17   0625   01/19/17   1750   WBC  6.4  9.3   --    --    --   9.7   HGB  9.2*  10.7*   --   7.4*   < >  10.1*   MCV  94  93   --    --    --   90   PLT  224  269  180   --    --   213    < > = values in this interval not displayed.      Most Recent 3 BMP's:  Recent Labs   Lab Test  05/30/17   0604  05/29/17   0600  05/28/17   0540   NA  144  147*  141   POTASSIUM  3.7  3.6  3.7   CHLORIDE  106  109  105   CO2  33*  30  31   BUN  28  28  29   CR  1.63*  1.70*  1.78*   ANIONGAP  5  8  5   ZULEMA  7.8*  7.7*  7.5*   GLC  80  115*  103*     Most Recent 2 LFT's:  Recent Labs   Lab Test   05/26/17   0342  01/19/17   1750   AST  40  25   ALT  23  21   ALKPHOS  142  56   BILITOTAL  0.2  0.7     Most Recent INR's and Anticoagulation Dosing History:  Anticoagulation Dose History     Recent Dosing and Labs Latest Ref Rng & Units 1/19/2017    INR 0.86 - 1.14 1.05        Most Recent 3 Troponin's:  Recent Labs   Lab Test  05/26/17   0342   TROPI  0.038     Most Recent Cholesterol Panel:No lab results found.  Most Recent 6 Bacteria Isolates From Any Culture (See EPIC Reports for Culture Details):  Recent Labs   Lab Test  05/26/17   0630  05/26/17   0430  05/26/17   0353  05/26/17   0342  01/19/17   2127   CULT  No MRSA isolated  No growth  No growth after 4 days  No growth after 4 days  No MRSA isolated     Most Recent TSH, T4 and A1c Labs:  Recent Labs   Lab Test  05/27/17   0533   A1C  5.3     Results for orders placed or performed during the hospital encounter of 05/26/17   XR Chest 2 Views    Narrative    CHEST 2 VIEWS  5/26/2017 4:12 AM     HISTORY: Fever. Shortness of breath. Cough.    COMPARISON: None.    FINDINGS: Small bilateral pleural effusions with adjacent opacities  that most likely represent atelectasis. The lungs are otherwise clear.  Mild cardiomegaly. Atherosclerotic calcification in the thoracic  aorta.      Impression    IMPRESSION:  1. Small bilateral pleural effusions with adjacent atelectasis.  2. The lungs are otherwise clear.    JESSA LEE MD

## 2017-05-30 NOTE — PLAN OF CARE
Problem: Goal Outcome Summary  Goal: Goal Outcome Summary  Outcome: No Change  Vital signs stable, afebrile. Up to use the bathroom with one assist using walker and gait belt. I doing so, she complains of pain in her left foot and leg. Uses call light appropriately about 50% of the time.

## 2017-06-01 LAB
BACTERIA SPEC CULT: NORMAL
BACTERIA SPEC CULT: NORMAL
Lab: NORMAL
Lab: NORMAL
MICRO REPORT STATUS: NORMAL
MICRO REPORT STATUS: NORMAL
SPECIMEN SOURCE: NORMAL
SPECIMEN SOURCE: NORMAL

## 2017-06-05 ENCOUNTER — RADIANT APPOINTMENT (OUTPATIENT)
Dept: GENERAL RADIOLOGY | Facility: OTHER | Age: 82
End: 2017-06-05
Attending: ORTHOPAEDIC SURGERY
Payer: COMMERCIAL

## 2017-06-05 ENCOUNTER — OFFICE VISIT (OUTPATIENT)
Dept: ORTHOPEDICS | Facility: OTHER | Age: 82
End: 2017-06-05
Payer: COMMERCIAL

## 2017-06-05 ENCOUNTER — MEDICAL CORRESPONDENCE (OUTPATIENT)
Dept: HEALTH INFORMATION MANAGEMENT | Facility: CLINIC | Age: 82
End: 2017-06-05

## 2017-06-05 VITALS — HEIGHT: 62 IN | TEMPERATURE: 97.1 F | WEIGHT: 145 LBS | BODY MASS INDEX: 26.68 KG/M2

## 2017-06-05 DIAGNOSIS — S32.592D CLOSED FRACTURE OF LEFT INFERIOR PUBIC RAMUS, WITH ROUTINE HEALING, SUBSEQUENT ENCOUNTER: Primary | ICD-10-CM

## 2017-06-05 DIAGNOSIS — S32.592A: ICD-10-CM

## 2017-06-05 PROCEDURE — 72170 X-RAY EXAM OF PELVIS: CPT

## 2017-06-05 PROCEDURE — 99213 OFFICE O/P EST LOW 20 MIN: CPT | Performed by: ORTHOPAEDIC SURGERY

## 2017-06-05 ASSESSMENT — PAIN SCALES - GENERAL: PAINLEVEL: MODERATE PAIN (5)

## 2017-06-05 NOTE — MR AVS SNAPSHOT
"              After Visit Summary   2017    Mary Ellen Cuba    MRN: [id number]           Patient Information     Date Of Birth          1924        Visit Information        Provider Department      2017 10:10 AM Liborio Hoffman,  [facility]        Today's Diagnoses     Closed fracture of left inferior pubic ramus (H)    -  1       Follow-ups after your visit        Who to contact     If you have questions or need follow up information about today's clinic visit or your schedule please contact [facility] directly at [phone].  Normal or non-critical lab and imaging results will be communicated to you by [employer], letter or phone within 4 business days after the clinic has received the results. If you do not hear from us within 7 days, please contact the clinic through [employer] or phone. If you have a critical or abnormal lab result, we will notify you by phone as soon as possible.  Submit refill requests through [employer] or call your pharmacy and they will forward the refill request to us. Please allow 3 business days for your refill to be completed.          Additional Information About Your Visit        MyChart Information     [employer] lets you send messages to your doctor, view your test results, renew your prescriptions, schedule appointments and more. To sign up, go to [email or web address] . Click on \"Log in\" on the left side of the screen, which will take you to the Welcome page. Then click on \"Sign up Now\" on the right side of the page.     You will be asked to enter the access code listed below, as well as some personal information. Please follow the directions to create your username and password.     Your access code is: [email or web address]  Expires: 2017  1:23 PM     Your access code will  in 90 days. If you need help or a new code, please call your [facility] or [phone].        Care EveryWhere ID     This is your Care EveryWhere " "ID. This could be used by other organizations to access your [place] medical records  [id number]        Your Vitals Were     Temperature Height BMI (Body Mass Index)             97.1  F (36.2  C) (Temporal) 5' 2\" (1.575 m) 26.52 kg/m2          Blood Pressure from Last 3 Encounters:   05/30/17 178/72   05/17/17 184/75   01/26/17 138/55    Weight from Last 3 Encounters:   06/05/17 145 lb (65.8 kg)   05/30/17 135 lb 9.3 oz (61.5 kg)   05/15/17 145 lb 8.1 oz (66 kg)               Primary Care Provider Office Phone # Fax #    Samantha Celis [phone]       [place] [address] [address] [facility] [address]        Thank you!     Thank you for choosing [facility]  for your care. Our goal is always to provide you with excellent care. Hearing back from our patients is one way we can continue to improve our services. Please take a few minutes to complete the written survey that you may receive in the mail after your visit with us. Thank you!             Your Updated Medication List - Protect others around you: Learn how to safely use, store and throw away your medicines at www.disposemymeds.org.          This list is accurate as of: 6/5/17 10:31 AM.  Always use your most recent med list.                   Brand Name Dispense Instructions for use    acetaminophen 500 MG tablet    TYLENOL     Take 2 tablets (1,000 mg) by mouth 3 times daily       allopurinol 100 MG tablet    ZYLOPRIM    30 tablet    Take 1 tablet (100 mg) by mouth 2 times daily       amLODIPine 5 MG tablet    NORVASC    30 tablet    Take 2 tablets (10 mg) by mouth daily       aspirin 325 MG tablet     120 tablet    Take 1 tablet (325 mg) by mouth daily       CALCIUM + D PO      Take 1 tablet by mouth daily.       CENTRUM SILVER ULTRA WOMENS Tabs      Take 1 tablet by mouth daily.       clopidogrel 75 MG tablet    PLAVIX    30 tablet    Take 1 tablet (75 mg) by mouth daily       fenofibrate 160 MG tablet     30 tablet    " Take 1 tablet (160 mg) by mouth daily       folic acid 400 MCG tablet    FOLVITE     Take 400 mcg by mouth daily.       furosemide 20 MG tablet    LASIX    30 tablet    Take 1 tablet (20 mg) by mouth 2 times daily       levothyroxine 112 MCG tablet    SYNTHROID/LEVOTHROID     Take 1 tablet (112 mcg) by mouth daily       metoprolol 25 MG 24 hr tablet    TOPROL XL    30 tablet    Take 1 tablet (25 mg) by mouth daily       oxyCODONE 5 MG IR tablet    ROXICODONE    30 tablet    Take 0.5-1 tablets (2.5-5 mg) by mouth every 6 hours as needed for moderate to severe pain       potassium citrate 5 MEQ (540 MG) CR tablet    UROCIT-K    120 tablet    Take 4 tablets (20 mEq) by mouth daily       senna-docusate 8.6-50 MG per tablet    SENOKOT-S;PERICOLACE    100 tablet    Take 1-2 tablets by mouth 2 times daily as needed (constipation )       simvastatin 10 MG tablet    ZOCOR    30 tablet    Take 1 tablet (10 mg) by mouth At Bedtime       VITAMIN D (CHOLECALCIFEROL) PO      Take  by mouth once a week.

## 2017-06-05 NOTE — PROGRESS NOTES
"Office Visit-Follow up    Chief Complaint: Mary Ellen Cuba is a 89 year old female who is being seen for   Chief Complaint   Patient presents with     RECHECK     left hip follow up     Surgical Followup     DOS: 1/20/17~Internal fixation with a TFN nail~5 months postop       History of Present Illness:   Today's visit:  Complains of left posterior hip pain. Recent new injury May 31 for ground-level fall. Complains of soreness. Pain started 1-2 days afterwards. Presents with her daughter.    May 15, 2017 hospital consult:   Mary Ellen Cuba is a 89-year-old female who normally is an independent ambulator, who reports that her leg gave out, although she thinks she may have tripped on something, landing on her right side.  Evaluated in the ER, diagnosed with pubic rami fractures.  She is well known to myself.  I had previously performed an intramedullary nail fixation of her left hip.  She had done well afterwards.  Currently complains of pain to her pelvis area.  Denies any other injuries.  Denies loss of consciousness.  She was evaluated on the second floor.  She reports that with just lying there, she has very minimal pain, but with any weightbearing, her leg feels weak and she has severe pain to the left groin area.         REVIEW OF SYSTEMS  General: negative for, night sweats, dizziness, fatigue  Resp: No shortness of breath and no cough  CV: negative for chest pain, syncope or near-syncope  GI: negative for nausea, vomiting and diarrhea  : negative for dysuria and hematuria  Musculoskeletal: as above  Neurologic: negative for syncope   Hematologic: negative for bleeding disorder    Physical Exam:  Vitals: Temp 97.1  F (36.2  C) (Temporal)  Ht 5' 2\" (1.575 m)  Wt 145 lb (65.8 kg)  BMI 26.52 kg/m2  BMI= Body mass index is 26.52 kg/(m^2).  Constitutional: healthy, alert and no acute distress   Psychiatric: mentation appears normal and affect normal/bright  NEURO: no focal deficits  RESP: Normal with easy " respirations and no use of accessory muscles to breathe, no audible wheezing or retractions  CV: LLE:  midcalf and down-  pitting edema           SKIN: No erythema, rashes, excoriation, or breakdown. No evidence of infection. , Small ecchymosis along the buttock area  JOINT/EXTREMITIES:left hip pelvis: No pain with range of motion to the hip passively. She is exquisite tenderness along the gluteal area suctioned with her ecchymosis. There is also tenderness along the pubic rami. No significant sacral pain.  GAIT: not tested             Diagnostic Modalities:  pelvis X-ray: Pubic rami unchanged-small calcification and superior inferior fractures. Previous nail in place and noticed some position change or loosening. No new injuries.  Independent visualization of the images was performed.      Impression: left superior and inferior pubic rami fracture  Left buttock contusion  Plan:  All of the above pertinent physical exam and imaging modalities findings was reviewed with Mary Ellen and her daughter.    Her recent fall caused a contusion to the area. Radiographs appear to be healing. Recommend progressing her activities tolerated. Weightbearing as tolerated.    Return to clinic 6, week(s), or sooner as needed for changes.  Re-x-ray on return: No    Lio Hoffman D.O.

## 2017-06-05 NOTE — NURSING NOTE
"Chief Complaint   Patient presents with     RECHECK     left hip follow up     Surgical Followup     DOS: 1/20/17~Internal fixation with a TFN nail~5 months postop       Initial Temp 97.1  F (36.2  C) (Temporal)  Ht 5' 2\" (1.575 m)  Wt 145 lb (65.8 kg)  BMI 26.52 kg/m2 Estimated body mass index is 26.52 kg/(m^2) as calculated from the following:    Height as of this encounter: 5' 2\" (1.575 m).    Weight as of this encounter: 145 lb (65.8 kg).  Medication Reconciliation: complete.    Elva Smith CMA (AAMA)      "

## 2017-06-05 NOTE — LETTER
"  6/5/2017       RE: Mary Ellen Cuba  [address] [facility] [address]           Dear Colleague,    Thank you for referring your patient, Mary Ellen Cuba, to the [facility]. Please see a copy of my visit note below.    Office Visit-Follow up    Chief Complaint: Mary Ellen Cuba is a 89 year old female who is being seen for   Chief Complaint   Patient presents with     RECHECK     left hip follow up     Surgical Followup     DOS: 1/20/17~Internal fixation with a TFN nail~5 months postop       History of Present Illness:   Today's visit:  Complains of left posterior hip pain. Recent new injury May 31 for ground-level fall. Complains of soreness. Pain started 1-2 days afterwards. Presents with her daughter.    May 15, 2017 hospital consult:   Mary Ellen Cuba is a 89-year-old female who normally is an independent ambulator, who reports that her leg gave out, although she thinks she may have tripped on something, landing on her right side.  Evaluated in the ER, diagnosed with pubic rami fractures.  She is well known to myself.  I had previously performed an intramedullary nail fixation of her left hip.  She had done well afterwards.  Currently complains of pain to her pelvis area.  Denies any other injuries.  Denies loss of consciousness.  She was evaluated on the second floor.  She reports that with just lying there, she has very minimal pain, but with any weightbearing, her leg feels weak and she has severe pain to the left groin area.         REVIEW OF SYSTEMS  General: negative for, night sweats, dizziness, fatigue  Resp: No shortness of breath and no cough  CV: negative for chest pain, syncope or near-syncope  GI: negative for nausea, vomiting and diarrhea  : negative for dysuria and hematuria  Musculoskeletal: as above  Neurologic: negative for syncope   Hematologic: negative for bleeding disorder    Physical Exam:  Vitals: Temp 97.1  F (36.2  C) (Temporal)  Ht 5' 2\" (1.575 m)  " Wt 145 lb (65.8 kg)  BMI 26.52 kg/m2  BMI= Body mass index is 26.52 kg/(m^2).  Constitutional: healthy, alert and no acute distress   Psychiatric: mentation appears normal and affect normal/bright  NEURO: no focal deficits  RESP: Normal with easy respirations and no use of accessory muscles to breathe, no audible wheezing or retractions  CV: LLE:  midcalf and down-  pitting edema           SKIN: No erythema, rashes, excoriation, or breakdown. No evidence of infection. , Small ecchymosis along the buttock area  JOINT/EXTREMITIES:left hip pelvis: No pain with range of motion to the hip passively. She is exquisite tenderness along the gluteal area suctioned with her ecchymosis. There is also tenderness along the pubic rami. No significant sacral pain.  GAIT: not tested             Diagnostic Modalities:  pelvis X-ray: Pubic rami unchanged-small calcification and superior inferior fractures. Previous nail in place and noticed some position change or loosening. No new injuries.  Independent visualization of the images was performed.      Impression: left superior and inferior pubic rami fracture  Left buttock contusion  Plan:  All of the above pertinent physical exam and imaging modalities findings was reviewed with Mary Ellen and her daughter.    Her recent fall caused a contusion to the area. Radiographs appear to be healing. Recommend progressing her activities tolerated. Weightbearing as tolerated.    Return to clinic 6, week(s), or sooner as needed for changes.  Re-x-ray on return: No    Lio Hoffman D.O.          Again, thank you for allowing me to participate in the care of your patient.        Sincerely,              Liborio Hoffman, DO

## 2017-07-20 ENCOUNTER — OFFICE VISIT (OUTPATIENT)
Dept: ORTHOPEDICS | Facility: OTHER | Age: 82
End: 2017-07-20
Payer: COMMERCIAL

## 2017-07-20 VITALS — TEMPERATURE: 98.5 F | WEIGHT: 127 LBS | BODY MASS INDEX: 23.37 KG/M2 | HEIGHT: 62 IN

## 2017-07-20 DIAGNOSIS — S32.592D CLOSED FRACTURE OF LEFT INFERIOR PUBIC RAMUS, WITH ROUTINE HEALING, SUBSEQUENT ENCOUNTER: Primary | ICD-10-CM

## 2017-07-20 PROCEDURE — 99212 OFFICE O/P EST SF 10 MIN: CPT | Performed by: ORTHOPAEDIC SURGERY

## 2017-07-20 ASSESSMENT — PAIN SCALES - GENERAL: PAINLEVEL: NO PAIN (0)

## 2017-07-20 NOTE — NURSING NOTE
"Chief Complaint   Patient presents with     RECHECK     Left hip follow up     Surgical Followup     DOS: 1/20/17~Internal fixation with a TFN nail~6 months postop       Initial Temp 98.5  F (36.9  C) (Temporal)  Ht 5' 2\" (1.575 m)  Wt 127 lb (57.6 kg)  BMI 23.23 kg/m2 Estimated body mass index is 23.23 kg/(m^2) as calculated from the following:    Height as of this encounter: 5' 2\" (1.575 m).    Weight as of this encounter: 127 lb (57.6 kg).  Medication Reconciliation: complete   Dianna/ADORE     "

## 2017-07-20 NOTE — LETTER
"      7/20/2017         RE: Mary Ellen Cuba  [address] [facility] [address]        Dear Colleague,    Thank you for referring your patient, Mary Ellen Cuba, to the [facility]. Please see a copy of my visit note below.    Office Visit-Follow up    Chief Complaint: Mary Ellen Cuba is a 89 year old female who is being seen for   Chief Complaint   Patient presents with     RECHECK     Left hip follow up     Surgical Followup     DOS: 1/20/17~Internal fixation with a TFN nail~6 months postop       History of Present Illness:   No further pelvis pain. Ambulating with a walker. No complaints.      REVIEW OF SYSTEMS  General: negative for, night sweats, dizziness, fatigue  Resp: No shortness of breath and no cough  CV: negative for chest pain, syncope or near-syncope  GI: negative for nausea, vomiting and diarrhea  : negative for dysuria and hematuria  Musculoskeletal: as above  Neurologic: negative for syncope   Hematologic: negative for bleeding disorder    Physical Exam:  Vitals: Temp 98.5  F (36.9  C) (Temporal)  Ht 5' 2\" (1.575 m)  Wt 127 lb (57.6 kg)  BMI 23.23 kg/m2  BMI= Body mass index is 23.23 kg/(m^2).  Constitutional: healthy, alert and no acute distress   Psychiatric: mentation appears normal and affect normal/bright  NEURO: no focal deficits  RESP: Normal with easy respirations and no use of accessory muscles to breathe, no audible wheezing or retractions  CV: LLE:  no edema         SKIN: No erythema, rashes, excoriation, or breakdown. No evidence of infection.   JOINT/EXTREMITIES:left hip: Unrestricted range of motion. No focal areas of tenderness. Able to go from a seated to standing position with no pain. No bony tenderness.  GAIT: not tested             Diagnostic Modalities:  None today.  Independent visualization of the images was performed.      Impression: left superior and inferior pubic rami fractures-routine healing status post IM nail for left hip fracture " January 2017-doing well    Plan:  All of the above pertinent physical exam and imaging modalities findings was reviewed with Mary Ellen and her daughter.    No current issues. Continue activity as tolerated. Follow up me as needed.          Return to clinic PRN, or sooner as needed for changes.  Re-x-ray on return: No    Lio Hoffman D.O.          Again, thank you for allowing me to participate in the care of your patient.        Sincerely,        Liborio Hoffman, DO

## 2017-07-20 NOTE — PROGRESS NOTES
"Office Visit-Follow up    Chief Complaint: Mary Ellen Cuba is a 89 year old female who is being seen for   Chief Complaint   Patient presents with     RECHECK     Left hip follow up     Surgical Followup     DOS: 1/20/17~Internal fixation with a TFN nail~6 months postop       History of Present Illness:   No further pelvis pain. Ambulating with a walker. No complaints.      REVIEW OF SYSTEMS  General: negative for, night sweats, dizziness, fatigue  Resp: No shortness of breath and no cough  CV: negative for chest pain, syncope or near-syncope  GI: negative for nausea, vomiting and diarrhea  : negative for dysuria and hematuria  Musculoskeletal: as above  Neurologic: negative for syncope   Hematologic: negative for bleeding disorder    Physical Exam:  Vitals: Temp 98.5  F (36.9  C) (Temporal)  Ht 5' 2\" (1.575 m)  Wt 127 lb (57.6 kg)  BMI 23.23 kg/m2  BMI= Body mass index is 23.23 kg/(m^2).  Constitutional: healthy, alert and no acute distress   Psychiatric: mentation appears normal and affect normal/bright  NEURO: no focal deficits  RESP: Normal with easy respirations and no use of accessory muscles to breathe, no audible wheezing or retractions  CV: LLE:  no edema         SKIN: No erythema, rashes, excoriation, or breakdown. No evidence of infection.   JOINT/EXTREMITIES:left hip: Unrestricted range of motion. No focal areas of tenderness. Able to go from a seated to standing position with no pain. No bony tenderness.  GAIT: not tested             Diagnostic Modalities:  None today.  Independent visualization of the images was performed.      Impression: left superior and inferior pubic rami fractures-routine healing status post IM nail for left hip fracture January 2017-doing well    Plan:  All of the above pertinent physical exam and imaging modalities findings was reviewed with Mary Ellen and her daughter.    No current issues. Continue activity as tolerated. Follow up me as needed.          Return to clinic " PRN, or sooner as needed for changes.  Re-x-ray on return: No    Lio Hoffman D.O.

## 2017-07-20 NOTE — MR AVS SNAPSHOT
"              After Visit Summary   2017    Mary Ellen Cuba    MRN: [id number]           Patient Information     Date Of Birth          1924        Visit Information        Provider Department      2017 1:00 PM Liborio Hoffman, DO [facility]         Follow-ups after your visit        Who to contact     If you have questions or need follow up information about today's clinic visit or your schedule please contact [facility] directly at [phone].  Normal or non-critical lab and imaging results will be communicated to you by [employer]hart, letter or phone within 4 business days after the clinic has received the results. If you do not hear from us within 7 days, please contact the clinic through [employer]hart or phone. If you have a critical or abnormal lab result, we will notify you by phone as soon as possible.  Submit refill requests through [employer] or call your pharmacy and they will forward the refill request to us. Please allow 3 business days for your refill to be completed.          Additional Information About Your Visit        MyChart Information     [employer] lets you send messages to your doctor, view your test results, renew your prescriptions, schedule appointments and more. To sign up, go to [email or web address] . Click on \"Log in\" on the left side of the screen, which will take you to the Welcome page. Then click on \"Sign up Now\" on the right side of the page.     You will be asked to enter the access code listed below, as well as some personal information. Please follow the directions to create your username and password.     Your access code is: [email or web address]  Expires: 10/18/2017  1:02 PM     Your access code will  in 90 days. If you need help or a new code, please call your [place] clinic or [phone].        Care EveryWhere ID     This is your Care EveryWhere ID. This could be used by other organizations to access your [place] medical " "records  [id number]        Your Vitals Were     Temperature Height BMI (Body Mass Index)             98.5  F (36.9  C) (Temporal) 5' 2\" (1.575 m) 23.23 kg/m2          Blood Pressure from Last 3 Encounters:   05/30/17 178/72   05/17/17 184/75   01/26/17 138/55    Weight from Last 3 Encounters:   07/20/17 127 lb (57.6 kg)   06/05/17 145 lb (65.8 kg)   05/30/17 135 lb 9.3 oz (61.5 kg)              Today, you had the following     No orders found for display       Primary Care Provider Office Phone # Fax #    Samantha Celis [phone]       [facility] [address] [facility] [address]        Equal Access to Services     [facility] GERTRUDE : Sylvain carr Somarek, waaxaaron luqadaha, qaybta kaalmada jeyson, judith john . So [facility] [phone].    ATENCIÓN: Si habla español, tiene a amezcua disposición servicios gratuitos de asistencia lingüística. Bakari al [phone].    We comply with applicable federal civil rights laws and [place] laws. We do not discriminate on the basis of race, color, national origin, age, disability sex, sexual orientation or gender identity.            Thank you!     Thank you for choosing [facility]  for your care. Our goal is always to provide you with excellent care. Hearing back from our patients is one way we can continue to improve our services. Please take a few minutes to complete the written survey that you may receive in the mail after your visit with us. Thank you!             Your Updated Medication List - Protect others around you: Learn how to safely use, store and throw away your medicines at www.disposemymeds.org.          This list is accurate as of: 7/20/17  1:02 PM.  Always use your most recent med list.                   Brand Name Dispense Instructions for use Diagnosis    acetaminophen 500 MG tablet    TYLENOL     Take 2 tablets (1,000 mg) by mouth 3 times daily    Closed left hip fracture, initial " encounter (H)       allopurinol 100 MG tablet    ZYLOPRIM    30 tablet    Take 1 tablet (100 mg) by mouth 2 times daily    Chronic gout, unspecified cause, unspecified site       amLODIPine 5 MG tablet    NORVASC    30 tablet    Take 2 tablets (10 mg) by mouth daily    Benign essential hypertension       aspirin 325 MG tablet     120 tablet    Take 1 tablet (325 mg) by mouth daily    Closed left hip fracture, initial encounter (H)       CALCIUM + D PO      Take 1 tablet by mouth daily.        CENTRUM SILVER ULTRA WOMENS Tabs      Take 1 tablet by mouth daily.        clopidogrel 75 MG tablet    PLAVIX    30 tablet    Take 1 tablet (75 mg) by mouth daily    Coronary artery disease involving native coronary artery of native heart without angina pectoris       fenofibrate 160 MG tablet     30 tablet    Take 1 tablet (160 mg) by mouth daily    Hypercholesterolemia       folic acid 400 MCG tablet    FOLVITE     Take 400 mcg by mouth daily.        furosemide 20 MG tablet    LASIX    30 tablet    Take 1 tablet (20 mg) by mouth 2 times daily    Acute combined systolic and diastolic congestive heart failure (H), Benign essential hypertension       levothyroxine 112 MCG tablet    SYNTHROID/LEVOTHROID     Take 1 tablet (112 mcg) by mouth daily    Other specified hypothyroidism       metoprolol 25 MG 24 hr tablet    TOPROL XL    30 tablet    Take 1 tablet (25 mg) by mouth daily    Benign essential hypertension       potassium citrate 5 MEQ (540 MG) CR tablet    UROCIT-K    120 tablet    Take 4 tablets (20 mEq) by mouth daily    Hypokalemia       senna-docusate 8.6-50 MG per tablet    SENOKOT-S;PERICOLACE    100 tablet    Take 1-2 tablets by mouth 2 times daily as needed (constipation )    Closed left hip fracture, initial encounter (H)       simvastatin 10 MG tablet    ZOCOR    30 tablet    Take 1 tablet (10 mg) by mouth At Bedtime    Hypercholesterolemia       VITAMIN D (CHOLECALCIFEROL) PO      Take  by mouth once a week.

## 2017-12-12 ENCOUNTER — HOSPITAL ENCOUNTER (OUTPATIENT)
Facility: CLINIC | Age: 82
Setting detail: OBSERVATION
Discharge: HOME | End: 2017-12-13
Attending: FAMILY MEDICINE | Admitting: INTERNAL MEDICINE
Payer: MEDICARE

## 2017-12-12 ENCOUNTER — APPOINTMENT (OUTPATIENT)
Dept: GENERAL RADIOLOGY | Facility: CLINIC | Age: 82
End: 2017-12-12
Attending: FAMILY MEDICINE
Payer: MEDICARE

## 2017-12-12 ENCOUNTER — APPOINTMENT (OUTPATIENT)
Dept: CT IMAGING | Facility: CLINIC | Age: 82
End: 2017-12-12
Attending: FAMILY MEDICINE
Payer: MEDICARE

## 2017-12-12 DIAGNOSIS — I50.41 ACUTE COMBINED SYSTOLIC AND DIASTOLIC CONGESTIVE HEART FAILURE (H): ICD-10-CM

## 2017-12-12 DIAGNOSIS — H54.7 BLIND: Primary | ICD-10-CM

## 2017-12-12 DIAGNOSIS — S70.02XA HEMATOMA OF LEFT HIP, INITIAL ENCOUNTER: ICD-10-CM

## 2017-12-12 DIAGNOSIS — I10 BENIGN ESSENTIAL HYPERTENSION: ICD-10-CM

## 2017-12-12 DIAGNOSIS — M25.552 HIP PAIN, LEFT: ICD-10-CM

## 2017-12-12 DIAGNOSIS — R26.2 UNABLE TO AMBULATE: ICD-10-CM

## 2017-12-12 PROBLEM — S80.12XA LEG HEMATOMA, LEFT, INITIAL ENCOUNTER: Status: ACTIVE | Noted: 2017-12-12

## 2017-12-12 LAB
ALBUMIN UR-MCNC: 100 MG/DL
ANION GAP SERPL CALCULATED.3IONS-SCNC: 7 MMOL/L (ref 3–14)
APPEARANCE UR: CLEAR
BACTERIA #/AREA URNS HPF: ABNORMAL /HPF
BASOPHILS # BLD AUTO: 0 10E9/L (ref 0–0.2)
BASOPHILS NFR BLD AUTO: 0.4 %
BILIRUB UR QL STRIP: NEGATIVE
BUN SERPL-MCNC: 36 MG/DL (ref 7–30)
CALCIUM SERPL-MCNC: 8.3 MG/DL (ref 8.5–10.1)
CHLORIDE SERPL-SCNC: 112 MMOL/L (ref 94–109)
CO2 SERPL-SCNC: 23 MMOL/L (ref 20–32)
COLOR UR AUTO: ABNORMAL
CREAT SERPL-MCNC: 1.85 MG/DL (ref 0.52–1.04)
DIFFERENTIAL METHOD BLD: ABNORMAL
EOSINOPHIL # BLD AUTO: 0.3 10E9/L (ref 0–0.7)
EOSINOPHIL NFR BLD AUTO: 2.9 %
ERYTHROCYTE [DISTWIDTH] IN BLOOD BY AUTOMATED COUNT: 15.3 % (ref 10–15)
GFR SERPL CREATININE-BSD FRML MDRD: 25 ML/MIN/1.7M2
GLUCOSE SERPL-MCNC: 114 MG/DL (ref 70–99)
GLUCOSE UR STRIP-MCNC: NEGATIVE MG/DL
HCT VFR BLD AUTO: 27.5 % (ref 35–47)
HGB BLD-MCNC: 8.6 G/DL (ref 11.7–15.7)
HGB UR QL STRIP: ABNORMAL
HYALINE CASTS #/AREA URNS LPF: 1 /LPF (ref 0–2)
IMM GRANULOCYTES # BLD: 0 10E9/L (ref 0–0.4)
IMM GRANULOCYTES NFR BLD: 0.2 %
KETONES UR STRIP-MCNC: NEGATIVE MG/DL
LEUKOCYTE ESTERASE UR QL STRIP: NEGATIVE
LYMPHOCYTES # BLD AUTO: 1.3 10E9/L (ref 0.8–5.3)
LYMPHOCYTES NFR BLD AUTO: 15.3 %
MCH RBC QN AUTO: 29.1 PG (ref 26.5–33)
MCHC RBC AUTO-ENTMCNC: 31.3 G/DL (ref 31.5–36.5)
MCV RBC AUTO: 93 FL (ref 78–100)
MONOCYTES # BLD AUTO: 0.6 10E9/L (ref 0–1.3)
MONOCYTES NFR BLD AUTO: 7 %
MUCOUS THREADS #/AREA URNS LPF: PRESENT /LPF
NEUTROPHILS # BLD AUTO: 6.4 10E9/L (ref 1.6–8.3)
NEUTROPHILS NFR BLD AUTO: 74.2 %
NITRATE UR QL: NEGATIVE
PH UR STRIP: 5 PH (ref 5–7)
PLATELET # BLD AUTO: 207 10E9/L (ref 150–450)
POTASSIUM SERPL-SCNC: 4.3 MMOL/L (ref 3.4–5.3)
RBC # BLD AUTO: 2.96 10E12/L (ref 3.8–5.2)
RBC #/AREA URNS AUTO: 1 /HPF (ref 0–2)
SODIUM SERPL-SCNC: 142 MMOL/L (ref 133–144)
SOURCE: ABNORMAL
SP GR UR STRIP: 1.01 (ref 1–1.03)
SQUAMOUS #/AREA URNS AUTO: <1 /HPF (ref 0–1)
UROBILINOGEN UR STRIP-MCNC: 0 MG/DL (ref 0–2)
WBC # BLD AUTO: 8.6 10E9/L (ref 4–11)
WBC #/AREA URNS AUTO: 2 /HPF (ref 0–2)

## 2017-12-12 PROCEDURE — 25000132 ZZH RX MED GY IP 250 OP 250 PS 637: Mod: GY | Performed by: FAMILY MEDICINE

## 2017-12-12 PROCEDURE — 85025 COMPLETE CBC W/AUTO DIFF WBC: CPT | Performed by: FAMILY MEDICINE

## 2017-12-12 PROCEDURE — 99285 EMERGENCY DEPT VISIT HI MDM: CPT | Mod: 25

## 2017-12-12 PROCEDURE — 99219 ZZC INITIAL OBSERVATION CARE,LEVL II: CPT | Performed by: INTERNAL MEDICINE

## 2017-12-12 PROCEDURE — A9270 NON-COVERED ITEM OR SERVICE: HCPCS | Mod: GY | Performed by: FAMILY MEDICINE

## 2017-12-12 PROCEDURE — 73502 X-RAY EXAM HIP UNI 2-3 VIEWS: CPT | Mod: TC

## 2017-12-12 PROCEDURE — 80048 BASIC METABOLIC PNL TOTAL CA: CPT | Performed by: FAMILY MEDICINE

## 2017-12-12 PROCEDURE — 99285 EMERGENCY DEPT VISIT HI MDM: CPT | Mod: 25 | Performed by: FAMILY MEDICINE

## 2017-12-12 PROCEDURE — 73560 X-RAY EXAM OF KNEE 1 OR 2: CPT | Mod: TC,LT

## 2017-12-12 PROCEDURE — 81001 URINALYSIS AUTO W/SCOPE: CPT | Performed by: FAMILY MEDICINE

## 2017-12-12 PROCEDURE — 72192 CT PELVIS W/O DYE: CPT

## 2017-12-12 RX ORDER — OXYCODONE HYDROCHLORIDE 5 MG/1
5 TABLET ORAL
Status: COMPLETED | OUTPATIENT
Start: 2017-12-12 | End: 2017-12-12

## 2017-12-12 RX ORDER — ACETAMINOPHEN 325 MG/1
975 TABLET ORAL ONCE
Status: COMPLETED | OUTPATIENT
Start: 2017-12-12 | End: 2017-12-12

## 2017-12-12 RX ADMIN — OXYCODONE HYDROCHLORIDE 5 MG: 5 TABLET ORAL at 19:20

## 2017-12-12 RX ADMIN — ACETAMINOPHEN 975 MG: 325 TABLET ORAL at 19:20

## 2017-12-12 ASSESSMENT — ENCOUNTER SYMPTOMS
BACK PAIN: 0
ARTHRALGIAS: 1
BLOOD IN STOOL: 0
APPETITE CHANGE: 0
HEADACHES: 0
RESPIRATORY NEGATIVE: 1
PALPITATIONS: 0
MYALGIAS: 1
DIZZINESS: 0
CHEST TIGHTNESS: 0
HEMATURIA: 0
EYES NEGATIVE: 1
PSYCHIATRIC NEGATIVE: 1
FREQUENCY: 0
FEVER: 0
ACTIVITY CHANGE: 1
ABDOMINAL PAIN: 0
NUMBNESS: 0
DYSURIA: 0
VOMITING: 0
CHILLS: 0
SHORTNESS OF BREATH: 0
FLANK PAIN: 0
NAUSEA: 0
COUGH: 0

## 2017-12-12 NOTE — IP AVS SNAPSHOT
MRN:[id number]                      After Visit Summary   12/12/2017    Mary Ellen Cuba    MRN: [id number]           Thank you!     Thank you for choosing [facility] for your care. Our goal is always to provide you with excellent care. Hearing back from our patients is one way we can continue to improve our services. Please take a few minutes to complete the written survey that you may receive in the mail after you visit with us. Thank you!        Patient Information     Date Of Birth          1/20/1924        About your hospital stay     You were admitted on:  December 12, 2017 You last received care in the:  [address] [facility]    You were discharged on:  December 13, 2017        Reason for your hospital stay       Hospitalized for left leg pain due to bleeding in leg (hematoma) and improved                  Who to Call     For medical emergencies, please call 911.  For non-urgent questions about your medical care, please call your primary care provider or clinic, [phone]          Attending Provider     Provider Specialty    Jalen Nazario,  [facility]    Liborio Hendrickson MD Internal Medicine       Primary Care Provider Office Phone # Fax #    Samantha Celis [phone]      After Care Instructions     Activity       Your activity upon discharge: activity as tolerated            Diet       Follow this diet upon discharge: Orders Placed This Encounter      Moderate Consistent CHO Diet                  Follow-up Appointments     Follow-up and recommended labs and tests        Follow up with primary care provider, Samantha Celis, within 1 month, for hospital follow- up.                  Additional Services     HOME CARE NURSING REFERRAL       **Order classes of: [facility], [facility] and [facility] will route to the Home Care and Hospice Referral Pool.  Home Care or Hospice will then contact the patient to schedule their appointment.**    If you do  not hear from Home Care and Hospice, or you would like to call to schedule, please call the referring place of service that your provider has listed below.  ______________________________________________________________________    Your provider has referred you to: [facility]    Extended Emergency Contact Information  Primary Emergency Contact: Sally Alexander   [facility]  Home Phone: [phone]  Relation: Other  Secondary Emergency Contact: Sukhjinder Mayer   [facility] Phone: [phone]  Relation: Other    Patient Anticipated Discharge Date: 12/13/2017     RN, PT, HHA to begin 24 - 48 hours after discharge.  PLEASE EVALUATE AND TREAT (Evaluation timeline is 24 - 48 hrs. Please call if there is need for a variance to this timeline).    REASON FOR REFERRAL: Assessment & Treatment: PT and RN    ADDITIONAL SERVICES NEEDED:     OTHER PERTINENT INFORMATION: Patient was last seen by provider on 12/13/2017 for hospital discharge.    Current Outpatient Prescriptions:  oxyCODONE IR (ROXICODONE) 5 MG tablet, Take 1 tablet (5 mg) by mouth every 4 hours as needed for moderate to severe pain, Disp: 10 tablet, Rfl: 0  amLODIPine (NORVASC) 5 MG tablet, Take 1 tablet (5 mg) by mouth daily, Disp: , Rfl:   furosemide (LASIX) 20 MG tablet, Take 1 tablet (20 mg) by mouth daily, Disp: , Rfl:       Patient Active Problem List:     Essential hypertension     Coronary artery disease involving native coronary artery of native heart without angina pectoris     Hyperlipidemia LDL goal <100     Hypothyroidism due to acquired atrophy of thyroid     Type 2 diabetes mellitus without complication (H)     Hypernatremia     Chronic kidney disease, stage III (moderate)     Anemia     Fractured hip, left, closed, initial encounter (H)     Urinary retention     Closed fracture of left inferior and superior pubic rami     Acute congestive heart failure (H)     CHF (congestive heart failure) (H)     Leg hematoma, left, initial  encounter     CKD (chronic kidney disease) stage 4, GFR 15-29 ml/min (H)     History of CVA (cerebrovascular accident)     Left leg pain     Blind - patient reported      Documentation of Face to Face and Certification for Home Health Services    I certify that patient, Mary Ellen Cuba is under my care and that I, or a Nurse Practitioner or Physician's Assistant working with me, had a face-to-face encounter that meets the physician face-to-face encounter requirements with this patient on: 12/13/2017.    This encounter with the patient was in whole, or in part, for the following medical condition, which is the primary reason for Home Health Care: left hip hematoma.    I certify that, based on my findings, the following services are medically necessary Home Health Services: Nursing and Physical Therapy    My clinical findings support the need for the above services because: Nurse is needed: To assess vital signs and bleeding after changes in medications or other medical regimen., To provide assessment and oversight required in the home to assure adherence to the medical plan due to: patient is blind and needs assistance managing medication changes. and To provide caregiver training to assist with: medication management.. and Physical Therapy Services are needed to assess and treat the following functional impairments: abnormal gait due to acute left hip pain from hematoma.    Further, I certify that my clinical findings support that this patient is homebound (i.e. absences from home require considerable and taxing effort and are for medical reasons or [employer] services or infrequently or of short duration when for other reasons) because: Requires assistance of another person or specialized equipment to access medical services because patient: Requires supervision of another for safe transfer..    Based on the above findings, I certify that this patient is confined to the home and needs intermittent skilled nursing  "care, physical therapy and/or speech therapy.  The patient is under my care, and I have initiated the establishment of the plan of care.  This patient will be followed by a physician who will periodically review the plan of care.    Physician/Provider to provide follow up care: Samantha Celis certified Physician at time of discharge: Liborio Hendrickson    Please be aware that coverage of these services is subject to the terms and limitations of your health insurance plan.  Call member services at your health plan with any benefit or coverage questions.                  Pending Results     No orders found for last 3 day(s).            Statement of Approval     Ordered          17 1746  I have reviewed and agree with all the recommendations and orders detailed in this document.  EFFECTIVE NOW     Approved and electronically signed by:  Liborio Hendrickson MD             Admission Information     Date & Time Provider Department Dept. Phone    2017 Liborio Hendrickson MD [address] [facility] [phone]      Your Vitals Were     Blood Pressure Pulse Temperature Respirations Height Weight    140/45 61 99.1  F (37.3  C) (Oral) 20 1.6 m (5' 3\") 63.6 kg (140 lb 3.4 oz)    Pulse Oximetry BMI (Body Mass Index)                95% 24.84 kg/m2          MyChart Information     Twist lets you send messages to your doctor, view your test results, renew your prescriptions, schedule appointments and more. To sign up, go to [email or web address] . Click on \"Log in\" on the left side of the screen, which will take you to the Welcome page. Then click on \"Sign up Now\" on the right side of the page.     You will be asked to enter the access code listed below, as well as some personal information. Please follow the directions to create your username and password.     Your access code is: [email or web address]  Expires: 3/13/2018  6:03 PM     Your access code will  in 90 days. If you need help or a new " code, please call your [facility] clinic or [phone].        Care EveryWhere ID     This is your Care EveryWhere ID. This could be used by other organizations to access your [facility] medical records  [id number]        Equal Access to Services     ADELAIDE GARCIA: Hadjulianna aad ku hadshine Curry, waaxda luqadaha, qaybta kaalmada adejesse, judith london laPoolfili garcia. So [facility] [phone].    ATENCIÓN: Si habla español, tiene a amezcua disposición servicios gratuitos de asistencia lingüística. Llame al [phone].    We comply with applicable federal civil rights laws and [place] laws. We do not discriminate on the basis of race, color, national origin, age, disability, sex, sexual orientation, or gender identity.               Review of your medicines      START taking        Dose / Directions    oxyCODONE IR 5 MG tablet   Commonly known as:  ROXICODONE   Used for:  Hematoma of left hip, initial encounter        Dose:  5 mg   Take 1 tablet (5 mg) by mouth every 4 hours as needed for moderate to severe pain   Quantity:  10 tablet   Refills:  0         CONTINUE these medicines which may have CHANGED, or have new prescriptions. If we are uncertain of the size of tablets/capsules you have at home, strength may be listed as something that might have changed.        Dose / Directions    furosemide 20 MG tablet   Commonly known as:  LASIX   Indication:  daily at noon   This may have changed:    - when to take this  - Another medication with the same name was removed. Continue taking this medication, and follow the directions you see here.   Used for:  Acute combined systolic and diastolic congestive heart failure (H), Benign essential hypertension        Dose:  20 mg   Take 1 tablet (20 mg) by mouth daily   Refills:  0         CONTINUE these medicines which have NOT CHANGED        Dose / Directions    acetaminophen 500 MG tablet   Commonly known as:  TYLENOL   Used for:  Closed left hip fracture, initial  encounter (H)        Dose:  1000 mg   Take 2 tablets (1,000 mg) by mouth 3 times daily   Refills:  0       allopurinol 100 MG tablet   Commonly known as:  ZYLOPRIM   Used for:  Chronic gout, unspecified cause, unspecified site        Dose:  100 mg   Take 1 tablet (100 mg) by mouth 2 times daily   Quantity:  30 tablet   Refills:  0       amLODIPine 5 MG tablet   Commonly known as:  NORVASC   Used for:  Benign essential hypertension        Dose:  5 mg   Take 1 tablet (5 mg) by mouth daily   Refills:  0       CALCIUM + D PO        Dose:  1 tablet   Take 1 tablet by mouth daily.   Refills:  0       CENTRUM SILVER ULTRA WOMENS Tabs        Dose:  1 tablet   Take 1 tablet by mouth daily.   Refills:  0       fenofibrate 160 MG tablet   Used for:  Hypercholesterolemia        Dose:  160 mg   Take 1 tablet (160 mg) by mouth daily   Quantity:  30 tablet   Refills:  0       folic acid 400 MCG tablet   Commonly known as:  FOLVITE        Dose:  400 mcg   Take 400 mcg by mouth daily.   Refills:  0       levothyroxine 112 MCG tablet   Commonly known as:  SYNTHROID/LEVOTHROID   Used for:  Other specified hypothyroidism        Dose:  112 mcg   Take 1 tablet (112 mcg) by mouth daily   Refills:  0       metoprolol 25 MG 24 hr tablet   Commonly known as:  TOPROL XL   Used for:  Benign essential hypertension        Dose:  25 mg   Take 1 tablet (25 mg) by mouth daily   Quantity:  30 tablet   Refills:  0       potassium citrate 5 MEQ (540 MG) CR tablet   Commonly known as:  UROCIT-K   Used for:  Hypokalemia        Dose:  20 mEq   Take 4 tablets (20 mEq) by mouth daily   Quantity:  120 tablet   Refills:  1       senna-docusate 8.6-50 MG per tablet   Commonly known as:  SENOKOT-S;PERICOLACE   Used for:  Closed left hip fracture, initial encounter (H)        Dose:  1-2 tablet   Take 1-2 tablets by mouth 2 times daily as needed (constipation )   Quantity:  100 tablet   Refills:  0       simvastatin 10 MG tablet   Commonly known as:  ZOCOR    Used for:  Hypercholesterolemia        Dose:  10 mg   Take 1 tablet (10 mg) by mouth At Bedtime   Quantity:  30 tablet   Refills:  0       VITAMIN D (CHOLECALCIFEROL) PO        Take  by mouth once a week.   Refills:  0         STOP taking     aspirin 325 MG tablet           clopidogrel 75 MG tablet   Commonly known as:  PLAVIX                Where to get your medicines      Some of these will need a paper prescription and others can be bought over the counter. Ask your nurse if you have questions.     Bring a paper prescription for each of these medications     oxyCODONE IR 5 MG tablet                Protect others around you: Learn how to safely use, store and throw away your medicines at www.disposemymeds.org.             Medication List: This is a list of all your medications and when to take them. Check marks below indicate your daily home schedule. Keep this list as a reference.      Medications           Morning Afternoon Evening Bedtime As Needed    acetaminophen 500 MG tablet   Commonly known as:  TYLENOL   Take 2 tablets (1,000 mg) by mouth 3 times daily   Last time this was given:  1,000 mg on 12/13/2017  1:41 PM                                         allopurinol 100 MG tablet   Commonly known as:  ZYLOPRIM   Take 1 tablet (100 mg) by mouth 2 times daily                                      amLODIPine 5 MG tablet   Commonly known as:  NORVASC   Take 1 tablet (5 mg) by mouth daily   Last time this was given:  5 mg on 12/13/2017  8:37 AM                                   CALCIUM + D PO   Take 1 tablet by mouth daily.                                   CENTRUM SILVER ULTRA WOMENS Tabs   Take 1 tablet by mouth daily.                                   fenofibrate 160 MG tablet   Take 1 tablet (160 mg) by mouth daily                                   folic acid 400 MCG tablet   Commonly known as:  FOLVITE   Take 400 mcg by mouth daily.                                   furosemide 20 MG tablet   Commonly known  as:  LASIX   Take 1 tablet (20 mg) by mouth daily                                   levothyroxine 112 MCG tablet   Commonly known as:  SYNTHROID/LEVOTHROID   Take 1 tablet (112 mcg) by mouth daily                                   metoprolol 25 MG 24 hr tablet   Commonly known as:  TOPROL XL   Take 1 tablet (25 mg) by mouth daily   Last time this was given:  25 mg on 12/13/2017  8:37 AM                                   oxyCODONE IR 5 MG tablet   Commonly known as:  ROXICODONE   Take 1 tablet (5 mg) by mouth every 4 hours as needed for moderate to severe pain   Last time this was given:  5 mg on 12/12/2017  7:20 PM                                   potassium citrate 5 MEQ (540 MG) CR tablet   Commonly known as:  UROCIT-K   Take 4 tablets (20 mEq) by mouth daily                                   senna-docusate 8.6-50 MG per tablet   Commonly known as:  SENOKOT-S;PERICOLACE   Take 1-2 tablets by mouth 2 times daily as needed (constipation )                                   simvastatin 10 MG tablet   Commonly known as:  ZOCOR   Take 1 tablet (10 mg) by mouth At Bedtime                                   VITAMIN D (CHOLECALCIFEROL) PO   Take  by mouth once a week.

## 2017-12-12 NOTE — IP AVS SNAPSHOT
[address] [facility]    [address] [place] [address]    Phone:  [phone]                                       After Visit Summary   12/12/2017    Mary Ellen Cuba    MRN: [id number]           After Visit Summary Signature Page     I have received my discharge instructions, and my questions have been answered. I have discussed any challenges I see with this plan with the nurse or doctor.    ..........................................................................................................................................  Patient/Patient Representative Signature      ..........................................................................................................................................  Patient Representative Print Name and Relationship to Patient    ..................................................               ................................................  Date                                            Time    ..........................................................................................................................................  Reviewed by Signature/Title    ...................................................              ..............................................  Date                                                            Time

## 2017-12-12 NOTE — LETTER
Transition Communication Hand-off for Care Transitions to Next Level of Care Provider    Name: Mary Ellen Cuba  MRN #: [id number]  Primary Care Provider: Samantha Celis  Primary Care MD Name: Samantha Celis MD   Primary Clinic: [facility] [address] [facility] [address]     Reason for Hospitalization:  Hip pain, left [M25.552]  Unable to ambulate [R26.2]  Hematoma of left hip, initial encounter [S70.02XA]  Admit Date/Time: 12/12/2017  6:47 PM  Discharge Date: 12/13/17  Payor Source: Payor: [employer] / Plan: [employer] PLAN / Product Type: HMO /     Readmission Assessment Measure (JUJU) Risk Score/category: None - OBS         Reason for Communication Hand-off Referral: Fragility    Discharge Plan: Continue [facility]       Concern for non-adherence with plan of care:   Y/N: Yes    Discharge Needs Assessment:  Needs       Most Recent Value    Equipment Currently Used at Home walker, rolling [4WW]    Transportation Available car, family or friend will provide    # of Referrals Placed by [facility]        Follow-up plan:  No future appointments.         SOFIA Camacho  [facility] [phone]/ [facility] [phone]      AVS/Discharge Summary is the source of truth; this is a helpful guide for improved communication of patient story

## 2017-12-13 ENCOUNTER — APPOINTMENT (OUTPATIENT)
Dept: PHYSICAL THERAPY | Facility: CLINIC | Age: 82
End: 2017-12-13
Attending: INTERNAL MEDICINE
Payer: MEDICARE

## 2017-12-13 VITALS
TEMPERATURE: 99.1 F | HEIGHT: 63 IN | RESPIRATION RATE: 20 BRPM | OXYGEN SATURATION: 95 % | HEART RATE: 61 BPM | DIASTOLIC BLOOD PRESSURE: 45 MMHG | BODY MASS INDEX: 24.84 KG/M2 | WEIGHT: 140.21 LBS | SYSTOLIC BLOOD PRESSURE: 140 MMHG

## 2017-12-13 PROBLEM — M79.605 LEFT LEG PAIN: Status: ACTIVE | Noted: 2017-12-13

## 2017-12-13 LAB
GLUCOSE BLDC GLUCOMTR-MCNC: 116 MG/DL (ref 70–99)
HGB BLD-MCNC: 8.1 G/DL (ref 11.7–15.7)

## 2017-12-13 PROCEDURE — 00000146 ZZHCL STATISTIC GLUCOSE BY METER IP

## 2017-12-13 PROCEDURE — A9270 NON-COVERED ITEM OR SERVICE: HCPCS | Mod: GY | Performed by: INTERNAL MEDICINE

## 2017-12-13 PROCEDURE — G0378 HOSPITAL OBSERVATION PER HR: HCPCS

## 2017-12-13 PROCEDURE — 85018 HEMOGLOBIN: CPT | Performed by: INTERNAL MEDICINE

## 2017-12-13 PROCEDURE — 99217 ZZC OBSERVATION CARE DISCHARGE: CPT | Performed by: PEDIATRICS

## 2017-12-13 PROCEDURE — 36415 COLL VENOUS BLD VENIPUNCTURE: CPT | Performed by: INTERNAL MEDICINE

## 2017-12-13 PROCEDURE — 25000132 ZZH RX MED GY IP 250 OP 250 PS 637: Mod: GY | Performed by: INTERNAL MEDICINE

## 2017-12-13 PROCEDURE — 97162 PT EVAL MOD COMPLEX 30 MIN: CPT | Mod: GP | Performed by: PHYSICAL THERAPIST

## 2017-12-13 PROCEDURE — 40000193 ZZH STATISTIC PT WARD VISIT: Performed by: PHYSICAL THERAPIST

## 2017-12-13 PROCEDURE — 99213 OFFICE O/P EST LOW 20 MIN: CPT | Performed by: ORTHOPAEDIC SURGERY

## 2017-12-13 RX ORDER — NALOXONE HYDROCHLORIDE 0.4 MG/ML
.1-.4 INJECTION, SOLUTION INTRAMUSCULAR; INTRAVENOUS; SUBCUTANEOUS
Status: DISCONTINUED | OUTPATIENT
Start: 2017-12-13 | End: 2017-12-13 | Stop reason: HOSPADM

## 2017-12-13 RX ORDER — ACETAMINOPHEN 500 MG
1000 TABLET ORAL 3 TIMES DAILY
Status: DISCONTINUED | OUTPATIENT
Start: 2017-12-13 | End: 2017-12-13 | Stop reason: HOSPADM

## 2017-12-13 RX ORDER — ACETAMINOPHEN 650 MG/1
650 SUPPOSITORY RECTAL EVERY 4 HOURS PRN
Status: DISCONTINUED | OUTPATIENT
Start: 2017-12-13 | End: 2017-12-13 | Stop reason: HOSPADM

## 2017-12-13 RX ORDER — METOPROLOL SUCCINATE 25 MG/1
25 TABLET, EXTENDED RELEASE ORAL DAILY
Status: DISCONTINUED | OUTPATIENT
Start: 2017-12-13 | End: 2017-12-13 | Stop reason: HOSPADM

## 2017-12-13 RX ORDER — AMOXICILLIN 250 MG
1-2 CAPSULE ORAL 2 TIMES DAILY PRN
Status: DISCONTINUED | OUTPATIENT
Start: 2017-12-13 | End: 2017-12-13 | Stop reason: HOSPADM

## 2017-12-13 RX ORDER — ONDANSETRON 2 MG/ML
4 INJECTION INTRAMUSCULAR; INTRAVENOUS EVERY 6 HOURS PRN
Status: DISCONTINUED | OUTPATIENT
Start: 2017-12-13 | End: 2017-12-13 | Stop reason: HOSPADM

## 2017-12-13 RX ORDER — AMLODIPINE BESYLATE 5 MG/1
5 TABLET ORAL DAILY
Status: DISCONTINUED | OUTPATIENT
Start: 2017-12-13 | End: 2017-12-13 | Stop reason: HOSPADM

## 2017-12-13 RX ORDER — ONDANSETRON 4 MG/1
4 TABLET, ORALLY DISINTEGRATING ORAL EVERY 6 HOURS PRN
Status: DISCONTINUED | OUTPATIENT
Start: 2017-12-13 | End: 2017-12-13 | Stop reason: HOSPADM

## 2017-12-13 RX ORDER — AMLODIPINE BESYLATE 5 MG/1
5 TABLET ORAL DAILY
Status: ON HOLD | COMMUNITY
Start: 2017-12-13 | End: 2018-02-02

## 2017-12-13 RX ORDER — OXYCODONE HYDROCHLORIDE 5 MG/1
5 TABLET ORAL EVERY 4 HOURS PRN
Qty: 10 TABLET | Refills: 0 | Status: ON HOLD | OUTPATIENT
Start: 2017-12-13 | End: 2018-02-02

## 2017-12-13 RX ORDER — ACETAMINOPHEN 325 MG/1
650 TABLET ORAL EVERY 4 HOURS PRN
Status: DISCONTINUED | OUTPATIENT
Start: 2017-12-13 | End: 2017-12-13 | Stop reason: HOSPADM

## 2017-12-13 RX ORDER — FUROSEMIDE 20 MG
20 TABLET ORAL DAILY
Status: ON HOLD | COMMUNITY
Start: 2017-12-13 | End: 2018-02-02

## 2017-12-13 RX ORDER — OXYCODONE HYDROCHLORIDE 5 MG/1
5 TABLET ORAL EVERY 4 HOURS PRN
Status: DISCONTINUED | OUTPATIENT
Start: 2017-12-13 | End: 2017-12-13 | Stop reason: HOSPADM

## 2017-12-13 RX ADMIN — METOPROLOL SUCCINATE 25 MG: 25 TABLET, EXTENDED RELEASE ORAL at 08:37

## 2017-12-13 RX ADMIN — ACETAMINOPHEN 1000 MG: 500 TABLET ORAL at 13:41

## 2017-12-13 RX ADMIN — ACETAMINOPHEN 1000 MG: 500 TABLET ORAL at 08:37

## 2017-12-13 RX ADMIN — AMLODIPINE BESYLATE 5 MG: 5 TABLET ORAL at 08:37

## 2017-12-13 NOTE — PLAN OF CARE
Problem: Patient Care Overview  Goal: Plan of Care/Patient Progress Review  Discharge Planner PT   Patient plan for discharge: Recommend DC to her home with home PT and 24 hour assist x 1-2 days due to pt is stand by assist and to ensure safe transition back to home environ.   Current status:Supine <> sit with SBA - effortful, but she is able to do it. Sit<> stand with SBA. Amb with SBA and WW. x30'   Barriers to return to prior living situation: lives alone  Recommendations for discharge: Home with home PT and increased assist- preferrably 24 hour assist x 1-2 days.   Rationale for recommendations: Pt is currently SBA but lives alone.  Recommending someone be with her for next 24-48 hours to ensure safe transition back to home.     Pt admitted under Observation. Evaluation only. No further inpatient PT indicated at this time.       Entered by: Shabana Peña 12/13/2017 5:05 PM

## 2017-12-13 NOTE — CONSULTS
[facility]    Orthopedics Consultation     Date of Admission:  12/12/2017  Date of Consult (When I saw the patient): 12/13/17    Assessment & Plan   Mary Ellen Cuba is a 89 year old female who was admitted on 12/12/2017. I was asked to see the patient for left hip pain.  She doesn't recall any recent trauma, and is on asa and plavix.  Her exam and imaging is consistent with left hip contusion/hematoma.  She may work with PT/OT [facility]AT and may follow up with me as an outpatient in 2 weeks.    Active Problems:    Essential hypertension    Coronary artery disease involving native coronary artery of native heart without angina pectoris    Hypothyroidism due to acquired atrophy of thyroid    Type 2 diabetes mellitus without complication (H)    Anemia    Leg hematoma, left, initial encounter    CKD (chronic kidney disease) stage 4, GFR 15-29 ml/min (H)    History of CVA (cerebrovascular accident)    Left leg pain      Selam Boyd    Code Status    DNR    Reason for Consult   Reason for consult: I was asked by the ER and Hospitalists to evaluate this patient for left hip pain.    Primary Care Physician   Samantha Celis    Chief Complaint   Left hip pain    History is obtained from the patient, electronic health record and emergency department physician    History of Present Illness   Mary Ellen Cuba is a 89 year old female who presents with left hip pain, atraumatic, and difficulty weight bearing.  She has a h/o left hip IM nail and pelvic fractures, both of which she recovered fine from.  She notes most of her pain at the lateral hip and thigh.  No numbness/tingling.  No groin pain.    Past Medical History   I have reviewed this patient's medical history and updated it with pertinent information if needed.   Past Medical History:   Diagnosis Date     CAD (coronary artery disease)     remote hx of stent placement in past     CVA (cerebral vascular accident) (H) 2013    some left sided  deficits     Diabetes mellitus (H)      Hyperlipemia      Hypertension        Past Surgical History   I have reviewed this patient's surgical history and updated it with pertinent information if needed.  Past Surgical History:   Procedure Laterality Date     APPENDECTOMY       GI SURGERY      gwendolyn     GI SURGERY      hemicolectomy     GYN SURGERY      oopherectomy     LASER YAG CAPSULOTOMY  3/21/2013    Procedure: LASER YAG CAPSULOTOMY;  yag capsulotomy bilateral eyes;  Surgeon: Deandre Nugent MD;  Location: [facility] OR     OPEN REDUCTION INTERNAL FIXATION HIP NAILING Left 1/20/2017    Procedure: OPEN REDUCTION INTERNAL FIXATION HIP NAILING;  Surgeon: Liborio Hoffman DO;  Location: [facility] OR     PHACOEMULSIFICATION WITH STANDARD INTRAOCULAR LENS IMPLANT  6/30/2011    Procedure:PHACOEMULSIFICATION WITH STANDARD INTRAOCULAR LENS IMPLANT; Surgeon:DEANDRE NUGENT; Location:[facility] OR     PHACOEMULSIFICATION WITH STANDARD INTRAOCULAR LENS IMPLANT  7/21/2011    Procedure:PHACOEMULSIFICATION WITH STANDARD INTRAOCULAR LENS IMPLANT; Surgeon:DEANDRE NUGENT; Location:[facility] OR     STENT, CORONARY, DIOGENES         Prior to Admission Medications   Prior to Admission Medications   Prescriptions Last Dose Informant Patient Reported? Taking?   Calcium-Vitamin D (CALCIUM + D PO)   Yes No   Sig: Take 1 tablet by mouth daily.   FUROSEMIDE PO   Yes Yes   Sig: Take 40 mg by mouth daily   Multiple Vitamins-Minerals (CENTRUM SILVER ULTRA WOMENS) TABS   Yes No   Sig: Take 1 tablet by mouth daily.   VITAMIN D, CHOLECALCIFEROL, PO   Yes No   Sig: Take  by mouth once a week.   acetaminophen (TYLENOL) 500 MG tablet   No Yes   Sig: Take 2 tablets (1,000 mg) by mouth 3 times daily   allopurinol (ZYLOPRIM) 100 MG tablet   No Yes   Sig: Take 1 tablet (100 mg) by mouth 2 times daily   amLODIPine (NORVASC) 5 MG tablet   No No   Sig: Take 2 tablets (10 mg) by mouth daily   Patient taking differently: Take 5 mg by mouth daily    aspirin 325 MG tablet    No No   Sig: Take 1 tablet (325 mg) by mouth daily   clopidogrel (PLAVIX) 75 MG tablet   No Yes   Sig: Take 1 tablet (75 mg) by mouth daily   fenofibrate 160 MG tablet   No No   Sig: Take 1 tablet (160 mg) by mouth daily   folic acid (FOLVITE) 400 MCG tablet   Yes No   Sig: Take 400 mcg by mouth daily.   furosemide (LASIX) 20 MG tablet   No No   Sig: Take 1 tablet (20 mg) by mouth 2 times daily   Patient taking differently: Take 20 mg by mouth daily    levothyroxine (SYNTHROID/LEVOTHROID) 112 MCG tablet   No Yes   Sig: Take 1 tablet (112 mcg) by mouth daily   metoprolol (TOPROL XL) 25 MG 24 hr tablet   No No   Sig: Take 1 tablet (25 mg) by mouth daily   potassium citrate (UROCIT-K) 5 MEQ (540 MG) CR tablet   No Yes   Sig: Take 4 tablets (20 mEq) by mouth daily   senna-docusate (SENOKOT-S;PERICOLACE) 8.6-50 MG per tablet   Yes Yes   Sig: Take 1-2 tablets by mouth 2 times daily as needed (constipation )   simvastatin (ZOCOR) 10 MG tablet   No Yes   Sig: Take 1 tablet (10 mg) by mouth At Bedtime      Facility-Administered Medications: None     Allergies   Allergies   Allergen Reactions     Hmg-Coa-R Inhibitors      Metformin GI Disturbance       Social History   I have reviewed this patient's social history and updated it with pertinent information if needed. Mary Ellen Cuba  reports that she has never smoked. She has never used smokeless tobacco. She reports that she does not drink alcohol or use illicit drugs.    Family History   I have reviewed this patient's family history and updated it with pertinent information if needed.   Family History   Problem Relation Age of Onset     Colon Cancer Mother      Prostate Cancer Brother      Peptic Ulcer Disease Father        Review of Systems   The 10 point Review of Systems is negative other than noted in the HPI or here.     Physical Exam   Temp: 99.1  F (37.3  C) Temp src: Oral BP: 140/45 Pulse: 61 Heart Rate: 61 Resp: 20 SpO2: 95 % O2 Device: None (Room air)    Vital  Signs with Ranges  Temp:  [96.6  F (35.9  C)-99.1  F (37.3  C)] 99.1  F (37.3  C)  Pulse:  [40-70] 61  Heart Rate:  [40-61] 61  Resp:  [16-20] 20  BP: (140-164)/(45-83) 140/45  SpO2:  [94 %-98 %] 95 %  140 lbs 3.4 oz    Constitutional: awake, alert, cooperative, no apparent distress, and appears stated age  Hematologic / Lymphatic: no inguinal lymphadenopathy  Respiratory: No increased work of breathing  Cardiovascular: no peripheral edema  Skin: no bruising or bleeding  Musculoskeletal: LEFT HIP:  tenderness present at lateral greater trochanter and IT band  range of motion full, no pain in groin with rotation  Neurologic: Awake, alert, oriented to name, place and time.    Neuropsychiatric: General: normal, calm and normal eye contact    Data   Results for orders placed or performed during the hospital encounter of 12/12/17 (from the past 24 hour(s))   XR Pelvis w Hip Left 1 View    Narrative    PELVIS AND HIP LEFT ONE VIEW 12/12/2017 8:10 PM     HISTORY: Left hip and thigh pain.      Impression    IMPRESSION: Left inferior pubic ramus sclerosis likely related to  subacute or chronic fracture. Left proximal femoral hardware,  unchanged in appearance from 6/5/2017. Heterotopic ossification is  noted superior to the femoral neck. Femoral artery calcification is  noted. No acute hip fracture is visible.    ANNIE CLAY MD   XR Knee Left 1/2 Views    Narrative    KNEE LEFT ONE TO TWO VIEWS  12/12/2017 8:11 PM     HISTORY: Left thigh pain.      Impression    IMPRESSION: Osteopenia. Meniscal chondrocalcinosis. Femoral artery  calcification. No apparent fracture.    ANNIE CLAY MD   CBC with platelets differential   Result Value Ref Range    WBC 8.6 4.0 - 11.0 10e9/L    RBC Count 2.96 (L) 3.8 - 5.2 10e12/L    Hemoglobin 8.6 (L) 11.7 - 15.7 g/dL    Hematocrit 27.5 (L) 35.0 - 47.0 %    MCV 93 78 - 100 fl    MCH 29.1 26.5 - 33.0 pg    MCHC 31.3 (L) 31.5 - 36.5 g/dL    RDW 15.3 (H) 10.0 - 15.0 %    Platelet Count 207 150  - 450 10e9/L    Diff Method Automated Method     % Neutrophils 74.2 %    % Lymphocytes 15.3 %    % Monocytes 7.0 %    % Eosinophils 2.9 %    % Basophils 0.4 %    % Immature Granulocytes 0.2 %    Absolute Neutrophil 6.4 1.6 - 8.3 10e9/L    Absolute Lymphocytes 1.3 0.8 - 5.3 10e9/L    Absolute Monocytes 0.6 0.0 - 1.3 10e9/L    Absolute Eosinophils 0.3 0.0 - 0.7 10e9/L    Absolute Basophils 0.0 0.0 - 0.2 10e9/L    Abs Immature Granulocytes 0.0 0 - 0.4 10e9/L   Basic metabolic panel   Result Value Ref Range    Sodium 142 133 - 144 mmol/L    Potassium 4.3 3.4 - 5.3 mmol/L    Chloride 112 (H) 94 - 109 mmol/L    Carbon Dioxide 23 20 - 32 mmol/L    Anion Gap 7 3 - 14 mmol/L    Glucose 114 (H) 70 - 99 mg/dL    Urea Nitrogen 36 (H) 7 - 30 mg/dL    Creatinine 1.85 (H) 0.52 - 1.04 mg/dL    GFR Estimate 25 (L) >60 mL/min/1.7m2    GFR Estimate If Black 31 (L) >60 mL/min/1.7m2    Calcium 8.3 (L) 8.5 - 10.1 mg/dL   UA reflex to Microscopic and Culture   Result Value Ref Range    Color Urine Straw     Appearance Urine Clear     Glucose Urine Negative NEG^Negative mg/dL    Bilirubin Urine Negative NEG^Negative    Ketones Urine Negative NEG^Negative mg/dL    Specific Gravity Urine 1.009 1.003 - 1.035    Blood Urine Small (A) NEG^Negative    pH Urine 5.0 5.0 - 7.0 pH    Protein Albumin Urine 100 (A) NEG^Negative mg/dL    Urobilinogen mg/dL 0.0 0.0 - 2.0 mg/dL    Nitrite Urine Negative NEG^Negative    Leukocyte Esterase Urine Negative NEG^Negative    Source Unspecified Urine     RBC Urine 1 0 - 2 /HPF    WBC Urine 2 0 - 2 /HPF    Bacteria Urine Few (A) NEG^Negative /HPF    Squamous Epithelial /HPF Urine <1 0 - 1 /HPF    Mucous Urine Present (A) NEG^Negative /LPF    Hyaline Casts 1 0 - 2 /LPF   CT Pelvis w/o Contrast    Narrative    CT PELVIS WITHOUT CONTRAST 12/12/2017 9:45 PM     HISTORY: Pelvic, left hip pain unable to ambulate.    Radiation dose for this scan was reduced using automated exposure  control, adjustment of the mA and/or  kV according to patient size, or  iterative reconstruction technique.    FINDINGS: Left sacral alar anterior cortical irregularity and  intramedullary sclerosis suggests fracture, possibly an insufficiency  fracture versus subacute fracture. There is fracture deformity in the  left inferior pubic ramus with bridging callus compatible with  subacute or chronic fracture. Left decubitus cortical irregularity  could represent age-indeterminate fracture as well. No definite acute  fractures are visible. The proximal femurs appear intact.  Intramedullary jeannie is noted on the left. There is prominent  heterogeneous soft tissue density lateral to the left greater  trochanter suspicious for soft tissue hematoma. Bilateral femoral and  iliac arterial calcification is noted. Advanced degenerative changes  are noted in the lower lumbar spine apophyseal joints.      Impression    IMPRESSION:  1. Marked heterogeneous soft tissue density over the left greater  trochanter, most likely soft tissue hematoma. This tracks from the  lateral margin of the greater trochanter proximally into the left  gluteus medius muscle.  2. Left inferior pubic ramus chronic/healed fracture.  3. Left pubis cortical irregularity which could represent  age-indeterminate fracture.  4. left sacral alar cortical irregularity of intramedullary sclerosis  which could represent subacute/chronic fracture versus insufficiency  fracture.  5. Apophyseal joint advanced degenerative arthrosis in the lower  lumbar spine.    ANNIE CLAY MD   Hemoglobin   Result Value Ref Range    Hemoglobin 8.1 (L) 11.7 - 15.7 g/dL   Glucose by meter   Result Value Ref Range    Glucose 116 (H) 70 - 99 mg/dL

## 2017-12-13 NOTE — ED NOTES
Brought pt the observation video to watch.  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  She stated that she has difficulty hearing.  Writer held the video near her ear so that she could hear it.  Pt stated that she understood the video, she was able to reiterate the information.  She asked a few questions, but stated that she understood the information.

## 2017-12-13 NOTE — ED NOTES
Unable to complete med rec.  Pt states that a nurse comes to her home every other week and sets up her pills.  Pt does not know the names of her medications.  Pt states that she goes to [facility] in [place] and sees Dr Celis.

## 2017-12-13 NOTE — ED PROVIDER NOTES
History     Chief Complaint   Patient presents with     Leg Pain     HPI  Mary Ellen Cuba is a 89 year old female who presents to the ER with concerns about left thigh pain that has been present x 3-4 weeks but is worse today starting about 3 hours ago.  She states that she has had a history for left hip fracture in the left sided pelvic fracture in the past that was surgically repaired.  She denies any specific fall or injury with this episode of pain.  She states that she is been walking on it until today but now the pain is so severe she can no longer walk on the left leg.  She states that she lives on her own in a apartment in the [place], [place] area.  She is here with a friend.  She denies any recent fever or chills.  She denies changes to her bowel or bladder habits.  He states that she has been eating normally for her.  Her friend states that she has been with her normal mentation and she is also not aware of any recent fall or injury that the patient has had recently.  He does admit to history for diabetes as well as chronic kidney disease.       Problem List:    Patient Active Problem List    Diagnosis Date Noted     Acute congestive heart failure (H) 05/26/2017     Priority: Medium     CHF (congestive heart failure) (H) 05/26/2017     Priority: Medium     Closed fracture of left inferior and superior pubic rami 05/15/2017     Priority: Medium     Urinary retention 01/21/2017     Priority: Medium     Fractured hip, left, closed, initial encounter (H) 01/20/2017     Priority: Medium     Essential hypertension 01/19/2017     Priority: Medium     Coronary artery disease involving native coronary artery of native heart without angina pectoris 01/19/2017     Priority: Medium     Hyperlipidemia LDL goal <100 01/19/2017     Priority: Medium     Hypothyroidism due to acquired atrophy of thyroid 01/19/2017     Priority: Medium     Type 2 diabetes mellitus without complication (H) 01/19/2017     Priority:  Medium     Hypernatremia 01/19/2017     Priority: Medium     Chronic kidney disease, stage III (moderate) 01/19/2017     Priority: Medium     Anemia 01/19/2017     Priority: Medium        Past Medical History:    Past Medical History:   Diagnosis Date     CAD (coronary artery disease)      CVA (cerebral vascular accident) (H) 2013     Diabetes mellitus (H)      Hyperlipemia      Hypertension        Past Surgical History:    Past Surgical History:   Procedure Laterality Date     APPENDECTOMY       GI SURGERY      gwendolyn     GI SURGERY      hemicolectomy     GYN SURGERY      oopherectomy     LASER YAG CAPSULOTOMY  3/21/2013    Procedure: LASER YAG CAPSULOTOMY;  yag capsulotomy bilateral eyes;  Surgeon: Deandre Nugent MD;  Location: [facility]     OPEN REDUCTION INTERNAL FIXATION HIP NAILING Left 1/20/2017    Procedure: OPEN REDUCTION INTERNAL FIXATION HIP NAILING;  Surgeon: Liborio Hoffman DO;  Location: [facility]     PHACOEMULSIFICATION WITH STANDARD INTRAOCULAR LENS IMPLANT  6/30/2011    Procedure:PHACOEMULSIFICATION WITH STANDARD INTRAOCULAR LENS IMPLANT; Surgeon:DEANDRE NUGENT; Location:[facility]     PHACOEMULSIFICATION WITH STANDARD INTRAOCULAR LENS IMPLANT  7/21/2011    Procedure:PHACOEMULSIFICATION WITH STANDARD INTRAOCULAR LENS IMPLANT; Surgeon:DEANDRE NUGENT; Location:[facility] OR     STENT, CORONARY, DIOGENES         Family History:    Family History   Problem Relation Age of Onset     Colon Cancer Mother      Prostate Cancer Brother      Peptic Ulcer Disease Father        Social History:  Marital Status:   [5]  Social History   Substance Use Topics     Smoking status: Never Smoker     Smokeless tobacco: Never Used     Alcohol use No        Medications:      amLODIPine (NORVASC) 5 MG tablet   furosemide (LASIX) 20 MG tablet   potassium citrate (UROCIT-K) 5 MEQ (540 MG) CR tablet   acetaminophen (TYLENOL) 500 MG tablet   metoprolol (TOPROL XL) 25 MG 24 hr tablet   senna-docusate (SENOKOT-S;PERICOLACE) 8.6-50  "MG per tablet   fenofibrate 160 MG tablet   simvastatin (ZOCOR) 10 MG tablet   allopurinol (ZYLOPRIM) 100 MG tablet   clopidogrel (PLAVIX) 75 MG tablet   levothyroxine (SYNTHROID/LEVOTHROID) 112 MCG tablet   aspirin 325 MG tablet   VITAMIN D, CHOLECALCIFEROL, PO   folic acid (FOLVITE) 400 MCG tablet   Multiple Vitamins-Minerals (CENTRUM SILVER ULTRA WOMENS) TABS   Calcium-Vitamin D (CALCIUM + D PO)       Review of Systems   Constitutional: Positive for activity change (unable to bear weight on the left leg becasue of pain.). Negative for appetite change, chills and fever.   HENT: Negative.    Eyes: Negative.    Respiratory: Negative.  Negative for cough, chest tightness and shortness of breath.    Cardiovascular: Negative for chest pain, palpitations and leg swelling.   Gastrointestinal: Negative for abdominal pain, blood in stool, nausea and vomiting.   Genitourinary: Negative for dysuria, flank pain, frequency and hematuria.   Musculoskeletal: Positive for arthralgias (left hip), gait problem and myalgias (left thigh area pain.). Negative for back pain.   Skin: Negative for rash.   Neurological: Negative for dizziness, numbness and headaches.   Psychiatric/Behavioral: Negative.    All other systems reviewed and are negative.      Physical Exam   BP: 144/83  Pulse: 69  Temp: 97.8  F (36.6  C)  Resp: 18  Height: 160 cm (5' 3\")  Weight: 61.2 kg (135 lb)  SpO2: 98 %      Physical Exam   Constitutional: She is oriented to person, place, and time. She appears well-developed and well-nourished. She appears distressed (with any movemetn of the left upper leg.).   HENT:   Head: Normocephalic and atraumatic.   Mouth/Throat: Oropharynx is clear and moist.   Eyes: Pupils are equal, round, and reactive to light.   Neck: Normal range of motion.   Cardiovascular: Normal rate.    Pulmonary/Chest: Effort normal. No respiratory distress.   Abdominal: Soft. She exhibits no mass (No pulsitile mass noted.). There is no tenderness. " There is no rebound and no guarding.   Musculoskeletal:        Left hip: She exhibits tenderness and bony tenderness. She exhibits normal range of motion, no swelling, no crepitus, no deformity and no laceration.        Legs:  Neurological: She is alert and oriented to person, place, and time.   Skin: Skin is warm. No bruising, no ecchymosis and no rash noted. No erythema.   Nursing note and vitals reviewed.      ED Course     ED Course     Procedures               Critical Care time:  none              Results for orders placed or performed during the hospital encounter of 12/12/17 (from the past 24 hour(s))   XR Pelvis w Hip Left 1 View    Narrative    PELVIS AND HIP LEFT ONE VIEW 12/12/2017 8:10 PM     HISTORY: Left hip and thigh pain.      Impression    IMPRESSION: Left inferior pubic ramus sclerosis likely related to  subacute or chronic fracture. Left proximal femoral hardware,  unchanged in appearance from 6/5/2017. Heterotopic ossification is  noted superior to the femoral neck. Femoral artery calcification is  noted. No acute hip fracture is visible.    ANNIE CLAY MD   XR Knee Left 1/2 Views    Narrative    KNEE LEFT ONE TO TWO VIEWS  12/12/2017 8:11 PM     HISTORY: Left thigh pain.      Impression    IMPRESSION: Osteopenia. Meniscal chondrocalcinosis. Femoral artery  calcification. No apparent fracture.    ANNIE CLAY MD   CBC with platelets differential   Result Value Ref Range    WBC 8.6 4.0 - 11.0 10e9/L    RBC Count 2.96 (L) 3.8 - 5.2 10e12/L    Hemoglobin 8.6 (L) 11.7 - 15.7 g/dL    Hematocrit 27.5 (L) 35.0 - 47.0 %    MCV 93 78 - 100 fl    MCH 29.1 26.5 - 33.0 pg    MCHC 31.3 (L) 31.5 - 36.5 g/dL    RDW 15.3 (H) 10.0 - 15.0 %    Platelet Count 207 150 - 450 10e9/L    Diff Method Automated Method     % Neutrophils 74.2 %    % Lymphocytes 15.3 %    % Monocytes 7.0 %    % Eosinophils 2.9 %    % Basophils 0.4 %    % Immature Granulocytes 0.2 %    Absolute Neutrophil 6.4 1.6 - 8.3 10e9/L     Absolute Lymphocytes 1.3 0.8 - 5.3 10e9/L    Absolute Monocytes 0.6 0.0 - 1.3 10e9/L    Absolute Eosinophils 0.3 0.0 - 0.7 10e9/L    Absolute Basophils 0.0 0.0 - 0.2 10e9/L    Abs Immature Granulocytes 0.0 0 - 0.4 10e9/L   Basic metabolic panel   Result Value Ref Range    Sodium 142 133 - 144 mmol/L    Potassium 4.3 3.4 - 5.3 mmol/L    Chloride 112 (H) 94 - 109 mmol/L    Carbon Dioxide 23 20 - 32 mmol/L    Anion Gap 7 3 - 14 mmol/L    Glucose 114 (H) 70 - 99 mg/dL    Urea Nitrogen 36 (H) 7 - 30 mg/dL    Creatinine 1.85 (H) 0.52 - 1.04 mg/dL    GFR Estimate 25 (L) >60 mL/min/1.7m2    GFR Estimate If Black 31 (L) >60 mL/min/1.7m2    Calcium 8.3 (L) 8.5 - 10.1 mg/dL   UA reflex to Microscopic and Culture   Result Value Ref Range    Color Urine Straw     Appearance Urine Clear     Glucose Urine Negative NEG^Negative mg/dL    Bilirubin Urine Negative NEG^Negative    Ketones Urine Negative NEG^Negative mg/dL    Specific Gravity Urine 1.009 1.003 - 1.035    Blood Urine Small (A) NEG^Negative    pH Urine 5.0 5.0 - 7.0 pH    Protein Albumin Urine 100 (A) NEG^Negative mg/dL    Urobilinogen mg/dL 0.0 0.0 - 2.0 mg/dL    Nitrite Urine Negative NEG^Negative    Leukocyte Esterase Urine Negative NEG^Negative    Source Unspecified Urine     RBC Urine 1 0 - 2 /HPF    WBC Urine 2 0 - 2 /HPF    Bacteria Urine Few (A) NEG^Negative /HPF    Squamous Epithelial /HPF Urine <1 0 - 1 /HPF    Mucous Urine Present (A) NEG^Negative /LPF    Hyaline Casts 1 0 - 2 /LPF   CT Pelvis w/o Contrast    Narrative    CT PELVIS WITHOUT CONTRAST 12/12/2017 9:45 PM     HISTORY: Pelvic, left hip pain unable to ambulate.    Radiation dose for this scan was reduced using automated exposure  control, adjustment of the mA and/or kV according to patient size, or  iterative reconstruction technique.    FINDINGS: Left sacral alar anterior cortical irregularity and  intramedullary sclerosis suggests fracture, possibly an insufficiency  fracture versus subacute  fracture. There is fracture deformity in the  left inferior pubic ramus with bridging callus compatible with  subacute or chronic fracture. Left decubitus cortical irregularity  could represent age-indeterminate fracture as well. No definite acute  fractures are visible. The proximal femurs appear intact.  Intramedullary jeannie is noted on the left. There is prominent  heterogeneous soft tissue density lateral to the left greater  trochanter suspicious for soft tissue hematoma. Bilateral femoral and  iliac arterial calcification is noted. Advanced degenerative changes  are noted in the lower lumbar spine apophyseal joints.      Impression    IMPRESSION:  1. Marked heterogeneous soft tissue density over the left greater  trochanter, most likely soft tissue hematoma. This tracks from the  lateral margin of the greater trochanter proximally into the left  gluteus medius muscle.  2. Left inferior pubic ramus chronic/healed fracture.  3. Left pubis cortical irregularity which could represent  age-indeterminate fracture.  4. left sacral alar cortical irregularity of intramedullary sclerosis  which could represent subacute/chronic fracture versus insufficiency  fracture.  5. Apophyseal joint advanced degenerative arthrosis in the lower  lumbar spine.    ANNIE CLAY MD       Medications ordered to be administered in the ER:    Medications   acetaminophen (TYLENOL) tablet 975 mg (975 mg Oral Given 12/12/17 1920)   oxyCODONE IR (ROXICODONE) tablet 5 mg (5 mg Oral Given 12/12/17 1920)       Assessments & Plan (with Medical Decision Making)  89-year-old to the ER secondary concerns about inability to bear weight on her left hip and leg due to severe pain.  She had no history of fall or injury as a cause of the symptoms.  Does have a remote history of pelvic fracture in May of this year as well as a left hip replacement done in January of this year.  Patient is most tender to the left lateral hip extending down to the mid left  lateral thigh area.  X-ray examinations were unremarkable except for the prosthesis in place for her left hip.  Because of the significant pain unexplained by the exam and x-rays a CT scan was ordered with results as listed above.  Patient lives alone and is unable to ambulate and care for self so could not safely discharge her back to her home.  I contacted shaquille Poon, and discussed the patient's symptoms and exam findings and asked that she would consult on the patient.  Consult order placed in the [employer] EMR.  I spoke to our hospitalist, Dr. Villanueva.  He agreed to come and see the patient for placement to the hospital floor for further evaluation and observation.       I have reviewed the nursing notes.    I have reviewed the findings, diagnosis, plan and need for hospitalization with the patient.       Final diagnoses:   Hip pain, left   Hematoma of left hip, initial encounter   Unable to ambulate       12/12/2017   [facility] EMERGENCY DEPARTMENT     Jalen Nazario,   12/13/17 0454

## 2017-12-13 NOTE — PROGRESS NOTES
12/13/17 1400   Quick Adds   Type of Visit Initial PT Evaluation   Living Environment   Lives With alone   Living Arrangements apartment   Home Accessibility no concerns;grab bars present (bathtub);grab bars present (toilet)   Number of Stairs to Enter Home 0   Number of Stairs Within Home 0   Transportation Available car;family or friend will provide   Self-Care   Dominant Hand right   Current Activity Tolerance moderate   Regular Exercise yes   Activity/Exercise Type walking   Exercise Amount/Frequency daily   Equipment Currently Used at Home walker, rolling  (4WW)   Activity/Exercise/Self-Care Comment has not been doing it as much lately   Functional Level Prior   Ambulation 1-->assistive equipment   Transferring 1-->assistive equipment   Toileting 1-->assistive equipment   Bathing 2-->assistive person   Dressing 2-->assistive person   Eating 0-->independent   Communication 0-->understands/communicates without difficulty   Swallowing 0-->swallows foods/liquids without difficulty   Cognition 0 - no cognition issues reported   Fall history within last six months no   Which of the above functional risks had a recent onset or change? ambulation;transferring;toileting   General Information   Onset of Illness/Injury or Date of Surgery - Date 12/12/17   Referring Physician Dr Villanueva   Patient/Family Goals Statement to return home   Pertinent History of Current Problem (include personal factors and/or comorbidities that impact the POC) Mary Ellen Cuba is a 89 year old female who presents with left hip pain, atraumatic, and difficulty weight bearing.  She has a h/o left hip IM nail and pelvic fractures, both of which she recovered fine from.  She notes most of her pain at the lateral hip and thigh.  No numbness/tingling.  No groin pain.   Precautions/Limitations fall precautions   Weight-Bearing Status - RLE weight-bearing as tolerated   General Observations Blind   Cognitive Status Examination   Orientation  orientation to person, place and time   Level of Consciousness alert   Follows Commands and Answers Questions 100% of the time   Personal Safety and Judgment intact   Memory intact   Posture    Posture Forward head position;Protracted shoulders   Range of Motion (ROM)   ROM Comment Grossly WFL for all extremities   Strength   Strength Comments Functional strength is good. Pt able to perform supien <> sit efffortrully but with SBA, sit<> stand with SBA and amb with SBA and WW   Bed Mobility   Bed Mobility Comments Supine <> sit and scootding with SBA   Transfer Skills   Transfer Comments SIt<> stand with SBA   Gait   Gait Gait Skill;Gait Analysis   Gait Skills   Level of [place]: Gait stand-by assist   Physical Assist/Nonphysical Assist: Gait supervision;verbal cues   Weight-Bearing Restrictions: Gait weight-bearing as tolerated   Assistive Device for Transfer: Gait rolling walker   Gait Distance 25 feet   Gait Analysis   Gait Pattern Used 3-point gait   Gait Deviations Noted decreased julieth;increased time in double stance;decreased velocity of limb motion;decreased step length;decreased toe-to-floor clearance;decreased weight-shifting ability   Impairments Contributing to Gait Deviations pain;decreased sensation;impaired sensory feedback;decreased strength  ((blind))   Balance   Balance Comments Sitting balance is good, standing balance is good. dynamic balance  No LoB's noted   Sensory Examination   Sensory Perception (Blind/ decreased vision)   Coordination   Coordination no deficits were identified   Muscle Tone   Muscle Tone no deficits were identified   General Therapy Interventions   Planned Therapy Interventions gait training;strengthening;transfer training;progressive activity/exercise   Intervention Comments Evaluation only   Clinical Impression   Criteria for Skilled Therapeutic Intervention yes, treatment indicated;evaluation only   PT Diagnosis L LE pain, swelling, inhibited strength due to  "hematoma   Influenced by the following impairments pain, swelling, decreased strength   Functional limitations due to impairments impaired functional mobility and gait   Clinical Presentation Evolving/Changing   Clinical Presentation Rationale 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 No Hospital based treatment planned   Anticipated Equipment Needs at Discharge (Pt has the equipment she needs)   Anticipated Discharge Disposition Home with Assist;Home with Home Therapy   Risk & Benefits of therapy have been explained Yes   Patient, Family & other staff in agreement with plan of care Yes   Clinical Impression Comments See care plan   [facility] TM \"6 Clicks\"   2016, Trustees of [facility], under license to [employer].  All rights reserved.   6 Clicks Short Forms Basic Mobility Inpatient Short Form   [facility] AM-PAC  \"6 Clicks\" V.2 Basic Mobility Inpatient Short Form   1. Turning from your back to your side while in a flat bed without using bedrails? 3 - A Little   2. Moving from lying on your back to sitting on the side of a flat bed without using bedrails? 3 - A Little   3. Moving to and from a bed to a chair (including a wheelchair)? 3 - A Little   4. Standing up from a chair using your arms (e.g., wheelchair, or bedside chair)? 3 - A Little   5. To walk in hospital room? 3 - A Little   6. Climbing 3-5 steps with a railing? 3 - A Little   Basic Mobility Raw Score (Score out of 24.Lower scores equate to lower levels of function) 18   Total Evaluation Time   Total Evaluation Time (Minutes) 20     "

## 2017-12-13 NOTE — H&P
[facility]    History and Physical  Hospitalist       Date of Admission:  12/12/2017  Date of Service (when I saw the patient): 12/12/17    Assessment & Plan       Active Problems:    Leg hematoma, left, initial encounter    Assessment: etiology unclear.  She doesn't recall any trauma but she is on both ASA and plavix.  With the abrupt onset of pain it is suspected the hematoma may explain her acute worsening of her pain that started today.   CT is showing any older healing inferior pubic ramus fracture that was known about, and an irregular area of the left side of the sacrum.  Patient unable to bear weight due to pain.     Plan: register to observation, stop ASA and plavix, hgb recheck tomorrow, PT, SS and ortho consults tomorrow and will need rehab placement, oxycodone and tylenol for pain control      Essential hypertension    Assessment: controlled    Plan: no change in meds      Coronary artery disease involving native coronary artery of native heart without angina pectoris    Assessment: chronic and asymptomatic    Plan: no intervention acutely      Anemia    Assessment: hgb stable and only slightly lower than previous     Plan: recheck tomorrow am      CKD (chronic kidney disease) stage 4, GFR 15-29 ml/min (H)    Assessment: chronic and stable    Plan: no intervention      Hypothyroidism due to acquired atrophy of thyroid    Assessment: chronic    Plan: resume replacement at discharge      Type 2 diabetes mellitus without complication (H)    Assessment: she is on no meds, doesn't monitor her blood sugars nor try to restrict carbs in her diet    Plan: ADA diet and check sugars bid for now      History of CVA (cerebrovascular accident)    Assessment: noted past history    Plan: resume ASA and plavix once hematoma resolving    DVT Prophylaxis: anticipate less than 24 hour stay  Code Status: DNR    Disposition: Expected discharge in 1 days once PT and ortho consults complete and  rehab bed available.    Cordell Villanueva MD    Primary Care Physician   Samantha Celis    Chief Complaint   Left leg pain    History is obtained from the patient    History of Present Illness   Mary Ellen Cuba is a 89 year old female who presents with left leg pain.  She has had some pain in her left hip and thigh area for about one month but today about 3 hours prior to coming to the ED she had abrupt worsening of her pain. It is sharp and worsened with movement or weight bearing.  She doesn't recall any recent trauma but also says her memory is poor.  Due to the severity of the pain she presented to the ED    Past Medical History    I have reviewed this patient's medical history and updated it with pertinent information if needed.   Past Medical History:   Diagnosis Date     CAD (coronary artery disease)     remote hx of stent placement in past     CVA (cerebral vascular accident) (H) 2013    some left sided deficits     Diabetes mellitus (H)      Hyperlipemia      Hypertension        Past Surgical History   I have reviewed this patient's surgical history and updated it with pertinent information if needed.  Past Surgical History:   Procedure Laterality Date     APPENDECTOMY       GI SURGERY      gwendolyn     GI SURGERY      hemicolectomy     GYN SURGERY      oopherectomy     LASER YAG CAPSULOTOMY  3/21/2013    Procedure: LASER YAG CAPSULOTOMY;  yag capsulotomy bilateral eyes;  Surgeon: Deandre Nugent MD;  Location: [facility] OR     OPEN REDUCTION INTERNAL FIXATION HIP NAILING Left 1/20/2017    Procedure: OPEN REDUCTION INTERNAL FIXATION HIP NAILING;  Surgeon: Liborio Hoffman DO;  Location: [facility] OR     PHACOEMULSIFICATION WITH STANDARD INTRAOCULAR LENS IMPLANT  6/30/2011    Procedure:PHACOEMULSIFICATION WITH STANDARD INTRAOCULAR LENS IMPLANT; Surgeon:DEANDRE NUGENT; Location:[facility] OR     PHACOEMULSIFICATION WITH STANDARD INTRAOCULAR LENS IMPLANT  7/21/2011    Procedure:PHACOEMULSIFICATION WITH STANDARD  INTRAOCULAR LENS IMPLANT; Surgeon:DEANDRE DOS SANTOS; Location:[facility] OR     STENT, CORONARY, DIOGENES         Prior to Admission Medications   Prior to Admission Medications   Prescriptions Last Dose Informant Patient Reported? Taking?   Calcium-Vitamin D (CALCIUM + D PO)   Yes No   Sig: Take 1 tablet by mouth daily.   FUROSEMIDE PO   Yes Yes   Sig: Take 40 mg by mouth daily   Multiple Vitamins-Minerals (CENTRUM SILVER ULTRA WOMENS) TABS   Yes No   Sig: Take 1 tablet by mouth daily.   VITAMIN D, CHOLECALCIFEROL, PO   Yes No   Sig: Take  by mouth once a week.   acetaminophen (TYLENOL) 500 MG tablet   No Yes   Sig: Take 2 tablets (1,000 mg) by mouth 3 times daily   allopurinol (ZYLOPRIM) 100 MG tablet   No Yes   Sig: Take 1 tablet (100 mg) by mouth 2 times daily   amLODIPine (NORVASC) 5 MG tablet   No No   Sig: Take 2 tablets (10 mg) by mouth daily   Patient taking differently: Take 5 mg by mouth daily    aspirin 325 MG tablet   No No   Sig: Take 1 tablet (325 mg) by mouth daily   clopidogrel (PLAVIX) 75 MG tablet   No Yes   Sig: Take 1 tablet (75 mg) by mouth daily   fenofibrate 160 MG tablet   No No   Sig: Take 1 tablet (160 mg) by mouth daily   folic acid (FOLVITE) 400 MCG tablet   Yes No   Sig: Take 400 mcg by mouth daily.   furosemide (LASIX) 20 MG tablet   No No   Sig: Take 1 tablet (20 mg) by mouth 2 times daily   Patient taking differently: Take 20 mg by mouth daily    levothyroxine (SYNTHROID/LEVOTHROID) 112 MCG tablet   No Yes   Sig: Take 1 tablet (112 mcg) by mouth daily   metoprolol (TOPROL XL) 25 MG 24 hr tablet   No No   Sig: Take 1 tablet (25 mg) by mouth daily   potassium citrate (UROCIT-K) 5 MEQ (540 MG) CR tablet   No Yes   Sig: Take 4 tablets (20 mEq) by mouth daily   senna-docusate (SENOKOT-S;PERICOLACE) 8.6-50 MG per tablet   Yes Yes   Sig: Take 1-2 tablets by mouth 2 times daily as needed (constipation )   simvastatin (ZOCOR) 10 MG tablet   No Yes   Sig: Take 1 tablet (10 mg) by mouth At Bedtime       Facility-Administered Medications: None     Allergies   Allergies   Allergen Reactions     Hmg-Coa-R Inhibitors      Metformin GI Disturbance       Social History   I have reviewed this patient's social history and updated it with pertinent information if needed. Mary Ellen Cuba  reports that she has never smoked. She has never used smokeless tobacco. She reports that she does not drink alcohol or use illicit drugs.    Family History   I have reviewed this patient's family history and updated it with pertinent information if needed.   Family History   Problem Relation Age of Onset     Colon Cancer Mother      Prostate Cancer Brother      Peptic Ulcer Disease Father        Review of Systems   The 10 point Review of Systems is negative other than noted in the HPI or here.     Physical Exam   Temp: 97.8  F (36.6  C) Temp src: Oral BP: 156/82 Pulse: 69   Resp: 18 SpO2: 97 % O2 Device: None (Room air)    Vital Signs with Ranges  Temp:  [97.8  F (36.6  C)] 97.8  F (36.6  C)  Pulse:  [69] 69  Resp:  [18] 18  BP: (144-156)/(82-83) 156/82  SpO2:  [97 %-98 %] 97 %  135 lbs 0 oz    Constitutional:   awake, alert, cooperative, no apparent distress, and appears stated age     Eyes:   Lids and lashes normal, pupils equal, round and reactive to light, extra ocular muscles intact, sclera clear, conjunctiva normal     ENT:   Normocephalic, without obvious abnormality, atraumatic, sinuses nontender on palpation, external ears without lesions, oral pharynx with moist mucous membranes, tonsils without erythema or exudates, gums normal and good dentition.     Neck:   Supple, symmetrical, trachea midline, no adenopathy, thyroid symmetric, not enlarged and no tenderness, skin normal     Hematologic / Lymphatic:   no cervical lymphadenopathy and no supraclavicular lymphadenopathy     Back:   Symmetric, no curvature, spinous processes are non-tender on palpation, paraspinous muscles are non-tender on palpation, no costal vertebral  tenderness     Lungs:   No increased work of breathing, good air exchange, clear to auscultation bilaterally, no crackles or wheezing     Cardiovascular:   Normal apical impulse, regular rate and rhythm, normal S1 and S2, no S3 or S4, and no murmur noted     Abdomen:   normal bowel sounds, soft, non-distended, non-tender, no masses palpated, no hepatosplenomegally     Musculoskeletal:   Most tender antererolaterally on her left proximal thigh.  No bruising noted     Neurologic:   Awake, alert, oriented to name, place and time.  Cranial nerves II-XII are grossly intact.  Motor is 5 out of 5 bilaterally.    Sensory is intact.         Data   Data reviewed today:  I personally reviewed no images or EKG's today.    Recent Labs  Lab 12/12/17  2039   WBC 8.6   HGB 8.6*   MCV 93         POTASSIUM 4.3   CHLORIDE 112*   CO2 23   BUN 36*   CR 1.85*   ANIONGAP 7   ZULEMA 8.3*   *       Recent Results (from the past 24 hour(s))   XR Pelvis w Hip Left 1 View    Narrative    PELVIS AND HIP LEFT ONE VIEW 12/12/2017 8:10 PM     HISTORY: Left hip and thigh pain.      Impression    IMPRESSION: Left inferior pubic ramus sclerosis likely related to  subacute or chronic fracture. Left proximal femoral hardware,  unchanged in appearance from 6/5/2017. Heterotopic ossification is  noted superior to the femoral neck. Femoral artery calcification is  noted. No acute hip fracture is visible.    ANNIE CLAY MD   XR Knee Left 1/2 Views    Narrative    KNEE LEFT ONE TO TWO VIEWS  12/12/2017 8:11 PM     HISTORY: Left thigh pain.      Impression    IMPRESSION: Osteopenia. Meniscal chondrocalcinosis. Femoral artery  calcification. No apparent fracture.    ANNIE CLAY MD   CT Pelvis w/o Contrast    Narrative    CT PELVIS WITHOUT CONTRAST 12/12/2017 9:45 PM     HISTORY: Pelvic, left hip pain unable to ambulate.    Radiation dose for this scan was reduced using automated exposure  control, adjustment of the mA and/or kV  according to patient size, or  iterative reconstruction technique.    FINDINGS: Left sacral alar anterior cortical irregularity and  intramedullary sclerosis suggests fracture, possibly an insufficiency  fracture versus subacute fracture. There is fracture deformity in the  left inferior pubic ramus with bridging callus compatible with  subacute or chronic fracture. Left decubitus cortical irregularity  could represent age-indeterminate fracture as well. No definite acute  fractures are visible. The proximal femurs appear intact.  Intramedullary jeannie is noted on the left. There is prominent  heterogeneous soft tissue density lateral to the left greater  trochanter suspicious for soft tissue hematoma. Bilateral femoral and  iliac arterial calcification is noted. Advanced degenerative changes  are noted in the lower lumbar spine apophyseal joints.      Impression    IMPRESSION:  1. Marked heterogeneous soft tissue density over the left greater  trochanter, most likely soft tissue hematoma. This tracks from the  lateral margin of the greater trochanter proximally into the left  gluteus medius muscle.  2. Left inferior pubic ramus chronic/healed fracture.  3. Left pubis cortical irregularity which could represent  age-indeterminate fracture.  4. left sacral alar cortical irregularity of intramedullary sclerosis  which could represent subacute/chronic fracture versus insufficiency  fracture.  5. Apophyseal joint advanced degenerative arthrosis in the lower  lumbar spine.    ANNIE CLAY MD

## 2017-12-13 NOTE — PROGRESS NOTES
"S-(situation): Patient registered to Observation. Patient arrived to room 251 via cart from ED    B-(background): left hip pain for 3 weeks    A-(assessment): VSS, afebrile.  Denies pain at this time stating the pain medications she was given in ED are working \"Great!\"    R-(recommendations): Orders and observation goals reviewed with patient.      Nursing Observation criteria listed below was met:    Skin issues/needs documented:NA  Isolation needs addressed, if appropriate: NA  Fall Prevention: Education given and documented: Yes  Education Assessment documented:Yes  Education Documented (Pre-existing chronic infection such as, MRSA/VRE need education on admission): Yes  New medication patient education completed and documented (Possible Side Effects of Common Medications handout): Yes  Home medications if not able to send immediately home with family stored here: NA  Reminder note placed in discharge instructions: NA  Patient has discharge needs (If yes, please explain): No              "

## 2017-12-13 NOTE — ED NOTES
Spoke with Dr Villanueva about pt status.  Verified that pt does not have an IV and will not need one placed with admitting provider.

## 2017-12-13 NOTE — ED NOTES
Report received from Laura FREDERICK at 1910.  Administered pain medication.  Pt denies pain when she is laying, she states that she only has pain with movement.  Pt to xray at that time.  Peripheral lab draw completed.  Pt requested the bedpan versus the commode.  Urine sample collected and sent to lab.

## 2017-12-13 NOTE — PLAN OF CARE
Problem: Patient Care Overview  Goal: Plan of Care/Patient Progress Review  S-(situation): PT orders received.  Orthopedics is also consulted.    B-(background): Pt admitted late last night with L LE pain and hematoma and unable to bear weight on that leg. CT  Findings as follows.         IMPRESSION:  1. Marked heterogeneous soft tissue density over the left greater  trochanter, most likely soft tissue hematoma. This tracks from the  lateral margin of the greater trochanter proximally into the left  gluteus medius muscle.  2. Left inferior pubic ramus chronic/healed fracture.  3. Left pubis cortical irregularity which could represent  age-indeterminate fracture.  4. left sacral alar cortical irregularity of intramedullary sclerosis  which could represent subacute/chronic fracture versus insufficiency  fracture.  5. Apophyseal joint advanced degenerative arthrosis in the lower  lumbar spine.    HGB this morning is 8.1 slightly down from yesterday.      A-(assessment): Pt has not yet been evaluated by orthopedic surgery who may issue a WB restriction.      R-(recommendations): PT eval will follow orthopedic eval in order to ensure mobility is in compliance with any WB restrictions or other recommendations.   Per Dr Villanueva's note pt is struggling/ unable to bear weight on her leg due to pain currently and he is recommending a rehabilitation stay.  PT eval is not required prior to DC if she is going to TCU today.

## 2017-12-13 NOTE — CONSULTS
CARE TRANSITION SOCIAL WORK INITIAL ASSESSMENT:  Reason For Consult: discharge planning   Met with: Patient.    DATA  Principal Problem:    Leg hematoma, left, initial encounter  Active Problems:    Essential hypertension    Coronary artery disease involving native coronary artery of native heart without angina pectoris    Hypothyroidism due to acquired atrophy of thyroid    Type 2 diabetes mellitus without complication (H)    Anemia    CKD (chronic kidney disease) stage 4, GFR 15-29 ml/min (H)    History of CVA (cerebrovascular accident)    Left leg pain          Primary Care MD Name: Samantha Celis MD       ASSESSMENT  Cognitive Status: awake, alert and oriented.       Resources List: Home Care     Lives With: alone  Living Arrangements: house     Description of Support System: Supportive, Involved   Who is your support system?: Other (specify) (Friends/neighbors )       Insurance Concerns: No Insurance issues identified        This writer met with pt introduced self and role. Discussed discharge planning and medicare guidelines in regards to home care and SNF benefits.  Met with patient to discuss discharge plans.  Patient lives alone.  She receives home care through [facility] [phone] Phone and [phone] Fax.  Currently she receives RN services every other week to help with medication setup since patient is not able to see her pills.  Patient also has a lifeline.  Patient uses a walker with seat and brakes for ambulation.  It is recommended that patient has Home P/T at discharge.  [facility].      PLAN    Home with [facility]    Discharge Planner   Discharge Plans in progress: [facility]  Barriers to discharge plan: Lives Alone  Follow up plan: PCP    Brii Black [facility] [phone]/ [facility] [phone]         Entered by: Brii Black 12/13/2017 4:46 PM

## 2017-12-13 NOTE — ED NOTES
ED Nursing criteria listed below was addressed during verbal handoff:     Abnormal vitals: Yes  Abnormal results: Yes  Med Reconciliation completed: Yes, not completed-see note  Meds given in ED: Yes  Any Overdue Meds: N/A  Core Measures: Yes  Isolation: N/A  Special needs: Yes  Skin assessment: Yes    Observation Patient  Education provided: Yes, see note    Report given to Shaylee FREDERICK, pt to room 251

## 2017-12-13 NOTE — PROGRESS NOTES
[facility]    Hospitalist Progress Note    Date of Service (when I saw the patient): 12/13/2017    Assessment & Plan   Mary Ellen Cuba is a 89 year old female who was hospitalized on 12/12/2017 due to left leg pain and new inability to ambulate.  Spontaneous hematoma associated with chronic use of aspirin and Plavix is suspected.  No ongoing active bleeding is suspected clinically, and her functional status has improved.  Patient expresses preference to discharge to home indicating that she has adequate resources for assistance at home.  She appears medically stable for discharge, but it is not clear that she yet has a safe disposition plan.  Because she is blind and has had her daily medications set up for her 2 weeks in advance by her home care nurse, she will not be able to eliminate aspirin and Plavix from her pillbox without help and she does not think that that help is available to her at home today.  If she inadvertently takes aspirin and Plavix at home, her spontaneous bleeding could worsen exacerbating her leg pain and worsening functional status.    Principal Problem:    Leg hematoma, left, initial encounter  Active Problems:    Essential hypertension    Coronary artery disease involving native coronary artery of native heart without angina pectoris    Type 2 diabetes mellitus without complication (H)    Anemia    CKD (chronic kidney disease) stage 4, GFR 15-29 ml/min (H)    Left leg pain    Hypothyroidism due to acquired atrophy of thyroid    History of CVA (cerebrovascular accident)    Advised discontinuation of aspirin and Plavix for least the next 1-2 weeks until outpatient follow-up with her PCP.  She will need assistance with removing aspirin and Plavix from her current pillbox.  Continue scheduled doses of extra strength acetaminophen 3 times a day with use of oxycodone as needed for breakthrough pain  Anticipate discharge once she has a safe disposition plan with  ride to her home and assistance with managing aspirin and Plavix which should be discontinued for now  Anticipate restarting home care at discharge    Code Status: Prior    Disposition: Expected discharge today or tomorrow once she has a safe disposition plan.    Liborio Hendrickson    Interval History   She is feeling better today.  Her left thigh pain has improved.  She was seen by orthopedic surgery who recommended advancing activity as tolerated.  She was seen by physical therapy and has been able to ambulate independently using an assistive device.  She is limited at baseline because she is blind.  Her vital signs have been stable.  She has no new concerns.  She is tolerating good oral intake.  She says that she does not set up her own medications at home because she is blind.  Rather, she relies on her home care nurse to set up her medications for 2 weeks at a time when her nurse comes every other week.  Her home care nurse is not due to come out again until next week at least according to her usual routine.  She does not think there is anyone else that can help manage medication changes that are recommended at discharge from this hospital stay including elimination of aspirin and Plavix for now.    -Data reviewed today: I reviewed all new labs and imaging results over the last 24 hours. I personally reviewed no images or EKG's today.    Physical Exam   Temp: 99.1  F (37.3  C) Temp src: Oral BP: 140/45 Pulse: 61 Heart Rate: 61 Resp: 20 SpO2: 95 % O2 Device: None (Room air)    Vitals:    12/12/17 1851 12/13/17 0030   Weight: 61.2 kg (135 lb) 63.6 kg (140 lb 3.4 oz)     Vital Signs with Ranges  Temp:  [96.6  F (35.9  C)-99.1  F (37.3  C)] 99.1  F (37.3  C)  Pulse:  [40-70] 61  Heart Rate:  [40-61] 61  Resp:  [16-20] 20  BP: (140-164)/(45-83) 140/45  SpO2:  [94 %-98 %] 95 %  I/O last 3 completed shifts:  In: 480 [P.O.:480]  Out: 1 [Urine:1]    Constitutional: No acute distress, somewhat pale color  Cardiovascular:  Regular rate and rhythm, appears well perfused  Neuro: Alert, maintains wakefulness and attention, answers questions appropriately    Medications        acetaminophen  1,000 mg Oral TID     amLODIPine  5 mg Oral Daily     metoprolol  25 mg Oral Daily       Data   Data reviewed today:  I personally reviewed no images or EKG's today.    Recent Labs  Lab 12/13/17  0550 12/12/17 2039   WBC  --  8.6   HGB 8.1* 8.6*   MCV  --  93   PLT  --  207   NA  --  142   POTASSIUM  --  4.3   CHLORIDE  --  112*   CO2  --  23   BUN  --  36*   CR  --  1.85*   ANIONGAP  --  7   ZULEMA  --  8.3*   GLC  --  114*       Recent Results (from the past 24 hour(s))   XR Pelvis w Hip Left 1 View    Narrative    PELVIS AND HIP LEFT ONE VIEW 12/12/2017 8:10 PM     HISTORY: Left hip and thigh pain.      Impression    IMPRESSION: Left inferior pubic ramus sclerosis likely related to  subacute or chronic fracture. Left proximal femoral hardware,  unchanged in appearance from 6/5/2017. Heterotopic ossification is  noted superior to the femoral neck. Femoral artery calcification is  noted. No acute hip fracture is visible.    ANNIE CLAY MD   XR Knee Left 1/2 Views    Narrative    KNEE LEFT ONE TO TWO VIEWS  12/12/2017 8:11 PM     HISTORY: Left thigh pain.      Impression    IMPRESSION: Osteopenia. Meniscal chondrocalcinosis. Femoral artery  calcification. No apparent fracture.    ANNIE CLAY MD   CT Pelvis w/o Contrast    Narrative    CT PELVIS WITHOUT CONTRAST 12/12/2017 9:45 PM     HISTORY: Pelvic, left hip pain unable to ambulate.    Radiation dose for this scan was reduced using automated exposure  control, adjustment of the mA and/or kV according to patient size, or  iterative reconstruction technique.    FINDINGS: Left sacral alar anterior cortical irregularity and  intramedullary sclerosis suggests fracture, possibly an insufficiency  fracture versus subacute fracture. There is fracture deformity in the  left inferior pubic ramus with  bridging callus compatible with  subacute or chronic fracture. Left decubitus cortical irregularity  could represent age-indeterminate fracture as well. No definite acute  fractures are visible. The proximal femurs appear intact.  Intramedullary jeannie is noted on the left. There is prominent  heterogeneous soft tissue density lateral to the left greater  trochanter suspicious for soft tissue hematoma. Bilateral femoral and  iliac arterial calcification is noted. Advanced degenerative changes  are noted in the lower lumbar spine apophyseal joints.      Impression    IMPRESSION:  1. Marked heterogeneous soft tissue density over the left greater  trochanter, most likely soft tissue hematoma. This tracks from the  lateral margin of the greater trochanter proximally into the left  gluteus medius muscle.  2. Left inferior pubic ramus chronic/healed fracture.  3. Left pubis cortical irregularity which could represent  age-indeterminate fracture.  4. left sacral alar cortical irregularity of intramedullary sclerosis  which could represent subacute/chronic fracture versus insufficiency  fracture.  5. Apophyseal joint advanced degenerative arthrosis in the lower  lumbar spine.    ANNIE CLAY MD

## 2017-12-13 NOTE — ED NOTES
Found a medication profile with pt.  Checked medications to that list- it says certification period 07/14/17-09/11/17      Skin assessment completed. Pt denies any pain at this time.  Lights turned down and pt will rest at this time.

## 2017-12-13 NOTE — DISCHARGE SUMMARY
[facility] Observation Discharge Summary    Mary Ellen Cuba MRN# [id number]   Age: 89 year old YOB: 1924     Date of Observation:  12/12/2017  Date of Discharge:  12/13/2017   Initial Physician:  Cordell Villanueva MD  Discharge Physician:  Liborio Hendrickson MD     Primary care provider: Samantha Celis          Admission Diagnoses:   Hip pain, left [M25.552]  Unable to ambulate [R26.2]  Hematoma of left hip, initial encounter [S70.02XA]          Discharge Diagnoses:   Principal diagnosis: Leg hematoma, left, initial encounter    Secondary diagnoses:    Leg hematoma, left, initial encounter    Essential hypertension    Coronary artery disease involving native coronary artery of native heart without angina pectoris    Type 2 diabetes mellitus without complication (H)    Anemia    CKD (chronic kidney disease) stage 4, GFR 15-29 ml/min (H)    Left leg pain    Hypothyroidism due to acquired atrophy of thyroid    History of CVA (cerebrovascular accident)    * No resolved hospital problems. *            Procedures:   No procedures performed during this hospital stay           Discharge Medications:     Outpatient Prescriptions Marked as Taking for the 12/12/17 encounter (Hospital Encounter)   Medication Sig     oxyCODONE IR (ROXICODONE) 5 MG tablet Take 1 tablet (5 mg) by mouth every 4 hours as needed for moderate to severe pain     amLODIPine (NORVASC) 5 MG tablet Take 1 tablet (5 mg) by mouth daily     furosemide (LASIX) 20 MG tablet Take 1 tablet (20 mg) by mouth daily     potassium citrate (UROCIT-K) 5 MEQ (540 MG) CR tablet Take 4 tablets (20 mEq) by mouth daily     acetaminophen (TYLENOL) 500 MG tablet Take 2 tablets (1,000 mg) by mouth 3 times daily     senna-docusate (SENOKOT-S;PERICOLACE) 8.6-50 MG per tablet Take 1-2 tablets by mouth 2 times daily as needed (constipation )     simvastatin (ZOCOR) 10 MG tablet Take 1 tablet (10 mg) by mouth At Bedtime     allopurinol (ZYLOPRIM) 100 MG tablet  Take 1 tablet (100 mg) by mouth 2 times daily     levothyroxine (SYNTHROID/LEVOTHROID) 112 MCG tablet Take 1 tablet (112 mcg) by mouth daily       Current Discharge Medication List      START taking these medications    Details   oxyCODONE IR (ROXICODONE) 5 MG tablet Take 1 tablet (5 mg) by mouth every 4 hours as needed for moderate to severe pain  Qty: 10 tablet, Refills: 0    Associated Diagnoses: Hematoma of left hip, initial encounter         CONTINUE these medications which have CHANGED    Details   amLODIPine (NORVASC) 5 MG tablet Take 1 tablet (5 mg) by mouth daily    Associated Diagnoses: Benign essential hypertension      furosemide (LASIX) 20 MG tablet Take 1 tablet (20 mg) by mouth daily    Associated Diagnoses: Acute combined systolic and diastolic congestive heart failure (H); Benign essential hypertension         CONTINUE these medications which have NOT CHANGED    Details   potassium citrate (UROCIT-K) 5 MEQ (540 MG) CR tablet Take 4 tablets (20 mEq) by mouth daily  Qty: 120 tablet, Refills: 1    Associated Diagnoses: Hypokalemia      acetaminophen (TYLENOL) 500 MG tablet Take 2 tablets (1,000 mg) by mouth 3 times daily    Associated Diagnoses: Closed left hip fracture, initial encounter (H)      senna-docusate (SENOKOT-S;PERICOLACE) 8.6-50 MG per tablet Take 1-2 tablets by mouth 2 times daily as needed (constipation )  Qty: 100 tablet, Refills: 0    Comments: Indication: Constipation  Associated Diagnoses: Closed left hip fracture, initial encounter (H)      simvastatin (ZOCOR) 10 MG tablet Take 1 tablet (10 mg) by mouth At Bedtime  Qty: 30 tablet    Associated Diagnoses: Hypercholesterolemia      allopurinol (ZYLOPRIM) 100 MG tablet Take 1 tablet (100 mg) by mouth 2 times daily  Qty: 30 tablet    Associated Diagnoses: Chronic gout, unspecified cause, unspecified site      levothyroxine (SYNTHROID/LEVOTHROID) 112 MCG tablet Take 1 tablet (112 mcg) by mouth daily    Associated Diagnoses: Other  specified hypothyroidism      metoprolol (TOPROL XL) 25 MG 24 hr tablet Take 1 tablet (25 mg) by mouth daily  Qty: 30 tablet    Associated Diagnoses: Benign essential hypertension      fenofibrate 160 MG tablet Take 1 tablet (160 mg) by mouth daily  Qty: 30 tablet    Associated Diagnoses: Hypercholesterolemia      VITAMIN D, CHOLECALCIFEROL, PO Take  by mouth once a week.      folic acid (FOLVITE) 400 MCG tablet Take 400 mcg by mouth daily.      Multiple Vitamins-Minerals (CENTRUM SILVER ULTRA WOMENS) TABS Take 1 tablet by mouth daily.      Calcium-Vitamin D (CALCIUM + D PO) Take 1 tablet by mouth daily.         STOP taking these medications       clopidogrel (PLAVIX) 75 MG tablet Comments:   Reason for Stopping:         aspirin 325 MG tablet Comments:   Reason for Stopping:                     Consultations:   No consultations were requested during this hospital stay          Brief History of Illness:   89 year old female patient with multiple chronic medical problems presented with several days history of worsening left hip pain which became so painful that she was unable to ambulate at the time of presentation.   Due to concern about persistent symptoms despite initial treatment in the emergency room including inability to safely ambulate independently, she was hospitalized for observation. See ER note and history and physical for details.          Hospital Course:   Patient was observed in the hospital.  CT of the hip demonstrated radiographic findings consistent with hematoma in the left hip area.  Hemoglobin remained stable during her hospital stay.  Orthopedic surgery was consulted and advised advancing activity as tolerated including weightbearing as tolerated and symptomatic management with analgesics.  PT was consulted, and the patient tolerated advancing ambulation and weightbearing independently by discharge.  Pain was adequately controlled with oral analgesics.  Spontaneous left hip hematoma was  suspected associated with previous aspirin and Plavix therapy.  At discharge, she was advised to discontinue aspirin and Plavix until follow-up with her primary care provider in the next 1-2 weeks.  Because she is blind, she needs assistance with managing her medications including this recommendation to discontinue aspirin and Plavix at this time.  Home care services were recommended.    I evaluated this patient on the day of discharge.  Vital signs were stable.  She appeared to be resting comfortably without signs of acute distress.  Color was pale.            Discharge Instructions and Follow-Up:   Discharge diet: Regular   Discharge activity: Activity as tolerated   Discharge follow-up: Follow up with primary care provider in 2 weeks      Pending test results: None         Discharge Disposition:   Admited to home care:   Agency: [facility]  Discharged to home      Attestation:  I have reviewed today's vital signs, notes, medications, and test results.    Liborio Hendrickson MD

## 2017-12-14 NOTE — PROGRESS NOTES
S-(situation): Patient discharged to home via W/C with friend    B-(background): Observation goals met     A-(assessment): Pt is A&O.  VSS.  Afebrile.  Up with SBA.  Moves well but can't see very well.      R-(recommendations): Discharge instructions reviewed with pt. Listed belongings gathered and returned to patient.  Patient Education resolved: Yes  New medications-Pt. Has been educated about reason of use and side effects NA  Home and hospital acquired medications returned to patient NA  Medication Bin checked and emptied on discharge No

## 2018-01-28 ENCOUNTER — APPOINTMENT (OUTPATIENT)
Dept: GENERAL RADIOLOGY | Facility: CLINIC | Age: 83
DRG: 280 | End: 2018-01-28
Attending: FAMILY MEDICINE
Payer: MEDICARE

## 2018-01-28 ENCOUNTER — HOSPITAL ENCOUNTER (INPATIENT)
Facility: CLINIC | Age: 83
LOS: 5 days | Discharge: HOME HEALTH | DRG: 280 | End: 2018-02-02
Attending: FAMILY MEDICINE | Admitting: FAMILY MEDICINE
Payer: MEDICARE

## 2018-01-28 DIAGNOSIS — S70.02XA HEMATOMA OF LEFT HIP, INITIAL ENCOUNTER: ICD-10-CM

## 2018-01-28 DIAGNOSIS — R09.02 HYPOXIA: ICD-10-CM

## 2018-01-28 DIAGNOSIS — J18.9 PNEUMONIA OF LEFT LUNG DUE TO INFECTIOUS ORGANISM, UNSPECIFIED PART OF LUNG: ICD-10-CM

## 2018-01-28 DIAGNOSIS — R79.89 ELEVATED TROPONIN: ICD-10-CM

## 2018-01-28 DIAGNOSIS — I10 BENIGN ESSENTIAL HYPERTENSION: ICD-10-CM

## 2018-01-28 DIAGNOSIS — R94.31 ABNORMAL ELECTROCARDIOGRAM: ICD-10-CM

## 2018-01-28 DIAGNOSIS — I21.4 NON-ST ELEVATED MYOCARDIAL INFARCTION (NON-STEMI) (H): Primary | ICD-10-CM

## 2018-01-28 DIAGNOSIS — I50.41 ACUTE COMBINED SYSTOLIC AND DIASTOLIC CONGESTIVE HEART FAILURE (H): ICD-10-CM

## 2018-01-28 DIAGNOSIS — R79.89 ELEVATED BRAIN NATRIURETIC PEPTIDE (BNP) LEVEL: ICD-10-CM

## 2018-01-28 PROBLEM — I50.9 CHF (CONGESTIVE HEART FAILURE) (H): Status: ACTIVE | Noted: 2017-05-26

## 2018-01-28 PROBLEM — N18.4 CKD (CHRONIC KIDNEY DISEASE) STAGE 4, GFR 15-29 ML/MIN (H): Status: ACTIVE | Noted: 2017-12-12

## 2018-01-28 PROBLEM — Z86.73 HISTORY OF CVA (CEREBROVASCULAR ACCIDENT): Status: ACTIVE | Noted: 2017-12-12

## 2018-01-28 PROBLEM — N18.30 CHRONIC KIDNEY DISEASE, STAGE III (MODERATE) (H): Status: RESOLVED | Noted: 2017-01-19 | Resolved: 2018-01-28

## 2018-01-28 PROBLEM — H54.7 BLIND: Status: ACTIVE | Noted: 2017-12-13

## 2018-01-28 LAB
ANION GAP SERPL CALCULATED.3IONS-SCNC: 10 MMOL/L (ref 3–14)
BASOPHILS # BLD AUTO: 0.1 10E9/L (ref 0–0.2)
BASOPHILS NFR BLD AUTO: 0.6 %
BUN SERPL-MCNC: 29 MG/DL (ref 7–30)
CALCIUM SERPL-MCNC: 8.6 MG/DL (ref 8.5–10.1)
CHLORIDE SERPL-SCNC: 113 MMOL/L (ref 94–109)
CO2 SERPL-SCNC: 21 MMOL/L (ref 20–32)
CREAT SERPL-MCNC: 1.74 MG/DL (ref 0.52–1.04)
DIFFERENTIAL METHOD BLD: ABNORMAL
EOSINOPHIL # BLD AUTO: 0 10E9/L (ref 0–0.7)
EOSINOPHIL NFR BLD AUTO: 0.4 %
ERYTHROCYTE [DISTWIDTH] IN BLOOD BY AUTOMATED COUNT: 16.8 % (ref 10–15)
ERYTHROCYTE [DISTWIDTH] IN BLOOD BY AUTOMATED COUNT: 16.9 % (ref 10–15)
FLUAV+FLUBV AG SPEC QL: NEGATIVE
FLUAV+FLUBV AG SPEC QL: NEGATIVE
GFR SERPL CREATININE-BSD FRML MDRD: 27 ML/MIN/1.7M2
GLUCOSE BLDC GLUCOMTR-MCNC: 123 MG/DL (ref 70–99)
GLUCOSE BLDC GLUCOMTR-MCNC: 139 MG/DL (ref 70–99)
GLUCOSE BLDC GLUCOMTR-MCNC: 99 MG/DL (ref 70–99)
GLUCOSE SERPL-MCNC: 125 MG/DL (ref 70–99)
HBA1C MFR BLD: 5.3 % (ref 4.3–6)
HCT VFR BLD AUTO: 28 % (ref 35–47)
HCT VFR BLD AUTO: 31.2 % (ref 35–47)
HGB BLD-MCNC: 8.8 G/DL (ref 11.7–15.7)
HGB BLD-MCNC: 9.8 G/DL (ref 11.7–15.7)
LACTATE BLD-SCNC: 1.6 MMOL/L (ref 0.7–2)
LMWH PPP CHRO-ACNC: 0.18 IU/ML
LYMPHOCYTES # BLD AUTO: 0.9 10E9/L (ref 0.8–5.3)
LYMPHOCYTES NFR BLD AUTO: 8.4 %
MCH RBC QN AUTO: 29.5 PG (ref 26.5–33)
MCH RBC QN AUTO: 29.8 PG (ref 26.5–33)
MCHC RBC AUTO-ENTMCNC: 31.4 G/DL (ref 31.5–36.5)
MCHC RBC AUTO-ENTMCNC: 31.4 G/DL (ref 31.5–36.5)
MCV RBC AUTO: 94 FL (ref 78–100)
MCV RBC AUTO: 95 FL (ref 78–100)
MONOCYTES # BLD AUTO: 0.7 10E9/L (ref 0–1.3)
MONOCYTES NFR BLD AUTO: 6.6 %
NEUTROPHILS # BLD AUTO: 9.5 10E9/L (ref 1.6–8.3)
NEUTROPHILS NFR BLD AUTO: 84 %
NT-PROBNP SERPL-MCNC: ABNORMAL PG/ML (ref 0–1800)
PLATELET # BLD AUTO: 194 10E9/L (ref 150–450)
PLATELET # BLD AUTO: 227 10E9/L (ref 150–450)
POTASSIUM SERPL-SCNC: 3.9 MMOL/L (ref 3.4–5.3)
RBC # BLD AUTO: 2.95 10E12/L (ref 3.8–5.2)
RBC # BLD AUTO: 3.32 10E12/L (ref 3.8–5.2)
SODIUM SERPL-SCNC: 144 MMOL/L (ref 133–144)
SPECIMEN SOURCE: NORMAL
T4 FREE SERPL-MCNC: 0.94 NG/DL (ref 0.76–1.46)
TROPONIN I SERPL-MCNC: 0.1 UG/L (ref 0–0.04)
TROPONIN I SERPL-MCNC: 0.34 UG/L (ref 0–0.04)
TROPONIN I SERPL-MCNC: 0.6 UG/L (ref 0–0.04)
TSH SERPL DL<=0.005 MIU/L-ACNC: 4.29 MU/L (ref 0.4–4)
WBC # BLD AUTO: 10.4 10E9/L (ref 4–11)
WBC # BLD AUTO: 11.3 10E9/L (ref 4–11)

## 2018-01-28 PROCEDURE — 00000146 ZZHCL STATISTIC GLUCOSE BY METER IP

## 2018-01-28 PROCEDURE — 93005 ELECTROCARDIOGRAM TRACING: CPT | Performed by: FAMILY MEDICINE

## 2018-01-28 PROCEDURE — 80048 BASIC METABOLIC PNL TOTAL CA: CPT | Performed by: FAMILY MEDICINE

## 2018-01-28 PROCEDURE — 84484 ASSAY OF TROPONIN QUANT: CPT | Performed by: FAMILY MEDICINE

## 2018-01-28 PROCEDURE — 99285 EMERGENCY DEPT VISIT HI MDM: CPT | Mod: Z6 | Performed by: FAMILY MEDICINE

## 2018-01-28 PROCEDURE — 83036 HEMOGLOBIN GLYCOSYLATED A1C: CPT | Performed by: FAMILY MEDICINE

## 2018-01-28 PROCEDURE — 71046 X-RAY EXAM CHEST 2 VIEWS: CPT | Mod: TC

## 2018-01-28 PROCEDURE — 27210995 ZZH RX 272: Performed by: FAMILY MEDICINE

## 2018-01-28 PROCEDURE — 83880 ASSAY OF NATRIURETIC PEPTIDE: CPT | Performed by: FAMILY MEDICINE

## 2018-01-28 PROCEDURE — 25000132 ZZH RX MED GY IP 250 OP 250 PS 637: Mod: GY | Performed by: FAMILY MEDICINE

## 2018-01-28 PROCEDURE — 87804 INFLUENZA ASSAY W/OPTIC: CPT | Performed by: FAMILY MEDICINE

## 2018-01-28 PROCEDURE — 83605 ASSAY OF LACTIC ACID: CPT | Performed by: FAMILY MEDICINE

## 2018-01-28 PROCEDURE — 96375 TX/PRO/DX INJ NEW DRUG ADDON: CPT | Performed by: FAMILY MEDICINE

## 2018-01-28 PROCEDURE — 36415 COLL VENOUS BLD VENIPUNCTURE: CPT | Performed by: FAMILY MEDICINE

## 2018-01-28 PROCEDURE — 93010 ELECTROCARDIOGRAM REPORT: CPT | Mod: Z6 | Performed by: FAMILY MEDICINE

## 2018-01-28 PROCEDURE — A9270 NON-COVERED ITEM OR SERVICE: HCPCS | Mod: GY | Performed by: FAMILY MEDICINE

## 2018-01-28 PROCEDURE — 85520 HEPARIN ASSAY: CPT | Performed by: FAMILY MEDICINE

## 2018-01-28 PROCEDURE — 99223 1ST HOSP IP/OBS HIGH 75: CPT | Mod: AI | Performed by: FAMILY MEDICINE

## 2018-01-28 PROCEDURE — 87081 CULTURE SCREEN ONLY: CPT | Performed by: FAMILY MEDICINE

## 2018-01-28 PROCEDURE — 25000128 H RX IP 250 OP 636: Performed by: FAMILY MEDICINE

## 2018-01-28 PROCEDURE — 84443 ASSAY THYROID STIM HORMONE: CPT | Performed by: FAMILY MEDICINE

## 2018-01-28 PROCEDURE — 85025 COMPLETE CBC W/AUTO DIFF WBC: CPT | Performed by: FAMILY MEDICINE

## 2018-01-28 PROCEDURE — 85027 COMPLETE CBC AUTOMATED: CPT | Performed by: FAMILY MEDICINE

## 2018-01-28 PROCEDURE — 84439 ASSAY OF FREE THYROXINE: CPT | Performed by: FAMILY MEDICINE

## 2018-01-28 PROCEDURE — 12000007 ZZH R&B INTERMEDIATE

## 2018-01-28 PROCEDURE — 96365 THER/PROPH/DIAG IV INF INIT: CPT | Performed by: FAMILY MEDICINE

## 2018-01-28 PROCEDURE — 25000128 H RX IP 250 OP 636: Performed by: EMERGENCY MEDICINE

## 2018-01-28 PROCEDURE — 99285 EMERGENCY DEPT VISIT HI MDM: CPT | Mod: 25 | Performed by: FAMILY MEDICINE

## 2018-01-28 RX ORDER — NICOTINE POLACRILEX 4 MG
15-30 LOZENGE BUCCAL
Status: DISCONTINUED | OUTPATIENT
Start: 2018-01-28 | End: 2018-02-02 | Stop reason: HOSPADM

## 2018-01-28 RX ORDER — DEXTROSE MONOHYDRATE 25 G/50ML
25-50 INJECTION, SOLUTION INTRAVENOUS
Status: DISCONTINUED | OUTPATIENT
Start: 2018-01-28 | End: 2018-02-02 | Stop reason: HOSPADM

## 2018-01-28 RX ORDER — METOPROLOL SUCCINATE 25 MG/1
25 TABLET, EXTENDED RELEASE ORAL DAILY
Status: DISCONTINUED | OUTPATIENT
Start: 2018-01-28 | End: 2018-01-31

## 2018-01-28 RX ORDER — MORPHINE SULFATE 2 MG/ML
1 INJECTION, SOLUTION INTRAMUSCULAR; INTRAVENOUS
Status: DISCONTINUED | OUTPATIENT
Start: 2018-01-28 | End: 2018-01-29

## 2018-01-28 RX ORDER — ACETAMINOPHEN 650 MG/1
650 SUPPOSITORY RECTAL EVERY 4 HOURS PRN
Status: DISCONTINUED | OUTPATIENT
Start: 2018-01-28 | End: 2018-01-29

## 2018-01-28 RX ORDER — LIDOCAINE 40 MG/G
CREAM TOPICAL
Status: DISCONTINUED | OUTPATIENT
Start: 2018-01-28 | End: 2018-02-02 | Stop reason: HOSPADM

## 2018-01-28 RX ORDER — HEPARIN SODIUM 10000 [USP'U]/100ML
0-3500 INJECTION, SOLUTION INTRAVENOUS CONTINUOUS
Status: DISCONTINUED | OUTPATIENT
Start: 2018-01-28 | End: 2018-01-29

## 2018-01-28 RX ORDER — NITROGLYCERIN 0.4 MG/1
0.4 TABLET SUBLINGUAL EVERY 5 MIN PRN
Status: DISCONTINUED | OUTPATIENT
Start: 2018-01-28 | End: 2018-02-02 | Stop reason: HOSPADM

## 2018-01-28 RX ORDER — ASPIRIN 325 MG
325 TABLET ORAL DAILY
Status: DISCONTINUED | OUTPATIENT
Start: 2018-01-29 | End: 2018-01-29

## 2018-01-28 RX ORDER — NALOXONE HYDROCHLORIDE 0.4 MG/ML
.1-.4 INJECTION, SOLUTION INTRAMUSCULAR; INTRAVENOUS; SUBCUTANEOUS
Status: DISCONTINUED | OUTPATIENT
Start: 2018-01-28 | End: 2018-01-29

## 2018-01-28 RX ORDER — AMOXICILLIN 250 MG
1 CAPSULE ORAL 2 TIMES DAILY PRN
Status: DISCONTINUED | OUTPATIENT
Start: 2018-01-28 | End: 2018-02-02 | Stop reason: HOSPADM

## 2018-01-28 RX ORDER — ONDANSETRON 4 MG/1
4 TABLET, ORALLY DISINTEGRATING ORAL EVERY 6 HOURS PRN
Status: DISCONTINUED | OUTPATIENT
Start: 2018-01-28 | End: 2018-02-02 | Stop reason: HOSPADM

## 2018-01-28 RX ORDER — ASPIRIN 81 MG/1
TABLET, CHEWABLE ORAL
Status: DISPENSED
Start: 2018-01-28 | End: 2018-01-29

## 2018-01-28 RX ORDER — SIMVASTATIN 5 MG
10 TABLET ORAL AT BEDTIME
Status: DISCONTINUED | OUTPATIENT
Start: 2018-01-28 | End: 2018-02-02 | Stop reason: HOSPADM

## 2018-01-28 RX ORDER — ONDANSETRON 2 MG/ML
4 INJECTION INTRAMUSCULAR; INTRAVENOUS EVERY 6 HOURS PRN
Status: DISCONTINUED | OUTPATIENT
Start: 2018-01-28 | End: 2018-02-02 | Stop reason: HOSPADM

## 2018-01-28 RX ORDER — LEVOTHYROXINE SODIUM 112 UG/1
112 TABLET ORAL DAILY
Status: DISCONTINUED | OUTPATIENT
Start: 2018-01-28 | End: 2018-02-01

## 2018-01-28 RX ORDER — ALUMINA, MAGNESIA, AND SIMETHICONE 2400; 2400; 240 MG/30ML; MG/30ML; MG/30ML
30 SUSPENSION ORAL EVERY 4 HOURS PRN
Status: DISCONTINUED | OUTPATIENT
Start: 2018-01-28 | End: 2018-02-02 | Stop reason: HOSPADM

## 2018-01-28 RX ORDER — POTASSIUM CITRATE 10 MEQ/1
20 TABLET, EXTENDED RELEASE ORAL DAILY
Status: DISCONTINUED | OUTPATIENT
Start: 2018-01-28 | End: 2018-02-02 | Stop reason: HOSPADM

## 2018-01-28 RX ORDER — AMLODIPINE BESYLATE 5 MG/1
5 TABLET ORAL DAILY
Status: DISCONTINUED | OUTPATIENT
Start: 2018-01-28 | End: 2018-01-31

## 2018-01-28 RX ORDER — ACETAMINOPHEN 325 MG/1
650 TABLET ORAL EVERY 4 HOURS PRN
Status: DISCONTINUED | OUTPATIENT
Start: 2018-01-28 | End: 2018-01-29

## 2018-01-28 RX ORDER — NALOXONE HYDROCHLORIDE 0.4 MG/ML
.1-.4 INJECTION, SOLUTION INTRAMUSCULAR; INTRAVENOUS; SUBCUTANEOUS
Status: DISCONTINUED | OUTPATIENT
Start: 2018-01-28 | End: 2018-01-28

## 2018-01-28 RX ORDER — ONDANSETRON 2 MG/ML
4 INJECTION INTRAMUSCULAR; INTRAVENOUS EVERY 6 HOURS PRN
Status: DISCONTINUED | OUTPATIENT
Start: 2018-01-28 | End: 2018-01-28

## 2018-01-28 RX ORDER — LISINOPRIL 2.5 MG/1
2.5 TABLET ORAL DAILY
Status: DISCONTINUED | OUTPATIENT
Start: 2018-01-28 | End: 2018-01-30

## 2018-01-28 RX ORDER — FUROSEMIDE 20 MG
20 TABLET ORAL DAILY
Status: DISCONTINUED | OUTPATIENT
Start: 2018-01-28 | End: 2018-01-31

## 2018-01-28 RX ORDER — SODIUM CHLORIDE 450 MG/100ML
INJECTION, SOLUTION INTRAVENOUS CONTINUOUS
Status: DISCONTINUED | OUTPATIENT
Start: 2018-01-28 | End: 2018-01-29

## 2018-01-28 RX ORDER — ACETAMINOPHEN 500 MG
1000 TABLET ORAL ONCE
Status: COMPLETED | OUTPATIENT
Start: 2018-01-28 | End: 2018-01-28

## 2018-01-28 RX ORDER — PROCHLORPERAZINE MALEATE 5 MG
5 TABLET ORAL EVERY 6 HOURS PRN
Status: DISCONTINUED | OUTPATIENT
Start: 2018-01-28 | End: 2018-02-02 | Stop reason: HOSPADM

## 2018-01-28 RX ORDER — ONDANSETRON 4 MG/1
4 TABLET, ORALLY DISINTEGRATING ORAL EVERY 6 HOURS PRN
Status: DISCONTINUED | OUTPATIENT
Start: 2018-01-28 | End: 2018-01-28

## 2018-01-28 RX ORDER — ASPIRIN 81 MG/1
324 TABLET, CHEWABLE ORAL ONCE
Status: COMPLETED | OUTPATIENT
Start: 2018-01-28 | End: 2018-01-28

## 2018-01-28 RX ORDER — CEFTRIAXONE SODIUM 2 G
2 VIAL (EA) INJECTION EVERY 24 HOURS
Status: DISCONTINUED | OUTPATIENT
Start: 2018-01-28 | End: 2018-02-01

## 2018-01-28 RX ORDER — CEFTRIAXONE SODIUM 2 G/50ML
2 INJECTION, SOLUTION INTRAVENOUS EVERY 24 HOURS
Status: DISCONTINUED | OUTPATIENT
Start: 2018-01-28 | End: 2018-01-28 | Stop reason: CLARIF

## 2018-01-28 RX ORDER — PROCHLORPERAZINE 25 MG
12.5 SUPPOSITORY, RECTAL RECTAL EVERY 12 HOURS PRN
Status: DISCONTINUED | OUTPATIENT
Start: 2018-01-28 | End: 2018-02-02 | Stop reason: HOSPADM

## 2018-01-28 RX ORDER — ONDANSETRON 2 MG/ML
4 INJECTION INTRAMUSCULAR; INTRAVENOUS ONCE
Status: COMPLETED | OUTPATIENT
Start: 2018-01-28 | End: 2018-01-28

## 2018-01-28 RX ORDER — AMOXICILLIN 250 MG
2 CAPSULE ORAL 2 TIMES DAILY PRN
Status: DISCONTINUED | OUTPATIENT
Start: 2018-01-28 | End: 2018-02-02 | Stop reason: HOSPADM

## 2018-01-28 RX ADMIN — ASPIRIN 81 MG 324 MG: 81 TABLET ORAL at 15:02

## 2018-01-28 RX ADMIN — AMLODIPINE BESYLATE 5 MG: 5 TABLET ORAL at 10:29

## 2018-01-28 RX ADMIN — CEFTRIAXONE 2 G: 2 INJECTION, POWDER, FOR SOLUTION INTRAMUSCULAR; INTRAVENOUS at 08:19

## 2018-01-28 RX ADMIN — AZITHROMYCIN MONOHYDRATE 500 MG: 500 INJECTION, POWDER, LYOPHILIZED, FOR SOLUTION INTRAVENOUS at 08:24

## 2018-01-28 RX ADMIN — METOPROLOL SUCCINATE 25 MG: 25 TABLET, EXTENDED RELEASE ORAL at 10:29

## 2018-01-28 RX ADMIN — SODIUM CHLORIDE: 4.5 INJECTION, SOLUTION INTRAVENOUS at 14:46

## 2018-01-28 RX ADMIN — FUROSEMIDE 20 MG: 20 TABLET ORAL at 10:29

## 2018-01-28 RX ADMIN — ACETAMINOPHEN 1000 MG: 500 TABLET ORAL at 07:09

## 2018-01-28 RX ADMIN — LEVOTHYROXINE SODIUM 112 MCG: 112 TABLET ORAL at 10:29

## 2018-01-28 RX ADMIN — POTASSIUM CITRATE 20 MEQ: 10 TABLET ORAL at 10:55

## 2018-01-28 RX ADMIN — HEPARIN SODIUM 800 UNITS/HR: 10000 INJECTION, SOLUTION INTRAVENOUS at 14:46

## 2018-01-28 RX ADMIN — LISINOPRIL 2.5 MG: 2.5 TABLET ORAL at 15:03

## 2018-01-28 RX ADMIN — SIMVASTATIN 10 MG: 5 TABLET, FILM COATED ORAL at 22:01

## 2018-01-28 RX ADMIN — ONDANSETRON 4 MG: 2 INJECTION INTRAMUSCULAR; INTRAVENOUS at 07:23

## 2018-01-28 ASSESSMENT — ACTIVITIES OF DAILY LIVING (ADL)
DRESS: 2 - ASSISTIVE PERSON
COGNITION: 0 - NO COGNITION ISSUES REPORTED
RETIRED_COMMUNICATION: 0-->UNDERSTANDS/COMMUNICATES WITHOUT DIFFICULTY
DRESS: 2-->ASSISTIVE PERSON
TOILETING: 2-->ASSISTIVE PERSON
BATHING: 2-->ASSISTIVE PERSON
AMBULATION: 1-->ASSISTIVE EQUIPMENT
AMBULATION: 3 - ASSISTIVE EQUIPMENT AND PERSON
SWALLOWING: 0-->SWALLOWS FOODS/LIQUIDS WITHOUT DIFFICULTY
TRANSFERRING: 3 - ASSISTIVE EQUIPMENT AND PERSON
FALL_HISTORY_WITHIN_LAST_SIX_MONTHS: NO
BATHING: 2 - ASSISTIVE PERSON
TOILETING: 3 - ASSISTIVE EQUIPMENT AND PERSON
EATING: 0 - INDEPENDENT
TRANSFERRING: 3-->ASSISTIVE EQUIPMENT AND PERSON
RETIRED_EATING: 0-->INDEPENDENT
COMMUNICATION: 0 - UNDERSTANDS/COMMUNICATES WITHOUT DIFFICULTY
SWALLOWING: 0 - SWALLOWS FOODS/LIQUIDS WITHOUT DIFFICULTY

## 2018-01-28 ASSESSMENT — ENCOUNTER SYMPTOMS
SHORTNESS OF BREATH: 1
APPETITE CHANGE: 1
VOMITING: 0
BACK PAIN: 1
CHILLS: 1
NAUSEA: 0
HEADACHES: 1
COUGH: 1

## 2018-01-28 NOTE — PROGRESS NOTES
Pt admitted to room 251 from ED. Pt has been having increased shortness of breath, fever and chills. Pt will be admitted for pneumonia. Arrives to her room alert and oriented. Pt is on 2-3 liters NC. Will titrate as able. Continuous pulse oxymetry and tele placed per order. Pt having some emesis in ED. Denies pain or nausea upon admission to the unit. Bed alarm placed. There was question as to if pt did take her AM meds prior to admission. Pt friend Sally did go back to the patients house and per the pill boxes at pt home she DID NOT take her morning medications. AM meds given per order. Will work with patient on the incentive spirometer after lunch. Pt napping between cares. Blood pressure 155/70, pulse 85, temperature 98.5  F (36.9  C), temperature source Oral, resp. rate 18, weight 66.6 kg (146 lb 13.2 oz), SpO2 93 %.

## 2018-01-28 NOTE — IP AVS SNAPSHOT
[address] [facility]    [address] [place] [address]    Phone:  [phone]                                       After Visit Summary   1/28/2018    Mary Ellen Cuba    MRN: [id number]           After Visit Summary Signature Page     I have received my discharge instructions, and my questions have been answered. I have discussed any challenges I see with this plan with the nurse or doctor.    ..........................................................................................................................................  Patient/Patient Representative Signature      ..........................................................................................................................................  Patient Representative Print Name and Relationship to Patient    ..................................................               ................................................  Date                                            Time    ..........................................................................................................................................  Reviewed by Signature/Title    ...................................................              ..............................................  Date                                                            Time

## 2018-01-28 NOTE — H&P
[facility] History and Physical    Mary Ellen Cuba MRN# [id number]   Age: 89 year old YOB: 1924     Date of Admission:  January 28, 2018    Home clinic: [facility] in [place]  Primary care provider: Samantha Celis          Assessment and Plan:   Assessment/Plan:   Active Problems:    Lingular pneumonia, organism unspecified    Assessment:  patient with a 3 day history of shortness of breath productive cough, chills, mild hypoxia requiring O2 supplementation in the ER and was found to have mild leukocytosis with white blood cell count 11.2 but normal lactic acid.  Chest x-ray shows a lingula infiltrate consistent with pneumonia.  Patient has been given Rocephin and azithromycin    Plan: will continue with Rocephin and azithromycin, continue with O2 supplementation and titrate as able.        Non-ST elevated myocardial infarction (non-STEMI) (H)    Assessment: Patient with an episode of severe back pain yesterday evening which was in the mid back and seemed to wax and wane - it lasted for a few hours and then fully resolved, with troponins increasing from 0.097 in the ED up to 0.337 in the last 6 hours and patient having some mild ST depression in lateral leads on initial EKG in ED while she was pain free    Plan: Discussed the situation with patient, and she would not want surgical intervention even if it was recommended.  She does want medical management to occur locally.  Heparin drip has been initiated and patient has been given aspirin.  She is already on a blocker but lisinopril 2.5 mg was added and her home statin was continued.  Discussed with Dr. Augustine, cardiologist on-call at [facility], and at this time he agrees with plan as above, with plan to to anticoagulating the patient with continue with serial troponins and perform echocardiogram tomorrow to reevaluate heart structure.  In order to maximize medical management, consideration should occur aspirin 81 mg  in addition to Plavix 75 mg for the next year, however this would significantly increase patient's bleeding risk and discussion of the pros and cons was encouraged.  Discussed with patient and she is not sure how aggressive she would like to be with the blood thinning at this point but does agree with heparin and adult ASA.  Will need further discussion as this hospitalization progresses but for now will continue with ASA alone at 325 mg strength and not give Plavix.        Hypoxia    Assessment: Likely secondary to her pneumonia, however cannot rule out cardiac injury component     Plan: continue plans as above, continue oxygen support as needed      Essential hypertension    Assessment: Blood pressures are slightly elevated in the 140-150 range since arriving on the floor    Plan: Continue with metoprolol extended release 25 mg as well as home Lasix and Norvasc, however will start lisinopril 2.5 mg daily and monitor blood pressure closely       Coronary artery disease involving native coronary artery of native heart without angina pectoris    Assessment:  previous history, with stent placed in 2013 with episode of atypical chest pain but rising troponins as above    Plan: continue with plan as above      Hyperlipidemia LDL goal <100    Assessment: On simvastatin    Plan: Continue home dose without change      Hypothyroidism due to acquired atrophy of thyroid    Assessment: On  Synthroid    Plan: Continue without change      Type 2 diabetes mellitus without complication (H)    Assessment: Previous history, however patient is not on any medication and most recent hemoglobin A1c is 5.2 in 5/17    Plan:  will recheck hemoglobin A1c and place patient on diabetic diet and monitor blood sugars, however at this time it is not sure if patient can be counted as diabetic, especially if she does not follow a strict diabetic diet at home      CHF (congestive heart failure) (H)    Assessment: Previous history, however most  recent echocardiogram shows ejection fraction of 50-55% patient does not have any signs of volume overload at this time,     Plan: We will proceed with echocardiogram tomorrow as above      CKD (chronic kidney disease) stage 4, GFR 15-29 ml/min (H)    Assessment: Chronic and stable, with creatinine clearance 18    Plan: Continue to monitor, will use heparin instead of Lovenox for anticoagulation      History of CVA (cerebrovascular accident)    Assessment:   previous history without significant residual effect    Plan: Continue plan as above for anticoagulation, monitor for any focal neurological deficit      Blind - patient reported    Assessment: chronic and stable with patient having a very good support system    Plan: continue supportive cares      VTE:  Heparin  Code Status:  DNR/DNI     Core Measures   Acute MI  Aspirin given within 24 hours of admission            Chief Complaint:   Cough, shortness of breath    History is obtained from the patient         History of Present Illness:   This patient is a 89 year old  female with a significant past medical history of CAD, HTN, Hyperlipidemia, DM, CVA, and hypothryoidism who presents with a 3 day history of productive cough and a 2 day history of chills and worsening shortness of breath.  Last evening she did have some mid back pain that became severe in nature at times but then would lessen.  This lasted for several hours and then fully resolved.  Patient was associating it to her coughing,  however she states her cough has worsened today and yet the back pain has not returned.  This morning when she was even more short of breath she presented to the emergency room, where she was found to be intermittently mild hypoxic with O2 sats  going as low as 88% on room air while at rest and awake.  Workup showed a  mildly elevated white blood cell count and chest x-ray showed a lingular pneumonia.  Troponin was mildly elevated at 0.097 an EKG showed 1 mm ST  depression in lateral leads but no elevation and patient remained symptom-free.  Decision was made to admit patient for ongoing management of her pneumonia but also further serial troponins to workup cardiac risk.  Since arriving on the floor patient remains pain-free, however her second troponin has increased up to 0.337.          Past Medical History:     Past Medical History:   Diagnosis Date     CAD (coronary artery disease)     remote hx of stent placement in past     CVA (cerebral vascular accident) (H) 2013    some left sided deficits     Diabetes mellitus (H)      Hyperlipemia      Hypertension              Past Surgical History:     Past Surgical History:   Procedure Laterality Date     APPENDECTOMY       GI SURGERY      gwendolyn     GI SURGERY      hemicolectomy     GYN SURGERY      oopherectomy     LASER YAG CAPSULOTOMY  3/21/2013    Procedure: LASER YAG CAPSULOTOMY;  yag capsulotomy bilateral eyes;  Surgeon: Deandre Nugent MD;  Location: [facility] OR     OPEN REDUCTION INTERNAL FIXATION HIP NAILING Left 1/20/2017    Procedure: OPEN REDUCTION INTERNAL FIXATION HIP NAILING;  Surgeon: Liborio Hoffman DO;  Location: [facility] OR     PHACOEMULSIFICATION WITH STANDARD INTRAOCULAR LENS IMPLANT  6/30/2011    Procedure:PHACOEMULSIFICATION WITH STANDARD INTRAOCULAR LENS IMPLANT; Surgeon:DEANDRE NUGENT; Location:[facility] OR     PHACOEMULSIFICATION WITH STANDARD INTRAOCULAR LENS IMPLANT  7/21/2011    Procedure:PHACOEMULSIFICATION WITH STANDARD INTRAOCULAR LENS IMPLANT; Surgeon:DEANDRE NUGENT; Location:[facility] OR     STENT, CORONARY, DIOGENES              Family History:     Family History   Problem Relation Age of Onset     Colon Cancer Mother      Prostate Cancer Brother      Peptic Ulcer Disease Father             Social History:     Social History     Social History     Marital status:      Spouse name: N/A     Number of children: N/A     Years of education: N/A     Social History Main Topics     Smoking status: Never  Smoker     Smokeless tobacco: Never Used     Alcohol use No     Drug use: No     Sexual activity: Not Asked     Other Topics Concern     None     Social History Narrative             Allergies:     Allergies   Allergen Reactions     Hmg-Coa-R Inhibitors      Metformin GI Disturbance             Medications:     No current outpatient prescriptions on file.             Review of Systems:   CONSTITUTIONAL:Positive for chills and feeling fatigued, but no fevers known  I: NEGATIVE for worrisome rashes, moles or lesions  Eyes:   patient is blind, she is able to see someone when they are close to her  that even that is unable to make out the details.  E/M: NEGATIVE for ear, mouth and throat problems  RESP: Positive for productive cough ×3 days and worsening shortness of breath over the past 2 days   CV: positive for mid back pain that would wax and wane for several hours last evening but no chest pain or pressure or palpitations  GI: NEGATIVE for nausea, abdominal pain, heartburn, or change in bowel habits  N: NEGATIVE for weakness, dizziness or paresthesias  P: NEGATIVE for changes in mood or affect          Physical Exam:   Blood pressure 149/68, pulse 73, temperature 98.2  F (36.8  C), temperature source Oral, resp. rate 20, weight 66.6 kg (146 lb 13.2 oz), SpO2 95 %.  Constitutional:   awake, alert, cooperative, no apparent distress, and appears stated age     Neck:   skin normal, no stridor and no lymphadenopathy     Lungs:   No increased work of breathing, good air exchange, clear to auscultation bilaterally, no crackles or wheezing     Cardiovascular:   Normal apical impulse, regular rate and rhythm, normal S1 and S2, no S3 or S4, and no murmur noted     Abdomen:   Bowel sounds present, abdomen soft and non-tender     Musculoskeletal:   no lower extremity pitting edema present     Neurologic:   Awake, alert, oriented to name, place and time.      Skin:   normal skin color, texture, turgor             Data:   All  laboratory and imaging data in the past 24 hours reviewed     Attestation:    I have reviewed today's vital signs, notes, medications, labs and imaging.    Electronically Signed:  Shabana Turpin MD    Note: Chart documentation done in part with Dragon Voice Recognition software. Although reviewed after completion, some word and grammatical errors may remain.

## 2018-01-28 NOTE — IP AVS SNAPSHOT
"          [address] MEDICAL SURGICAL: [phone]                                              INTERAGENCY TRANSFER FORM - PHYSICIAN ORDERS   2018                    Hospital Admission Date: 2018  MINESH RUTHERFORD   : 1924  Sex: Female        Attending Provider: Liborio Hendrickson MD     Allergies:  Hmg-coa-r Inhibitors, Metformin    Infection:  None   Service:  HOSPITALIST    Ht:  1.6 m (5' 3\")   Wt:  62.5 kg (137 lb 12.6 oz)   Admission Wt:  64.4 kg (142 lb)    BMI:  24.41 kg/m 2   BSA:  1.67 m 2            Patient PCP Information     Provider PCP Type    Samantha Celis MD General      ED Clinical Impression     Diagnosis Description Comment Added By Time Added    Pneumonia of left lung due to infectious organism, unspecified part of lung [J18.9] Pneumonia of left lung due to infectious organism, unspecified part of lung [J18.9] lingular Alexandre Gautam MD 2018  8:03 AM    Hypoxia [R09.02] Hypoxia [R09.02] 88% on RA Alexandre Gautam MD 2018  8:03 AM    Elevated brain natriuretic peptide (BNP) level [R79.89] Elevated brain natriuretic peptide (BNP) level [R79.89]  Alexandre Gautam MD 2018  8:03 AM    Elevated troponin [R74.8] Elevated troponin [R74.8] 0.097 Alexandre Gautam MD 2018  8:04 AM      Hospital Problems as of 2018              Priority Class Noted POA    Essential hypertension Medium  2017 Yes    Coronary artery disease involving native coronary artery of native heart without angina pectoris Medium  2017 Yes    Hyperlipidemia LDL goal <100 Medium  2017 Yes    Hypothyroidism due to acquired atrophy of thyroid Medium  2017 Yes    Type 2 diabetes mellitus without complication (H) Medium  2017 Yes    Anemia in chronic kidney disease Medium  2017 Yes    CKD (chronic kidney disease) stage 4, GFR 15-29 ml/min (H) Medium  2017 Yes    History of CVA (cerebrovascular accident) Medium  2017 " Yes    Blind - patient reported Medium  12/13/2017 Yes    Lingular pneumonia, organism unspecified Medium  1/28/2018 Yes    Non-ST elevated myocardial infarction (non-STEMI) (H) Medium  1/28/2018 Yes    LVH (left ventricular hypertrophy) Medium  1/29/2018 Yes    RVH (right ventricular hypertrophy) Medium  1/29/2018 Yes    Acute combined systolic and diastolic congestive heart failure (H) Medium  1/29/2018 Yes    Paroxysmal atrial fibrillation (H) - possible Medium  1/29/2018 Yes    Moderate pulmonary hypertension by Echo Medium  1/29/2018 Yes    Moderate mitral regurgitation Medium  1/29/2018 Yes    Systolic dysfunction, left ventricle EF 40-45% Medium  1/29/2018 Yes    Diastolic dysfunction grade II Medium  1/29/2018 Yes    Acute respiratory failure with hypoxia (H) Medium  1/30/2018 No    Acute kidney injury (H) Medium  1/31/2018 No      Non-Hospital Problems as of 2/2/2018              Priority Class Noted    Hypernatremia Medium  1/19/2017    Fractured hip, left, closed, initial encounter (H) Medium  1/20/2017    Urinary retention Medium  1/21/2017    Closed fracture of left inferior and superior pubic rami Medium  5/15/2017    Acute congestive heart failure (H) Medium  5/26/2017    Leg hematoma, left, initial encounter Medium  12/12/2017    Left leg pain Medium  12/13/2017      Code Status History     Date Active Date Inactive Code Status Order ID Comments User Context    2/2/2018 10:14 AM  DNR/DNI [id number]  Liborio Hendrickson MD Outpatient    1/28/2018  1:45 PM 2/2/2018 10:14 AM DNR/DNI [id number]  Shabana Turpin MD Inpatient    12/13/2017  4:14 PM 1/28/2018  1:45 PM DNR [id number]  Liborio Hendrickson MD Outpatient    12/13/2017 12:33 AM 12/13/2017  4:14 PM DNR [id number]  Cordell Villanueva MD Inpatient    5/30/2017  9:30 AM 12/13/2017 12:33 AM DNR [id number]  Gurvinder Diaz MD Outpatient    5/26/2017  6:00 AM 5/30/2017  9:30 AM DNR [id number]  Jaret Grey MD Inpatient    5/17/2017 10:40 AM  5/26/2017  6:00 AM DNR [id number]  Cordell Fountain MD Outpatient    5/15/2017 11:41 PM 5/17/2017 10:40 AM DNR [id number]  Cordell Villanueva MD Inpatient    1/22/2017  8:51 AM 5/15/2017 11:41 PM Full Code [id number]  Liborio Hoffman,  Outpatient    1/20/2017  1:38 PM 1/22/2017  8:51 AM Full Code [id number]  Liborio Hoffman,  Inpatient    1/19/2017  8:20 PM 1/20/2017  1:38 PM Full Code [id number]  Jaret Grey MD Inpatient         Medication Review      START taking        Dose / Directions Comments    cefdinir 300 MG capsule   Commonly known as:  OMNICEF   Indication:  Community Acquired Pneumonia   Used for:  Pneumonia of left lung due to infectious organism, unspecified part of lung        Dose:  300 mg   Start taking on:  2/3/2018   Take 1 capsule (300 mg) by mouth daily for 4 days   Quantity:  4 capsule   Refills:  0        clopidogrel 75 MG tablet   Commonly known as:  PLAVIX        Dose:  75 mg   Start taking on:  2/3/2018   Take 1 tablet (75 mg) by mouth daily   Quantity:  30 tablet   Refills:  0        torsemide 20 MG tablet   Commonly known as:  DEMADEX        Dose:  40 mg   Start taking on:  2/3/2018   Take 2 tablets (40 mg) by mouth daily   Quantity:  60 tablet   Refills:  0          CONTINUE these medications which may have CHANGED, or have new prescriptions. If we are uncertain of the size of tablets/capsules you have at home, strength may be listed as something that might have changed.        Dose / Directions Comments    amLODIPine 5 MG tablet   Commonly known as:  NORVASC   This may have changed:  when to take this   Used for:  Benign essential hypertension        Dose:  5 mg   Take 1 tablet (5 mg) by mouth 2 times daily   Quantity:  30 tablet   Refills:  0        aspirin 81 MG EC tablet   This may have changed:    - medication strength  - when to take this        Dose:  81 mg   Start taking on:  2/3/2018   Take 1 tablet (81 mg) by mouth daily   Refills:  0         oxyCODONE IR 5 MG tablet   Commonly known as:  ROXICODONE   This may have changed:  how much to take   Used for:  Hematoma of left hip, initial encounter        Dose:  2.5-5 mg   Take 0.5-1 tablets (2.5-5 mg) by mouth every 4 hours as needed for moderate to severe pain   Quantity:  10 tablet   Refills:  0        TOPROL XL 25 MG 24 hr tablet   This may have changed:  how much to take   Used for:  Benign essential hypertension   Generic drug:  metoprolol succinate        Dose:  37.5 mg   Take 1.5 tablets (37.5 mg) by mouth daily   Quantity:  30 tablet   Refills:  0          CONTINUE these medications which have NOT CHANGED        Dose / Directions Comments    acetaminophen 500 MG tablet   Commonly known as:  TYLENOL   Used for:  Closed left hip fracture, initial encounter (H)        Dose:  1000 mg   Take 2 tablets (1,000 mg) by mouth 3 times daily   Refills:  0        CALCIUM + D PO        Dose:  1 tablet   Take 1 tablet by mouth daily.   Refills:  0        fenofibrate 160 MG tablet   Used for:  Hypercholesterolemia        Dose:  160 mg   Take 1 tablet (160 mg) by mouth daily   Quantity:  30 tablet   Refills:  0        levothyroxine 112 MCG tablet   Commonly known as:  SYNTHROID/LEVOTHROID   Used for:  Other specified hypothyroidism        Dose:  112 mcg   Take 1 tablet (112 mcg) by mouth daily   Refills:  0        potassium citrate 5 MEQ (540 MG) CR tablet   Commonly known as:  UROCIT-K   Used for:  Hypokalemia        Dose:  20 mEq   Take 4 tablets (20 mEq) by mouth daily   Quantity:  120 tablet   Refills:  1        senna-docusate 8.6-50 MG per tablet   Commonly known as:  SENOKOT-S;PERICOLACE   Used for:  Closed left hip fracture, initial encounter (H)        Dose:  1-2 tablet   Take 1-2 tablets by mouth 2 times daily as needed (constipation )   Quantity:  100 tablet   Refills:  0    Indication: Constipation       simvastatin 10 MG tablet   Commonly known as:  ZOCOR   Used for:  Hypercholesterolemia        Dose:  10  mg   Take 1 tablet (10 mg) by mouth At Bedtime   Quantity:  30 tablet   Refills:  0          STOP taking     allopurinol 100 MG tablet   Commonly known as:  ZYLOPRIM           CENTRUM SILVER ULTRA WOMENS Tabs           folic acid 400 MCG tablet   Commonly known as:  FOLVITE           furosemide 20 MG tablet   Commonly known as:  LASIX           VITAMIN D (CHOLECALCIFEROL) PO                   Summary of Visit     Reason for your hospital stay       Hospitlaized due to pneumonia and probable heart attack and improved             After Care     Activity       Your activity upon discharge: activity as tolerated       Diet       Follow this diet upon discharge: Orders Placed This Encounter      Combination Diet [id number] Calories: Moderate Consistent CHO (4-6 CHO units/meal); 2 gm NA Diet             Referrals     HOME CARE NURSING REFERRAL       **Order classes of: [place] [facility], [facility] and [facility] will route to the Home Care and Hospice Referral Pool.  Home Care or Hospice will then contact the patient to schedule their appointment.**    If you do not hear from Home Care and Hospice, or you would like to call to schedule, please call the referring place of service that your provider has listed below.  ______________________________________________________________________    Your provider has referred you to: [facility]: [facility] [phone]   [email or web address]    Extended Emergency Contact Information  Primary Emergency Contact: Sally Alexander   [facility]  Home Phone: [phone]  Relation: Other  Secondary Emergency Contact: Sukhjinder Mayer   [facility] Phone: [phone]  Relation: Other    Patient Anticipated Discharge Date: 2/2/2018     RN, PT, HHA to begin 24 - 48 hours after discharge.  PLEASE EVALUATE AND TREAT (Evaluation timeline is 24 - 48 hrs. Please call if there is need for a variance to this timeline).    REASON FOR  REFERRAL: Assessment & Treatment: PT and RN and Teaching and Training: New Medication (please describe): see list and New or Exacerbated Diagnosis (please describe): pneumonia, MI and acute CHF    ADDITIONAL SERVICES NEEDED: A    OTHER PERTINENT INFORMATION: Patient was last seen by provider on 2/2/2018 for hospital discharge.    Current Outpatient Prescriptions:  oxyCODONE IR (ROXICODONE) 5 MG tablet, Take 0.5-1 tablets (2.5-5 mg) by mouth every 4 hours as needed for moderate to severe pain, Disp: 10 tablet, Rfl: 0  [START ON 2/3/2018] aspirin EC 81 MG EC tablet, Take 1 tablet (81 mg) by mouth daily, Disp: , Rfl:   metoprolol succinate (TOPROL XL) 25 MG 24 hr tablet, Take 1.5 tablets (37.5 mg) by mouth daily, Disp: 30 tablet, Rfl:   amLODIPine (NORVASC) 5 MG tablet, Take 1 tablet (5 mg) by mouth 2 times daily, Disp: 30 tablet, Rfl:   [START ON 2/3/2018] cefdinir (OMNICEF) 300 MG capsule, Take 1 capsule (300 mg) by mouth daily for 4 days, Disp: 4 capsule, Rfl: 0  [START ON 2/3/2018] torsemide (DEMADEX) 20 MG tablet, Take 2 tablets (40 mg) by mouth daily, Disp: 60 tablet, Rfl: 0  [START ON 2/3/2018] clopidogrel (PLAVIX) 75 MG tablet, Take 1 tablet (75 mg) by mouth daily, Disp: 30 tablet, Rfl: 0      Patient Active Problem List:     Essential hypertension     Coronary artery disease involving native coronary artery of native heart without angina pectoris     Hyperlipidemia LDL goal <100     Hypothyroidism due to acquired atrophy of thyroid     Type 2 diabetes mellitus without complication (H)     Hypernatremia     Anemia in chronic kidney disease     Fractured hip, left, closed, initial encounter (H)     Urinary retention     Closed fracture of left inferior and superior pubic rami     Acute congestive heart failure (H)     Leg hematoma, left, initial encounter     CKD (chronic kidney disease) stage 4, GFR 15-29 ml/min (H)     History of CVA (cerebrovascular accident)     Left leg pain     Blind - patient reported      Lingular pneumonia, organism unspecified     Non-ST elevated myocardial infarction (non-STEMI) (H)     LVH (left ventricular hypertrophy)     RVH (right ventricular hypertrophy)     Acute combined systolic and diastolic congestive heart failure (H)     Paroxysmal atrial fibrillation (H) - possible     Moderate pulmonary hypertension by Echo     Moderate mitral regurgitation     Systolic dysfunction, left ventricle EF 40-45%     Diastolic dysfunction grade II     Acute respiratory failure with hypoxia (H)     Acute kidney injury (H)      Documentation of Face to Face and Certification for Home Health Services    I certify that patient, Mary Ellen Cuba is under my care and that I, or a Nurse Practitioner or Physician's Assistant working with me, had a face-to-face encounter that meets the physician face-to-face encounter requirements with this patient on: 2/2/2018.    This encounter with the patient was in whole, or in part, for the following medical condition, which is the primary reason for Home Health Care: pneumonia, NSTEMI, acute CHF.    I certify that, based on my findings, the following services are medically necessary Home Health Services: Nursing and Physical Therapy    My clinical findings support the need for the above services because: Nurse is needed: To assess vital signs and oxygenation after changes in medications or other medical regimen.. and Physical Therapy Services are needed to assess and treat the following functional impairments: abnormal gait and weakness.    Further, I certify that my clinical findings support that this patient is homebound (i.e. absences from home require considerable and taxing effort and are for medical reasons or [employer] services or infrequently or of short duration when for other reasons) because: Requires assistance of another person or specialized equipment to access medical services because patient: Requires supervision of another for safe transfer..    Based on  the above findings, I certify that this patient is confined to the home and needs intermittent skilled nursing care, physical therapy and/or speech therapy.  The patient is under my care, and I have initiated the establishment of the plan of care.  This patient will be followed by a physician who will periodically review the plan of care.    Physician/Provider to provide follow up care: Samantha Celis    [facility] certified Physician at time of discharge: Liborio Hendrickson MD      Please be aware that coverage of these services is subject to the terms and limitations of your health insurance plan.  Call member services at your health plan with any benefit or coverage questions.             Other Orders     MD CERTIFICATION [facility] PATIENT                 Follow-Up Appointment Instructions     Future Labs/Procedures    Follow-up and recommended labs and tests      Comments:    Follow up with primary care provider, Samantha Celis, within 2 weeks, to evaluate medication change and for hospital follow- up. The following labs/tests are recommended: basic metabolic panel.      Follow-Up Appointment Instructions     Follow-up and recommended labs and tests        Follow up with primary care provider, Samantha Celis, within 2 weeks, to evaluate medication change and for hospital follow- up. The following labs/tests are recommended: basic metabolic panel.             Statement of Approval     Ordered          02/02/18 1016  I have reviewed and agree with all the recommendations and orders detailed in this document.  EFFECTIVE NOW     Approved and electronically signed by:  Liborio Hendrickson MD

## 2018-01-28 NOTE — LETTER
Transition Communication Hand-off for Care Transitions to Next Level of Care Provider    Name: Mary Ellen Cuba  MRN #: [id number]  Primary Care Provider: Samantha Celis  Primary Care MD Name: Samantha CELIS  [facility] [address] [facility] [address]  Primary Care Clinic Name: Critical [facility] hospital  Reason for Hospitalization:  Hypoxia [R09.02]  Elevated troponin [R74.8]  Elevated brain natriuretic peptide (BNP) level [R79.89]  Pneumonia of left lung due to infectious organism, unspecified part of lung [J18.9]  Admit Date/Time: 1/28/2018  5:46 AM  Discharge Date: 2/2/18  Payor Source: Payor: [employer] / Plan: [employer] PLAN / Product Type: HMO /     Readmission Assessment Measure (JUJU) Risk Score/category: Average             Reason for Communication Hand-off Referral: Admission diagnoses: CHF  Admission diagnoses: PN    Discharge Plan:  Discharge Plan:       Most Recent Value    Concerns To Be Addressed basic needs concerns, cognitive/perceptual concerns, discharge planning concerns    Concerns Comments plans to have HH upon discharge           Concern for non-adherence with plan of care:   Y/N no    Discharge Needs Assessment:  Needs       Most Recent Value    Equipment Currently Used at Home walker, rolling    Transportation Available family or friend will provide, car    Home Care [facility] [phone], Fax: [phone]          Already enrolled in Tele-monitoring program and name of program:  no  Follow-up specialty is recommended: No    Follow-up plan:  No future appointments.    Any outstanding tests or procedures:        Referrals     Future Labs/Procedures    HOME CARE NURSING REFERRAL     Comments:    **Order classes of: [place] [facility], [facility] and [facility] will route to the Home Care and Hospice Referral Pool.  Home Care or Hospice will then contact the patient to schedule their appointment.**    If you do not hear from Home Care and  Hospice, or you would like to call to schedule, please call the referring place of service that your provider has listed below.  ______________________________________________________________________    Your provider has referred you to: [facility]: [facility] [phone]   [email or web address]    Extended Emergency Contact Information  Primary Emergency Contact: Sally Alexander   [facility]  Home Phone: [phone]  Relation: Other  Secondary Emergency Contact: Moreno Sukhjinder JOSÉ LUIS   [facility] Phone: [phone]  Relation: Other    Patient Anticipated Discharge Date: 2/2/2018     RN, PT, HHA to begin 24 - 48 hours after discharge.  PLEASE EVALUATE AND TREAT (Evaluation timeline is 24 - 48 hrs. Please call if there is need for a variance to this timeline).    REASON FOR REFERRAL: Assessment & Treatment: PT and RN and Teaching and Training: New Medication (please describe): see list and New or Exacerbated Diagnosis (please describe): pneumonia, MI and acute CHF    ADDITIONAL SERVICES NEEDED: HHA    OTHER PERTINENT INFORMATION: Patient was last seen by provider on 2/2/2018 for hospital discharge.    Current Outpatient Prescriptions:  oxyCODONE IR (ROXICODONE) 5 MG tablet, Take 0.5-1 tablets (2.5-5 mg) by mouth every 4 hours as needed for moderate to severe pain, Disp: 10 tablet, Rfl: 0  [START ON 2/3/2018] aspirin EC 81 MG EC tablet, Take 1 tablet (81 mg) by mouth daily, Disp: , Rfl:   metoprolol succinate (TOPROL XL) 25 MG 24 hr tablet, Take 1.5 tablets (37.5 mg) by mouth daily, Disp: 30 tablet, Rfl:   amLODIPine (NORVASC) 5 MG tablet, Take 1 tablet (5 mg) by mouth 2 times daily, Disp: 30 tablet, Rfl:   [START ON 2/3/2018] cefdinir (OMNICEF) 300 MG capsule, Take 1 capsule (300 mg) by mouth daily for 4 days, Disp: 4 capsule, Rfl: 0  [START ON 2/3/2018] torsemide (DEMADEX) 20 MG tablet, Take 2 tablets (40 mg) by mouth daily, Disp: 60 tablet, Rfl:  0  [START ON 2/3/2018] clopidogrel (PLAVIX) 75 MG tablet, Take 1 tablet (75 mg) by mouth daily, Disp: 30 tablet, Rfl: 0      Patient Active Problem List:     Essential hypertension     Coronary artery disease involving native coronary artery of native heart without angina pectoris     Hyperlipidemia LDL goal <100     Hypothyroidism due to acquired atrophy of thyroid     Type 2 diabetes mellitus without complication (H)     Hypernatremia     Anemia in chronic kidney disease     Fractured hip, left, closed, initial encounter (H)     Urinary retention     Closed fracture of left inferior and superior pubic rami     Acute congestive heart failure (H)     Leg hematoma, left, initial encounter     CKD (chronic kidney disease) stage 4, GFR 15-29 ml/min (H)     History of CVA (cerebrovascular accident)     Left leg pain     Blind - patient reported     Lingular pneumonia, organism unspecified     Non-ST elevated myocardial infarction (non-STEMI) (H)     LVH (left ventricular hypertrophy)     RVH (right ventricular hypertrophy)     Acute combined systolic and diastolic congestive heart failure (H)     Paroxysmal atrial fibrillation (H) - possible     Moderate pulmonary hypertension by Echo     Moderate mitral regurgitation     Systolic dysfunction, left ventricle EF 40-45%     Diastolic dysfunction grade II     Acute respiratory failure with hypoxia (H)     Acute kidney injury (H)      Documentation of Face to Face and Certification for Home Health Services    I certify that patient, Mary Ellen Cuba is under my care and that I, or a Nurse Practitioner or Physician's Assistant working with me, had a face-to-face encounter that meets the physician face-to-face encounter requirements with this patient on: 2/2/2018.    This encounter with the patient was in whole, or in part, for the following medical condition, which is the primary reason for Home Health Care: pneumonia, NSTEMI, acute CHF.    I certify that, based on my  findings, the following services are medically necessary Home Health Services: Nursing and Physical Therapy    My clinical findings support the need for the above services because: Nurse is needed: To assess vital signs and oxygenation after changes in medications or other medical regimen.. and Physical Therapy Services are needed to assess and treat the following functional impairments: abnormal gait and weakness.    Further, I certify that my clinical findings support that this patient is homebound (i.e. absences from home require considerable and taxing effort and are for medical reasons or [employer] services or infrequently or of short duration when for other reasons) because: Requires assistance of another person or specialized equipment to access medical services because patient: Requires supervision of another for safe transfer..    Based on the above findings, I certify that this patient is confined to the home and needs intermittent skilled nursing care, physical therapy and/or speech therapy.  The patient is under my care, and I have initiated the establishment of the plan of care.  This patient will be followed by a physician who will periodically review the plan of care.    Physician/Provider to provide follow up care: Samantha Celis    [facility] certified Physician at time of discharge: Liborio Hendrickson MD      Please be aware that coverage of these services is subject to the terms and limitations of your health insurance plan.  Call member services at your health plan with any benefit or coverage questions.            Key Recommendations:  Patient will return home with a friend for one night and then will have Chin SHIPLEY RN PT HHA and friend support.    Sindy Tejada    AVS/Discharge Summary is the source of truth; this is a helpful guide for improved communication of patient story

## 2018-01-28 NOTE — IP AVS SNAPSHOT
MRN:[id number]                      After Visit Summary   1/28/2018    Mary Ellen Cuba    MRN: [id number]           Thank you!     Thank you for choosing [facility] for your care. Our goal is always to provide you with excellent care. Hearing back from our patients is one way we can continue to improve our services. Please take a few minutes to complete the written survey that you may receive in the mail after you visit with us. Thank you!        Patient Information     Date Of Birth          1/20/1924        Designated Caregiver       Most Recent Value    Caregiver    Will someone help with your care after discharge? yes    Name of designated caregiver Sally (friend)     Phone number of caregiver [phone]    Caregiver address [place]      About your hospital stay     You were admitted on:  January 28, 2018 You last received care in the:  [address]    You were discharged on:  February 2, 2018        Reason for your hospital stay       Hospitlaized due to pneumonia and probable heart attack and improved                  Who to Call     For medical emergencies, please call 911.  For non-urgent questions about your medical care, please call your primary care provider or clinic, [phone]          Attending Provider     Provider Specialty    Alexandre Gautam MD Emergency Medicine    Shabana Turpin MD Family Practice    Liborio Hendrickson MD Internal Medicine       Primary Care Provider Office Phone # Fax #    Samantha Celis [phone]      After Care Instructions     Activity       Your activity upon discharge: activity as tolerated            Diet       Follow this diet upon discharge: Orders Placed This Encounter      Combination Diet [id number] Calories: Moderate Consistent CHO (4-6 CHO units/meal); 2 gm NA Diet                  Follow-up Appointments     Follow-up and recommended labs and tests        Follow up with primary care  provider, Samantha Celis, within 2 weeks, to evaluate medication change and for hospital follow- up. The following labs/tests are recommended: basic metabolic panel.                  Additional Services     HOME CARE NURSING REFERRAL       **Order classes of: [facility], [facility] and [facility] will route to the Home Care and Hospice Referral Pool.  Home Care or Hospice will then contact the patient to schedule their appointment.**    If you do not hear from Home Care and Hospice, or you would like to call to schedule, please call the referring place of service that your provider has listed below.  ______________________________________________________________________    Your provider has referred you to: [facility]: [facility] [phone]   [email or web address]    Extended Emergency Contact Information  Primary Emergency Contact: Sally Alexander   [facility]  Home Phone: [phone]  Relation: Other  Secondary Emergency Contact: Sukhjinder Mayer   [facility] Phone: [phone]  Relation: Other    Patient Anticipated Discharge Date: 2/2/2018     RN, PT, HHA to begin 24 - 48 hours after discharge.  PLEASE EVALUATE AND TREAT (Evaluation timeline is 24 - 48 hrs. Please call if there is need for a variance to this timeline).    REASON FOR REFERRAL: Assessment & Treatment: PT and RN and Teaching and Training: New Medication (please describe): see list and New or Exacerbated Diagnosis (please describe): pneumonia, MI and acute CHF    ADDITIONAL SERVICES NEEDED: HHA    OTHER PERTINENT INFORMATION: Patient was last seen by provider on 2/2/2018 for hospital discharge.    Current Outpatient Prescriptions:  oxyCODONE IR (ROXICODONE) 5 MG tablet, Take 0.5-1 tablets (2.5-5 mg) by mouth every 4 hours as needed for moderate to severe pain, Disp: 10 tablet, Rfl: 0  [START ON 2/3/2018] aspirin EC 81 MG EC tablet, Take 1 tablet (81 mg) by mouth daily, Disp: , Rfl:    metoprolol succinate (TOPROL XL) 25 MG 24 hr tablet, Take 1.5 tablets (37.5 mg) by mouth daily, Disp: 30 tablet, Rfl:   amLODIPine (NORVASC) 5 MG tablet, Take 1 tablet (5 mg) by mouth 2 times daily, Disp: 30 tablet, Rfl:   [START ON 2/3/2018] cefdinir (OMNICEF) 300 MG capsule, Take 1 capsule (300 mg) by mouth daily for 4 days, Disp: 4 capsule, Rfl: 0  [START ON 2/3/2018] torsemide (DEMADEX) 20 MG tablet, Take 2 tablets (40 mg) by mouth daily, Disp: 60 tablet, Rfl: 0  [START ON 2/3/2018] clopidogrel (PLAVIX) 75 MG tablet, Take 1 tablet (75 mg) by mouth daily, Disp: 30 tablet, Rfl: 0      Patient Active Problem List:     Essential hypertension     Coronary artery disease involving native coronary artery of native heart without angina pectoris     Hyperlipidemia LDL goal <100     Hypothyroidism due to acquired atrophy of thyroid     Type 2 diabetes mellitus without complication (H)     Hypernatremia     Anemia in chronic kidney disease     Fractured hip, left, closed, initial encounter (H)     Urinary retention     Closed fracture of left inferior and superior pubic rami     Acute congestive heart failure (H)     Leg hematoma, left, initial encounter     CKD (chronic kidney disease) stage 4, GFR 15-29 ml/min (H)     History of CVA (cerebrovascular accident)     Left leg pain     Blind - patient reported     Lingular pneumonia, organism unspecified     Non-ST elevated myocardial infarction (non-STEMI) (H)     LVH (left ventricular hypertrophy)     RVH (right ventricular hypertrophy)     Acute combined systolic and diastolic congestive heart failure (H)     Paroxysmal atrial fibrillation (H) - possible     Moderate pulmonary hypertension by Echo     Moderate mitral regurgitation     Systolic dysfunction, left ventricle EF 40-45%     Diastolic dysfunction grade II     Acute respiratory failure with hypoxia (H)     Acute kidney injury (H)      Documentation of Face to Face and Certification for Home Health Services    I  certify that patient, Mary Ellen Cuba is under my care and that I, or a Nurse Practitioner or Physician's Assistant working with me, had a face-to-face encounter that meets the physician face-to-face encounter requirements with this patient on: 2/2/2018.    This encounter with the patient was in whole, or in part, for the following medical condition, which is the primary reason for Home Health Care: pneumonia, NSTEMI, acute CHF.    I certify that, based on my findings, the following services are medically necessary Home Health Services: Nursing and Physical Therapy    My clinical findings support the need for the above services because: Nurse is needed: To assess vital signs and oxygenation after changes in medications or other medical regimen.. and Physical Therapy Services are needed to assess and treat the following functional impairments: abnormal gait and weakness.    Further, I certify that my clinical findings support that this patient is homebound (i.e. absences from home require considerable and taxing effort and are for medical reasons or [facility] services or infrequently or of short duration when for other reasons) because: Requires assistance of another person or specialized equipment to access medical services because patient: Requires supervision of another for safe transfer..    Based on the above findings, I certify that this patient is confined to the home and needs intermittent skilled nursing care, physical therapy and/or speech therapy.  The patient is under my care, and I have initiated the establishment of the plan of care.  This patient will be followed by a physician who will periodically review the plan of care.    Physician/Provider to provide follow up care: Samantha Celis certified Physician at time of discharge: Liborio Hendrickson MD      Please be aware that coverage of these services is subject to the terms and limitations of your health insurance plan.  Call member  "services at your health plan with any benefit or coverage questions.                  Future tests that were ordered for you     MD CERTIFICATION HHA PATIENT                 General Recommendations To Control Heart Failure When You Get Home     Instructions To Patients and Families: Please read and check off each of these important instructions as you do them when you get home.           Weight and symptoms      ___ Put a scale in your bathroom  ___ Post a weight chart or calendar next to the scale  ___Weigh yourself every day as soon as you get up in the morning. You should only be wearing your pajamas. Write your weight on the chart/calendar.  ___ Bring your weight chart/calendar with you to all appointments    ___Call your doctor if you gain 2 pounds in 1 day or 5 pounds in 1 week from your \"dry\" weight (baseline weight). Also call your doctor if you have shortness of breath that gets worse over time, leg swelling or fatigue.         Medicines and diet     ___ Make sure to take your medicines as prescribed.    ___Bring a current list of your medicines and all of your medicine bottles with you to all appointments.    ___ Limit fluids if you still have swelling or shortness of breath, or if your doctor tells you to do so.  ___ Eat less than 2000 mg of sodium (salt) every day. Read food labels, and do not add salt to meals.   ___ Heart healthy diet with low fat and low cholesterol          Activity and suggested lifestyle changes    ___ Stay active. Talk to your doctor about an exercise program that is safe for your heart.    ___ Stop smoking. Reduce alcohol use.      ___ Lose weight if you are overweight. Extra weight puts a lot of stress on the heart.          Control for Leg Swelling   ___ Keep your legs elevated (raised) as needed for swelling. If swelling is uncomfortable or elevation doesn t help, ask your doctor about using ACE wrap or Jobst stockings.          Follow-up appointments   ____ Follow up with " "your doctor within 7-10 days after discharge.    ___ Make sure to take your medications as prescribed and bring an accurate list of your medications and your weight chart/calendar to your follow up appointment.           Pending Results     No orders found from 2018 to 2018.            Statement of Approval     Ordered          18 1016  I have reviewed and agree with all the recommendations and orders detailed in this document.  EFFECTIVE NOW     Approved and electronically signed by:  Liborio Hendrickson MD             Admission Information     Date & Time Provider Department Dept. Phone    2018 Liborio Hendrickson MD [address] Surgical [phone]      Your Vitals Were     Blood Pressure Pulse Temperature Respirations Weight Pulse Oximetry    158/77 60 97.7  F (36.5  C) (Oral) 18 62.5 kg (137 lb 12.6 oz) 92%    BMI (Body Mass Index)                   24.41 kg/m2           MyChart Information     [employer] lets you send messages to your doctor, view your test results, renew your prescriptions, schedule appointments and more. To sign up, go to [email or web address] . Click on \"Log in\" on the left side of the screen, which will take you to the Welcome page. Then click on \"Sign up Now\" on the right side of the page.     You will be asked to enter the access code listed below, as well as some personal information. Please follow the directions to create your username and password.     Your access code is: [email or web address]  Expires: 3/13/2018  6:03 PM     Your access code will  in 90 days. If you need help or a new code, please call your [place] clinic or [phone].        Care EveryWhere ID     This is your Care EveryWhere ID. This could be used by other organizations to access your [facility] medical records  [id number]        Equal Access to Services     ADELAIDE LOREDO AH: Sylvain Curry, gunner burns, vinny benítez, judith london " laarnulfo ahScooter [facility] [phone].    ATENCIÓN: Si habla montezañol, tiene a amezcua disposición servicios gratuitos de asistencia lingüística. Bakari al [phone].    We comply with applicable federal civil rights laws and [place] laws. We do not discriminate on the basis of race, color, national origin, age, disability, sex, sexual orientation, or gender identity.               Review of your medicines      START taking        Dose / Directions    cefdinir 300 MG capsule   Commonly known as:  OMNICEF   Indication:  Community Acquired Pneumonia   Used for:  Pneumonia of left lung due to infectious organism, unspecified part of lung        Dose:  300 mg   Start taking on:  2/3/2018   Take 1 capsule (300 mg) by mouth daily for 4 days   Quantity:  4 capsule   Refills:  0       clopidogrel 75 MG tablet   Commonly known as:  PLAVIX        Dose:  75 mg   Start taking on:  2/3/2018   Take 1 tablet (75 mg) by mouth daily   Quantity:  30 tablet   Refills:  0       torsemide 20 MG tablet   Commonly known as:  DEMADEX        Dose:  40 mg   Start taking on:  2/3/2018   Take 2 tablets (40 mg) by mouth daily   Quantity:  60 tablet   Refills:  0         CONTINUE these medicines which may have CHANGED, or have new prescriptions. If we are uncertain of the size of tablets/capsules you have at home, strength may be listed as something that might have changed.        Dose / Directions    amLODIPine 5 MG tablet   Commonly known as:  NORVASC   This may have changed:  when to take this   Used for:  Benign essential hypertension        Dose:  5 mg   Take 1 tablet (5 mg) by mouth 2 times daily   Quantity:  30 tablet   Refills:  0       aspirin 81 MG EC tablet   This may have changed:    - medication strength  - when to take this        Dose:  81 mg   Start taking on:  2/3/2018   Take 1 tablet (81 mg) by mouth daily   Refills:  0       oxyCODONE IR 5 MG tablet   Commonly known as:  ROXICODONE   This may have changed:  how much to take   Used for:   Hematoma of left hip, initial encounter        Dose:  2.5-5 mg   Take 0.5-1 tablets (2.5-5 mg) by mouth every 4 hours as needed for moderate to severe pain   Quantity:  10 tablet   Refills:  0       TOPROL XL 25 MG 24 hr tablet   This may have changed:  how much to take   Used for:  Benign essential hypertension   Generic drug:  metoprolol succinate        Dose:  37.5 mg   Take 1.5 tablets (37.5 mg) by mouth daily   Quantity:  30 tablet   Refills:  0         CONTINUE these medicines which have NOT CHANGED        Dose / Directions    acetaminophen 500 MG tablet   Commonly known as:  TYLENOL   Used for:  Closed left hip fracture, initial encounter (H)        Dose:  1000 mg   Take 2 tablets (1,000 mg) by mouth 3 times daily   Refills:  0       CALCIUM + D PO        Dose:  1 tablet   Take 1 tablet by mouth daily.   Refills:  0       fenofibrate 160 MG tablet   Used for:  Hypercholesterolemia        Dose:  160 mg   Take 1 tablet (160 mg) by mouth daily   Quantity:  30 tablet   Refills:  0       levothyroxine 112 MCG tablet   Commonly known as:  SYNTHROID/LEVOTHROID   Used for:  Other specified hypothyroidism        Dose:  112 mcg   Take 1 tablet (112 mcg) by mouth daily   Refills:  0       potassium citrate 5 MEQ (540 MG) CR tablet   Commonly known as:  UROCIT-K   Used for:  Hypokalemia        Dose:  20 mEq   Take 4 tablets (20 mEq) by mouth daily   Quantity:  120 tablet   Refills:  1       senna-docusate 8.6-50 MG per tablet   Commonly known as:  SENOKOT-S;PERICOLACE   Used for:  Closed left hip fracture, initial encounter (H)        Dose:  1-2 tablet   Take 1-2 tablets by mouth 2 times daily as needed (constipation )   Quantity:  100 tablet   Refills:  0       simvastatin 10 MG tablet   Commonly known as:  ZOCOR   Used for:  Hypercholesterolemia        Dose:  10 mg   Take 1 tablet (10 mg) by mouth At Bedtime   Quantity:  30 tablet   Refills:  0         STOP taking     allopurinol 100 MG tablet   Commonly known as:   ZYLOPRIM           CENTRUM SILVER ULTRA WOMENS Tabs           folic acid 400 MCG tablet   Commonly known as:  FOLVITE           furosemide 20 MG tablet   Commonly known as:  LASIX           VITAMIN D (CHOLECALCIFEROL) PO                Where to get your medicines      These medications were sent to [facility] [address] [place], [place] - [address] [facility]  [address] [facility] [address]     Phone:  [phone]     cefdinir 300 MG capsule    clopidogrel 75 MG tablet    torsemide 20 MG tablet         Some of these will need a paper prescription and others can be bought over the counter. Ask your nurse if you have questions.     You don't need a prescription for these medications     aspirin 81 MG EC tablet                Protect others around you: Learn how to safely use, store and throw away your medicines at www.disposemymeds.org.        ANTIBIOTIC INSTRUCTION     You've Been Prescribed an Antibiotic - Now What?  Your healthcare team thinks that you or your loved one might have an infection. Some infections can be treated with antibiotics, which are powerful, life-saving drugs. Like all medications, antibiotics have side effects and should only be used when necessary. There are some important things you should know about your antibiotic treatment.      Your healthcare team may run tests before you start taking an antibiotic.    Your team may take samples (e.g., from your blood, urine or other areas) to run tests to look for bacteria. These test can be important to determine if you need an antibiotic at all and, if you do, which antibiotic will work best.      Within a few days, your healthcare team might change or even stop your antibiotic.    Your team may start you on an antibiotic while they are working to find out what is making you sick.    Your team might change your antibiotic because test results show that a different antibiotic would be better to treat your infection.    In some cases, once your  team has more information, they learn that you do not need an antibiotic at all. They may find out that you don't have an infection, or that the antibiotic you're taking won't work against your infection. For example, an infection caused by a virus can't be treated with antibiotics. Staying on an antibiotic when you don't need it is more likely to be harmful than helpful.      You may experience side effects from your antibiotic.    Like all medications, antibiotics have side effects. Some of these can be serious.    Let you healthcare team know if you have any known allergies when you are admitted to the hospital.    One significant side effect of nearly all antibiotics is the risk of severe and sometimes deadly diarrhea caused by Clostridium difficile (C. Difficile). This occurs when a person takes antibiotics because some good germs are destroyed. Antibiotic use allows C. diificile to take over, putting patients at high risk for this serious infection.    As a patient or caregiver, it is important to understand your or your loved one's antibiotic treatment. It is especially important for caregivers to speak up when patients can't speak for themselves. Here are some important questions to ask your healthcare team.    What infection is this antibiotic treating and how do you know I have that infection?    What side effects might occur from this antibiotic?    How long will I need to take this antibiotic?    Is it safe to take this antibiotic with other medications or supplements (e.g., vitamins) that I am taking?     Are there any special directions I need to know about taking this antibiotic? For example, should I take it with food?    How will I be monitored to know whether my infection is responding to the antibiotic?    What tests may help to make sure the right antibiotic is prescribed for me?      Information provided by:  www.cdc.gov/getsmart  U.S. Department of Health and Human Services  Centers for disease  Control and Prevention  National Center for Emerging and Zoonotic Infectious Diseases  Division of Healthcare Quality Promotion        Information about OPIOIDS     PRESCRIPTION OPIOIDS: WHAT YOU NEED TO KNOW    Prescription opioids can be used to help relieve moderate to severe pain and are often prescribed following a surgery or injury, or for certain health conditions. These medications can be an important part of treatment but also come with serious risks. It is important to work with your health care provider to make sure you are getting the safest, most effective care.    WHAT ARE THE RISKS AND SIDE EFFECTS OF OPIOID USE?  Prescription opioids carry serious risks of addiction and overdose, especially with prolonged use. An opioid overdose, often marked by slowed breathing can cause sudden death. The use of prescription opioids can have a number of side effects as well, even when taken as directed:      Tolerance - meaning you might need to take more of a medication for the same pain relief    Physical dependence - meaning you have symptoms of withdrawal when a medication is stopped    Increased sensitivity to pain    Constipation    Nausea, vomiting, and dry mouth    Sleepiness and dizziness    Confusion    Depression    Low levels of testosterone that can result in lower sex drive, energy, and strength    Itching and sweating    RISKS ARE GREATER WITH:    History of drug misuse, substance use disorder, or overdose    Mental health conditions (such as depression or anxiety)    Sleep apnea    Older age (65 years or older)    Pregnancy    Avoid alcohol while taking prescription opioids.   Also, unless specifically advised by your health care provider, medications to avoid include:    Benzodiazepines (such as Xanax or Valium)    Muscle relaxants (such as Soma or Flexeril)    Hypnotics (such as Ambien or Lunesta)    Other prescription opioids    KNOW YOUR OPTIONS:  Talk to your health care provider about ways to  manage your pain that do not involve prescription opioids. Some of these options may actually work better and have fewer risks and side effects:    Pain relievers such as acetaminophen, ibuprofen, and naproxen    Some medications that are also used for depression or seizures    Physical therapy and exercise    Cognitive behavioral therapy, a psychological, goal-directed approach, in which patients learn how to modify physical, behavioral, and emotional triggers of pain and stress    IF YOU ARE PRESCRIBED OPIOIDS FOR PAIN:    Never take opioids in greater amounts or more often than prescribed    Follow up with your primary health care provider and work together to create a plan on how to manage your pain.    Talk about ways to help manage your pain that do not involve prescription opioids    Talk about all concerns and side effects    Help prevent misuse and abuse    Never sell or share prescription opioids    Never use another person's prescription opioids    Store prescription opioids in a secure place and out of reach of others (this may include visitors, children, friends, and family)    Visit www.cdc.gov/drugoverdose to learn about risks of opioid abuse and overdose    If you believe you may be struggling with addiction, tell your health care provider and ask for guidance or call [facility]'s National Helpline at 2-535-806-HELP    LEARN MORE / www.cdc.gov/drugoverdose/prescribing/guideline.html    Safely dispose of unused prescription opioids: Find your local drug take-back programs and more information about the importance of safe disposal at www.doseofreality.[place].gov             Medication List: This is a list of all your medications and when to take them. Check marks below indicate your daily home schedule. Keep this list as a reference.      Medications           Morning Afternoon Evening Bedtime As Needed    acetaminophen 500 MG tablet   Commonly known as:  TYLENOL   Take 2 tablets (1,000 mg) by mouth 3 times  daily   Last time this was given:  975 mg on 1/29/2018  9:24 PM                                         amLODIPine 5 MG tablet   Commonly known as:  NORVASC   Take 1 tablet (5 mg) by mouth 2 times daily   Last time this was given:  5 mg on 2/2/2018  8:22 AM                                      aspirin 81 MG EC tablet   Take 1 tablet (81 mg) by mouth daily   Start taking on:  2/3/2018   Last time this was given:  81 mg on 2/2/2018  8:22 AM                                   CALCIUM + D PO   Take 1 tablet by mouth daily.                                   cefdinir 300 MG capsule   Commonly known as:  OMNICEF   Take 1 capsule (300 mg) by mouth daily for 4 days   Start taking on:  2/3/2018   Last time this was given:  300 mg on 2/2/2018  8:22 AM                                   clopidogrel 75 MG tablet   Commonly known as:  PLAVIX   Take 1 tablet (75 mg) by mouth daily   Start taking on:  2/3/2018   Last time this was given:  75 mg on 2/2/2018  8:21 AM                                   fenofibrate 160 MG tablet   Take 1 tablet (160 mg) by mouth daily                                   levothyroxine 112 MCG tablet   Commonly known as:  SYNTHROID/LEVOTHROID   Take 1 tablet (112 mcg) by mouth daily   Last time this was given:  112 mcg on 2/2/2018  5:29 AM                                   oxyCODONE IR 5 MG tablet   Commonly known as:  ROXICODONE   Take 0.5-1 tablets (2.5-5 mg) by mouth every 4 hours as needed for moderate to severe pain   Last time this was given:  2.5 mg on 1/30/2018  5:43 AM                                   potassium citrate 5 MEQ (540 MG) CR tablet   Commonly known as:  UROCIT-K   Take 4 tablets (20 mEq) by mouth daily   Last time this was given:  20 mEq on 2/2/2018  8:22 AM                                   senna-docusate 8.6-50 MG per tablet   Commonly known as:  SENOKOT-S;PERICOLACE   Take 1-2 tablets by mouth 2 times daily as needed (constipation )                                   simvastatin 10 MG  tablet   Commonly known as:  ZOCOR   Take 1 tablet (10 mg) by mouth At Bedtime   Last time this was given:  10 mg on 2/1/2018  9:41 PM                                   TOPROL XL 25 MG 24 hr tablet   Take 1.5 tablets (37.5 mg) by mouth daily   Last time this was given:  37.5 mg on 2/2/2018  8:21 AM   Generic drug:  metoprolol succinate                                   torsemide 20 MG tablet   Commonly known as:  DEMADEX   Take 2 tablets (40 mg) by mouth daily   Start taking on:  2/3/2018   Last time this was given:  40 mg on 2/2/2018  8:22 AM                                                More Information        Patient Education    Torsemide Oral tablet    Torsemide Solution for injection  Torsemide Oral tablet  What is this medicine?  TORSEMIDE (TORE se mide) is a diuretic. It helps you make more urine and to lose salt and excess water from your body. This medicine is used to treat high blood pressure, and edema or swelling from heart, kidney, or liver disease.  This medicine may be used for other purposes; ask your health care provider or pharmacist if you have questions.  What should I tell my health care provider before I take this medicine?  They need to know if you have any of these conditions:    abnormal blood electrolytes    diabetes    gout    heart disease    kidney disease    liver disease    small amounts of urine, or difficulty passing urine    an unusual or allergic reaction to torsemide, sulfa drugs, other medicines, foods, dyes, or preservatives    pregnant or trying to get pregnant    breast-feeding  How should I use this medicine?  Take this medicine by mouth with a glass of water. Follow the directions on the prescription label. You may take this medicine with or without food. If it upsets your stomach, take it with food or milk. Do not take your medicine more often than directed. Remember that you will need to pass more urine after taking this medicine. Do not take your medicine at a time of  day that will cause you problems. Do not take at bedtime.  Talk to your pediatrician regarding the use of this medicine in children. Special care may be needed.  Overdosage: If you think you have taken too much of this medicine contact a poison control center or emergency room at once.  NOTE: This medicine is only for you. Do not share this medicine with others.  What if I miss a dose?  If you miss a dose, take it as soon as you can. If it is almost time for your next dose, take only that dose. Do not take double or extra doses.  What may interact with this medicine?    alcohol    certain antibiotics given by injection  certain heart medicines like digoxin    diuretics    lithium    medicines for diabetes    medicines for blood pressure    medicines for cholesterol like cholestyramine    medicines that relax muscles for surgery    NSAIDs, medicines for pain and inflammation, like ibuprofen or naproxen    OTC supplements like ginseng and ephedra    probenecid    steroid medicines like prednisone or cortisone  This list may not describe all possible interactions. Give your health care provider a list of all the medicines, herbs, non-prescription drugs, or dietary supplements you use. Also tell them if you smoke, drink alcohol, or use illegal drugs. Some items may interact with your medicine.  What should I watch for while using this medicine?  Visit your doctor or health care professional for regular checks on your progress. Check your blood pressure regularly. Ask your doctor or health care professional what your blood pressure should be, and when you should contact him or her. If you are a diabetic, check your blood sugar as directed.  You may need to be on a special diet while taking this medicine. Check with your doctor. Also, ask how many glasses of fluid you need to drink a day. You must not get dehydrated.  You may get drowsy or dizzy. Do not drive, use machinery, or do anything that needs mental alertness  until you know how this drug affects you. Do not stand or sit up quickly, especially if you are an older patient. This reduces the risk of dizzy or fainting spells. Alcohol can make you more drowsy and dizzy. Avoid alcoholic drinks.  What side effects may I notice from receiving this medicine?  Side effects that you should report to your doctor or health care professional as soon as possible:    allergic reactions such as skin rash or itching, hives, swelling of the lips, mouth, tongue or throat    blood in urine or stool    dry mouth    hearing loss or ringing in the ears    irregular heartbeat    muscle pain, weakness or cramps    pain or difficulty passing urine    unusually weak or tired    vomiting or diarrhea  Side effects that usually do not require medical attention (report to your doctor or health care professional if they continue or are bothersome):    dizzy or lightheaded    headache    increased thirst    passing large amounts of urine    sexual difficulties    stomach pain, upset or nausea  This list may not describe all possible side effects. Call your doctor for medical advice about side effects. You may report side effects to FDA at 2-115-FDA-6633.  Where should I keep my medicine?  Keep out of the reach of children.  Store at room temperature between 15 and 30 degrees C (59 and 86 degrees F). Throw away any unused medicine after the expiration date.  NOTE:This sheet is a summary. It may not cover all possible information. If you have questions about this medicine, talk to your doctor, pharmacist, or health care provider. Copyright  2016 [employer]                Patient Education    Cefdinir Oral capsule    Cefdinir Oral suspension  Cefdinir Oral capsule  What is this medicine?  CEFDINIR (SEF di ner) is a cephalosporin antibiotic. It is used to treat certain kinds of bacterial infections. It will not work for colds, flu, or other viral infections.  This medicine may be used for other purposes;  ask your health care provider or pharmacist if you have questions.  What should I tell my health care provider before I take this medicine?  They need to know if you have any of these conditions:    bleeding problems    kidney disease    stomach or intestine problems (especially colitis)    an unusual or allergic reaction to cefdinir, other cephalosporin antibiotics, penicillin, penicillamine, other foods, dyes or preservatives    pregnant or trying to get pregnant    breast-feeding  How should I use this medicine?  Take this medicine by mouth. Swallow it with a drink of water. Follow the directions on the prescription label. You can take it with or without food. If it upsets your stomach it may help to take it with food. Take your doses at regular intervals. Do not take it more often than directed. Finish all the medicine you are prescribed even if you think your infection is better.  Talk to your pediatrician regarding the use of this medicine in children. Special care may be needed.  Overdosage: If you think you have taken too much of this medicine contact a poison control center or emergency room at once.  NOTE: This medicine is only for you. Do not share this medicine with others.  What if I miss a dose?  If you miss a dose, take it as soon as you can. If it is almost time for your next dose, take only that dose. Do not take double or extra doses.  What may interact with this medicine?    antacids that contain aluminum or magnesium    iron supplements    other antibiotics    probenecid  This list may not describe all possible interactions. Give your health care provider a list of all the medicines, herbs, non-prescription drugs, or dietary supplements you use. Also tell them if you smoke, drink alcohol, or use illegal drugs. Some items may interact with your medicine.  What should I watch for while using this medicine?  Tell your doctor or health care professional if your symptoms do not get better in a few  days.  If you are diabetic you may get a false-positive result for sugar in your urine. Check with your doctor or health care professional before you change your diet or the dose of your diabetes medicine.  What side effects may I notice from receiving this medicine?  Side effects that you should report to your doctor or health care professional as soon as possible:    allergic reactions like skin rash, itching or hives, swelling of the face, lips, or tongue    breathing problems    fever or chills    redness, blistering, peeling or loosening of the skin, including inside the mouth    seizures    severe or watery diarrhea    sore throat    swollen joints    trouble passing urine or change in the amount of urine    unusual bleeding or bruising    unusually weak or tired  Side effects that usually do not require medical attention (report to your doctor or health care professional if they continue or are bothersome):    constipation    dizziness    gas or heartburn    headache    loss of appetite    nausea or vomiting    stomach pain    stool discoloration    vaginal itching  This list may not describe all possible side effects. Call your doctor for medical advice about side effects. You may report side effects to FDA at 4-371-FDA-2907.  Where should I keep my medicine?  Keep out of the reach of children.  Store at room temperature between 15 and 30 degrees C (59 and 86 degrees F). Throw the medicine away after the expiration date.  NOTE:This sheet is a summary. It may not cover all possible information. If you have questions about this medicine, talk to your doctor, pharmacist, or health care provider. Copyright  2016 [employer]                Clopidogrel tablets  Brand Name: Plavix  What is this medicine?  CLOPIDOGREL (kloh PID oh grel) helps to prevent blood clots. This medicine is used to prevent heart attack, stroke, or other vascular events in people who are at high risk.  How should I use this medicine?  Take  this medicine by mouth with a glass of water. Follow the directions on the prescription label. You may take this medicine with or without food. If it upsets your stomach, take it with food. Take your medicine at regular intervals. Do not take it more often than directed. Do not stop taking except on your doctor's advice.  A special MedGuide will be given to you by the pharmacist with each prescription and refill. Be sure to read this information carefully each time.  Talk to your pediatrician regarding the use of this medicine in children. Special care may be needed.  What side effects may I notice from receiving this medicine?  Side effects that you should report to your doctor or health care professional as soon as possible:    allergic reactions like skin rash, itching or hives, swelling of the face, lips, or tongue    signs and symptoms of bleeding such as bloody or black, tarry stools; red or dark-brown urine; spitting up blood or brown material that looks like coffee grounds; red spots on the skin; unusual bruising or bleeding from the eye, gums, or nose    signs and symptoms of a blood clot such as breathing problems; changes in vision; chest pain; severe, sudden headache; pain, swelling, warmth in the leg; trouble speaking; sudden numbness or weakness of the face, arm or leg  Side effects that usually do not require medical attention (report to your doctor or health care professional if they continue or are bothersome):    constipation    diarrhea    headache    upset stomach  What may interact with this medicine?  Do not take this medicine with the following medications:    dasabuvir; ombitasvir; paritaprevir; ritonavir    defibrotide  This medicine may also interact with the following medications:    antiviral medicines for HIV or AIDS    aspirin    certain medicines for depression like citalopram, fluoxetine, fluvoxamine    certain medicines for fungal infections like ketoconazole, fluconazole,  voriconazole    certain medicines for seizures like felbamate, oxcarbazepine, phenytoin    certain medicines for stomach problems like cimetidine, omeprazole, esomeprazole    certain medicines that treat or prevent blood clots like warfarin, enoxaparin, dalteparin, apixaban, dabigatran, rivaroxaban, ticlopidine    chloramphenicol    cilostazol    fluvastatin    isoniazid    modafinil    nicardipine    NSAIDS, medicines for pain and inflammation, like ibuprofen or naproxen    quinine    repaglinide    tamoxifen    tolbutamide    topiramate    torsemide  What if I miss a dose?  If you miss a dose, take it as soon as you can. If it is almost time for your next dose, take only that dose. Do not take double or extra doses.  Where should I keep my medicine?  Keep out of the reach of children.  Store at room temperature of 59 to 86 degrees F (15 to 30 degrees C). Throw away any unused medicine after the expiration date.  What should I tell my health care provider before I take this medicine?  They need to know if you have any of the following conditions:    bleeding disorders    bleeding in the brain    having surgery    history of stomach bleeding    an unusual or allergic reaction to clopidogrel, other medicines, foods, dyes, or preservatives    pregnant or trying to get pregnant    breast-feeding  What should I watch for while using this medicine?  Visit your doctor or health care professional for regular check ups. Do not stop taking your medicine unless your doctor tells you to.  Notify your doctor or health care professional and seek emergency treatment if you develop breathing problems; changes in vision; chest pain; severe, sudden headache; pain, swelling, warmth in the leg; trouble speaking; sudden numbness or weakness of the face, arm or leg. These can be signs that your condition has gotten worse.  If you are going to have surgery or dental work, tell your doctor or health care professional that you are taking  this medicine.  Certain genetic factors may reduce the effect of this medicine. Your doctor may use genetic tests to determine treatment.  NOTE:This sheet is a summary. It may not cover all possible information. If you have questions about this medicine, talk to your doctor, pharmacist, or health care provider. Copyright  2017 [employer]

## 2018-01-28 NOTE — PROGRESS NOTES
Writer notified by lab pt 1200 trop 0.337. Will update MD. Blood pressure 149/68, pulse 73, temperature 98.2  F (36.8  C), temperature source Oral, resp. rate 20, weight 66.6 kg (146 lb 13.2 oz), SpO2 95 %.    MD evaluated pt after critical lab result. Heparin gtt to be started. Pt now DNR/DNI. Plan for echocardiogram tomorrow. Pt continues to deny pain. Will continue to trend troponin's.   Blood pressure 144/52, pulse 72, temperature 97.6  F (36.4  C), temperature source Oral, resp. rate 16, weight 66.6 kg (146 lb 13.2 oz), SpO2 96 %.

## 2018-01-28 NOTE — ED PROVIDER NOTES
History     Chief Complaint   Patient presents with     Shortness of Breath     HPI  Mary Ellen Cuba is a 89 year old female who presents to the ED this morning with cough, chills and shortness of breath.  Started yesterday and was worse.  Actually feels a little bit better today.  Has been coughing up some sputum but is not sure what color it is.  Has not documented any fevers but felt chilled most of yesterday.  Has some back discomfort off and on in the mid back and had a headache yesterday as well.  [facility]ives in a senior community and is accompanied by her friend, Sally.    Problem List:    Patient Active Problem List    Diagnosis Date Noted     Left leg pain 12/13/2017     Priority: Medium     Blind - patient reported 12/13/2017     Priority: Medium     Leg hematoma, left, initial encounter 12/12/2017     Priority: Medium     CKD (chronic kidney disease) stage 4, GFR 15-29 ml/min (H) 12/12/2017     Priority: Medium     History of CVA (cerebrovascular accident) 12/12/2017     Priority: Medium     Acute congestive heart failure (H) 05/26/2017     Priority: Medium     CHF (congestive heart failure) (H) 05/26/2017     Priority: Medium     Closed fracture of left inferior and superior pubic rami 05/15/2017     Priority: Medium     Urinary retention 01/21/2017     Priority: Medium     Fractured hip, left, closed, initial encounter (H) 01/20/2017     Priority: Medium     Essential hypertension 01/19/2017     Priority: Medium     Coronary artery disease involving native coronary artery of native heart without angina pectoris 01/19/2017     Priority: Medium     Hyperlipidemia LDL goal <100 01/19/2017     Priority: Medium     Hypothyroidism due to acquired atrophy of thyroid 01/19/2017     Priority: Medium     Type 2 diabetes mellitus without complication (H) 01/19/2017     Priority: Medium     Hypernatremia 01/19/2017     Priority: Medium     Chronic kidney disease, stage III (moderate) 01/19/2017     Priority: Medium      Anemia 01/19/2017     Priority: Medium        Past Medical History:    Past Medical History:   Diagnosis Date     CAD (coronary artery disease)      CVA (cerebral vascular accident) (H) 2013     Diabetes mellitus (H)      Hyperlipemia      Hypertension        Past Surgical History:    Past Surgical History:   Procedure Laterality Date     APPENDECTOMY       GI SURGERY      gwendolyn     GI SURGERY      hemicolectomy     GYN SURGERY      oopherectomy     LASER YAG CAPSULOTOMY  3/21/2013    Procedure: LASER YAG CAPSULOTOMY;  yag capsulotomy bilateral eyes;  Surgeon: Deandre Nugent MD;  Location: [facility]     OPEN REDUCTION INTERNAL FIXATION HIP NAILING Left 1/20/2017    Procedure: OPEN REDUCTION INTERNAL FIXATION HIP NAILING;  Surgeon: Liborio Hoffman DO;  Location: [facility] OR     PHACOEMULSIFICATION WITH STANDARD INTRAOCULAR LENS IMPLANT  6/30/2011    Procedure:PHACOEMULSIFICATION WITH STANDARD INTRAOCULAR LENS IMPLANT; Surgeon:DEANDRE NUGENT; Location:[facility] OR     PHACOEMULSIFICATION WITH STANDARD INTRAOCULAR LENS IMPLANT  7/21/2011    Procedure:PHACOEMULSIFICATION WITH STANDARD INTRAOCULAR LENS IMPLANT; Surgeon:DEANDRE NUGENT; Location:[facility] OR     STENT, CORONARY, DIOGENES         Family History:    Family History   Problem Relation Age of Onset     Colon Cancer Mother      Prostate Cancer Brother      Peptic Ulcer Disease Father        Social History:  Marital Status:   [5]  Social History   Substance Use Topics     Smoking status: Never Smoker     Smokeless tobacco: Never Used     Alcohol use No        Medications:      oxyCODONE IR (ROXICODONE) 5 MG tablet   amLODIPine (NORVASC) 5 MG tablet   furosemide (LASIX) 20 MG tablet   potassium citrate (UROCIT-K) 5 MEQ (540 MG) CR tablet   acetaminophen (TYLENOL) 500 MG tablet   metoprolol (TOPROL XL) 25 MG 24 hr tablet   senna-docusate (SENOKOT-S;PERICOLACE) 8.6-50 MG per tablet   fenofibrate 160 MG tablet   simvastatin (ZOCOR) 10 MG tablet   allopurinol  (ZYLOPRIM) 100 MG tablet   levothyroxine (SYNTHROID/LEVOTHROID) 112 MCG tablet   VITAMIN D, CHOLECALCIFEROL, PO   folic acid (FOLVITE) 400 MCG tablet   Multiple Vitamins-Minerals (CENTRUM SILVER ULTRA WOMENS) TABS   Calcium-Vitamin D (CALCIUM + D PO)         Review of Systems   Constitutional: Positive for appetite change and chills.   Respiratory: Positive for cough and shortness of breath.    Cardiovascular: Positive for leg swelling. Negative for chest pain.   Gastrointestinal: Negative for nausea and vomiting.   Musculoskeletal: Positive for back pain.   Skin: Negative for rash.   Neurological: Positive for headaches.   All other systems reviewed and are negative.      Physical Exam   BP: 182/81  Pulse: 85  Heart Rate: 85  Temp: 99.5  F (37.5  C)  Resp: 22  Weight: 64.4 kg (142 lb)  SpO2: 95 %      Physical Exam   Constitutional: She is oriented to person, place, and time. She appears well-developed and well-nourished. No distress.   Very pleasant, conversant 89-year-old sharp minded, good humored lady who speaks in full sentences.   HENT:   Head: Normocephalic.   Mouth/Throat: Oropharynx is clear and moist.   Cardiovascular: Normal rate.  An irregular rhythm present.   Pulmonary/Chest: Effort normal. She has rales (coarse on left).   Abdominal: Soft. Bowel sounds are normal. There is no tenderness.   Musculoskeletal: Normal range of motion. She exhibits no edema or tenderness.   Neurological: She is alert and oriented to person, place, and time. No cranial nerve deficit.   Skin: Skin is warm and dry. No rash noted.   Psychiatric: She has a normal mood and affect.       ED Course  (with Medical Decision Making)    IV placed.  Labs drawn.  Chest x-ray ordered.  Influenza sent.    Influenza was negative.  White count up slightly at 11.3.  Lactic acid normal at 1.6.  Chest x-ray slightly increased interstitial opacities in the lingula which could be due to atelectasis or pneumonia.  Clinically I suspect the latter.   Small bilateral pleural effusions and mild scattered interstitial prominence is mostly stable.    BNP is elevated over 44,000.  She has not had any peripheral edema.  No orthopnea.  It has been over 10,000 in the past.  Echo in May 2017 showed normal LV function with an EF of 55-60%.  Troponin is up slightly at 0.097.  She has had no chest pain.  O2 sats have been dropping down to the 88% range.  Supplemental O2 is been applied.       IV Rocephin and Zithromax have been ordered. She will stay in the hospital for treatment of her pneumonia.    We can trend her troponin as well.  It's possible that she could have had some cardiac ischemia yesterday when she felt worse than she does today.  She denies chest pain this morning.    Dr. Turpin has assumed her care.  I wrote basic transition orders.         ED Course     Procedures               EKG Interpretation:      Interpreted by Alexandre Gautam  Time reviewed:0750   Symptoms at time of EKG: dyspnea   Rhythm: Normal sinus   Rate: 91  Axis: Normal  Ectopy: frequent Premature atrial contraction  Conduction: Normal  ST Segments/ T Waves: No ST-T wave changes, No acute ischemic changes and ST Segment Depression V5 and V6  Q Waves: None  Comparison to prior: Is unchanged compared to 520 6/17 and 1/19/17 except for subtle ST depression in V5-6 of less than 1 mm.    Clinical Impression: subtle ST depression V5-6, otherwise no change from previous.      Results for orders placed or performed during the hospital encounter of 01/28/18 (from the past 24 hour(s))   Influenza A/B antigen   Result Value Ref Range    Influenza A/B Agn Specimen Nasal     Influenza A Negative NEG^Negative    Influenza B Negative NEG^Negative   CBC with platelets differential   Result Value Ref Range    WBC 11.3 (H) 4.0 - 11.0 10e9/L    RBC Count 3.32 (L) 3.8 - 5.2 10e12/L    Hemoglobin 9.8 (L) 11.7 - 15.7 g/dL    Hematocrit 31.2 (L) 35.0 - 47.0 %    MCV 94 78 - 100 fl    MCH 29.5 26.5 - 33.0  pg    MCHC 31.4 (L) 31.5 - 36.5 g/dL    RDW 16.9 (H) 10.0 - 15.0 %    Platelet Count 227 150 - 450 10e9/L    Diff Method Automated Method     % Neutrophils 84.0 %    % Lymphocytes 8.4 %    % Monocytes 6.6 %    % Eosinophils 0.4 %    % Basophils 0.6 %    Absolute Neutrophil 9.5 (H) 1.6 - 8.3 10e9/L    Absolute Lymphocytes 0.9 0.8 - 5.3 10e9/L    Absolute Monocytes 0.7 0.0 - 1.3 10e9/L    Absolute Eosinophils 0.0 0.0 - 0.7 10e9/L    Absolute Basophils 0.1 0.0 - 0.2 10e9/L   Basic metabolic panel   Result Value Ref Range    Sodium 144 133 - 144 mmol/L    Potassium 3.9 3.4 - 5.3 mmol/L    Chloride 113 (H) 94 - 109 mmol/L    Carbon Dioxide 21 20 - 32 mmol/L    Anion Gap 10 3 - 14 mmol/L    Glucose 125 (H) 70 - 99 mg/dL    Urea Nitrogen 29 7 - 30 mg/dL    Creatinine 1.74 (H) 0.52 - 1.04 mg/dL    GFR Estimate 27 (L) >60 mL/min/1.7m2    GFR Estimate If Black 33 (L) >60 mL/min/1.7m2    Calcium 8.6 8.5 - 10.1 mg/dL   Nt probnp inpatient   Result Value Ref Range    N-Terminal Pro BNP Inpatient 02222 (H) 0 - 1800 pg/mL   Troponin I   Result Value Ref Range    Troponin I ES 0.097 (H) 0.000 - 0.045 ug/L   Lactic acid whole blood   Result Value Ref Range    Lactic Acid 1.6 0.7 - 2.0 mmol/L   XR Chest 2 Views    Narrative    XR CHEST 2 VW   1/28/2018 6:56 AM     INDICATION: Shortness of breath; .    COMPARISON: 5/26/2017.      Impression    IMPRESSION: Small bilateral pleural effusions. Mild scattered  interstitial prominence most of which is stable. Slightly increased  interstitial opacities in the lingula which could be due to  atelectasis or pneumonia. Stable mild cardiomegaly.    JAVIER PORTILLO MD           Assessments & Plan    (J18.9) Pneumonia of left lung due to infectious organism, unspecified part of lung  Comment: lingular  Plan: admit. IV Abx,    (R09.02) Hypoxia  Comment: 88% on RA  Plan: supplemental O2    (R79.89) Elevated brain natriuretic peptide (BNP) level  Comment:   Plan: watch for clinical signs of CHF.       (R74.8) Elevated troponin  Comment: 0.097  Plan:  May have some cardiac ischemia yesterday.  She is pain-free today.  Will trend.            I have reviewed the nursing notes.    I have reviewed the findings, diagnosis, plan and need for follow up with the patient.       ED to Inpatient Handoff:    Discussed with Dr Turpin at 0758  Patient accepted for Inpatient Stay  Pending studies include none  Code Status: Not Addressed           New Prescriptions    No medications on file       Final diagnoses:   Pneumonia of left lung due to infectious organism, unspecified part of lung - lingular   Hypoxia - 88% on RA   Elevated brain natriuretic peptide (BNP) level   Elevated troponin - 0.097       1/28/2018   [facility] EMERGENCY DEPARTMENT     Alexandre Gautam MD  01/28/18 0811

## 2018-01-28 NOTE — IP AVS SNAPSHOT
` ` Patient Information     Patient Name Sex     Mary Ellen Cuba ([id number]) Female 1924       Room Bed    251 251-01      Patient Demographics     Address Phone    [address] [address] [facility] [address] [phone] (Home)      Patient Ethnicity & Race     Ethnic Group Patient Race    American White      Emergency Contact(s)     Name Relation Home Work Mobile    Sally Alexander Other [phone]      Sukhjinder Mayer Other [phone]      Derek Mayer Brother [phone]      BRII MANN Friend [phone]      Lynn Macedo Friend [phone]        Documents on File        Status Date Received Description       Documents for the Patient    Privacy Notice - [place] Received 11     Insurance Card       External Medication Information Consent Accepted 17     Patient ID Received 13     Consent for Services - Hospital/Clinic Received () 11     Insurance Card Received 11     Consent for EHR Access  13 Copied from existing Consent for services - C/HOD collected on 2011    [facility] Specified Other       Consent for Services - Hospital/Clinic Received but Refused Insurance Consent () 04/10/13     Consent for Services/Privacy Notice - Hospital/Clinic Received () 17     Insurance Card Received 17 HP    Insurance Card Received 17 Medicare    Patient ID Received 17     HIM ELISHA Authorization  17 [facility]    Care Everywhere Prospective Auth Received 17     HIM ELISHA Authorization  12/15/17 [facility]    Insurance Card Received 18 HP    Insurance Card Received 18 Medicare    Consent for Services/Privacy Notice - Hospital/Clinic Received 18     HIM ELISHA Authorization  (Deleted) 17 [employer] [facility]/ [facility]       Documents for the Encounter    CMS IM for Patient Signature Received 18     Discharge Attachment   TORSEMIDE ORAL TABLET (ENGLISH)    Discharge Attachment    CEFDINIR ORAL CAPSULE (ENGLISH)    Discharge Attachment   CLOPIDOGREL TABLETS (ENGLISH)    ECG   ECG Report    ECG   ECG Report      Admission Information     Attending Provider Admitting Provider Admission Type Admission Date/Time    Liborio Hendrickson MD Peterson, Shabana Bishop MD Emergency 01/28/18  0546    Discharge Date Hospital Service Auth/Cert Status Service Area     Hospitalist Incomplete [facility]    Unit Room/Bed Admission Status       [facility] 2A MEDICAL SURGICAL 251/251-01 Admission (Confirmed)       Admission     Complaint    Lingular pneumonia, Pneumonia      Hospital Account     Name Acct ID Class Status Primary Coverage    Mary Ellen Cuba [id number] Inpatient Open MEDICARE - MEDICARE RR FOR HB SUPPLEMENT            Guarantor Account (for Hospital Account #[id number])     Name Relation to Pt Service Area Active? Acct Type    Mary Ellen Cuba Self FCS Yes Personal/Family    Address Phone          [address] [place] [address] [phone](H)              Coverage Information (for Hospital Account #[id number])     1. MEDICARE/MEDICARE RR FOR HB SUPPLEMENT     F/O Payor/Plan Precert #    MEDICARE/MEDICARE RR FOR HB SUPPLEMENT     Subscriber Subscriber #    Mary Ellen Cuba [id number]    Address Phone    [address]  [place] [address] [phone]          2. HEALTH[employer]ERS/HEALTH[employer]ERS FREEDOM PLAN     F/O Payor/Plan Precert #    HE[employer]NERS/HEALTH[employer]ERS FREEDOM PLAN     Subscriber Subscriber #    Mary Ellen Cuba [id number]    Address Phone    [address]  [place] [address] [phone]

## 2018-01-28 NOTE — ED NOTES
ED Nursing criteria listed below was addressed during verbal handoff:     Abnormal vitals: Yes Low sats on room air  Abnormal results: Yes see labs  Med Reconciliation completed: No friend to check med box and let us know if she took AM meds  Meds given in ED: Yes  Any Overdue Meds: No  Core Measures: N/A  Isolation: Yes  Special needs: Yes fall risk, needs O2  Skin assessment: Yes    Observation Patient  Education provided: N/A

## 2018-01-29 ENCOUNTER — APPOINTMENT (OUTPATIENT)
Dept: CARDIOLOGY | Facility: CLINIC | Age: 83
DRG: 280 | End: 2018-01-29
Attending: FAMILY MEDICINE
Payer: MEDICARE

## 2018-01-29 PROBLEM — I48.0 PAROXYSMAL ATRIAL FIBRILLATION (H): Status: ACTIVE | Noted: 2018-01-29

## 2018-01-29 PROBLEM — D63.1 ANEMIA IN CHRONIC KIDNEY DISEASE: Status: ACTIVE | Noted: 2017-01-19

## 2018-01-29 PROBLEM — I51.9 SYSTOLIC DYSFUNCTION, LEFT VENTRICLE: Status: ACTIVE | Noted: 2018-01-29

## 2018-01-29 PROBLEM — N18.9 ANEMIA IN CHRONIC KIDNEY DISEASE: Status: ACTIVE | Noted: 2017-01-19

## 2018-01-29 PROBLEM — I51.7 LVH (LEFT VENTRICULAR HYPERTROPHY): Status: ACTIVE | Noted: 2018-01-29

## 2018-01-29 PROBLEM — Z86.79 H/O CONGESTIVE HEART FAILURE: Status: ACTIVE | Noted: 2017-05-26

## 2018-01-29 PROBLEM — I27.20 MODERATE TO SEVERE PULMONARY HYPERTENSION (H): Status: ACTIVE | Noted: 2018-01-29

## 2018-01-29 PROBLEM — I34.0 MODERATE MITRAL REGURGITATION: Status: ACTIVE | Noted: 2018-01-29

## 2018-01-29 PROBLEM — I51.7 RVH (RIGHT VENTRICULAR HYPERTROPHY): Status: ACTIVE | Noted: 2018-01-29

## 2018-01-29 PROBLEM — R79.89 ELEVATED BRAIN NATRIURETIC PEPTIDE (BNP) LEVEL: Status: ACTIVE | Noted: 2018-01-29

## 2018-01-29 PROBLEM — I51.89 DIASTOLIC DYSFUNCTION: Status: ACTIVE | Noted: 2018-01-29

## 2018-01-29 LAB
BACTERIA SPEC CULT: NORMAL
CHOLEST SERPL-MCNC: 136 MG/DL
GLUCOSE BLDC GLUCOMTR-MCNC: 108 MG/DL (ref 70–99)
GLUCOSE BLDC GLUCOMTR-MCNC: 114 MG/DL (ref 70–99)
GLUCOSE BLDC GLUCOMTR-MCNC: 147 MG/DL (ref 70–99)
GLUCOSE BLDC GLUCOMTR-MCNC: 94 MG/DL (ref 70–99)
HDLC SERPL-MCNC: 54 MG/DL
LDLC SERPL CALC-MCNC: 68 MG/DL
LMWH PPP CHRO-ACNC: 0.64 IU/ML
LMWH PPP CHRO-ACNC: <0.1 IU/ML
NONHDLC SERPL-MCNC: 82 MG/DL
SPECIMEN SOURCE: NORMAL
TRIGL SERPL-MCNC: 71 MG/DL
TROPONIN I SERPL-MCNC: 0.41 UG/L (ref 0–0.04)
TROPONIN I SERPL-MCNC: 0.55 UG/L (ref 0–0.04)

## 2018-01-29 PROCEDURE — 25000128 H RX IP 250 OP 636: Performed by: PEDIATRICS

## 2018-01-29 PROCEDURE — A9270 NON-COVERED ITEM OR SERVICE: HCPCS | Mod: GY | Performed by: FAMILY MEDICINE

## 2018-01-29 PROCEDURE — 25000132 ZZH RX MED GY IP 250 OP 250 PS 637: Mod: GY | Performed by: FAMILY MEDICINE

## 2018-01-29 PROCEDURE — A9270 NON-COVERED ITEM OR SERVICE: HCPCS | Mod: GY | Performed by: PEDIATRICS

## 2018-01-29 PROCEDURE — 84484 ASSAY OF TROPONIN QUANT: CPT | Performed by: FAMILY MEDICINE

## 2018-01-29 PROCEDURE — 25000128 H RX IP 250 OP 636: Performed by: FAMILY MEDICINE

## 2018-01-29 PROCEDURE — 93306 TTE W/DOPPLER COMPLETE: CPT | Mod: 26 | Performed by: INTERNAL MEDICINE

## 2018-01-29 PROCEDURE — 25000132 ZZH RX MED GY IP 250 OP 250 PS 637: Mod: GY | Performed by: PEDIATRICS

## 2018-01-29 PROCEDURE — 27210995 ZZH RX 272: Performed by: FAMILY MEDICINE

## 2018-01-29 PROCEDURE — 85520 HEPARIN ASSAY: CPT | Performed by: FAMILY MEDICINE

## 2018-01-29 PROCEDURE — 80061 LIPID PANEL: CPT | Performed by: FAMILY MEDICINE

## 2018-01-29 PROCEDURE — 93306 TTE W/DOPPLER COMPLETE: CPT

## 2018-01-29 PROCEDURE — 00000146 ZZHCL STATISTIC GLUCOSE BY METER IP

## 2018-01-29 PROCEDURE — 12000007 ZZH R&B INTERMEDIATE

## 2018-01-29 PROCEDURE — 99233 SBSQ HOSP IP/OBS HIGH 50: CPT | Performed by: PEDIATRICS

## 2018-01-29 PROCEDURE — 36415 COLL VENOUS BLD VENIPUNCTURE: CPT | Performed by: FAMILY MEDICINE

## 2018-01-29 RX ORDER — NALOXONE HYDROCHLORIDE 0.4 MG/ML
.1-.4 INJECTION, SOLUTION INTRAMUSCULAR; INTRAVENOUS; SUBCUTANEOUS
Status: DISCONTINUED | OUTPATIENT
Start: 2018-01-29 | End: 2018-02-02 | Stop reason: HOSPADM

## 2018-01-29 RX ORDER — CLOPIDOGREL BISULFATE 75 MG/1
75 TABLET ORAL DAILY
Status: DISCONTINUED | OUTPATIENT
Start: 2018-01-30 | End: 2018-02-02 | Stop reason: HOSPADM

## 2018-01-29 RX ORDER — ASPIRIN 81 MG/1
81 TABLET ORAL DAILY
Status: DISCONTINUED | OUTPATIENT
Start: 2018-01-30 | End: 2018-02-02 | Stop reason: HOSPADM

## 2018-01-29 RX ORDER — ACETAMINOPHEN 325 MG/1
975 TABLET ORAL EVERY 6 HOURS PRN
Status: DISCONTINUED | OUTPATIENT
Start: 2018-01-29 | End: 2018-02-02 | Stop reason: HOSPADM

## 2018-01-29 RX ORDER — HEPARIN SODIUM 5000 [USP'U]/.5ML
5000 INJECTION, SOLUTION INTRAVENOUS; SUBCUTANEOUS EVERY 12 HOURS
Status: DISCONTINUED | OUTPATIENT
Start: 2018-01-29 | End: 2018-02-02 | Stop reason: HOSPADM

## 2018-01-29 RX ORDER — AZITHROMYCIN 250 MG/1
250 TABLET, FILM COATED ORAL DAILY
Status: COMPLETED | OUTPATIENT
Start: 2018-01-30 | End: 2018-02-01

## 2018-01-29 RX ADMIN — ACETAMINOPHEN 975 MG: 325 TABLET ORAL at 21:24

## 2018-01-29 RX ADMIN — METOPROLOL SUCCINATE 25 MG: 25 TABLET, EXTENDED RELEASE ORAL at 08:53

## 2018-01-29 RX ADMIN — POTASSIUM CITRATE 20 MEQ: 10 TABLET ORAL at 08:54

## 2018-01-29 RX ADMIN — FUROSEMIDE 20 MG: 20 TABLET ORAL at 08:54

## 2018-01-29 RX ADMIN — LISINOPRIL 2.5 MG: 2.5 TABLET ORAL at 08:54

## 2018-01-29 RX ADMIN — HEPARIN SODIUM 5000 UNITS: 5000 INJECTION, SOLUTION INTRAVENOUS; SUBCUTANEOUS at 21:25

## 2018-01-29 RX ADMIN — HEPARIN SODIUM 1100 UNITS/HR: 10000 INJECTION, SOLUTION INTRAVENOUS at 06:54

## 2018-01-29 RX ADMIN — ASPIRIN 325 MG ORAL TABLET 325 MG: 325 PILL ORAL at 08:55

## 2018-01-29 RX ADMIN — AMLODIPINE BESYLATE 5 MG: 5 TABLET ORAL at 08:55

## 2018-01-29 RX ADMIN — AZITHROMYCIN MONOHYDRATE 250 MG: 500 INJECTION, POWDER, LYOPHILIZED, FOR SOLUTION INTRAVENOUS at 08:27

## 2018-01-29 RX ADMIN — LEVOTHYROXINE SODIUM 112 MCG: 112 TABLET ORAL at 08:53

## 2018-01-29 RX ADMIN — SIMVASTATIN 10 MG: 5 TABLET, FILM COATED ORAL at 21:25

## 2018-01-29 RX ADMIN — CEFTRIAXONE 2 G: 2 INJECTION, POWDER, FOR SOLUTION INTRAMUSCULAR; INTRAVENOUS at 10:02

## 2018-01-29 NOTE — PLAN OF CARE
Problem: Patient Care Overview  Goal: Plan of Care/Patient Progress Review  Vital signs stable, afebrile. Oxygen saturation was dropping to 87-88% when sleeping so NC oxygen was increased to 3 LPM and saturation stayed above 90%. Patient stated she hasn't slept much at all during the night and feels weak and tired.

## 2018-01-29 NOTE — PROGRESS NOTES
[facility]    Hospitalist Progress Note    Date of Service (when I saw the patient): 01/29/2018    Assessment & Plan   Mary Ellen Cuba is a 89 year old female who was admitted on 1/28/2018 with suspected lingular pneumonia and non-ST elevation MI.  She is generally clinically improved.  She continues to have pain in her left lower back thought to be more likely due to pneumonia than angina.  Troponin is trending downward.  It remains uncertain as to whether she may have had non-ST elevation MI associated with CAD versus known ventricular hypertrophy.  BNP is severely elevated and increased compared with previously which could be consistent with acute CHF and her weight is increased about 10-15 pounds compared with her previous baseline.  However, she does not otherwise demonstrate overt signs of decompensated CHF.  Paroxysmal atrial fibrillation is also possible although ventricular rate appears controlled.  Although stable, her severe chronic kidney disease may also contribute to elevated BNP.  Anemia associate with severe chronic kidney disease is stable without apparent active bleeding.  Glycemic control with underlying type 2 diabetes is good.    Active Problems:    Lingular pneumonia, organism unspecified    Non-ST elevated myocardial infarction (non-STEMI) (H)    Coronary artery disease involving native coronary artery of native heart without angina pectoris    Type 2 diabetes mellitus without complication (H)    CKD (chronic kidney disease) stage 4, GFR 15-29 ml/min (H)    Hypoxia    LVH (left ventricular hypertrophy)    RVH (right ventricular hypertrophy)    Elevated brain natriuretic peptide (BNP) level 44,344 at admit    Paroxysmal atrial fibrillation (H) - possible    Essential hypertension    Hyperlipidemia LDL goal <100    Hypothyroidism due to acquired atrophy of thyroid    Anemia in chronic kidney disease    H/O congestive heart failure    History of CVA  (cerebrovascular accident)    Blind - patient reported    Echocardiogram today  Continue IV heparin for now, possible discontinuation of IV heparin today depending upon echocardiogram results   Consider adding Plavix when IV heparin is discontinued  Switch IV to oral Zithromax and continue ceftriaxone for now  Wean oxygen as tolerated for saturations 90% and higher  Advance activity as tolerated to begin to plan for disposition  Discontinue IV fluids, continue chronic Lasix dose for now although would have low threshold to increase oral Lasix or switch to IV Lasix if there were increasing concerns for pulmonary edema    DVT Prophylaxis: IV heparin  Code Status: DNR/DNI    Disposition: Expected discharge in 1-3 days.    Liborio Hendrickson    Interval History   She says she is starting to feel better today.  Her cough continues and remains mostly dry.  She denies shortness of breath at rest although has not yet been active.  She denies chest pain.  She continues to have pain in her left mid back below her shoulder blade, and that pain is much less severe than then it had been before she presented to the hospital.  That pain does not seem to worsen with activity, coughing, or deeper breaths.  She offers no new complaints.  She remains afebrile.  Vital signs have been stable aside from elevated blood pressure readings.  Oxygenation has been generally stable on oxygen therapy.  She is tolerating oral intake and voiding spontaneously.    -Data reviewed today: I reviewed all new labs and imaging results over the last 24 hours. I personally reviewed telemetry which demonstrates possible atrial fibrillation although most strips appear more consistent with sinus rhythm with first-degree AV block.    Physical Exam   Temp: 99.4  F (37.4  C) Temp src: Oral BP: 165/71 Pulse: 72 Heart Rate: 67 Resp: 18 SpO2: 92 % O2 Device: Nasal cannula Oxygen Delivery: 2 LPM  Vitals:    01/28/18 0548 01/28/18 0937 01/29/18 0700   Weight: 64.4 kg  (142 lb) 66.6 kg (146 lb 13.2 oz) 68.5 kg (151 lb 0.2 oz)     Vital Signs with Ranges  Temp:  [97.6  F (36.4  C)-99.4  F (37.4  C)] 99.4  F (37.4  C)  Pulse:  [67-78] 72  Heart Rate:  [67-72] 67  Resp:  [16-20] 18  BP: (142-168)/(51-71) 165/71  SpO2:  [86 %-96 %] 92 %  I/O last 3 completed shifts:  In: 687 [P.O.:480; I.V.:207]  Out: -     Constitutional: No acute distress resting in bed  Respiratory: Normal respiratory effort, good air movement, right lung is clear, inspiratory crackles are present at the left base, no wheezes  Cardiovascular: Regular rate and rhythm, normal capillary refill  Neuro: Alert and maintains wakefulness and attention  Other: Minimal pitting edema in the lower extremities    Medications     - MEDICATION INSTRUCTIONS -       NaCl 50 mL/hr at 01/28/18 1446     HEParin 1,100 Units/hr (01/29/18 0654)     - MEDICATION INSTRUCTIONS -       - MEDICATION INSTRUCTIONS -         [START ON 1/30/2018] azithromycin  250 mg Oral Daily     cefTRIAXone  2 g Intravenous Q24H     amLODIPine  5 mg Oral Daily     furosemide  20 mg Oral Daily     levothyroxine  112 mcg Oral Daily     metoprolol succinate  25 mg Oral Daily     potassium citrate  20 mEq Oral Daily     sodium chloride (PF)  3 mL Intracatheter Q8H     aspirin  325 mg Oral Daily     lisinopril  2.5 mg Oral Daily     insulin aspart  1-3 Units Subcutaneous TID AC     insulin aspart  1-3 Units Subcutaneous At Bedtime     simvastatin  10 mg Oral At Bedtime       Data   Data reviewed today:  I personally reviewed telemetry.    Recent Labs  Lab 01/29/18  0615 01/29/18  0013 01/28/18  1745 01/28/18  1357  01/28/18  0630   WBC  --   --   --  10.4  --  11.3*   HGB  --   --   --  8.8*  --  9.8*   MCV  --   --   --  95  --  94   PLT  --   --   --  194  --  227   NA  --   --   --   --   --  144   POTASSIUM  --   --   --   --   --  3.9   CHLORIDE  --   --   --   --   --  113*   CO2  --   --   --   --   --  21   BUN  --   --   --   --   --  29   CR  --   --   --    --   --  1.74*   ANIONGAP  --   --   --   --   --  10   ZULEMA  --   --   --   --   --  8.6   GLC  --   --   --   --   --  125*   TROPI 0.411* 0.550* 0.600*  --   < > 0.097*   < > = values in this interval not displayed.    LDL cholesterol 68  Blood sugars have ranged from  over the last 24 hours, hemoglobin A1c was 5.3  BNP 44,344 on admission, increased compared with 11,792 on May 26, 2017

## 2018-01-29 NOTE — PROGRESS NOTES
SPIRITUAL HEALTH SERVICES  SPIRITUAL ASSESSMENT Progress Note  [facility]      Staff referral - Nurse said pt would appreciate someone to whom she could talk.  (Pt known to  from previous hospitalization.)  When  entered the room, pt was on the phone with her nephew.  After they concluded, she said he was very good about checking on her.  She proceeded to express gratitude for some of her neighbors and said she didn't know what she would do without them.  She then talked about the challenges of aging.   provided supportive listening.   is available for pt/family needs.    Rosas Bowie M.Div., [facility]  Staff   Office tel: [phone]

## 2018-01-29 NOTE — CONSULTS
Care Transition Initial Assessment - RN  Reason For Consult: discharge planning   Met with: Patient.    DATA   Active Problems:    Essential hypertension    Coronary artery disease involving native coronary artery of native heart without angina pectoris    Hyperlipidemia LDL goal <100    Hypothyroidism due to acquired atrophy of thyroid    Type 2 diabetes mellitus without complication (H)    Anemia in chronic kidney disease    H/O congestive heart failure    CKD (chronic kidney disease) stage 4, GFR 15-29 ml/min (H)    History of CVA (cerebrovascular accident)    Blind - patient reported    Lingular pneumonia, organism unspecified    Non-ST elevated myocardial infarction (non-STEMI) (H)    Hypoxia    LVH (left ventricular hypertrophy)    RVH (right ventricular hypertrophy)    Elevated brain natriuretic peptide (BNP) level 44,344 at admit    Paroxysmal atrial fibrillation (H) - possible       Primary Care Clinic Name: [facility]  Primary Care MD Name: Samantha KRISHNA  Contact information and PCP information verified: Yes    ASSESSMENT  Cognitive Status: awake, alert and oriented.             Lives With: alone  Living Arrangements: apartment     Description of Support System: Supportive, Involved   Who is your support system?: Neighbor       Insurance Concerns: No Insurance issues identified      This writer met with pt and her friend, introduced self and role. Discussed discharge planning and Medicare guidelines in regards to home care and SNF benefits. Patient lives in an apartment with support of her neighbors. Pt has a life alert. Discussed home care potential with patient who adamantly refused having home care come to her house.      PLAN    Home      Discharge Planner   Discharge Plans in progress: Home  Barriers to discharge plan: Medical stability  Follow up plan: Handoff to [facility]       Entered by: ELLY LAU 01/29/2018 10:49 AM         Selam Orourke, MSN, RN, Care Coordinator  [place]  [phone]  [facility] [phone]

## 2018-01-29 NOTE — PROGRESS NOTES
S-(situation): Heparin XA level result <0.10    B-(background): MI - elevating troponin.    A-(assessment): Increase heparin rate by 300 units per hour = 1100 units per hour. Give bolus of 3350 units heparin.    R-(recommendations): Recheck in 6 hours.

## 2018-01-29 NOTE — PLAN OF CARE
Problem: Patient Care Overview  Goal: Plan of Care/Patient Progress Review  Outcome: Improving  Heparin 10 A recheck at 1645, heparin infusing at 900 units /hr presently.  Denies pain oxycodone available prn.  Max T 100.4   Up with assist of 1 to bathroom.  Attempted to wean off O2 sat's drop to 86%  On 2 L O2 n/c sat's 91%.Had a run of PVC this afternoon see Tele strip, pt was asymptomatic Sat's dropped during this, increased O2 to  4 L O2 to keep about 89%.

## 2018-01-29 NOTE — PROGRESS NOTES
S-(situation): Heparin XA level result 0.18     B-(background): MI - elevating troponin.    A-(assessment): No change in heparin rate, continue at 800 units per hour.    R-(recommendations): Recheck in 6 hours.

## 2018-01-30 ENCOUNTER — APPOINTMENT (OUTPATIENT)
Dept: GENERAL RADIOLOGY | Facility: CLINIC | Age: 83
DRG: 280 | End: 2018-01-30
Attending: INTERNAL MEDICINE
Payer: MEDICARE

## 2018-01-30 PROBLEM — J96.01 ACUTE RESPIRATORY FAILURE WITH HYPOXIA (H): Status: ACTIVE | Noted: 2018-01-30

## 2018-01-30 LAB
ANION GAP SERPL CALCULATED.3IONS-SCNC: 7 MMOL/L (ref 3–14)
BUN SERPL-MCNC: 35 MG/DL (ref 7–30)
CALCIUM SERPL-MCNC: 8.3 MG/DL (ref 8.5–10.1)
CHLORIDE SERPL-SCNC: 111 MMOL/L (ref 94–109)
CO2 SERPL-SCNC: 24 MMOL/L (ref 20–32)
CREAT SERPL-MCNC: 2.17 MG/DL (ref 0.52–1.04)
GFR SERPL CREATININE-BSD FRML MDRD: 21 ML/MIN/1.7M2
GLUCOSE BLDC GLUCOMTR-MCNC: 125 MG/DL (ref 70–99)
GLUCOSE BLDC GLUCOMTR-MCNC: 129 MG/DL (ref 70–99)
GLUCOSE BLDC GLUCOMTR-MCNC: 135 MG/DL (ref 70–99)
GLUCOSE BLDC GLUCOMTR-MCNC: 143 MG/DL (ref 70–99)
GLUCOSE BLDC GLUCOMTR-MCNC: 165 MG/DL (ref 70–99)
GLUCOSE SERPL-MCNC: 111 MG/DL (ref 70–99)
HGB BLD-MCNC: 9.1 G/DL (ref 11.7–15.7)
NT-PROBNP SERPL-MCNC: ABNORMAL PG/ML (ref 0–1800)
POTASSIUM SERPL-SCNC: 4.4 MMOL/L (ref 3.4–5.3)
SODIUM SERPL-SCNC: 142 MMOL/L (ref 133–144)
TROPONIN I SERPL-MCNC: 0.26 UG/L (ref 0–0.04)
TROPONIN I SERPL-MCNC: 0.28 UG/L (ref 0–0.04)

## 2018-01-30 PROCEDURE — 99232 SBSQ HOSP IP/OBS MODERATE 35: CPT | Performed by: INTERNAL MEDICINE

## 2018-01-30 PROCEDURE — 25000128 H RX IP 250 OP 636: Performed by: INTERNAL MEDICINE

## 2018-01-30 PROCEDURE — A9270 NON-COVERED ITEM OR SERVICE: HCPCS | Mod: GY | Performed by: FAMILY MEDICINE

## 2018-01-30 PROCEDURE — 25000125 ZZHC RX 250: Performed by: INTERNAL MEDICINE

## 2018-01-30 PROCEDURE — 25000132 ZZH RX MED GY IP 250 OP 250 PS 637: Mod: GY | Performed by: FAMILY MEDICINE

## 2018-01-30 PROCEDURE — 25000128 H RX IP 250 OP 636: Performed by: PEDIATRICS

## 2018-01-30 PROCEDURE — 84484 ASSAY OF TROPONIN QUANT: CPT | Performed by: INTERNAL MEDICINE

## 2018-01-30 PROCEDURE — 25000131 ZZH RX MED GY IP 250 OP 636 PS 637: Mod: GY | Performed by: FAMILY MEDICINE

## 2018-01-30 PROCEDURE — 71045 X-RAY EXAM CHEST 1 VIEW: CPT | Mod: TC

## 2018-01-30 PROCEDURE — 25000128 H RX IP 250 OP 636: Performed by: FAMILY MEDICINE

## 2018-01-30 PROCEDURE — 27210995 ZZH RX 272: Performed by: FAMILY MEDICINE

## 2018-01-30 PROCEDURE — 83880 ASSAY OF NATRIURETIC PEPTIDE: CPT | Performed by: PEDIATRICS

## 2018-01-30 PROCEDURE — 36415 COLL VENOUS BLD VENIPUNCTURE: CPT | Performed by: PEDIATRICS

## 2018-01-30 PROCEDURE — A9270 NON-COVERED ITEM OR SERVICE: HCPCS | Mod: GY | Performed by: PEDIATRICS

## 2018-01-30 PROCEDURE — 25000128 H RX IP 250 OP 636

## 2018-01-30 PROCEDURE — 80048 BASIC METABOLIC PNL TOTAL CA: CPT | Performed by: PEDIATRICS

## 2018-01-30 PROCEDURE — 12000007 ZZH R&B INTERMEDIATE

## 2018-01-30 PROCEDURE — 84484 ASSAY OF TROPONIN QUANT: CPT | Performed by: PEDIATRICS

## 2018-01-30 PROCEDURE — 85018 HEMOGLOBIN: CPT | Performed by: PEDIATRICS

## 2018-01-30 PROCEDURE — 36415 COLL VENOUS BLD VENIPUNCTURE: CPT | Performed by: INTERNAL MEDICINE

## 2018-01-30 PROCEDURE — 25000132 ZZH RX MED GY IP 250 OP 250 PS 637: Mod: GY | Performed by: PEDIATRICS

## 2018-01-30 PROCEDURE — 00000146 ZZHCL STATISTIC GLUCOSE BY METER IP

## 2018-01-30 RX ORDER — LISINOPRIL 2.5 MG/1
2.5 TABLET ORAL 2 TIMES DAILY
Status: DISCONTINUED | OUTPATIENT
Start: 2018-01-30 | End: 2018-01-30

## 2018-01-30 RX ORDER — METOPROLOL TARTRATE 1 MG/ML
5 INJECTION, SOLUTION INTRAVENOUS EVERY 4 HOURS PRN
Status: DISCONTINUED | OUTPATIENT
Start: 2018-01-30 | End: 2018-02-02 | Stop reason: HOSPADM

## 2018-01-30 RX ORDER — FUROSEMIDE 10 MG/ML
100 INJECTION INTRAMUSCULAR; INTRAVENOUS ONCE
Status: DISCONTINUED | OUTPATIENT
Start: 2018-01-30 | End: 2018-01-30

## 2018-01-30 RX ORDER — MORPHINE SULFATE 2 MG/ML
INJECTION, SOLUTION INTRAMUSCULAR; INTRAVENOUS
Status: COMPLETED
Start: 2018-01-30 | End: 2018-01-30

## 2018-01-30 RX ORDER — MORPHINE SULFATE 2 MG/ML
1-2 INJECTION, SOLUTION INTRAMUSCULAR; INTRAVENOUS EVERY 4 HOURS PRN
Status: DISCONTINUED | OUTPATIENT
Start: 2018-01-30 | End: 2018-02-02 | Stop reason: HOSPADM

## 2018-01-30 RX ORDER — FUROSEMIDE 10 MG/ML
20 INJECTION INTRAMUSCULAR; INTRAVENOUS ONCE
Status: COMPLETED | OUTPATIENT
Start: 2018-01-30 | End: 2018-01-30

## 2018-01-30 RX ORDER — IPRATROPIUM BROMIDE AND ALBUTEROL SULFATE 2.5; .5 MG/3ML; MG/3ML
3 SOLUTION RESPIRATORY (INHALATION) ONCE
Status: COMPLETED | OUTPATIENT
Start: 2018-01-30 | End: 2018-01-30

## 2018-01-30 RX ADMIN — CEFTRIAXONE 2 G: 2 INJECTION, POWDER, FOR SOLUTION INTRAMUSCULAR; INTRAVENOUS at 08:40

## 2018-01-30 RX ADMIN — OXYCODONE HYDROCHLORIDE 2.5 MG: 5 TABLET ORAL at 05:43

## 2018-01-30 RX ADMIN — ASPIRIN 81 MG: 81 TABLET, COATED ORAL at 08:41

## 2018-01-30 RX ADMIN — FUROSEMIDE 20 MG: 20 TABLET ORAL at 08:41

## 2018-01-30 RX ADMIN — FUROSEMIDE 200 MG/HR: 10 INJECTION, SOLUTION INTRAVENOUS at 17:54

## 2018-01-30 RX ADMIN — IPRATROPIUM BROMIDE AND ALBUTEROL SULFATE 3 ML: .5; 3 SOLUTION RESPIRATORY (INHALATION) at 06:04

## 2018-01-30 RX ADMIN — NITROGLYCERIN 0.4 MG: 0.4 TABLET SUBLINGUAL at 06:36

## 2018-01-30 RX ADMIN — METOPROLOL SUCCINATE 25 MG: 25 TABLET, EXTENDED RELEASE ORAL at 06:47

## 2018-01-30 RX ADMIN — NITROGLYCERIN 0.4 MG: 0.4 TABLET SUBLINGUAL at 06:31

## 2018-01-30 RX ADMIN — FUROSEMIDE 20 MG: 10 INJECTION, SOLUTION INTRAVENOUS at 06:04

## 2018-01-30 RX ADMIN — AMLODIPINE BESYLATE 5 MG: 5 TABLET ORAL at 08:40

## 2018-01-30 RX ADMIN — LEVOTHYROXINE SODIUM 112 MCG: 112 TABLET ORAL at 08:41

## 2018-01-30 RX ADMIN — AZITHROMYCIN 250 MG: 250 TABLET, FILM COATED ORAL at 08:41

## 2018-01-30 RX ADMIN — HEPARIN SODIUM 5000 UNITS: 5000 INJECTION, SOLUTION INTRAVENOUS; SUBCUTANEOUS at 22:12

## 2018-01-30 RX ADMIN — MORPHINE SULFATE 2 MG: 2 INJECTION, SOLUTION INTRAMUSCULAR; INTRAVENOUS at 06:32

## 2018-01-30 RX ADMIN — CLOPIDOGREL BISULFATE 75 MG: 75 TABLET, FILM COATED ORAL at 08:41

## 2018-01-30 RX ADMIN — NITROGLYCERIN 0.4 MG: 0.4 TABLET SUBLINGUAL at 06:41

## 2018-01-30 RX ADMIN — INSULIN ASPART 1 UNITS: 100 INJECTION, SOLUTION INTRAVENOUS; SUBCUTANEOUS at 12:33

## 2018-01-30 RX ADMIN — SIMVASTATIN 10 MG: 5 TABLET, FILM COATED ORAL at 22:12

## 2018-01-30 RX ADMIN — LISINOPRIL 2.5 MG: 2.5 TABLET ORAL at 06:47

## 2018-01-30 RX ADMIN — POTASSIUM CITRATE 20 MEQ: 10 TABLET ORAL at 08:40

## 2018-01-30 RX ADMIN — HEPARIN SODIUM 5000 UNITS: 5000 INJECTION, SOLUTION INTRAVENOUS; SUBCUTANEOUS at 08:40

## 2018-01-30 ASSESSMENT — PAIN DESCRIPTION - DESCRIPTORS
DESCRIPTORS: SHARP;SHOOTING
DESCRIPTORS: SHARP
DESCRIPTORS: SHARP

## 2018-01-30 NOTE — PROGRESS NOTES
Patient woke with sudden shortness of breath requiring oxygen to be increased from 4L oxymask to 10L to maintain sats 89-90%. Pt reports severe back pain and states it has changed from just the left side of her back now throughout her entire back. Blood pressure very elevated (see flowsheet). Pt is anxious. Lungs tight and wheezy throughout changed from earlier assessment. MD notified. Orders obtained

## 2018-01-30 NOTE — PLAN OF CARE
Problem: Patient Care Overview  Goal: Plan of Care/Patient Progress Review  Patient had maintained oxygen sats on 2-4L via nasal cannula throughout most of the night. She did need additional oxygen at times but it seemed to be poor oximeter readings more than actual need. Pt denied any pain and denied feeling short of breath. Lungs were clear/diminished throughout. Approximately 6am patient woke feeling very anxious and short of breath. Oxygen needed to be increased from 4L to 10L to maintain sats at 89-90%. Lungs tight and wheezy. Pt complaining of severe upper back pain. Blood pressure very elevated (see flowsheet). IV lasix, duoneb and oxycodone given. EKG completed and additional lab work done. 3 doses of Nitro, Morphine and morning Lisinopril and Metoprolol doses given as well. Blood pressure now improved and pt is feeling much better than she was. Breathing more relaxed and pt is resting. Will continue to monitor closely and transfer to ICU if necessary per MD.

## 2018-01-30 NOTE — PROGRESS NOTES
Increased SOB this am.  No CP but increased work of breathing with rales and wheezing.  Her cardiac exam is irregular.  Will get CXR, EKG, troponin, BNP, hgb.  Will give IV lasix and one duoneb while awaiting lab, CXR and EKG.  electronically signed by Cordell Villanueva M.D.

## 2018-01-30 NOTE — PROGRESS NOTES
[facility]    Hospitalist Progress Note    Date of Service (when I saw the patient): 01/30/2018    Assessment & Plan   Mary Ellen Cuba is a 89 year old female who was admitted on 1/28/2018.     Active Problems:    Lingular pneumonia, organism unspecified    Assessment: CXR appears worse this morning and it does appear asymmetric on her CXR but clinically she had an abrupt worsening at about 6 am today with back pain and increased SOB which is not likely infectious in etiology    Plan: continue her current antibiotics for now      Non-ST elevated myocardial infarction (non-STEMI) (H)    Assessment: had abrupt onset of upper back pain with increased SOB and increased oxygen requirements.  Her EKG is showing more ST depression in V2 - V5.  Troponin is pending.  She has some rales and wheezing on exam.  Suspect she is having either unstable angina or recurrent NSTEMI.  Discussed once again regarding angiography and she wishes to manage medically.     Plan: will continue ASA, plavix, beta blocker and lisinopril.  Will give one dose of IV lasix while awaiting CXR and BNP as well as troponin. Will give IV morphine and sl NTG but if pain not resolving will need to transfer to ICU and start heparin and nitro drip      Acute respiratory failure with hypoxia (H)    Assessment: increased SOB with suspicion for cardiac ischemia    Plan: as above      Essential hypertension    Assessment: BP elevated    Plan: will give am meds now since her pressures are runing high      Coronary artery disease involving native coronary artery of native heart without angina pectoris    Assessment: as above    Plan: as above      Hyperlipidemia LDL goal <100    Assessment: chronic and on a statin    Plan: no change      Anemia in chronic kidney disease    Assessment: hgb stable without evidence of active bleeding    Plan: follow      CKD (chronic kidney disease) stage 4, GFR 15-29 ml/min (H)    Assessment:  creatinine increased slightly which would coincide with her reduced EF noted on her echo    Plan: follow for now      Blind - patient reported    Assessment: no intervention    Plan: as above      LVH (left ventricular hypertrophy)    Assessment: noted    Plan: manage BP      RVH (right ventricular hypertrophy)    Assessment: noted    Plan: no intervention      Elevated brain natriuretic peptide (BNP) level 44,344 at admit    Assessment: could be developing CHF but right now suspect her symptoms are more ischemic in nature    Plan: as above      Paroxysmal atrial fibrillation (H) - possible    Assessment: EKG with sinus rhythm with frequent ectopy    Plan: no intervention      Moderate pulmonary hypertension by Echo    Assessment: noted    Plan: no acute intervention      Moderate mitral regurgitation    Assessment: possible CHF as mentioned above    Plan: as above      Systolic dysfunction, left ventricle EF 40-45%    Assessment: on an ACEI and beta blocker and given lasix this am    Plan: as above      Diastolic dysfunction grade II    Assessment: as above    Plan: as above      Hypothyroidism due to acquired atrophy of thyroid    Assessment: normal free T4    Plan: no change      Type 2 diabetes mellitus without complication (H)    Assessment: BS well controlled    Plan: no change      History of CVA (cerebrovascular accident)    Assessment: no acute symptoms    Plan: no intervention    DVT Prophylaxis: Heparin SQ  Code Status: DNR/DNI    Disposition: Expected discharge in 2-3 days once hypoxia resolves, no recurrent cardiac ischemia.    Cordell Villanueva MD    Interval History   Complains of increased SOB abruptly this am and with associated upper back pain.     -Data reviewed today: I reviewed all new labs and imaging results over the last 24 hours. I personally reviewed no images or EKG's today.    Physical Exam   Temp: 97.8  F (36.6  C) Temp src: Oral BP: 178/77 Pulse: 65 Heart Rate: 71 Resp: (!) 32 SpO2:  91 % O2 Device: Oxymask Oxygen Delivery: 10 LPM  Vitals:    01/28/18 0937 01/29/18 0700 01/30/18 0345   Weight: 66.6 kg (146 lb 13.2 oz) 68.5 kg (151 lb 0.2 oz) 69.5 kg (153 lb 3.5 oz)     Vital Signs with Ranges  Temp:  [97.8  F (36.6  C)-100.7  F (38.2  C)] 97.8  F (36.6  C)  Pulse:  [38-72] 65  Heart Rate:  [63-79] 71  Resp:  [18-32] 32  BP: (138-209)/() 178/77  SpO2:  [84 %-93 %] 91 %  I/O last 3 completed shifts:  In: 942 [P.O.:360; I.V.:582]  Out: -     Lungs:     Increased work of breathing.  Rales and wheezing on her right side and rales in her left base     Cardiovascular:   RRR with frequent premature contractions with no murmur, rub or gallop but heart tones distant     Abdomen:   +BS.  Soft, NT         Medications     - MEDICATION INSTRUCTIONS -       - MEDICATION INSTRUCTIONS -         azithromycin  250 mg Oral Daily     aspirin EC  81 mg Oral Daily     clopidogrel  75 mg Oral Daily     heparin  5,000 Units Subcutaneous Q12H     cefTRIAXone  2 g Intravenous Q24H     amLODIPine  5 mg Oral Daily     furosemide  20 mg Oral Daily     levothyroxine  112 mcg Oral Daily     metoprolol succinate  25 mg Oral Daily     potassium citrate  20 mEq Oral Daily     sodium chloride (PF)  3 mL Intracatheter Q8H     lisinopril  2.5 mg Oral Daily     insulin aspart  1-3 Units Subcutaneous TID AC     insulin aspart  1-3 Units Subcutaneous At Bedtime     simvastatin  10 mg Oral At Bedtime       Data     Recent Labs  Lab 01/30/18  0556 01/29/18  0615 01/29/18  0013 01/28/18  1745 01/28/18  1357  01/28/18  0630   WBC  --   --   --   --  10.4  --  11.3*   HGB 9.1*  --   --   --  8.8*  --  9.8*   MCV  --   --   --   --  95  --  94   PLT  --   --   --   --  194  --  227     --   --   --   --   --  144   POTASSIUM 4.4  --   --   --   --   --  3.9   CHLORIDE 111*  --   --   --   --   --  113*   CO2 24  --   --   --   --   --  21   BUN 35*  --   --   --   --   --  29   CR 2.17*  --   --   --   --   --  1.74*   ANIONGAP 7   --   --   --   --   --  10   ZULEMA 8.3*  --   --   --   --   --  8.6   *  --   --   --   --   --  125*   TROPI  --  0.411* 0.550* 0.600*  --   < > 0.097*   < > = values in this interval not displayed.    Recent Results (from the past 24 hour(s))   XR Chest Port 1 View    Narrative    XR CHEST PORT 1 VW   1/30/2018 6:15 AM     HISTORY: Worsening SOB.     COMPARISON: 1/28/2018.    FINDINGS: Upright portable chest. The heart is at the upper limits of  normal in size without pulmonary edema. Thoracic aorta is calcified.  There is a new right perihilar and right upper lobe infiltrate. New  right pleural effusion. Atelectasis or scarring at the left lung base.  No pneumothorax.      Impression    IMPRESSION: New right upper lobe pneumonia and right pleural effusion.

## 2018-01-30 NOTE — PLAN OF CARE
Problem: Patient Care Overview  Goal: Plan of Care/Patient Progress Review  Patient denies pain. Continues to need 2-3L of O2. BP elevated, temp 100.7 and 100.1. Tele A-Fib with rates dropping into high 30's at times. Patient confused this afternoon, not orientated to time and situation. Lungs have fine crackles/diminished in lower lobes. Heparin gtt stopped at 1720. Decreased appetite for supper. Up with assist of one. Patient had O2 off and pulse ox off, when oximeter applied sats were 76% on RA, unsure how long oxygen tubing had been off patient. Continue to monitor patient.

## 2018-01-31 ENCOUNTER — APPOINTMENT (OUTPATIENT)
Dept: PHYSICAL THERAPY | Facility: CLINIC | Age: 83
DRG: 280 | End: 2018-01-31
Attending: PEDIATRICS
Payer: MEDICARE

## 2018-01-31 PROBLEM — I50.41 ACUTE COMBINED SYSTOLIC AND DIASTOLIC CONGESTIVE HEART FAILURE (H): Status: ACTIVE | Noted: 2018-01-29

## 2018-01-31 PROBLEM — N17.9 ACUTE KIDNEY INJURY (H): Status: ACTIVE | Noted: 2018-01-31

## 2018-01-31 LAB
ANION GAP SERPL CALCULATED.3IONS-SCNC: 7 MMOL/L (ref 3–14)
BUN SERPL-MCNC: 40 MG/DL (ref 7–30)
CALCIUM SERPL-MCNC: 8.3 MG/DL (ref 8.5–10.1)
CHLORIDE SERPL-SCNC: 109 MMOL/L (ref 94–109)
CO2 SERPL-SCNC: 24 MMOL/L (ref 20–32)
CREAT SERPL-MCNC: 2.44 MG/DL (ref 0.52–1.04)
GFR SERPL CREATININE-BSD FRML MDRD: 18 ML/MIN/1.7M2
GLUCOSE BLDC GLUCOMTR-MCNC: 113 MG/DL (ref 70–99)
GLUCOSE BLDC GLUCOMTR-MCNC: 121 MG/DL (ref 70–99)
GLUCOSE BLDC GLUCOMTR-MCNC: 124 MG/DL (ref 70–99)
GLUCOSE BLDC GLUCOMTR-MCNC: 146 MG/DL (ref 70–99)
GLUCOSE SERPL-MCNC: 118 MG/DL (ref 70–99)
HEMOCCULT SP1 STL QL: NEGATIVE
POTASSIUM SERPL-SCNC: 4.6 MMOL/L (ref 3.4–5.3)
PROCALCITONIN SERPL-MCNC: 0.62 NG/ML
SODIUM SERPL-SCNC: 140 MMOL/L (ref 133–144)
WBC # BLD AUTO: 8.4 10E9/L (ref 4–11)

## 2018-01-31 PROCEDURE — 25000128 H RX IP 250 OP 636: Performed by: FAMILY MEDICINE

## 2018-01-31 PROCEDURE — 25000132 ZZH RX MED GY IP 250 OP 250 PS 637: Mod: GY | Performed by: FAMILY MEDICINE

## 2018-01-31 PROCEDURE — 25000132 ZZH RX MED GY IP 250 OP 250 PS 637: Mod: GY | Performed by: PEDIATRICS

## 2018-01-31 PROCEDURE — 99233 SBSQ HOSP IP/OBS HIGH 50: CPT | Performed by: PEDIATRICS

## 2018-01-31 PROCEDURE — 84145 PROCALCITONIN (PCT): CPT | Performed by: INTERNAL MEDICINE

## 2018-01-31 PROCEDURE — 85048 AUTOMATED LEUKOCYTE COUNT: CPT | Performed by: INTERNAL MEDICINE

## 2018-01-31 PROCEDURE — 25000128 H RX IP 250 OP 636: Performed by: PEDIATRICS

## 2018-01-31 PROCEDURE — 00000146 ZZHCL STATISTIC GLUCOSE BY METER IP

## 2018-01-31 PROCEDURE — A9270 NON-COVERED ITEM OR SERVICE: HCPCS | Mod: GY | Performed by: PEDIATRICS

## 2018-01-31 PROCEDURE — 97530 THERAPEUTIC ACTIVITIES: CPT | Mod: GP | Performed by: PHYSICAL THERAPIST

## 2018-01-31 PROCEDURE — 27210995 ZZH RX 272: Performed by: FAMILY MEDICINE

## 2018-01-31 PROCEDURE — A9270 NON-COVERED ITEM OR SERVICE: HCPCS | Mod: GY | Performed by: FAMILY MEDICINE

## 2018-01-31 PROCEDURE — 80048 BASIC METABOLIC PNL TOTAL CA: CPT | Performed by: INTERNAL MEDICINE

## 2018-01-31 PROCEDURE — 12000007 ZZH R&B INTERMEDIATE

## 2018-01-31 PROCEDURE — 82274 ASSAY TEST FOR BLOOD FECAL: CPT | Performed by: PEDIATRICS

## 2018-01-31 PROCEDURE — 36415 COLL VENOUS BLD VENIPUNCTURE: CPT | Performed by: INTERNAL MEDICINE

## 2018-01-31 PROCEDURE — 97162 PT EVAL MOD COMPLEX 30 MIN: CPT | Mod: GP | Performed by: PHYSICAL THERAPIST

## 2018-01-31 PROCEDURE — 40000193 ZZH STATISTIC PT WARD VISIT: Performed by: PHYSICAL THERAPIST

## 2018-01-31 RX ORDER — METOPROLOL SUCCINATE 50 MG/1
50 TABLET, EXTENDED RELEASE ORAL DAILY
Status: DISCONTINUED | OUTPATIENT
Start: 2018-02-01 | End: 2018-02-01

## 2018-01-31 RX ORDER — AMLODIPINE BESYLATE 5 MG/1
5 TABLET ORAL 2 TIMES DAILY
Status: DISCONTINUED | OUTPATIENT
Start: 2018-01-31 | End: 2018-02-02 | Stop reason: HOSPADM

## 2018-01-31 RX ORDER — FUROSEMIDE 10 MG/ML
100 INJECTION INTRAMUSCULAR; INTRAVENOUS 3 TIMES DAILY
Status: DISCONTINUED | OUTPATIENT
Start: 2018-01-31 | End: 2018-01-31

## 2018-01-31 RX ADMIN — FUROSEMIDE: 10 INJECTION, SOLUTION INTRAVENOUS at 21:56

## 2018-01-31 RX ADMIN — CLOPIDOGREL BISULFATE 75 MG: 75 TABLET, FILM COATED ORAL at 08:50

## 2018-01-31 RX ADMIN — POTASSIUM CITRATE 20 MEQ: 10 TABLET ORAL at 08:50

## 2018-01-31 RX ADMIN — METOPROLOL SUCCINATE 25 MG: 25 TABLET, EXTENDED RELEASE ORAL at 08:50

## 2018-01-31 RX ADMIN — HEPARIN SODIUM 5000 UNITS: 5000 INJECTION, SOLUTION INTRAVENOUS; SUBCUTANEOUS at 08:50

## 2018-01-31 RX ADMIN — INSULIN ASPART 1 UNITS: 100 INJECTION, SOLUTION INTRAVENOUS; SUBCUTANEOUS at 12:05

## 2018-01-31 RX ADMIN — FUROSEMIDE 100 MG: 10 INJECTION, SOLUTION INTRAVENOUS at 14:10

## 2018-01-31 RX ADMIN — ASPIRIN 81 MG: 81 TABLET, COATED ORAL at 08:50

## 2018-01-31 RX ADMIN — CEFTRIAXONE 2 G: 2 INJECTION, POWDER, FOR SOLUTION INTRAMUSCULAR; INTRAVENOUS at 08:49

## 2018-01-31 RX ADMIN — FUROSEMIDE 100 MG: 10 INJECTION, SOLUTION INTRAVENOUS at 09:05

## 2018-01-31 RX ADMIN — LEVOTHYROXINE SODIUM 112 MCG: 112 TABLET ORAL at 08:50

## 2018-01-31 RX ADMIN — AMLODIPINE BESYLATE 5 MG: 5 TABLET ORAL at 21:49

## 2018-01-31 RX ADMIN — AMLODIPINE BESYLATE 5 MG: 5 TABLET ORAL at 08:50

## 2018-01-31 RX ADMIN — AZITHROMYCIN 250 MG: 250 TABLET, FILM COATED ORAL at 08:50

## 2018-01-31 RX ADMIN — HEPARIN SODIUM 5000 UNITS: 5000 INJECTION, SOLUTION INTRAVENOUS; SUBCUTANEOUS at 21:49

## 2018-01-31 RX ADMIN — SIMVASTATIN 10 MG: 5 TABLET, FILM COATED ORAL at 21:49

## 2018-01-31 NOTE — PROGRESS NOTES
CTS update: Writer met with patient and revisited discharge plan and patient does not want home care I gave her the benefits of home care and she does not want any she verbalizes that she has a lot of friends that help her and she doesn't need anyone else coming into her house. Patient state writer could call friend Edna to see if she could stay with her a couple days after discharge and a message was left on her phone [phone].  STEVE Tejada CTS RN

## 2018-01-31 NOTE — PLAN OF CARE
Problem: Patient Care Overview  Goal: Plan of Care/Patient Progress Review  Outcome: Improving  Pt has been on 4L O2 via NC to maintain saturations over 89%. Lung sounds are diminished, with crackles in bilateral lower lobes. HR has been SR with PACs. Pt stated she had some left hip pain, but it resolved on its own. Pt has had 1100mL urine out overnight. Pt had slight fever, but that also resolved. All other VSS. Will continue to monitor per POC.

## 2018-01-31 NOTE — PLAN OF CARE
Problem: Patient Care Overview  Goal: Interdisciplinary Rounds/Family Conf  Patient continues to be disoriented to time alert to self and situation, but able to make needs known.Pt on 3L NC pulse oximetry remains >95% will continue to assess suitability for weaning. Pt continues to deny chest pain or telemetry sinus dysrhythmia with occasional PVC's and PAC's.Pt had a decreased appetite ate approximately 50% of dinner last blood glucose 135 at 1700. Remains a SBA for ambulation to the bathroom.

## 2018-01-31 NOTE — PROGRESS NOTES
[facility]    Hospitalist Progress Note    Date of Service (when I saw the patient): 01/31/2018    Assessment & Plan   Mary Ellen Cuba is a 89 year old female who was admitted on 1/28/2018 with suspected community-acquired pneumonia and non ST elevation MI.  Signs of infection are improving and she has not had any recurrent anginal equivalent pain for over 24 hours.  However, based on significantly rising BNP, persistent dyspnea with hypoxia, and weight gain of approximately 10-15 pounds compared with her baseline, there is increased suspicion today for acute CHF with both systolic and diastolic dysfunction noted on echocardiogram.  She did have significant mitral regurgitation which could also contribute to pulmonary edema.  LVH was also noted on echocardiogram consistent with hypertensive heart disease which can also exacerbate heart failure.  Of concern , renal function has deteriorated over the last 2 days consistent with possible acute kidney injury superimposed upon severe chronic kidney disease at baseline.  Worsening renal function coincided with attempted initiation of ACE inhibitor therapy which was discontinued yesterday for this reason.  Although renal function has continued to worsen today, urine output did improve with high-dose IV Lasix therapy.  Although initial paroxysmal atrial fibrillation was possible, recurrent atrial fibrillation has not been apparent on telemetry monitoring.    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    Paroxysmal atrial fibrillation (H) - possible    Moderate pulmonary hypertension by Echo     "Moderate mitral regurgitation    Acute respiratory failure with hypoxia (H)    Acute kidney injury (H) - possible    Essential hypertension    Hyperlipidemia LDL goal <100    Hypothyroidism due to acquired atrophy of thyroid    Anemia in chronic kidney disease    CKD (chronic kidney disease) stage 4, GFR 15-29 ml/min (H)    History of CVA (cerebrovascular accident)    Blind - patient reported    RVH (right ventricular hypertrophy)    Systolic dysfunction, left ventricle EF 40-45%    Diastolic dysfunction grade II    Continue ceftriaxone and Zithromax   check pro-calcitonin level today  Titrate oxygen to keep saturations 90% and higher, may need home oxygen therapy after hospital discharge which would be new for her  Increase oral metoprolol XL from 25-50 mg daily, continue IV metoprolol as needed for severely elevated blood pressure  Increase amlodipine from 5 mg once daily to 5 mg twice daily  Avoiding use of ACE inhibitor and ARB because of worsening renal function  Continue IV Lasix 100 mg 3 times a day for 3 doses, anticipate return to oral loop diuretic therapy once signs of suspected acute CHF improve  Continue to follow renal function closely  Disposition planning discussed with the patient who expresses clear preference to return home to her usual living environment  PT evaluation and treatment during her hospital stay, encourage ambulation although she requires assistance because she is legally blind    DVT Prophylaxis: Heparin SQ  Code Status: DNR/DNI    Disposition: Expected discharge in 2-3 days.    Liborio Hendrickson    Interval History    she says she is feeling better.  She no longer is having severe back pain.  Her cough persists and remains dry.  She denies dyspnea at rest.  She is feeling weaker and more tired than usual and is noticing that she is less steady when she gets up to ambulate reporting that she feels \"wiggly\" when she stands up.  She denies lightheadedness or dizziness.  She has not had " fever.  Heart rate has fluctuated from the bradycardic range to normal.  Blood pressures have been moderately elevated although initial blood pressure this morning is severely elevated.  She is tolerating good oral intake.  Oxygenation has been generally stable with stable oxygen requirement.  Urine output improved significantly after a higher dose of IV Lasix yesterday afternoon.    Additional history is obtained from the patient.  Because she is legally blind, she cannot read her pill bottles and often cannot read typical written instructions.  However, she has help from neighbors and friends who can read for her.  She has someone that sets up her medications for 2-3 weeks at a time, and her pillboxes are marked with very large print with black ink which she is able to read such that she can take her pills reliably.  She normally can move around her own apartment without difficulty.    -Data reviewed today: I reviewed all new labs and imaging results over the last 24 hours. I personally reviewed Telemetry which has demonstrated frequent PACs but no dysrhythmias.    Physical Exam   Temp: 98.7  F (37.1  C) Temp src: Oral BP: 193/80 Pulse: 76 Heart Rate: 76 Resp: 26 SpO2: 94 % O2 Device: Nasal cannula Oxygen Delivery: 3 LPM  Vitals:    01/29/18 0700 01/30/18 0345 01/31/18 0300   Weight: 68.5 kg (151 lb 0.2 oz) 69.5 kg (153 lb 3.5 oz) 67 kg (147 lb 11.3 oz)     Previous baseline dry weight prior to this hospitalization was 130-140 pounds     Vital Signs with Ranges  Temp:  [98.6  F (37  C)-100.2  F (37.9  C)] 98.7  F (37.1  C)  Pulse:  [50-84] 76  Heart Rate:  [50-76] 76  Resp:  [18-26] 26  BP: (151-193)/(58-80) 193/80  SpO2:  [88 %-96 %] 94 %  I/O last 3 completed shifts:  In: 200 [P.O.:200]  Out: 1325 [Urine:1325]    Constitutional: No acute distress resting in bed  Respiratory: Normal respiratory effort, mildly decreased breath sounds, clear lung fields  Cardiovascular: Regular rate and rhythm, brisk capillary  refill  Neuro: Alert and oriented, answers questions appropriately, no overt confusion  Other: No pitting edema in the lower extremities    Medications     - MEDICATION INSTRUCTIONS -       - MEDICATION INSTRUCTIONS -         furosemide  100 mg Intravenous TID     azithromycin  250 mg Oral Daily     aspirin EC  81 mg Oral Daily     clopidogrel  75 mg Oral Daily     heparin  5,000 Units Subcutaneous Q12H     cefTRIAXone  2 g Intravenous Q24H     amLODIPine  5 mg Oral Daily     levothyroxine  112 mcg Oral Daily     metoprolol succinate  25 mg Oral Daily     potassium citrate  20 mEq Oral Daily     sodium chloride (PF)  3 mL Intracatheter Q8H     insulin aspart  1-3 Units Subcutaneous TID AC     insulin aspart  1-3 Units Subcutaneous At Bedtime     simvastatin  10 mg Oral At Bedtime       Data   Data reviewed today:  I personally reviewed telemetry.    Recent Labs  Lab 01/31/18  0552 01/30/18  1204 01/30/18  0556 01/29/18  0615  01/28/18  1357  01/28/18  0630   WBC 8.4  --   --   --   --  10.4  --  11.3*   HGB  --   --  9.1*  --   --  8.8*  --  9.8*   MCV  --   --   --   --   --  95  --  94   PLT  --   --   --   --   --  194  --  227     --  142  --   --   --   --  144   POTASSIUM 4.6  --  4.4  --   --   --   --  3.9   CHLORIDE 109  --  111*  --   --   --   --  113*   CO2 24  --  24  --   --   --   --  21   BUN 40*  --  35*  --   --   --   --  29   CR 2.44*  --  2.17*  --   --   --   --  1.74*   ANIONGAP 7  --  7  --   --   --   --  10   ZULEMA 8.3*  --  8.3*  --   --   --   --  8.6   *  --  111*  --   --   --   --  125*   TROPI  --  0.264* 0.283* 0.411*  < >  --   < > 0.097*   < > = values in this interval not displayed.    Nasal culture for MRSA was negative  Pro calcitonin level today is pending blood sugars range 118-143 over the last 24 hours

## 2018-01-31 NOTE — PROGRESS NOTES
" 01/31/18 1600   Quick Adds   Type of Visit Initial PT Evaluation   Living Environment   Lives With alone   Living Arrangements apartment   Home Accessibility no concerns   Number of Stairs to Enter Home 0   Number of Stairs Within Home 0   Transportation Available family or friend will provide;car   Self-Care   Dominant Hand right   Usual Activity Tolerance good   Regular Exercise yes   Activity/Exercise Type walking   Exercise Amount/Frequency daily   Equipment Currently Used at Home walker, rolling   Functional Level Prior   Ambulation 1-->assistive equipment   Transferring 1-->assistive equipment   Toileting 1-->assistive equipment   Bathing 2-->assistive person   Dressing 2-->assistive person   Eating 0-->independent   Communication 0-->understands/communicates without difficulty   Swallowing 0-->swallows foods/liquids without difficulty   Cognition 0 - no cognition issues reported   Fall history within last six months no   Which of the above functional risks had a recent onset or change? none   General Information   Onset of Illness/Injury or Date of Surgery - Date 01/28/18   Referring Physician Dr. Hendrickson   Patient/Family Goals Statement To return to her home indep   Pertinent History of Current Problem (include personal factors and/or comorbidities that impact the POC) Pt admitted 1/28/18 due to SOB and found to have PNA and also an NSTMI.  Per todays MD progress note \"Signs of infection are improving and she has not had any recurrent anginal equivalent pain for over 24 hours.  However, based on significantly rising BNP, persistent dyspnea with hypoxia, and weight gain of approximately 10-15 pounds compared with her baseline, there is increased suspicion today for acute CHF with both systolic and diastolic dysfunction noted on echocardiogram.  She did have significant mitral regurgitation which could also contribute to pulmonary edema.  LVH was also noted on echocardiogram consistent with hypertensive heart " "disease which can also exacerbate heart failure.  Of concern , renal function has deteriorated over the last 2 days consistent with possible acute kidney injury superimposed upon severe chronic kidney disease at baseline.  Worsening renal function coincided with attempted initiation of ACE inhibitor therapy which was discontinued yesterday for this reason.  Although renal function has continued to worsen today, urine output did improve with high-dose IV Lasix therapy.  Although initial paroxysmal atrial fibrillation was possible, recurrent atrial fibrillation has not been apparent on telemetry monitoring.\"   Precautions/Limitations oxygen therapy device and L/min  (Cardiac)   Cognitive Status Examination   Orientation orientation to person, place and time   Level of Consciousness alert   Follows Commands and Answers Questions 100% of the time   Personal Safety and Judgment intact   Memory intact   Pain Assessment   Patient Currently in Pain No   Integumentary/Edema   Integumentary/Edema no deficits were identifed   Posture    Posture Forward head position;Protracted shoulders;Kyphosis   Range of Motion (ROM)   ROM Comment Grossly WFL   Strength   Strength Comments Functional strength is good.  She is able to perform Supine to sit and sit<> stand with SBA.     Bed Mobility   Bed Mobility Comments Supine <> sit with SBA   Transfer Skills   Transfer Comments Sit <> stand with SBA   Gait   Gait Comments Amb x 10'  with WW and 5L O2 with SBA.    Balance   Balance Comments No LOB noted during eval. sitting balance and standing balance are good   Sensory Examination   Sensory Perception Comments Pt is blind   Coordination   Coordination no deficits were identified   Muscle Tone   Muscle Tone no deficits were identified   General Therapy Interventions   Planned Therapy Interventions gait training;progressive activity/exercise   Clinical Impression   Criteria for Skilled Therapeutic Intervention yes, treatment indicated   PT " "Diagnosis Impaired exercise tolerance and decreased strength relative to baseline.    Influenced by the following impairments cardiac event and PNA.   Functional limitations due to impairments decreased endurance for functional activities and gait   Clinical Presentation Evolving/Changing   Clinical Presentation Rationale Recent MI   Clinical Decision Making (Complexity) Moderate complexity   Therapy Frequency` 5 times/week   Predicted Duration of Therapy Intervention (days/wks) 3 days   Anticipated Equipment Needs at Discharge (None)   Anticipated Discharge Disposition Home with Assist;Home with Outpatient Therapy;Home with Home Therapy   Risk & Benefits of therapy have been explained Yes   Patient, Family & other staff in agreement with plan of care Yes   Clinical Impression Comments See care plan   [facility] TM \"6 Clicks\"   2016, Trustees of [facility], under license to [employer].  All rights reserved.   6 Clicks Short Forms Basic Mobility Inpatient Short Form   [facility]  \"6 Clicks\" V.2 Basic Mobility Inpatient Short Form   1. Turning from your back to your side while in a flat bed without using bedrails? 4 - None   2. Moving from lying on your back to sitting on the side of a flat bed without using bedrails? 4 - None   3. Moving to and from a bed to a chair (including a wheelchair)? 4 - None   4. Standing up from a chair using your arms (e.g., wheelchair, or bedside chair)? 4 - None   5. To walk in hospital room? 3 - A Little   6. Climbing 3-5 steps with a railing? 3 - A Little   Basic Mobility Raw Score (Score out of 24.Lower scores equate to lower levels of function) 22   Total Evaluation Time   Total Evaluation Time (Minutes) 20     "

## 2018-01-31 NOTE — PLAN OF CARE
Problem: Patient Care Overview  Goal: Plan of Care/Patient Progress Review  Discharge Planner PT   Problem: Patient Care Overview  Discharge Planner PT   Patient plan for discharge: Recommend DC to her home with home PT and 24 hour assist x 1-2 days due to pt is stand by assist and to ensure safe transition back to home environ.   Current status:Supine <> sit with SBA  Sit<> stand with SBA. Amb with SBA and WW and 5L O2 x10'                   Barriers to return to prior living situation: lives alone  Recommendations for discharge: Home with home PT and increased assist- preferrably 24 hour assist x 1-2 days. Recommend progress to OP cardiac rehab given need for O2 and recent NSTMI   Rationale for recommendations: Pt is currently SBA but lives alone.  Recommending someone be with her for next 24-48 hours to ensure safe transition back to home.       Entered by: Shabana Peña 01/31/2018 4:09 PM     Patient is a 89 year old year old female. Prior to admission, patient was living in a [facility] home style apartment  with 0 stairs to enter, using 4WW  for mobility, and requiring no assist for ADLs. She does have life line service and still cooks for herself.  Currently, patient is requiring SBA assistance for functional mobility and SBA for ambulation using WW. Barriers to return to previous living situation include Medical status and O2 needs .  Patient equipment needs at discharge include ? O2.  Recommend discharge to home with home PT transitioning to OP cardiac rehab with family friend transport.        Nursing please walk with patient BID as able. Thank you!

## 2018-01-31 NOTE — PROGRESS NOTES
Writer talked with patient and friend Sally Alexander [phone] who helps patient with bills and other household chores. Sally was able to convince patient that home care would be good and patient is currently getting a nurse visit every 2 weeks for medication set up from [facility] (phone: [phone] Fax: [phone]). Sally would like a list of home care agencies and is interested in going with [facility] but would like to talk with patient's POA Ricky first. No friends can stay with her at this time. CTS will follow up with patient  tomorrow.  STEVE Tejada CTS RN

## 2018-02-01 ENCOUNTER — APPOINTMENT (OUTPATIENT)
Dept: PHYSICAL THERAPY | Facility: CLINIC | Age: 83
DRG: 280 | End: 2018-02-01
Payer: MEDICARE

## 2018-02-01 LAB
ANION GAP SERPL CALCULATED.3IONS-SCNC: 9 MMOL/L (ref 3–14)
BUN SERPL-MCNC: 46 MG/DL (ref 7–30)
CALCIUM SERPL-MCNC: 8.3 MG/DL (ref 8.5–10.1)
CHLORIDE SERPL-SCNC: 107 MMOL/L (ref 94–109)
CO2 SERPL-SCNC: 26 MMOL/L (ref 20–32)
CREAT SERPL-MCNC: 2.6 MG/DL (ref 0.52–1.04)
GFR SERPL CREATININE-BSD FRML MDRD: 17 ML/MIN/1.7M2
GLUCOSE BLDC GLUCOMTR-MCNC: 110 MG/DL (ref 70–99)
GLUCOSE BLDC GLUCOMTR-MCNC: 116 MG/DL (ref 70–99)
GLUCOSE BLDC GLUCOMTR-MCNC: 116 MG/DL (ref 70–99)
GLUCOSE BLDC GLUCOMTR-MCNC: 120 MG/DL (ref 70–99)
GLUCOSE BLDC GLUCOMTR-MCNC: 129 MG/DL (ref 70–99)
GLUCOSE BLDC GLUCOMTR-MCNC: 136 MG/DL (ref 70–99)
GLUCOSE SERPL-MCNC: 99 MG/DL (ref 70–99)
HGB BLD-MCNC: 8 G/DL (ref 11.7–15.7)
PLATELET # BLD AUTO: 205 10E9/L (ref 150–450)
POTASSIUM SERPL-SCNC: 4 MMOL/L (ref 3.4–5.3)
SODIUM SERPL-SCNC: 142 MMOL/L (ref 133–144)

## 2018-02-01 PROCEDURE — 80048 BASIC METABOLIC PNL TOTAL CA: CPT | Performed by: PEDIATRICS

## 2018-02-01 PROCEDURE — 85049 AUTOMATED PLATELET COUNT: CPT | Performed by: PEDIATRICS

## 2018-02-01 PROCEDURE — 99232 SBSQ HOSP IP/OBS MODERATE 35: CPT | Performed by: PEDIATRICS

## 2018-02-01 PROCEDURE — 00000146 ZZHCL STATISTIC GLUCOSE BY METER IP

## 2018-02-01 PROCEDURE — 40000193 ZZH STATISTIC PT WARD VISIT

## 2018-02-01 PROCEDURE — 25000128 H RX IP 250 OP 636: Performed by: PEDIATRICS

## 2018-02-01 PROCEDURE — 27210995 ZZH RX 272: Performed by: FAMILY MEDICINE

## 2018-02-01 PROCEDURE — 25000132 ZZH RX MED GY IP 250 OP 250 PS 637: Mod: GY | Performed by: FAMILY MEDICINE

## 2018-02-01 PROCEDURE — 36415 COLL VENOUS BLD VENIPUNCTURE: CPT | Performed by: PEDIATRICS

## 2018-02-01 PROCEDURE — A9270 NON-COVERED ITEM OR SERVICE: HCPCS | Mod: GY | Performed by: PEDIATRICS

## 2018-02-01 PROCEDURE — 97110 THERAPEUTIC EXERCISES: CPT | Mod: GP

## 2018-02-01 PROCEDURE — 85018 HEMOGLOBIN: CPT | Performed by: PEDIATRICS

## 2018-02-01 PROCEDURE — 25000132 ZZH RX MED GY IP 250 OP 250 PS 637: Mod: GY | Performed by: PEDIATRICS

## 2018-02-01 PROCEDURE — A9270 NON-COVERED ITEM OR SERVICE: HCPCS | Mod: GY | Performed by: FAMILY MEDICINE

## 2018-02-01 PROCEDURE — 40000270 ZZH STATISTIC OXYGEN  O2DAILY TECH TIME

## 2018-02-01 PROCEDURE — 40000275 ZZH STATISTIC RCP TIME EA 10 MIN

## 2018-02-01 PROCEDURE — 40000274 ZZH STATISTIC RCP CONSULT EA 30 MIN

## 2018-02-01 PROCEDURE — 25000128 H RX IP 250 OP 636: Performed by: FAMILY MEDICINE

## 2018-02-01 PROCEDURE — 12000000 ZZH R&B MED SURG/OB

## 2018-02-01 RX ORDER — CEFDINIR 300 MG/1
300 CAPSULE ORAL DAILY
Status: DISCONTINUED | OUTPATIENT
Start: 2018-02-02 | End: 2018-02-02 | Stop reason: HOSPADM

## 2018-02-01 RX ORDER — LEVOTHYROXINE SODIUM 112 UG/1
112 TABLET ORAL DAILY
Status: DISCONTINUED | OUTPATIENT
Start: 2018-02-02 | End: 2018-02-01

## 2018-02-01 RX ORDER — TORSEMIDE 20 MG/1
40 TABLET ORAL DAILY
Status: DISCONTINUED | OUTPATIENT
Start: 2018-02-01 | End: 2018-02-02 | Stop reason: HOSPADM

## 2018-02-01 RX ORDER — LEVOTHYROXINE SODIUM 112 UG/1
112 TABLET ORAL DAILY
Status: DISCONTINUED | OUTPATIENT
Start: 2018-02-02 | End: 2018-02-02 | Stop reason: HOSPADM

## 2018-02-01 RX ADMIN — CEFTRIAXONE 2 G: 2 INJECTION, POWDER, FOR SOLUTION INTRAMUSCULAR; INTRAVENOUS at 09:27

## 2018-02-01 RX ADMIN — TORSEMIDE 40 MG: 20 TABLET ORAL at 11:31

## 2018-02-01 RX ADMIN — SIMVASTATIN 10 MG: 5 TABLET, FILM COATED ORAL at 21:41

## 2018-02-01 RX ADMIN — CLOPIDOGREL BISULFATE 75 MG: 75 TABLET, FILM COATED ORAL at 09:35

## 2018-02-01 RX ADMIN — ASPIRIN 81 MG: 81 TABLET, COATED ORAL at 09:34

## 2018-02-01 RX ADMIN — AZITHROMYCIN 250 MG: 250 TABLET, FILM COATED ORAL at 09:35

## 2018-02-01 RX ADMIN — POTASSIUM CITRATE 20 MEQ: 10 TABLET ORAL at 09:32

## 2018-02-01 RX ADMIN — METOPROLOL SUCCINATE 37.5 MG: 25 TABLET, EXTENDED RELEASE ORAL at 09:33

## 2018-02-01 RX ADMIN — HEPARIN SODIUM 5000 UNITS: 5000 INJECTION, SOLUTION INTRAVENOUS; SUBCUTANEOUS at 09:36

## 2018-02-01 RX ADMIN — AMLODIPINE BESYLATE 5 MG: 5 TABLET ORAL at 09:35

## 2018-02-01 RX ADMIN — FUROSEMIDE: 10 INJECTION, SOLUTION INTRAVENOUS at 06:23

## 2018-02-01 RX ADMIN — LEVOTHYROXINE SODIUM 112 MCG: 112 TABLET ORAL at 09:32

## 2018-02-01 RX ADMIN — HEPARIN SODIUM 5000 UNITS: 5000 INJECTION, SOLUTION INTRAVENOUS; SUBCUTANEOUS at 21:32

## 2018-02-01 RX ADMIN — AMLODIPINE BESYLATE 5 MG: 5 TABLET ORAL at 21:32

## 2018-02-01 NOTE — PLAN OF CARE
Problem: Patient Care Overview  Goal: Plan of Care/Patient Progress Review  Discharge Planner PT   Patient plan for discharge: to return home with friends staying with her for support  Current status: Pt is currently completing bed mobility independently. She has been walking with nursing staff. Pt was on RA throughout therapy with 02 sats remaining between 86-92%. With brief rest breaks, 02 sats return to 88 or higher quickly. Pt is able to tolerate supine and seated exercises, but does note quick fatigue with these exercises.   Barriers to return to prior living situation: level of strength and endurance required for home negotiation.   Recommendations for discharge: Home with friends present for support   Rationale for recommendations: pt is moving well and is maintaining fair 02 sats with activity on RA.       Entered by: Verona Rodríguez 02/01/2018 1:29 PM      Thank you for your referral,     Verona Rodríguez PT, DPT  [phone]  [facility]

## 2018-02-01 NOTE — PROGRESS NOTES
CTS update: Writer spoke with patient's POA Sukhjinder Mayer [phone] gave him an update on patient and how we are preparing for her to go home tomorrow. Sally Alexander [phone] will pick patient up and take home will stay with patient for the first night and [facility] Phone: [phone] was picked to follow patient at home. Writer informed Sukhjinder that patient would go home with RN/PT/HHA and he was in agreement. STEVE Tejada CTS RN

## 2018-02-01 NOTE — PROGRESS NOTES
[facility]    Hospitalist Progress Note    Date of Service (when I saw the patient): 02/01/2018    Assessment & Plan   Mary Ellen Cuba is a 89 year old female who was admitted on 1/28/2018  With community-acquired pneumonia and non-ST elevation MI.  Clinical course has also been concerning for acute systolic and diastolic heart failure, and moderate mitral regurgitation may also have contributed to pulmonary edema.  Signs of infection continue to improve.  She has not had recurrent angina.  Signs of acute heart failure and pulmonary edema are now also improving coinciding with more aggressive diuresis.  Although an increased dose of beta-blocker therapy was attempted, she has had worsening bradycardia intermittently although has not been symptomatic from bradycardia so far.  Respiratory failure due to the combination of pneumonia and heart failure is resolving.  Acute kidney injury persists with worsening renal function although urine output has been good with high dose loop diuretic therapy.  Acute kidney injury has been attributed to possible ACE inhibitor therapy but also acute heart failure and aggressive diuresis.  Glycemic control with well-controlled diabetes at baseline remains good.    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)    Paroxysmal atrial fibrillation (H) - possible    Moderate pulmonary hypertension by Echo    Moderate mitral regurgitation    Acute respiratory failure with hypoxia (H)    Acute kidney injury (H)    Essential hypertension    Hyperlipidemia LDL goal <100    Hypothyroidism due to acquired atrophy of thyroid    Anemia in  chronic kidney disease    CKD (chronic kidney disease) stage 4, GFR 15-29 ml/min (H)    History of CVA (cerebrovascular accident)    Blind - patient reported    RVH (right ventricular hypertrophy)    Systolic dysfunction, left ventricle EF 40-45%    Diastolic dysfunction grade II    Switch from IV Lasix to oral torsemide today  Anticipate discharge on higher dose of loop diuretic and possibly metolazone as needed for worsening symptoms of heart failure after discharge although use of an as needed medication may be particularly challenging for this patient who lives independently but is legally blind and relies on others to set up her medications and advance  Switch IV ceftriaxone to oral Omnicef to complete a 10 day course of cephalosporin therapy, Zithromax treatment has been completed  Wean oxygen as tolerated for saturations 88% and higher as long as she is not more dyspneic with saturations in the upper 80s  Advance activity as tolerated, continue PT  Decrease metoprolol from 50 mg to 37.5 mg because of more significant bradycardia    DVT Prophylaxis: Heparin SQ  Code Status: DNR/DNI    Disposition: Expected discharge  tomorrow to home with support and she will need assistance with managing her new medications and medication adjustments after discharge because she is legally blind.    Liborio Hendrickson    Interval History   She is feeling better today.  She denies dyspnea at rest.  She feels steadier with ambulating.  She denies any chest or back pain.  She denies dizziness.  She remains afebrile.  She was persistently bradycardic overnight and nursing reports that heart rates were as low as 40 intermittently when sleeping.  Oxygenation has improved and she has weaned off of oxygen supplementation this morning.  Saturations have dropped to 89% intermittently, but she does not complain of more dyspnea when oxygen saturations are 89%. Cough seems to be subsiding.   Urine output was improved on the higher dose of IV  Lasix.  She is tolerating adequate oral intake.    -Data reviewed today: I reviewed all new labs and imaging results over the last 24 hours. I personally reviewed no images or EKG's today.    Physical Exam   Temp: 98.7  F (37.1  C) Temp src: Oral BP: 169/88 Pulse: 65 Heart Rate: 54 Resp: 20 SpO2: 90 % O2 Device: None (Room air) Oxygen Delivery: 3 LPM  Vitals:    01/30/18 0345 01/31/18 0300 02/01/18 0331   Weight: 69.5 kg (153 lb 3.5 oz) 67 kg (147 lb 11.3 oz) 64.8 kg (142 lb 13.7 oz)     Vital Signs with Ranges  Temp:  [98.2  F (36.8  C)-100.2  F (37.9  C)] 98.7  F (37.1  C)  Pulse:  [52-65] 65  Heart Rate:  [48-64] 54  Resp:  [20] 20  BP: (156-175)/(54-88) 169/88  SpO2:  [89 %-95 %] 90 %  I/O last 3 completed shifts:  In: 860 [P.O.:860]  Out: 1976 [Urine:1450; Other:526]    Constitutional: No acute distress resting in bed  Respiratory: Normal respiratory effort, few inspiratory crackles at the left base with otherwise clear lung fields  Cardiovascular: Regular rate and rhythm, brisk capillary refill    Medications     - MEDICATION INSTRUCTIONS -       - MEDICATION INSTRUCTIONS -         metoprolol succinate  37.5 mg Oral Daily     amLODIPine  5 mg Oral BID     /INJECTIONB   Intravenous TID     aspirin EC  81 mg Oral Daily     clopidogrel  75 mg Oral Daily     heparin  5,000 Units Subcutaneous Q12H     cefTRIAXone  2 g Intravenous Q24H     levothyroxine  112 mcg Oral Daily     potassium citrate  20 mEq Oral Daily     sodium chloride (PF)  3 mL Intracatheter Q8H     insulin aspart  1-3 Units Subcutaneous TID AC     insulin aspart  1-3 Units Subcutaneous At Bedtime     simvastatin  10 mg Oral At Bedtime       Data   Data reviewed today:  I personally reviewed no images or EKG's today.    Recent Labs  Lab 02/01/18  0546 01/31/18  0552 01/30/18  1204 01/30/18  0556 01/29/18  0615  01/28/18  1357  01/28/18  0630   WBC  --  8.4  --   --   --   --  10.4  --  11.3*   HGB 8.0*  --   --  9.1*  --   --  8.8*  --  9.8*   MCV   --   --   --   --   --   --  95  --  94     --   --   --   --   --  194  --  227    140  --  142  --   --   --   --  144   POTASSIUM 4.0 4.6  --  4.4  --   --   --   --  3.9   CHLORIDE 107 109  --  111*  --   --   --   --  113*   CO2 26 24  --  24  --   --   --   --  21   BUN 46* 40*  --  35*  --   --   --   --  29   CR 2.60* 2.44*  --  2.17*  --   --   --   --  1.74*   ANIONGAP 9 7  --  7  --   --   --   --  10   ZULEMA 8.3* 8.3*  --  8.3*  --   --   --   --  8.6   GLC 99 118*  --  111*  --   --   --   --  125*   TROPI  --   --  0.264* 0.283* 0.411*  < >  --   < > 0.097*   < > = values in this interval not displayed.    Stool was negative for occult blood  Blood sugars range  over the last 24 hours

## 2018-02-01 NOTE — PLAN OF CARE
Problem: Pneumonia (Adult)  Goal: Signs and Symptoms of Listed Potential Problems Will be Absent, Minimized or Managed (Pneumonia)  Signs and symptoms of listed potential problems will be absent, minimized or managed by discharge/transition of care (reference Pneumonia (Adult) CPG).   Outcome: Improving  Pt has diminished lungs t/o, fine crackles. Has been up to the bathroom with standby assist and walker and has tolerated activity well. Denies SOA. Using 4L nc O2 with O2 sats 92%. Has nonproductive cough. Voiding well. Low grade temp 99.1 (o) at beginning of the night, afebrile and vss at this time. Occult negative stool. Will continue to monitor respiratory status.

## 2018-02-01 NOTE — PLAN OF CARE
Problem: Pneumonia (Adult)  Goal: Signs and Symptoms of Listed Potential Problems Will be Absent, Minimized or Managed (Pneumonia)  Signs and symptoms of listed potential problems will be absent, minimized or managed by discharge/transition of care (reference Pneumonia (Adult) CPG).   Outcome: Improving  Clear lungs and sats average 92-93% with reminders to take deep breaths at times. Ate about 50% of meals. Voiding and had BM. Up with walker, gait belt, and one assist and did well in ambulating. Alert and oriented except to time. Has denied pain. No edema. /88 and on antihypertensives. Coreg was increased today. Started on Torsemide. Will continue to monitor lung sounds, sats, activity tolerance, BP, I&O.

## 2018-02-01 NOTE — PLAN OF CARE
Problem: Patient Care Overview  Goal: Interdisciplinary Rounds/Family Conf  Patient is alert and oriented to self and situation but disoriented to time easily reoriented. Patient able to make needs known. Patient remains on 4L NC with stats maintaining >90% LS diminished in all lobes. Patient continues to deny pain. Last blood glucose 113, patient appetite remains fair.  Patient is an assist x1 with ambulation and walked 200 feet in the hallway this evening. Will continue to monitor patient.

## 2018-02-02 VITALS
BODY MASS INDEX: 24.41 KG/M2 | OXYGEN SATURATION: 92 % | TEMPERATURE: 97.7 F | HEART RATE: 60 BPM | DIASTOLIC BLOOD PRESSURE: 77 MMHG | SYSTOLIC BLOOD PRESSURE: 158 MMHG | RESPIRATION RATE: 18 BRPM | WEIGHT: 137.79 LBS

## 2018-02-02 LAB
ANION GAP SERPL CALCULATED.3IONS-SCNC: 9 MMOL/L (ref 3–14)
BUN SERPL-MCNC: 55 MG/DL (ref 7–30)
CALCIUM SERPL-MCNC: 8.4 MG/DL (ref 8.5–10.1)
CHLORIDE SERPL-SCNC: 104 MMOL/L (ref 94–109)
CO2 SERPL-SCNC: 28 MMOL/L (ref 20–32)
CREAT SERPL-MCNC: 2.6 MG/DL (ref 0.52–1.04)
GFR SERPL CREATININE-BSD FRML MDRD: 17 ML/MIN/1.7M2
GLUCOSE BLDC GLUCOMTR-MCNC: 122 MG/DL (ref 70–99)
GLUCOSE BLDC GLUCOMTR-MCNC: 149 MG/DL (ref 70–99)
GLUCOSE SERPL-MCNC: 137 MG/DL (ref 70–99)
HGB BLD-MCNC: 8.4 G/DL (ref 11.7–15.7)
POTASSIUM SERPL-SCNC: 3.8 MMOL/L (ref 3.4–5.3)
SODIUM SERPL-SCNC: 141 MMOL/L (ref 133–144)

## 2018-02-02 PROCEDURE — 25000132 ZZH RX MED GY IP 250 OP 250 PS 637: Mod: GY | Performed by: INTERNAL MEDICINE

## 2018-02-02 PROCEDURE — 25000132 ZZH RX MED GY IP 250 OP 250 PS 637: Mod: GY | Performed by: PEDIATRICS

## 2018-02-02 PROCEDURE — A9270 NON-COVERED ITEM OR SERVICE: HCPCS | Mod: GY | Performed by: FAMILY MEDICINE

## 2018-02-02 PROCEDURE — 00000146 ZZHCL STATISTIC GLUCOSE BY METER IP

## 2018-02-02 PROCEDURE — 80048 BASIC METABOLIC PNL TOTAL CA: CPT | Performed by: PEDIATRICS

## 2018-02-02 PROCEDURE — A9270 NON-COVERED ITEM OR SERVICE: HCPCS | Mod: GY | Performed by: PEDIATRICS

## 2018-02-02 PROCEDURE — 99239 HOSP IP/OBS DSCHRG MGMT >30: CPT | Performed by: PEDIATRICS

## 2018-02-02 PROCEDURE — A9270 NON-COVERED ITEM OR SERVICE: HCPCS | Mod: GY | Performed by: INTERNAL MEDICINE

## 2018-02-02 PROCEDURE — 25000128 H RX IP 250 OP 636: Performed by: PEDIATRICS

## 2018-02-02 PROCEDURE — 36415 COLL VENOUS BLD VENIPUNCTURE: CPT | Performed by: PEDIATRICS

## 2018-02-02 PROCEDURE — 25000132 ZZH RX MED GY IP 250 OP 250 PS 637: Mod: GY | Performed by: FAMILY MEDICINE

## 2018-02-02 PROCEDURE — 85018 HEMOGLOBIN: CPT | Performed by: PEDIATRICS

## 2018-02-02 RX ORDER — AMLODIPINE BESYLATE 5 MG/1
5 TABLET ORAL 2 TIMES DAILY
Qty: 30 TABLET | Status: ON HOLD | COMMUNITY
Start: 2018-02-02 | End: 2018-03-02

## 2018-02-02 RX ORDER — OXYCODONE HYDROCHLORIDE 5 MG/1
2.5-5 TABLET ORAL EVERY 4 HOURS PRN
Qty: 10 TABLET | Refills: 0 | COMMUNITY
Start: 2018-02-02

## 2018-02-02 RX ORDER — CEFDINIR 300 MG/1
300 CAPSULE ORAL DAILY
Qty: 4 CAPSULE | Refills: 0 | Status: SHIPPED | OUTPATIENT
Start: 2018-02-03 | End: 2018-02-07

## 2018-02-02 RX ORDER — CLOPIDOGREL BISULFATE 75 MG/1
75 TABLET ORAL DAILY
Qty: 30 TABLET | Refills: 0 | Status: SHIPPED | OUTPATIENT
Start: 2018-02-03

## 2018-02-02 RX ORDER — TORSEMIDE 20 MG/1
40 TABLET ORAL DAILY
Qty: 60 TABLET | Refills: 0 | Status: SHIPPED | OUTPATIENT
Start: 2018-02-03

## 2018-02-02 RX ORDER — METOPROLOL SUCCINATE 25 MG/1
37.5 TABLET, EXTENDED RELEASE ORAL DAILY
Qty: 30 TABLET | Status: ON HOLD | COMMUNITY
Start: 2018-02-02 | End: 2018-03-02

## 2018-02-02 RX ADMIN — LEVOTHYROXINE SODIUM 112 MCG: 112 TABLET ORAL at 05:29

## 2018-02-02 RX ADMIN — TORSEMIDE 40 MG: 20 TABLET ORAL at 08:22

## 2018-02-02 RX ADMIN — AMLODIPINE BESYLATE 5 MG: 5 TABLET ORAL at 08:22

## 2018-02-02 RX ADMIN — Medication 1 MG: at 01:00

## 2018-02-02 RX ADMIN — CLOPIDOGREL BISULFATE 75 MG: 75 TABLET, FILM COATED ORAL at 08:21

## 2018-02-02 RX ADMIN — METOPROLOL SUCCINATE 37.5 MG: 25 TABLET, EXTENDED RELEASE ORAL at 08:21

## 2018-02-02 RX ADMIN — ASPIRIN 81 MG: 81 TABLET, COATED ORAL at 08:22

## 2018-02-02 RX ADMIN — INSULIN ASPART 1 UNITS: 100 INJECTION, SOLUTION INTRAVENOUS; SUBCUTANEOUS at 08:31

## 2018-02-02 RX ADMIN — POTASSIUM CITRATE 20 MEQ: 10 TABLET ORAL at 08:22

## 2018-02-02 RX ADMIN — CEFDINIR 300 MG: 300 CAPSULE ORAL at 08:22

## 2018-02-02 RX ADMIN — HEPARIN SODIUM 5000 UNITS: 5000 INJECTION, SOLUTION INTRAVENOUS; SUBCUTANEOUS at 08:22

## 2018-02-02 NOTE — PROGRESS NOTES
Received a call from Leonard at [facility], requesting to fax orders, med list and H/P. Per Sindy FREDERICK in Care Transition, pt refused [facility] and was requesting [facility]. Relayed this message to Leonard who had additional questions on private pay services. This writer again spoke with Care Transitions Team and they stated they would contact family and have them call [facility].

## 2018-02-02 NOTE — PROGRESS NOTES
Care Coordinator- Discharge Planning     Admission Date/Time:  1/28/2018  Attending MD:  Liborio Hendrickson MD     Data  Date of initial CC assessment:  1/29/18  Chart reviewed, discussed with interdisciplinary team.   Patient was admitted for:   1. Non-ST elevated myocardial infarction (non-STEMI) (H)    2. Pneumonia of left lung due to infectious organism, unspecified part of lung    3. Hypoxia    4. Elevated brain natriuretic peptide (BNP) level    5. Elevated troponin    6. Abnormal electrocardiogram    7. Hematoma of left hip, initial encounter    8. Benign essential hypertension    9. Acute combined systolic and diastolic congestive heart failure (H)         Assessment  Full assessment completed in previous note    Coordination of Care and Referrals: Provided patient/family with options for Home Care.      Plan  Anticipated Discharge Date:  2/2/18  Anticipated Discharge Plan:  [facility] Phone: [phone] Friend Sally will take home and stay with patient tonight and follow up with daily     CTS Handoff completed:  YES    Sindy Tejada RN

## 2018-02-02 NOTE — DISCHARGE SUMMARY
[facility]    Discharge Summary  Hospitalist    Date of Admission:  1/28/2018  Date of Discharge:  2/2/2018  Discharging Provider: Liborio Hendrickson  Date of Service (when I saw the patient): 02/02/18    Discharge Diagnoses     Lingular pneumonia, organism unspecified    Non-ST elevated myocardial infarction (non-STEMI) (H)    Coronary artery disease involving native coronary artery of native heart without angina pectoris    Type 2 diabetes mellitus without complication (H)    LVH (left ventricular hypertrophy)    Acute combined systolic and diastolic congestive heart failure (H)    Paroxysmal atrial fibrillation (H) - possible    Moderate pulmonary hypertension by Echo    Moderate mitral regurgitation    Acute respiratory failure with hypoxia (H)    Acute kidney injury (H)    Essential hypertension    Hyperlipidemia LDL goal <100    Hypothyroidism due to acquired atrophy of thyroid    Anemia in chronic kidney disease    CKD (chronic kidney disease) stage 4, GFR 15-29 ml/min (H)    History of CVA (cerebrovascular accident)    Blind - patient reported    RVH (right ventricular hypertrophy)    Systolic dysfunction, left ventricle EF 40-45%    Diastolic dysfunction grade II    Pneumonia      History of Present Illness   Mary Ellen Cuba is an 89 year old female with complex chronic medical problems, advanced age, and legally blind status who presented with 3 day history of worsening cough, shortness of breath, mid back pain, and chills.  Due to initial concerns for pneumonia, respiratory failure, and non-ST elevation MI she was hospitalized. See admission history and physical for details.    Hospital Course   Mary Ellen Cuba was admitted on 1/28/2018.  The following problems were addressed during her hospitalization:    Problem #1 community-acquired pneumonia with acute respiratory failure.  Initial chest x-ray demonstrated findings consistent with lingular infiltrate, and subsequent  chest x-ray also demonstrated findings suspicious for right-sided infiltrates.  Rapid influenza testing was negative.  Nasal culture for MRSA was negative.  Pro calcitonin was elevated consistent with bacterial infection.  She was treated empirically with Zithromax and ceftriaxone.  She completed a full 5 day course of Zithromax during her hospital stay.  She was severely hypoxic and treated with oxygen, but she did not require assisted ventilation.  She improved and was able to wean off of oxygen therapy for over 24 hours by discharge.  After investigation, community-acquired bacterial pneumonia was suspected although a specific organism could not otherwise be determined.  Her clinical course was consistent with acute hypoxic respiratory failure.    Problem #2 non-ST elevation MI with acute diastolic and systolic congestive heart failure.  She presented with severe mid back pain which was thought to be an anginal equivalent during her hospital stay.  Troponin was elevated and progressively increased to a peak of 0.6 before decreasing.  EKG demonstrated ST segment depression in leads V3 through V6.  EKG and telemetry demonstrated frequent PACs, and it was possible she had a short episode of paroxysmal atrial fibrillation.  Echocardiogram demonstrated decreased LV systolic function with estimated EF 40-45% which was decreased as compared with a year previously.  No regional wall motion abnormality was reported.  Grade 2 diastolic dysfunction along with left ventricular hypertrophy, moderate mitral regurgitation, and mild to moderate pulmonary hypertension were also noted on echocardiogram.  Patient expressed a clear preference for medical management only and declined invasive intervention for treatment of MI.  Her underlying severe chronic kidney disease and subsequent acute kidney injury during this hospital stay were considered a relative contraindication to coronary angiography which the patient declined.   Suspected acute non-ST elevation MI was treated initially with aspirin, Plavix, IV heparin, beta-blocker therapy, and a statin.  LDL cholesterol was 68.  An ACE inhibitor was started but subsequently discontinued when she subsequently developed acute kidney injury.  With IV fluid therapy of pneumonia, weight initially increased and her respiratory status deteriorated.  BNP increased as high as 75,168, and radiographic findings were also consistent with pulmonary edema and heart failure.  She subsequently was treated with high doses of a loop diuretic.  Back pain resolved, and her respiratory status improved.  She remained generally hemodynamically stable with intermittent asymptomatic bradycardia particularly during sleep.  Weight returned to baseline.  After investigation, non-ST elevation MI due to underlying CAD and pneumonia with hypoxic respiratory failure was suspected.  Her clinical course was consistent with acute systolic and diastolic heart failure.  Chronic mitral regurgitation also probably contributed to pulmonary edema.    Problem #3 acute kidney injury superimposed upon stage IV chronic kidney disease.  Renal function initially was at her usual baseline.  After starting an ACE inhibitor, her creatinine progressively increased, so ACE inhibitor treatment was discontinued and not restarted.  She did not develop hyperkalemia.  Urine output remained adequate particularly with high doses of diuretics.  Renal function stabilized although had not recovered to her previous baseline by discharge.  After investigation, acute kidney injury due to a combination of ACE inhibitor treatment and acute illness including pneumonia with hypoxia was suspected.  Worsening renal function was considered to be a contraindication to use of an ACE inhibitor or ARB for treatment of non-ST elevation MI and acute systolic heart failure.    Problem #4 anemia of chronic kidney disease.  She was anemic throughout her hospital stay.   Active bleeding was not present, and stool testing for occult blood was negative.  Hemoglobin stabilized between 8 and 9.  She did not receive blood transfusion.  After investigation, anemia of chronic kidney disease was suspected.    Problem #5 debility and functional decline.  She developed initially worsening weakness and underwent PT evaluation and treatment.  She tolerated increasing ambulation using an assistive device by discharge, but ongoing physical therapy was recommended for strengthening and endurance.  Patient expressed strong preference to return home.  Her acute functional decline was superimposed upon chronic limitations including her legally blind status for which she relied on help from others to manage her medications at home and because of which she is homebound.  Because a number of medication changes were advised at discharge, and because of her acute problems including pneumonia with hypoxic respiratory failure, non-ST elevation MI, and heart failure, home care nursing services were advised for a recheck of her oxygenation and vital signs as well as compliance with recommended medication changes with which the patient was agreeable.  She was seen for face-to-face evaluation for certification of home care services on the day of discharge.    Liborio Hendrickson    Significant Results and Procedures   No procedures performed during this admission    Pending Results   none    Code Status   DNR / DNI       Primary Care Physician   Samantha Celis    Physical Exam   137 lbs 12.6 oz  /77  Pulse 60  Temp 97.7  F (36.5  C) (Oral)  Resp 18  Wt 62.5 kg (137 lb 12.6 oz)  SpO2 92%  BMI 24.41 kg/m2    No acute distress sitting in a chair  Normal respiratory effort, few scattered crackles, no wheezes  Regular heart rate and rhythm  Alert and oriented, conversive with normal speech, some memory difficulties apparent for example the patient had some difficulty remembering the medical problems for which she  was hospitalized during this hospital stay    Discharge Disposition   Discharged to home  Condition at discharge: Stable    Consultations This Hospital Stay   CARE TRANSITION RN/SW IP CONSULT  PHYSICAL THERAPY ADULT IP CONSULT  SMOKING CESSATION PROGRAM IP CONSULT    Time Spent on this Encounter   I, Liborio Hendrickson, personally saw the patient today and spent greater than 30 minutes discharging this patient.    Discharge Orders     MD CERTIFICATION [facility] PATIENT     HOME CARE NURSING REFERRAL     Reason for your hospital stay   Hospitlaized due to pneumonia and probable heart attack and improved     Follow-up and recommended labs and tests    Follow up with primary care provider, Samantha Celis, within 2 weeks, to evaluate medication change and for hospital follow- up. The following labs/tests are recommended: basic metabolic panel.     Activity   Your activity upon discharge: activity as tolerated     DNR/DNI     Diet   Follow this diet upon discharge: Orders Placed This Encounter     Combination Diet 6626-7359 Calories: Moderate Consistent CHO (4-6 CHO units/meal); 2 gm NA Diet       Discharge Medications   Current Discharge Medication List      START taking these medications    Details   cefdinir (OMNICEF) 300 MG capsule Take 1 capsule (300 mg) by mouth daily for 4 days  Qty: 4 capsule, Refills: 0    Associated Diagnoses: Pneumonia of left lung due to infectious organism, unspecified part of lung      torsemide (DEMADEX) 20 MG tablet Take 2 tablets (40 mg) by mouth daily  Qty: 60 tablet, Refills: 0    Associated Diagnoses: Acute combined systolic and diastolic congestive heart failure (H)      clopidogrel (PLAVIX) 75 MG tablet Take 1 tablet (75 mg) by mouth daily  Qty: 30 tablet, Refills: 0    Associated Diagnoses: Non-ST elevated myocardial infarction (non-STEMI) (H)         CONTINUE these medications which have CHANGED    Details   oxyCODONE IR (ROXICODONE) 5 MG tablet Take 0.5-1 tablets (2.5-5 mg) by mouth every 4  hours as needed for moderate to severe pain  Qty: 10 tablet, Refills: 0    Associated Diagnoses: Hematoma of left hip, initial encounter      aspirin EC 81 MG EC tablet Take 1 tablet (81 mg) by mouth daily    Associated Diagnoses: Non-ST elevated myocardial infarction (non-STEMI) (H)      metoprolol succinate (TOPROL XL) 25 MG 24 hr tablet Take 1.5 tablets (37.5 mg) by mouth daily  Qty: 30 tablet    Associated Diagnoses: Benign essential hypertension      amLODIPine (NORVASC) 5 MG tablet Take 1 tablet (5 mg) by mouth 2 times daily  Qty: 30 tablet    Associated Diagnoses: Benign essential hypertension         CONTINUE these medications which have NOT CHANGED    Details   potassium citrate (UROCIT-K) 5 MEQ (540 MG) CR tablet Take 4 tablets (20 mEq) by mouth daily  Qty: 120 tablet, Refills: 1    Associated Diagnoses: Hypokalemia      acetaminophen (TYLENOL) 500 MG tablet Take 2 tablets (1,000 mg) by mouth 3 times daily    Associated Diagnoses: Closed left hip fracture, initial encounter (H)      simvastatin (ZOCOR) 10 MG tablet Take 1 tablet (10 mg) by mouth At Bedtime  Qty: 30 tablet    Associated Diagnoses: Hypercholesterolemia      levothyroxine (SYNTHROID/LEVOTHROID) 112 MCG tablet Take 1 tablet (112 mcg) by mouth daily    Associated Diagnoses: Other specified hypothyroidism      senna-docusate (SENOKOT-S;PERICOLACE) 8.6-50 MG per tablet Take 1-2 tablets by mouth 2 times daily as needed (constipation )  Qty: 100 tablet, Refills: 0    Comments: Indication: Constipation  Associated Diagnoses: Closed left hip fracture, initial encounter (H)      fenofibrate 160 MG tablet Take 1 tablet (160 mg) by mouth daily  Qty: 30 tablet    Associated Diagnoses: Hypercholesterolemia      Calcium-Vitamin D (CALCIUM + D PO) Take 1 tablet by mouth daily.         STOP taking these medications       furosemide (LASIX) 20 MG tablet Comments:   Reason for Stopping:         allopurinol (ZYLOPRIM) 100 MG tablet Comments:   Reason for  Stopping:         VITAMIN D, CHOLECALCIFEROL, PO Comments:   Reason for Stopping:         folic acid (FOLVITE) 400 MCG tablet Comments:   Reason for Stopping:         Multiple Vitamins-Minerals (CENTRUM SILVER ULTRA WOMENS) TABS Comments:   Reason for Stopping:             Allergies   Allergies   Allergen Reactions     Hmg-Coa-R Inhibitors      Metformin GI Disturbance     Data   Most Recent 3 CBC's:  Recent Labs   Lab Test  02/02/18   0553  02/01/18   0546  01/31/18   0552  01/30/18   0556  01/28/18   1357  01/28/18   0630   12/12/17   2039   WBC   --    --   8.4   --   10.4  11.3*   --   8.6   HGB  8.4*  8.0*   --   9.1*  8.8*  9.8*   < >  8.6*   MCV   --    --    --    --   95  94   --   93   PLT   --   205   --    --   194  227   --   207    < > = values in this interval not displayed.      Most Recent 3 BMP's:  Recent Labs   Lab Test  02/02/18   0553  02/01/18   0546  01/31/18   0552   NA  141  142  140   POTASSIUM  3.8  4.0  4.6   CHLORIDE  104  107  109   CO2  28  26  24   BUN  55*  46*  40*   CR  2.60*  2.60*  2.44*   ANIONGAP  9  9  7   ZULEMA  8.4*  8.3*  8.3*   GLC  137*  99  118*     Most Recent 3 Troponin's:  Recent Labs   Lab Test  01/30/18   1204  01/30/18   0556  01/29/18   0615   TROPI  0.264*  0.283*  0.411*     Most Recent Cholesterol Panel:  Recent Labs   Lab Test  01/29/18   0615   CHOL  136   LDL  68   HDL  54   TRIG  71     Most Recent 6 Bacteria Isolates From Any Culture (See EPIC Reports for Culture Details):  Recent Labs   Lab Test  01/28/18   1000  05/26/17   0630  05/26/17   0430  05/26/17   0353  05/26/17   0342  01/19/17   2127   CULT  No MRSA isolated  No MRSA isolated  No growth  No growth after 6 days  No growth after 6 days  No MRSA isolated     Most Recent TSH, T4 and A1c Labs:  Recent Labs   Lab Test  01/28/18   1745   TSH  4.29*   T4  0.94   A1C  5.3     Results for orders placed or performed during the hospital encounter of 01/28/18   XR Chest 2 Views    Narrative    XR CHEST 2 VW    1/28/2018 6:56 AM     INDICATION: Shortness of breath; .    COMPARISON: 5/26/2017.      Impression    IMPRESSION: Small bilateral pleural effusions. Mild scattered  interstitial prominence most of which is stable. Slightly increased  interstitial opacities in the lingula which could be due to  atelectasis or pneumonia. Stable mild cardiomegaly.    JAVIER PORTILLO MD   XR Chest Port 1 View    Narrative    XR CHEST PORT 1 VW   1/30/2018 6:15 AM     HISTORY: Worsening SOB.     COMPARISON: 1/28/2018.    FINDINGS: Upright portable chest. The heart is at the upper limits of  normal in size without pulmonary edema. Thoracic aorta is calcified.  There is a new right perihilar and right upper lobe infiltrate. New  right pleural effusion. Atelectasis or scarring at the left lung base.  No pneumothorax.      Impression    IMPRESSION: New right upper lobe pneumonia and right pleural effusion.    YANCY BAUM MD

## 2018-02-02 NOTE — PLAN OF CARE
Problem: Patient Care Overview  Goal: Interdisciplinary Rounds/Family Conf  Outcome: Therapy, progress toward functional goals as expected  Patient is alert and oriented x3 displays some disorientation to time. Patient able to make needs known denies any pain at this time. Patient continues to be on RA with oxygen stats >90 with no c/o of SOB, LS diminished  . Patient ambulates to to the bathroom with assist x1 with a walker and gait belt. Patient had difficulty sleeping hospitalist notified 1mg Melatonin ordered but ineffective patient did not sleep at all will continue to monitor patient.

## 2018-02-02 NOTE — DISCHARGE INSTRUCTIONS
Monday February 5th at 10:40 am  Dr. Celis / Ramiro Suazo  [phone] / please call if need to reschedule   Please bring Ins Card / Photo ID / List of Medications

## 2018-02-02 NOTE — PLAN OF CARE
Problem: Patient Care Overview  Goal: Plan of Care/Patient Progress Review  S-(situation): Scheduled for PT treatment today.    B-(background): Working with PT on strength and activity tolerance.  Did not sleep well last night in the hospital. Pt states she wants to not work this morning with PT due to getting a nap in.    A-(assessment): Pt did well with PT session last visit.  DC today.    R-(recommendations): DC home with home therapy services.    Physical Therapy Discharge Summary    Reason for therapy discharge:    Discharged to home with home therapy.    Progress towards therapy goal(s). See goals on Care Plan in [facility] record for goal details.  Goals partially met.  Barriers to achieving goals:   limited tolerance for therapy.    Therapy recommendation(s):    Continued therapy is recommended.  Rationale/Recommendations:  strength and endurance.     Thank you for your referral.    Noemi Lala, PT, DPT  [facility]  [phone]

## 2018-02-02 NOTE — PROGRESS NOTES
S-(situation): Patient discharged to home via W/C with family    B-(background): Pt A&O at this time, only confused with time. VSS.  Afebrile.  Has diminished lung sounds but clear. Sats are 92% on R/A.      A-(assessment): Pneumonia - see above.    R-(recommendations): Discharge instructions reviewed with pt and family. Listed belongings gathered and returned to patient.          Discharge Nursing Criteria:     Care Plan and Patient education resolved: Yes    New Medications- pt has been educated about purpose and side effects: Yes    Vaccines  Influenza status verified at discharge:  Yes      Intentionally Retained Items (examples: Drains, Wound packing, ureteral stents, rodriguez, PICC line or IV)  Patient was sent home with nothing left in place.   Information you need to know about your procedure form filled out and copy given to patient: No  Follow up appointment made for removal: No    MISC  Prescriptions if needed, hard copies sent with patient  NA  Home and hospital aquired medications returned to patient: NA  Medication Bin checked and emptied on discharge Yes  Patient reports post-discharge pain management plan is effective: Yes    TKA/CONNIE ONLY:   Discharged with ALICIA Stockings No  ALICIA stocking Education Completed No

## 2018-02-02 NOTE — PROGRESS NOTES
VSS, afebrile.  Denies pain.  LS clear, on RA, sats in 90s.  Tolerating PO well, glucose checks within SS parameters, no insulin needed.  Continue to monitor.

## 2018-02-03 NOTE — PROGRESS NOTES
"Mary Ellen Cuba  Gender: female  : 1924  [address] [facility] [address]  [phone] (home)     Medical Record: [id number]  Pharmacy: Data Unavailable  Primary Care Provider: Samantha Celis    Parent's names are: Data Unavailable (mother) and Data Unavailable (father).      [facility]  February 3, 2018     Discharge Phone Call:  Key Words/Key Times      Introduction - AIDET (Acknowledge, Introduce, Duration, Explanation)      Empathy-   We are calling to see how you are since your recent stay in the hospital?     Call back COMMENTS: Her daughter answered the phone      Clinical Questions -  (f/u appts, medication side effects/purpose, ability to care for self at home) \"For your safety, it is important to us that you understand the purpose and side effects of your medications, can you tell me what your new medications are?\"     Call back COMMENTS: None Home Health Care provider had just come to their house.      Staff Recognition -  We like to recognize staff and physicians who have done an excellent job.  Do you remember any people from your care team that you would like recognize?     Call back COMMENTS: No specific names were left.      Very Good Care -  We want to provide very good care to all patients.  How was your care?     Call back COMMENTS: Pts.daughter mentioned that her family stated that our staff was very nice and informative when they asked for information on the pt.      Opportunities for Improvement -  Our goal is to be the best.  Do you have any suggestions for things that we could improve upon?     Call back COMMENTS: Her daughter did not have any concerns to offer.      Thank You           "

## 2018-02-06 ENCOUNTER — DOCUMENTATION ONLY (OUTPATIENT)
Dept: CARE COORDINATION | Facility: CLINIC | Age: 83
End: 2018-02-06

## 2018-02-07 ENCOUNTER — DOCUMENTATION ONLY (OUTPATIENT)
Dept: CARE COORDINATION | Facility: CLINIC | Age: 83
End: 2018-02-07

## 2018-02-07 NOTE — PROGRESS NOTES
[facility] now requests orders and shares plan of care/discharge summaries for some patients through [employer].  Please REPLY TO THIS MESSAGE in order to give authorization for orders when needed.  This is considered a verbal order, you will still receive a faxed copy of orders for signature.  Thank you for your assistance in improving collaboration for our patients.    ORDER/PT 1x/wk this week then 2x/wk for 2 weeks for strengthening, endurance, gait and transfer training.

## 2018-02-07 NOTE — PROGRESS NOTES
[facility] now requests orders and shares plan of care/discharge summaries for some patients through [employer].  Please REPLY TO THIS MESSAGE in order to give authorization for orders when needed.  This is considered a verbal order, you will still receive a faxed copy of orders for signature.  Thank you for your assistance in improving collaboration for our patients.    ORDER    Requesting orders for a social work Evaluation

## 2018-02-07 NOTE — PROGRESS NOTES
[facility] now requests orders and shares plan of care/discharge summaries for some patients through [employer].  Please REPLY TO THIS MESSAGE in order to give authorization for orders when needed.  This is considered a verbal order, you will still receive a faxed copy of orders for signature.  Thank you for your assistance in improving collaboration for our patients.    ORDER    Skilled OT 2 week 4 for ADL retraining and cognitive assessments.

## 2018-02-25 ENCOUNTER — NURSE TRIAGE (OUTPATIENT)
Dept: NURSING | Facility: CLINIC | Age: 83
End: 2018-02-25

## 2018-02-25 ENCOUNTER — HOSPITAL ENCOUNTER (INPATIENT)
Facility: CLINIC | Age: 83
LOS: 5 days | Discharge: HOME | DRG: 280 | End: 2018-03-02
Attending: FAMILY MEDICINE | Admitting: INTERNAL MEDICINE
Payer: MEDICARE

## 2018-02-25 ENCOUNTER — APPOINTMENT (OUTPATIENT)
Dept: GENERAL RADIOLOGY | Facility: CLINIC | Age: 83
DRG: 280 | End: 2018-02-25
Attending: FAMILY MEDICINE
Payer: MEDICARE

## 2018-02-25 DIAGNOSIS — E11.9 TYPE 2 DIABETES MELLITUS WITHOUT COMPLICATION, WITHOUT LONG-TERM CURRENT USE OF INSULIN (H): ICD-10-CM

## 2018-02-25 DIAGNOSIS — I21.4 NON-ST ELEVATED MYOCARDIAL INFARCTION (NON-STEMI) (H): Primary | ICD-10-CM

## 2018-02-25 DIAGNOSIS — I50.41 ACUTE COMBINED SYSTOLIC AND DIASTOLIC HEART FAILURE (H): ICD-10-CM

## 2018-02-25 DIAGNOSIS — N17.9 ACUTE RENAL FAILURE SUPERIMPOSED ON CHRONIC KIDNEY DISEASE, UNSPECIFIED CKD STAGE, UNSPECIFIED ACUTE RENAL FAILURE TYPE: ICD-10-CM

## 2018-02-25 DIAGNOSIS — N18.9 ACUTE RENAL FAILURE SUPERIMPOSED ON CHRONIC KIDNEY DISEASE, UNSPECIFIED CKD STAGE, UNSPECIFIED ACUTE RENAL FAILURE TYPE: ICD-10-CM

## 2018-02-25 DIAGNOSIS — N18.4 CHRONIC KIDNEY DISEASE, STAGE IV (SEVERE) (H): ICD-10-CM

## 2018-02-25 DIAGNOSIS — Z86.73 HISTORY OF CVA (CEREBROVASCULAR ACCIDENT): ICD-10-CM

## 2018-02-25 DIAGNOSIS — E11.22 TYPE 2 DIABETES MELLITUS WITH DIABETIC CHRONIC KIDNEY DISEASE, UNSPECIFIED CKD STAGE, UNSPECIFIED LONG TERM INSULIN USE STATUS: ICD-10-CM

## 2018-02-25 DIAGNOSIS — I50.41 ACUTE COMBINED SYSTOLIC AND DIASTOLIC CONGESTIVE HEART FAILURE (H): ICD-10-CM

## 2018-02-25 DIAGNOSIS — J18.9 COMMUNITY ACQUIRED PNEUMONIA, UNSPECIFIED LATERALITY: ICD-10-CM

## 2018-02-25 DIAGNOSIS — I25.10 CORONARY ARTERY DISEASE INVOLVING NATIVE CORONARY ARTERY OF NATIVE HEART WITHOUT ANGINA PECTORIS: ICD-10-CM

## 2018-02-25 DIAGNOSIS — I10 ESSENTIAL HYPERTENSION: ICD-10-CM

## 2018-02-25 DIAGNOSIS — S72.002A CLOSED LEFT HIP FRACTURE, INITIAL ENCOUNTER (H): ICD-10-CM

## 2018-02-25 DIAGNOSIS — I13.0 HYPERTENSIVE HEART AND RENAL DISEASE WITH HEART FAILURE (H): ICD-10-CM

## 2018-02-25 DIAGNOSIS — E03.4 HYPOTHYROIDISM DUE TO ACQUIRED ATROPHY OF THYROID: ICD-10-CM

## 2018-02-25 DIAGNOSIS — J96.01 ACUTE RESPIRATORY FAILURE WITH HYPOXIA (H): ICD-10-CM

## 2018-02-25 DIAGNOSIS — I10 BENIGN ESSENTIAL HYPERTENSION: ICD-10-CM

## 2018-02-25 DIAGNOSIS — R33.9 URINARY RETENTION: ICD-10-CM

## 2018-02-25 PROBLEM — J96.00 ACUTE RESPIRATORY FAILURE (H): Status: ACTIVE | Noted: 2018-02-25

## 2018-02-25 PROBLEM — I50.43 ACUTE ON CHRONIC COMBINED SYSTOLIC AND DIASTOLIC CHF, NYHA CLASS 1 (H): Status: ACTIVE | Noted: 2018-02-25

## 2018-02-25 LAB
ALBUMIN SERPL-MCNC: 3.3 G/DL (ref 3.4–5)
ALBUMIN UR-MCNC: >499 MG/DL
ALP SERPL-CCNC: 76 U/L (ref 40–150)
ALT SERPL W P-5'-P-CCNC: 11 U/L (ref 0–50)
AMORPH CRY #/AREA URNS HPF: ABNORMAL /HPF
ANION GAP SERPL CALCULATED.3IONS-SCNC: 14 MMOL/L (ref 3–14)
APPEARANCE UR: ABNORMAL
AST SERPL W P-5'-P-CCNC: 19 U/L (ref 0–45)
BASOPHILS # BLD AUTO: 0 10E9/L (ref 0–0.2)
BASOPHILS NFR BLD AUTO: 0.1 %
BILIRUB SERPL-MCNC: 1.1 MG/DL (ref 0.2–1.3)
BILIRUB UR QL STRIP: NEGATIVE
BUN SERPL-MCNC: 55 MG/DL (ref 7–30)
CALCIUM SERPL-MCNC: 9.1 MG/DL (ref 8.5–10.1)
CHLORIDE SERPL-SCNC: 107 MMOL/L (ref 94–109)
CO2 SERPL-SCNC: 22 MMOL/L (ref 20–32)
COLOR UR AUTO: ABNORMAL
CREAT SERPL-MCNC: 2.54 MG/DL (ref 0.52–1.04)
CRP SERPL-MCNC: 142 MG/L (ref 0–8)
DIFFERENTIAL METHOD BLD: ABNORMAL
EOSINOPHIL # BLD AUTO: 0 10E9/L (ref 0–0.7)
EOSINOPHIL NFR BLD AUTO: 0 %
ERYTHROCYTE [DISTWIDTH] IN BLOOD BY AUTOMATED COUNT: 16.1 % (ref 10–15)
FLUAV+FLUBV AG SPEC QL: NEGATIVE
FLUAV+FLUBV AG SPEC QL: NEGATIVE
GFR SERPL CREATININE-BSD FRML MDRD: 18 ML/MIN/1.7M2
GLUCOSE BLDC GLUCOMTR-MCNC: 144 MG/DL (ref 70–99)
GLUCOSE SERPL-MCNC: 146 MG/DL (ref 70–99)
GLUCOSE UR STRIP-MCNC: 50 MG/DL
HBA1C MFR BLD: 5.5 % (ref 4.3–6)
HCT VFR BLD AUTO: 32.1 % (ref 35–47)
HGB BLD-MCNC: 9.8 G/DL (ref 11.7–15.7)
HGB UR QL STRIP: ABNORMAL
HYALINE CASTS #/AREA URNS LPF: 8 /LPF (ref 0–2)
IMM GRANULOCYTES # BLD: 0 10E9/L (ref 0–0.4)
IMM GRANULOCYTES NFR BLD: 0.4 %
KETONES UR STRIP-MCNC: NEGATIVE MG/DL
LEUKOCYTE ESTERASE UR QL STRIP: NEGATIVE
LYMPHOCYTES # BLD AUTO: 0.6 10E9/L (ref 0.8–5.3)
LYMPHOCYTES NFR BLD AUTO: 5.3 %
MCH RBC QN AUTO: 28.2 PG (ref 26.5–33)
MCHC RBC AUTO-ENTMCNC: 30.5 G/DL (ref 31.5–36.5)
MCV RBC AUTO: 93 FL (ref 78–100)
MONOCYTES # BLD AUTO: 0.7 10E9/L (ref 0–1.3)
MONOCYTES NFR BLD AUTO: 6.1 %
MUCOUS THREADS #/AREA URNS LPF: PRESENT /LPF
NEUTROPHILS # BLD AUTO: 10 10E9/L (ref 1.6–8.3)
NEUTROPHILS NFR BLD AUTO: 88.1 %
NITRATE UR QL: NEGATIVE
NT-PROBNP SERPL-MCNC: ABNORMAL PG/ML (ref 0–1800)
PH UR STRIP: 5 PH (ref 5–7)
PLATELET # BLD AUTO: 250 10E9/L (ref 150–450)
POTASSIUM SERPL-SCNC: 3.9 MMOL/L (ref 3.4–5.3)
PROT SERPL-MCNC: 7.3 G/DL (ref 6.8–8.8)
RBC # BLD AUTO: 3.47 10E12/L (ref 3.8–5.2)
RBC #/AREA URNS AUTO: 5 /HPF (ref 0–2)
RSV AG SPEC QL: NEGATIVE
SODIUM SERPL-SCNC: 143 MMOL/L (ref 133–144)
SOURCE: ABNORMAL
SP GR UR STRIP: 1.02 (ref 1–1.03)
SPECIMEN SOURCE: NORMAL
SPECIMEN SOURCE: NORMAL
SQUAMOUS #/AREA URNS AUTO: 2 /HPF (ref 0–1)
T4 FREE SERPL-MCNC: 1.07 NG/DL (ref 0.76–1.46)
TROPONIN I SERPL-MCNC: 0.7 UG/L (ref 0–0.04)
TSH SERPL DL<=0.005 MIU/L-ACNC: 6.47 MU/L (ref 0.4–4)
UROBILINOGEN UR STRIP-MCNC: 0 MG/DL (ref 0–2)
WBC # BLD AUTO: 11.4 10E9/L (ref 4–11)
WBC #/AREA URNS AUTO: <1 /HPF (ref 0–2)

## 2018-02-25 PROCEDURE — 87800 DETECT AGNT MULT DNA DIREC: CPT | Performed by: FAMILY MEDICINE

## 2018-02-25 PROCEDURE — 36415 COLL VENOUS BLD VENIPUNCTURE: CPT | Performed by: FAMILY MEDICINE

## 2018-02-25 PROCEDURE — 81001 URINALYSIS AUTO W/SCOPE: CPT | Performed by: FAMILY MEDICINE

## 2018-02-25 PROCEDURE — 87086 URINE CULTURE/COLONY COUNT: CPT | Performed by: FAMILY MEDICINE

## 2018-02-25 PROCEDURE — 25000128 H RX IP 250 OP 636: Performed by: FAMILY MEDICINE

## 2018-02-25 PROCEDURE — 96365 THER/PROPH/DIAG IV INF INIT: CPT | Performed by: FAMILY MEDICINE

## 2018-02-25 PROCEDURE — 87804 INFLUENZA ASSAY W/OPTIC: CPT | Performed by: FAMILY MEDICINE

## 2018-02-25 PROCEDURE — 96367 TX/PROPH/DG ADDL SEQ IV INF: CPT | Performed by: FAMILY MEDICINE

## 2018-02-25 PROCEDURE — 86140 C-REACTIVE PROTEIN: CPT | Performed by: FAMILY MEDICINE

## 2018-02-25 PROCEDURE — 83036 HEMOGLOBIN GLYCOSYLATED A1C: CPT | Performed by: INTERNAL MEDICINE

## 2018-02-25 PROCEDURE — 12000000 ZZH R&B MED SURG/OB

## 2018-02-25 PROCEDURE — 87040 BLOOD CULTURE FOR BACTERIA: CPT | Performed by: FAMILY MEDICINE

## 2018-02-25 PROCEDURE — 93005 ELECTROCARDIOGRAM TRACING: CPT | Performed by: FAMILY MEDICINE

## 2018-02-25 PROCEDURE — 84439 ASSAY OF FREE THYROXINE: CPT | Performed by: INTERNAL MEDICINE

## 2018-02-25 PROCEDURE — 25000125 ZZHC RX 250: Performed by: FAMILY MEDICINE

## 2018-02-25 PROCEDURE — 87081 CULTURE SCREEN ONLY: CPT | Performed by: FAMILY MEDICINE

## 2018-02-25 PROCEDURE — 99285 EMERGENCY DEPT VISIT HI MDM: CPT | Mod: 25 | Performed by: FAMILY MEDICINE

## 2018-02-25 PROCEDURE — 96361 HYDRATE IV INFUSION ADD-ON: CPT | Performed by: FAMILY MEDICINE

## 2018-02-25 PROCEDURE — 87807 RSV ASSAY W/OPTIC: CPT | Performed by: FAMILY MEDICINE

## 2018-02-25 PROCEDURE — 87186 SC STD MICRODIL/AGAR DIL: CPT | Performed by: FAMILY MEDICINE

## 2018-02-25 PROCEDURE — 93010 ELECTROCARDIOGRAM REPORT: CPT | Mod: Z6 | Performed by: FAMILY MEDICINE

## 2018-02-25 PROCEDURE — 83880 ASSAY OF NATRIURETIC PEPTIDE: CPT | Performed by: FAMILY MEDICINE

## 2018-02-25 PROCEDURE — 84484 ASSAY OF TROPONIN QUANT: CPT | Performed by: INTERNAL MEDICINE

## 2018-02-25 PROCEDURE — 99285 EMERGENCY DEPT VISIT HI MDM: CPT | Mod: Z6 | Performed by: FAMILY MEDICINE

## 2018-02-25 PROCEDURE — A9270 NON-COVERED ITEM OR SERVICE: HCPCS | Mod: GY | Performed by: FAMILY MEDICINE

## 2018-02-25 PROCEDURE — 80053 COMPREHEN METABOLIC PANEL: CPT | Performed by: FAMILY MEDICINE

## 2018-02-25 PROCEDURE — 71046 X-RAY EXAM CHEST 2 VIEWS: CPT | Mod: TC

## 2018-02-25 PROCEDURE — 25000132 ZZH RX MED GY IP 250 OP 250 PS 637: Mod: GY | Performed by: FAMILY MEDICINE

## 2018-02-25 PROCEDURE — 84443 ASSAY THYROID STIM HORMONE: CPT | Performed by: FAMILY MEDICINE

## 2018-02-25 PROCEDURE — 94640 AIRWAY INHALATION TREATMENT: CPT | Performed by: FAMILY MEDICINE

## 2018-02-25 PROCEDURE — 85025 COMPLETE CBC W/AUTO DIFF WBC: CPT | Performed by: FAMILY MEDICINE

## 2018-02-25 PROCEDURE — 99207 ZZC CDG-MDM COMPONENT: MEETS MODERATE - UP CODED: CPT | Performed by: INTERNAL MEDICINE

## 2018-02-25 PROCEDURE — 00000146 ZZHCL STATISTIC GLUCOSE BY METER IP

## 2018-02-25 PROCEDURE — 87077 CULTURE AEROBIC IDENTIFY: CPT | Performed by: FAMILY MEDICINE

## 2018-02-25 PROCEDURE — 96375 TX/PRO/DX INJ NEW DRUG ADDON: CPT | Performed by: FAMILY MEDICINE

## 2018-02-25 PROCEDURE — 99223 1ST HOSP IP/OBS HIGH 75: CPT | Mod: AI | Performed by: INTERNAL MEDICINE

## 2018-02-25 PROCEDURE — 84145 PROCALCITONIN (PCT): CPT | Performed by: INTERNAL MEDICINE

## 2018-02-25 RX ORDER — ASPIRIN 81 MG/1
81 TABLET ORAL DAILY
Status: DISCONTINUED | OUTPATIENT
Start: 2018-02-26 | End: 2018-03-02 | Stop reason: HOSPADM

## 2018-02-25 RX ORDER — FUROSEMIDE 10 MG/ML
40 INJECTION INTRAMUSCULAR; INTRAVENOUS ONCE
Status: COMPLETED | OUTPATIENT
Start: 2018-02-25 | End: 2018-02-25

## 2018-02-25 RX ORDER — ACETAMINOPHEN 650 MG/1
650 SUPPOSITORY RECTAL EVERY 4 HOURS PRN
Status: CANCELLED | OUTPATIENT
Start: 2018-02-25

## 2018-02-25 RX ORDER — ONDANSETRON 2 MG/ML
4 INJECTION INTRAMUSCULAR; INTRAVENOUS EVERY 6 HOURS PRN
Status: DISCONTINUED | OUTPATIENT
Start: 2018-02-25 | End: 2018-03-02 | Stop reason: HOSPADM

## 2018-02-25 RX ORDER — ONDANSETRON 4 MG/1
4 TABLET, ORALLY DISINTEGRATING ORAL EVERY 6 HOURS PRN
Status: CANCELLED | OUTPATIENT
Start: 2018-02-25

## 2018-02-25 RX ORDER — POTASSIUM CITRATE 10 MEQ/1
20 TABLET, EXTENDED RELEASE ORAL DAILY
Status: DISCONTINUED | OUTPATIENT
Start: 2018-02-26 | End: 2018-02-26

## 2018-02-25 RX ORDER — NALOXONE HYDROCHLORIDE 0.4 MG/ML
.1-.4 INJECTION, SOLUTION INTRAMUSCULAR; INTRAVENOUS; SUBCUTANEOUS
Status: DISCONTINUED | OUTPATIENT
Start: 2018-02-25 | End: 2018-03-02 | Stop reason: HOSPADM

## 2018-02-25 RX ORDER — PROCHLORPERAZINE 25 MG
12.5 SUPPOSITORY, RECTAL RECTAL EVERY 12 HOURS PRN
Status: DISCONTINUED | OUTPATIENT
Start: 2018-02-25 | End: 2018-03-02 | Stop reason: HOSPADM

## 2018-02-25 RX ORDER — LEVOTHYROXINE SODIUM 112 UG/1
112 TABLET ORAL DAILY
Status: DISCONTINUED | OUTPATIENT
Start: 2018-02-26 | End: 2018-03-02 | Stop reason: HOSPADM

## 2018-02-25 RX ORDER — ACETAMINOPHEN 325 MG/1
650 TABLET ORAL EVERY 4 HOURS PRN
Status: CANCELLED | OUTPATIENT
Start: 2018-02-25

## 2018-02-25 RX ORDER — AMOXICILLIN 250 MG
1-2 CAPSULE ORAL DAILY PRN
Status: DISCONTINUED | OUTPATIENT
Start: 2018-02-25 | End: 2018-03-02 | Stop reason: HOSPADM

## 2018-02-25 RX ORDER — NALOXONE HYDROCHLORIDE 0.4 MG/ML
.1-.4 INJECTION, SOLUTION INTRAMUSCULAR; INTRAVENOUS; SUBCUTANEOUS
Status: CANCELLED | OUTPATIENT
Start: 2018-02-25

## 2018-02-25 RX ORDER — ONDANSETRON 4 MG/1
4 TABLET, ORALLY DISINTEGRATING ORAL EVERY 6 HOURS PRN
Status: DISCONTINUED | OUTPATIENT
Start: 2018-02-25 | End: 2018-03-02 | Stop reason: HOSPADM

## 2018-02-25 RX ORDER — DEXTROSE MONOHYDRATE 25 G/50ML
25-50 INJECTION, SOLUTION INTRAVENOUS
Status: DISCONTINUED | OUTPATIENT
Start: 2018-02-25 | End: 2018-03-02 | Stop reason: HOSPADM

## 2018-02-25 RX ORDER — ISOSORBIDE MONONITRATE 30 MG/1
30 TABLET, EXTENDED RELEASE ORAL DAILY
Status: DISCONTINUED | OUTPATIENT
Start: 2018-02-25 | End: 2018-02-26

## 2018-02-25 RX ORDER — ONDANSETRON 2 MG/ML
4 INJECTION INTRAMUSCULAR; INTRAVENOUS EVERY 6 HOURS PRN
Status: CANCELLED | OUTPATIENT
Start: 2018-02-25

## 2018-02-25 RX ORDER — SIMVASTATIN 5 MG
10 TABLET ORAL AT BEDTIME
Status: DISCONTINUED | OUTPATIENT
Start: 2018-02-25 | End: 2018-03-02 | Stop reason: HOSPADM

## 2018-02-25 RX ORDER — ONDANSETRON 2 MG/ML
4 INJECTION INTRAMUSCULAR; INTRAVENOUS EVERY 30 MIN PRN
Status: DISCONTINUED | OUTPATIENT
Start: 2018-02-25 | End: 2018-02-25

## 2018-02-25 RX ORDER — NICOTINE POLACRILEX 4 MG
15-30 LOZENGE BUCCAL
Status: DISCONTINUED | OUTPATIENT
Start: 2018-02-25 | End: 2018-03-02 | Stop reason: HOSPADM

## 2018-02-25 RX ORDER — CLOPIDOGREL BISULFATE 75 MG/1
75 TABLET ORAL DAILY
Status: DISCONTINUED | OUTPATIENT
Start: 2018-02-26 | End: 2018-03-02 | Stop reason: HOSPADM

## 2018-02-25 RX ORDER — PROCHLORPERAZINE MALEATE 5 MG
5 TABLET ORAL EVERY 6 HOURS PRN
Status: DISCONTINUED | OUTPATIENT
Start: 2018-02-25 | End: 2018-03-02 | Stop reason: HOSPADM

## 2018-02-25 RX ORDER — NITROGLYCERIN 80 MG/1
1 PATCH TRANSDERMAL ONCE
Status: COMPLETED | OUTPATIENT
Start: 2018-02-25 | End: 2018-02-25

## 2018-02-25 RX ORDER — ALBUTEROL SULFATE 0.83 MG/ML
2.5 SOLUTION RESPIRATORY (INHALATION) EVERY 4 HOURS PRN
Status: DISCONTINUED | OUTPATIENT
Start: 2018-02-25 | End: 2018-03-02 | Stop reason: HOSPADM

## 2018-02-25 RX ORDER — SODIUM CHLORIDE 9 MG/ML
1000 INJECTION, SOLUTION INTRAVENOUS CONTINUOUS
Status: DISCONTINUED | OUTPATIENT
Start: 2018-02-25 | End: 2018-02-25

## 2018-02-25 RX ORDER — ACETAMINOPHEN 325 MG/1
650 TABLET ORAL EVERY 4 HOURS PRN
Status: DISCONTINUED | OUTPATIENT
Start: 2018-02-25 | End: 2018-03-02 | Stop reason: HOSPADM

## 2018-02-25 RX ORDER — IPRATROPIUM BROMIDE AND ALBUTEROL SULFATE 2.5; .5 MG/3ML; MG/3ML
3 SOLUTION RESPIRATORY (INHALATION) ONCE
Status: COMPLETED | OUTPATIENT
Start: 2018-02-25 | End: 2018-02-25

## 2018-02-25 RX ORDER — HYDRALAZINE HYDROCHLORIDE 10 MG/1
10 TABLET, FILM COATED ORAL 4 TIMES DAILY
Status: DISCONTINUED | OUTPATIENT
Start: 2018-02-26 | End: 2018-03-02 | Stop reason: HOSPADM

## 2018-02-25 RX ORDER — DEXTROSE MONOHYDRATE, SODIUM CHLORIDE, AND POTASSIUM CHLORIDE 50; 1.49; 4.5 G/1000ML; G/1000ML; G/1000ML
INJECTION, SOLUTION INTRAVENOUS CONTINUOUS
Status: CANCELLED | OUTPATIENT
Start: 2018-02-25

## 2018-02-25 RX ADMIN — METOPROLOL SUCCINATE 37.5 MG: 25 TABLET, EXTENDED RELEASE ORAL at 20:51

## 2018-02-25 RX ADMIN — FUROSEMIDE 40 MG: 10 INJECTION, SOLUTION INTRAVENOUS at 20:09

## 2018-02-25 RX ADMIN — AZITHROMYCIN MONOHYDRATE 500 MG: 500 INJECTION, POWDER, LYOPHILIZED, FOR SOLUTION INTRAVENOUS at 21:24

## 2018-02-25 RX ADMIN — NITROGLYCERIN 1 PATCH: 0.4 PATCH TRANSDERMAL at 20:49

## 2018-02-25 RX ADMIN — SODIUM CHLORIDE 1000 ML: 9 INJECTION, SOLUTION INTRAVENOUS at 18:45

## 2018-02-25 RX ADMIN — CEFTRIAXONE 1 G: 1 INJECTION, SOLUTION INTRAVENOUS at 20:46

## 2018-02-25 RX ADMIN — IPRATROPIUM BROMIDE AND ALBUTEROL SULFATE 3 ML: .5; 3 SOLUTION RESPIRATORY (INHALATION) at 19:26

## 2018-02-25 ASSESSMENT — ENCOUNTER SYMPTOMS
SHORTNESS OF BREATH: 1
VOMITING: 0
APPETITE CHANGE: 1
HEADACHES: 1
CONFUSION: 1
NAUSEA: 1

## 2018-02-25 NOTE — IP AVS SNAPSHOT
MRN:[id number]                      After Visit Summary   2/25/2018    Mary Ellen Cuba    MRN: [id number]           Thank you!     Thank you for choosing [facility] for your care. Our goal is always to provide you with excellent care. Hearing back from our patients is one way we can continue to improve our services. Please take a few minutes to complete the written survey that you may receive in the mail after you visit with us. Thank you!        Patient Information     Date Of Birth          1/20/1924        Designated Caregiver       Most Recent Value    Caregiver    Will someone help with your care after discharge? no      About your hospital stay     You were admitted on:  February 25, 2018 You last received care in the:  [address]    You were discharged on:  March 2, 2018        Reason for your hospital stay       Recurrent heart attack causing worsening functioning of the heart - your medications have been adjusted and you are more comfortable and pain free on the current medications.  You wish not to have aggressive procedures at this time and want medical management and focus on comfort going forward with avoidance of further hospital stays.  You will be going home with 24/7 agency nurse help and the initiation of hospice services.                  Who to Call     For medical emergencies, please call 911.  For non-urgent questions about your medical care, please call your primary care provider or clinic, [phone]          Attending Provider     Provider Specialty    Tara, Ethan CORTÉS MD Emergency Medicine    Shabana Turpin MD [facility]       Primary Care Provider Office Phone # Fax #    Samantha Celis [phone]      After Care Instructions     Activity       Your activity upon discharge: activity as tolerated            Diet       Follow this diet upon discharge: [id number] Calories: Moderate Consistent CHO (4-6 CHO  units/meal); 2 gm NA Diet                  Follow-up Appointments     Follow-up and recommended labs and tests        Hospice services will begin on 3/5/18  Follow up with your primary care provider on 3/6/18 as scheduled                  Additional Services     HOME CARE NURSING REFERRAL       **Order classes of: [place] [facility], [facility] and [place] [facility] will route to the Home Care and Hospice Referral Pool.  Home Care or Hospice will then contact the patient to schedule their appointment.**    If you do not hear from Home Care and Hospice, or you would like to call to schedule, please call the referring place of service that your provider has listed below.  ______________________________________________________________________    Your provider has referred you to: FMG: [facility] [phone]   [email or web address]    Extended Emergency Contact Information  Primary Emergency Contact: Sally Alexander   [facility]  Home Phone: [phone]  Relation: Other  Secondary Emergency Contact: MorenoSukhjinder JOSÉ LUIS   [facility]  Home Phone: [phone]  Relation: Other    Patient Anticipated Discharge Date: 3/2/18   RN, PT, HHA to begin 24 - 48 hours after discharge.  PLEASE EVALUATE AND TREAT (Evaluation timeline is 24 - 48 hrs. Please call if there is need for a variance to this timeline).    REASON FOR REFERRAL: Assessment & Treatment: RN    ADDITIONAL SERVICES NEEDED: None identified     OTHER PERTINENT INFORMATION: Patient was last seen by provider on 3/2/18 for recurrent non-STEMI heart attack, worsening congestive heart failure, possible community acquired pneumonia    No current outpatient prescriptions on file.    Patient Active Problem List:     Essential hypertension     Coronary artery disease involving native coronary artery of native heart without angina pectoris     Hyperlipidemia LDL goal <100     Hypothyroidism due to acquired atrophy of thyroid      Type 2 diabetes mellitus without complication (H)     Hypernatremia     Anemia in chronic kidney disease     Fractured hip, left, closed, initial encounter (H)     Urinary retention     Closed fracture of left inferior and superior pubic rami     Acute congestive heart failure (H)     Leg hematoma, left, initial encounter     CKD (chronic kidney disease) stage 4, GFR 15-29 ml/min (H)     History of CVA (cerebrovascular accident)     Left leg pain     Blind - patient reported     Lingular pneumonia, organism unspecified     Non-ST elevated myocardial infarction (non-STEMI) (H)     LVH (left ventricular hypertrophy)     RVH (right ventricular hypertrophy)     Acute combined systolic and diastolic congestive heart failure (H)     Paroxysmal atrial fibrillation (H) - possible     Moderate pulmonary hypertension by Echo     Moderate mitral regurgitation     Acute respiratory failure with hypoxia (H)     Acute kidney injury (H)     Acute on chronic combined systolic and diastolic CHF, NYHA class 1 (H)     Acute respiratory failure (H)      Documentation of Face to Face and Certification for Home Health Services    I certify that patient, Mary Ellen Cuba is under my care and that I, or a Nurse Practitioner or Physician's Assistant working with me, had a face-to-face encounter that meets the physician face-to-face encounter requirements with this patient on: 3/2/2018.    This encounter with the patient was in whole, or in part, for the following medical condition, which is the primary reason for Home Health Care: CAD with recurrent MI despite maximal medical management, worsening CHF with acute exacerbation.    I certify that, based on my findings, the following services are medically necessary Home Health Services: Nursing    My clinical findings support the need for the above services because: Nurse is needed: To assess in medical stability after changes in medications or other medical regimen..    Further, I certify  that my clinical findings support that this patient is homebound (i.e. absences from home require considerable and taxing effort and are for medical reasons or [facility] services or infrequently or of short duration when for other reasons) because: Requires assistance of another person or specialized equipment to access medical services because patient: Requires supervision of another for safe transfer outside of the hourse.    Based on the above findings, I certify that this patient is confined to the home and needs intermittent skilled nursing care, physical therapy and/or speech therapy.  The patient is under my care, and I have initiated the establishment of the plan of care.  This patient will be followed by a physician who will periodically review the plan of care.    Physician/Provider to provide follow up care: Samantha Celis certified Physician at time of discharge: Shabana Turpin MD    Please be aware that coverage of these services is subject to the terms and limitations of your health insurance plan.  Call member services at your health plan with any benefit or coverage questions.            HOSPICE REFERRAL       **Order classes of: [place] [facility], [facility] and [place] [facility] will route to the Home Care and Hospice Referral Pool.  Home Care or Hospice will then contact the patient to schedule their appointment.**    Hospice eligibility overview: prognosis of 6 months or less, end stage disease, patient goals are comfort only, patient is not seeking curative treatment.    If you do not hear from Hospice, or you would like to call to schedule, please call the referring place of service that your provider has listed below.  ______________________________________________________________________    Your provider has referred you to: [facility]: [facility] [phone]   [email or web address]    Anticipated Start Date for Services:  3/5/18  PLEASE Schedule Hospice consult for 24 - 48 hours.  Please call if there is need for a variance to this timeline.    REASON FOR REFERRAL: Hospice Diagnosis: CAD with recurrent non-STEMI and worsening CHF despite maximal medical management with 2 heart attacks in the past month and worsening CHF with EF of 35%, in patient that does not want further hospitalizations or aggressive interventions for her cardiac issues.    ADDITIONAL SERVICES NEEDED: Private Duty: nursing, being provided 24/7 by outside agency    OTHER PERTINENT INFORMATION: Patient was last seen by provider on 3/2/18 for Non-STEMI causing acute on chronic CHF exacerbation.  Factors that indicate prognosis of less than 6 months:  Functional decline: Patient is having increased memory issues, more difficulty with ADL which is complicated by the fact that patient is legally blind, now requiring 24/7 agency nurse support in order to return home  End stage disease: CAD with 2 Non-STEMI and worsening EF over the past month    No current outpatient prescriptions on file.    Patient Active Problem List:     Essential hypertension     Coronary artery disease involving native coronary artery of native heart without angina pectoris     Hyperlipidemia LDL goal <100     Hypothyroidism due to acquired atrophy of thyroid     Type 2 diabetes mellitus without complication (H)     Hypernatremia     Anemia in chronic kidney disease     Fractured hip, left, closed, initial encounter (H)     Urinary retention     Closed fracture of left inferior and superior pubic rami     Acute congestive heart failure (H)     Leg hematoma, left, initial encounter     CKD (chronic kidney disease) stage 4, GFR 15-29 ml/min (H)     History of CVA (cerebrovascular accident)     Left leg pain     Blind - patient reported     Lingular pneumonia, organism unspecified     Non-ST elevated myocardial infarction (non-STEMI) (H)     LVH (left ventricular hypertrophy)     RVH (right  ventricular hypertrophy)     Acute combined systolic and diastolic congestive heart failure (H)     Paroxysmal atrial fibrillation (H) - possible     Moderate pulmonary hypertension by Echo     Moderate mitral regurgitation     Acute respiratory failure with hypoxia (H)     Acute kidney injury (H)     Acute on chronic combined systolic and diastolic CHF, NYHA class 1 (H)     Acute respiratory failure (H)    This patient is under my care, and I have initiated the establishment of the plan of care. This patient will be followed by a physician who will periodically review the plan of care.    Physician/Provider to provide follow up care: Samantha Celis    [facility] certified Physician at time of discharge: Shbaana Turpin MD    Please be aware that coverage of these services is subject to the terms and limitations of your health insurance plan.  Call member services at your health plan with any benefit or coverage questions.                  Further instructions from your care team       Follow Up Appointments:    Tuesday March 6th at 11:00am  Dr. Celis  [facility]  [phone]  Please bring Insurance card, photo ID, copay if have one.    If need to cancel please call within 24hrs of the appointment.    Pending Results     Date and Time Order Name Status Description    2/25/2018 2019 Blood culture Preliminary             Statement of Approval     Ordered          03/02/18 1013  I have reviewed and agree with all the recommendations and orders detailed in this document.  EFFECTIVE NOW     Approved and electronically signed by:  Shabana Turpin MD             Admission Information     Date & Time Provider Department Dept. Phone    2/25/2018 Shabana Turpin MD [address] Surgical [phone]      Your Vitals Were     Blood Pressure Pulse Temperature Respirations Weight Pulse Oximetry    105/60 (BP Location: Left arm) 55 97.4  F (36.3  C) (Oral) 18 60.6  "kg (133 lb 9.6 oz) 95%    BMI (Body Mass Index)                   23.67 kg/m2           [employer] Information     [employer] lets you send messages to your doctor, view your test results, renew your prescriptions, schedule appointments and more. To sign up, go to [email or web address] . Click on \"Log in\" on the left side of the screen, which will take you to the Welcome page. Then click on \"Sign up Now\" on the right side of the page.     You will be asked to enter the access code listed below, as well as some personal information. Please follow the directions to create your username and password.     Your access code is: [email or web address]  Expires: 3/13/2018  6:03 PM     Your access code will  in 90 days. If you need help or a new code, please call your [facility] clinic or [phone].        Care EveryWhere ID     This is your Care EveryWhere ID. This could be used by other organizations to access your [facility] medical records  [id number]        Equal Access to Services     [facility]: Hadii anel Curry, wasridharda katy, qaybta kaalbob benítez, judith john . So [facility] [phone].    ATENCIÓN: Si habla español, tiene a amezcua disposición servicios gratuitos de asistencia lingüística. Llame al [phone].    We comply with applicable federal civil rights laws and [place] laws. We do not discriminate on the basis of race, color, national origin, age, disability, sex, sexual orientation, or gender identity.               Review of your medicines      START taking        Dose / Directions    hydrALAZINE 10 MG tablet   Commonly known as:  APRESOLINE   Used for:  Acute combined systolic and diastolic congestive heart failure (H), Coronary artery disease involving native coronary artery of native heart without angina pectoris, Non-ST elevated myocardial infarction (non-STEMI) (H), Essential hypertension        Dose:  10 mg   Take 1 tablet (10 mg) by mouth 4 times daily "   Quantity:  120 tablet   Refills:  0       isosorbide mononitrate 60 MG 24 hr tablet   Commonly known as:  IMDUR   Used for:  Coronary artery disease involving native coronary artery of native heart without angina pectoris, Non-ST elevated myocardial infarction (non-STEMI) (H)        Dose:  60 mg   Start taking on:  3/3/2018   Take 1 tablet (60 mg) by mouth daily   Quantity:  30 tablet   Refills:  0         CONTINUE these medicines which may have CHANGED, or have new prescriptions. If we are uncertain of the size of tablets/capsules you have at home, strength may be listed as something that might have changed.        Dose / Directions    acetaminophen 500 MG tablet   Commonly known as:  TYLENOL   This may have changed:    - how much to take  - when to take this  - reasons to take this        Dose:  500-1000 mg   Take 1-2 tablets (500-1,000 mg) by mouth every 6 hours as needed for mild pain   Refills:  0       metoprolol succinate 50 MG 24 hr tablet   Commonly known as:  TOPROL XL   This may have changed:    - medication strength  - how much to take   Used for:  Benign essential hypertension        Dose:  50 mg   Take 1 tablet (50 mg) by mouth daily   Quantity:  30 tablet   Refills:  0       senna-docusate 8.6-50 MG per tablet   Commonly known as:  SENOKOT-S;PERICOLACE   This may have changed:    - how much to take  - when to take this  - additional instructions   Used for:  Closed left hip fracture, initial encounter (H)        Dose:  1 tablet   Take 1 tablet by mouth daily And may also take one extra tablet daily as needed for constipation   Quantity:  100 tablet   Refills:  0         CONTINUE these medicines which have NOT CHANGED        Dose / Directions    aspirin 81 MG EC tablet   Used for:  Non-ST elevated myocardial infarction (non-STEMI) (H)        Dose:  81 mg   Take 1 tablet (81 mg) by mouth daily   Refills:  0       clopidogrel 75 MG tablet   Commonly known as:  PLAVIX   Used for:  Non-ST elevated  myocardial infarction (non-STEMI) (H)        Dose:  75 mg   Take 1 tablet (75 mg) by mouth daily   Quantity:  30 tablet   Refills:  0       levothyroxine 112 MCG tablet   Commonly known as:  SYNTHROID/LEVOTHROID   Used for:  Other specified hypothyroidism        Dose:  112 mcg   Take 1 tablet (112 mcg) by mouth daily   Refills:  0       oxyCODONE IR 5 MG tablet   Commonly known as:  ROXICODONE   Used for:  Hematoma of left hip, initial encounter        Dose:  2.5-5 mg   Take 0.5-1 tablets (2.5-5 mg) by mouth every 4 hours as needed for moderate to severe pain   Quantity:  10 tablet   Refills:  0       simvastatin 10 MG tablet   Commonly known as:  ZOCOR   Used for:  Hypercholesterolemia        Dose:  10 mg   Take 1 tablet (10 mg) by mouth At Bedtime   Quantity:  30 tablet   Refills:  0       torsemide 20 MG tablet   Commonly known as:  DEMADEX   Used for:  Acute combined systolic and diastolic congestive heart failure (H)        Dose:  40 mg   Take 2 tablets (40 mg) by mouth daily   Quantity:  60 tablet   Refills:  0         STOP taking     amLODIPine 5 MG tablet   Commonly known as:  NORVASC           fenofibrate 160 MG tablet                Where to get your medicines      These medications were sent to [facility] [address] [place] [id number] [address] [address]     Phone:  [phone]     hydrALAZINE 10 MG tablet    isosorbide mononitrate 60 MG 24 hr tablet    metoprolol succinate 50 MG 24 hr tablet    senna-docusate 8.6-50 MG per tablet                Protect others around you: Learn how to safely use, store and throw away your medicines at www.disposemymeds.org.             Medication List: This is a list of all your medications and when to take them. Check marks below indicate your daily home schedule. Keep this list as a reference.      Medications           Morning Afternoon Evening Bedtime As Needed    acetaminophen 500 MG tablet   Commonly known as:  TYLENOL   Take 1-2  tablets (500-1,000 mg) by mouth every 6 hours as needed for mild pain                                   aspirin 81 MG EC tablet   Take 1 tablet (81 mg) by mouth daily   Last time this was given:  81 mg on 3/2/2018  8:56 AM                                   clopidogrel 75 MG tablet   Commonly known as:  PLAVIX   Take 1 tablet (75 mg) by mouth daily   Last time this was given:  75 mg on 3/2/2018  8:56 AM                                   hydrALAZINE 10 MG tablet   Commonly known as:  APRESOLINE   Take 1 tablet (10 mg) by mouth 4 times daily   Last time this was given:  10 mg on 3/2/2018 11:58 AM                                            isosorbide mononitrate 60 MG 24 hr tablet   Commonly known as:  IMDUR   Take 1 tablet (60 mg) by mouth daily   Start taking on:  3/3/2018   Last time this was given:  60 mg on 3/2/2018  8:56 AM                                   levothyroxine 112 MCG tablet   Commonly known as:  SYNTHROID/LEVOTHROID   Take 1 tablet (112 mcg) by mouth daily   Last time this was given:  112 mcg on 3/2/2018  6:43 AM                                   metoprolol succinate 50 MG 24 hr tablet   Commonly known as:  TOPROL XL   Take 1 tablet (50 mg) by mouth daily   Last time this was given:  50 mg on 3/2/2018  8:56 AM                                   oxyCODONE IR 5 MG tablet   Commonly known as:  ROXICODONE   Take 0.5-1 tablets (2.5-5 mg) by mouth every 4 hours as needed for moderate to severe pain                                   senna-docusate 8.6-50 MG per tablet   Commonly known as:  SENOKOT-S;PERICOLACE   Take 1 tablet by mouth daily And may also take one extra tablet daily as needed for constipation   Last time this was given:  1 tablet on 3/2/2018  8:56 AM                                   simvastatin 10 MG tablet   Commonly known as:  ZOCOR   Take 1 tablet (10 mg) by mouth At Bedtime   Last time this was given:  10 mg on 3/1/2018  8:34 PM                                   torsemide 20 MG tablet    Commonly known as:  DEMADEX   Take 2 tablets (40 mg) by mouth daily   Last time this was given:  40 mg on 3/2/2018  8:56 AM

## 2018-02-25 NOTE — IP AVS SNAPSHOT
[address] [facility]    [address] [place] [address]    Phone:  [phone]                                       After Visit Summary   2/25/2018    Mary Ellen Cuba    MRN: [id number]           After Visit Summary Signature Page     I have received my discharge instructions, and my questions have been answered. I have discussed any challenges I see with this plan with the nurse or doctor.    ..........................................................................................................................................  Patient/Patient Representative Signature      ..........................................................................................................................................  Patient Representative Print Name and Relationship to Patient    ..................................................               ................................................  Date                                            Time    ..........................................................................................................................................  Reviewed by Signature/Title    ...................................................              ..............................................  Date                                                            Time

## 2018-02-25 NOTE — TELEPHONE ENCOUNTER
"Sally, patient's caregiver/neighbor, called with patient. Patient gave verbal consent for Sally to speak with FNA regarding her care. Sally reported that patient is short of breath when talking which is not typical for patient and that patient has been a little confused and not taking medications even though patient \"swears she is.\" Patient reports feeling dizzy when standing up and walking, weak, light headed, and short of breath. Denies fever and  chest pain. Advised for patient to be seen in ER. Sally agreed but was worried that she may not be able to physically help \"patient get from point A to point B.\" Advised calling 911 for an ambulance to transport patient to ER. Patient and Sally agreed.      Reason for Disposition    [1] MODERATE difficulty breathing (e.g., speaks in phrases, SOB even at rest, pulse 100-120) AND [2] NEW-onset or WORSE than normal    Additional Information    Negative: [1] Breathing stopped AND [2] hasn't returned    Negative: Choking on something    Negative: Severe difficulty breathing (e.g., struggling for each breath, speaks in single words)    Negative: Bluish lips, tongue, or face now    Negative: Difficult to awaken or acting confused  (e.g., disoriented, slurred speech)    Negative: Passed out (i.e., lost consciousness, collapsed and was not responding)    Negative: Wheezing started suddenly after medicine, an allergic food or bee sting    Negative: Stridor    Negative: Slow, shallow and weak breathing    Negative: Sounds like a life-threatening emergency to the triager    Negative: Chest pain    Negative: [1] Wheezing (high pitched whistling sound) AND [2] previous asthma attacks or use of asthma medicines    Negative: [1] Difficulty breathing AND [2] only present when coughing    Negative: [1] Difficulty breathing AND [2] only from stuffy or runny nose    Protocols used: BREATHING DIFFICULTY-ADULT-AH    Rasheeda Stein RN/[facility]    "

## 2018-02-26 ENCOUNTER — APPOINTMENT (OUTPATIENT)
Dept: CARDIOLOGY | Facility: CLINIC | Age: 83
DRG: 280 | End: 2018-02-26
Attending: INTERNAL MEDICINE
Payer: MEDICARE

## 2018-02-26 LAB
ANION GAP SERPL CALCULATED.3IONS-SCNC: 12 MMOL/L (ref 3–14)
BACTERIA SPEC CULT: NORMAL
BUN SERPL-MCNC: 58 MG/DL (ref 7–30)
CALCIUM SERPL-MCNC: 8.1 MG/DL (ref 8.5–10.1)
CHLORIDE SERPL-SCNC: 110 MMOL/L (ref 94–109)
CO2 SERPL-SCNC: 22 MMOL/L (ref 20–32)
CREAT SERPL-MCNC: 2.47 MG/DL (ref 0.52–1.04)
ERYTHROCYTE [DISTWIDTH] IN BLOOD BY AUTOMATED COUNT: 16 % (ref 10–15)
GFR SERPL CREATININE-BSD FRML MDRD: 18 ML/MIN/1.7M2
GLUCOSE BLDC GLUCOMTR-MCNC: 119 MG/DL (ref 70–99)
GLUCOSE BLDC GLUCOMTR-MCNC: 123 MG/DL (ref 70–99)
GLUCOSE BLDC GLUCOMTR-MCNC: 164 MG/DL (ref 70–99)
GLUCOSE BLDC GLUCOMTR-MCNC: 184 MG/DL (ref 70–99)
GLUCOSE SERPL-MCNC: 122 MG/DL (ref 70–99)
HCT VFR BLD AUTO: 27.1 % (ref 35–47)
HGB BLD-MCNC: 8.2 G/DL (ref 11.7–15.7)
MCH RBC QN AUTO: 28.1 PG (ref 26.5–33)
MCHC RBC AUTO-ENTMCNC: 30.3 G/DL (ref 31.5–36.5)
MCV RBC AUTO: 93 FL (ref 78–100)
PLATELET # BLD AUTO: 204 10E9/L (ref 150–450)
POTASSIUM SERPL-SCNC: 3.6 MMOL/L (ref 3.4–5.3)
PROCALCITONIN SERPL-MCNC: 0.57 NG/ML
PROCALCITONIN SERPL-MCNC: 0.66 NG/ML
RBC # BLD AUTO: 2.92 10E12/L (ref 3.8–5.2)
SODIUM SERPL-SCNC: 144 MMOL/L (ref 133–144)
SPECIMEN SOURCE: NORMAL
TROPONIN I SERPL-MCNC: 1.44 UG/L (ref 0–0.04)
TROPONIN I SERPL-MCNC: 1.52 UG/L (ref 0–0.04)
TROPONIN I SERPL-MCNC: 1.59 UG/L (ref 0–0.04)
WBC # BLD AUTO: 8.5 10E9/L (ref 4–11)

## 2018-02-26 PROCEDURE — 80048 BASIC METABOLIC PNL TOTAL CA: CPT | Performed by: INTERNAL MEDICINE

## 2018-02-26 PROCEDURE — 36415 COLL VENOUS BLD VENIPUNCTURE: CPT | Performed by: INTERNAL MEDICINE

## 2018-02-26 PROCEDURE — A9270 NON-COVERED ITEM OR SERVICE: HCPCS | Mod: GY | Performed by: FAMILY MEDICINE

## 2018-02-26 PROCEDURE — 99233 SBSQ HOSP IP/OBS HIGH 50: CPT | Performed by: FAMILY MEDICINE

## 2018-02-26 PROCEDURE — 25000131 ZZH RX MED GY IP 250 OP 636 PS 637: Mod: GY | Performed by: INTERNAL MEDICINE

## 2018-02-26 PROCEDURE — 12000007 ZZH R&B INTERMEDIATE

## 2018-02-26 PROCEDURE — 25000128 H RX IP 250 OP 636: Performed by: FAMILY MEDICINE

## 2018-02-26 PROCEDURE — 84484 ASSAY OF TROPONIN QUANT: CPT | Performed by: INTERNAL MEDICINE

## 2018-02-26 PROCEDURE — 40000274 ZZH STATISTIC RCP CONSULT EA 30 MIN

## 2018-02-26 PROCEDURE — 25000128 H RX IP 250 OP 636: Performed by: INTERNAL MEDICINE

## 2018-02-26 PROCEDURE — 93325 DOPPLER ECHO COLOR FLOW MAPG: CPT | Mod: 26 | Performed by: INTERNAL MEDICINE

## 2018-02-26 PROCEDURE — 84484 ASSAY OF TROPONIN QUANT: CPT | Performed by: FAMILY MEDICINE

## 2018-02-26 PROCEDURE — 25000132 ZZH RX MED GY IP 250 OP 250 PS 637: Mod: GY | Performed by: INTERNAL MEDICINE

## 2018-02-26 PROCEDURE — 40000275 ZZH STATISTIC RCP TIME EA 10 MIN

## 2018-02-26 PROCEDURE — A9270 NON-COVERED ITEM OR SERVICE: HCPCS | Mod: GY | Performed by: INTERNAL MEDICINE

## 2018-02-26 PROCEDURE — 85027 COMPLETE CBC AUTOMATED: CPT | Performed by: INTERNAL MEDICINE

## 2018-02-26 PROCEDURE — 40000270 ZZH STATISTIC OXYGEN  O2DAILY TECH TIME

## 2018-02-26 PROCEDURE — 93321 DOPPLER ECHO F-UP/LMTD STD: CPT | Mod: 26 | Performed by: INTERNAL MEDICINE

## 2018-02-26 PROCEDURE — 93308 TTE F-UP OR LMTD: CPT | Mod: 26 | Performed by: INTERNAL MEDICINE

## 2018-02-26 PROCEDURE — 36415 COLL VENOUS BLD VENIPUNCTURE: CPT | Performed by: FAMILY MEDICINE

## 2018-02-26 PROCEDURE — 25000125 ZZHC RX 250: Performed by: INTERNAL MEDICINE

## 2018-02-26 PROCEDURE — 00000146 ZZHCL STATISTIC GLUCOSE BY METER IP

## 2018-02-26 PROCEDURE — 93308 TTE F-UP OR LMTD: CPT

## 2018-02-26 PROCEDURE — 84145 PROCALCITONIN (PCT): CPT | Performed by: INTERNAL MEDICINE

## 2018-02-26 PROCEDURE — 25000132 ZZH RX MED GY IP 250 OP 250 PS 637: Mod: GY | Performed by: FAMILY MEDICINE

## 2018-02-26 RX ORDER — METOLAZONE 2.5 MG/1
2.5 TABLET ORAL ONCE
Status: COMPLETED | OUTPATIENT
Start: 2018-02-26 | End: 2018-02-26

## 2018-02-26 RX ORDER — AZITHROMYCIN 250 MG/1
250 TABLET, FILM COATED ORAL DAILY
Status: DISCONTINUED | OUTPATIENT
Start: 2018-02-26 | End: 2018-03-01

## 2018-02-26 RX ORDER — ISOSORBIDE MONONITRATE 30 MG/1
60 TABLET, EXTENDED RELEASE ORAL DAILY
Status: DISCONTINUED | OUTPATIENT
Start: 2018-02-27 | End: 2018-03-02 | Stop reason: HOSPADM

## 2018-02-26 RX ORDER — AMOXICILLIN 250 MG
1 CAPSULE ORAL DAILY
Status: DISCONTINUED | OUTPATIENT
Start: 2018-02-26 | End: 2018-03-02 | Stop reason: HOSPADM

## 2018-02-26 RX ORDER — METOPROLOL SUCCINATE 50 MG/1
50 TABLET, EXTENDED RELEASE ORAL DAILY
Status: DISCONTINUED | OUTPATIENT
Start: 2018-02-27 | End: 2018-03-02 | Stop reason: HOSPADM

## 2018-02-26 RX ADMIN — HYDRALAZINE HYDROCHLORIDE 10 MG: 10 TABLET, FILM COATED ORAL at 08:13

## 2018-02-26 RX ADMIN — ISOSORBIDE MONONITRATE 30 MG: 30 TABLET, EXTENDED RELEASE ORAL at 00:06

## 2018-02-26 RX ADMIN — METOLAZONE 2.5 MG: 2.5 TABLET ORAL at 06:21

## 2018-02-26 RX ADMIN — SENNOSIDES AND DOCUSATE SODIUM 1 TABLET: 8.6; 5 TABLET ORAL at 08:13

## 2018-02-26 RX ADMIN — CEFTRIAXONE 1 G: 1 INJECTION, SOLUTION INTRAVENOUS at 20:23

## 2018-02-26 RX ADMIN — INSULIN ASPART 1 UNITS: 100 INJECTION, SOLUTION INTRAVENOUS; SUBCUTANEOUS at 12:44

## 2018-02-26 RX ADMIN — FUROSEMIDE 5 MG/HR: 10 INJECTION, SOLUTION INTRAVENOUS at 00:08

## 2018-02-26 RX ADMIN — LEVOTHYROXINE SODIUM 112 MCG: 112 TABLET ORAL at 06:21

## 2018-02-26 RX ADMIN — FUROSEMIDE 5 MG/HR: 10 INJECTION, SOLUTION INTRAVENOUS at 10:18

## 2018-02-26 RX ADMIN — HYDRALAZINE HYDROCHLORIDE 10 MG: 10 TABLET, FILM COATED ORAL at 20:24

## 2018-02-26 RX ADMIN — HYDRALAZINE HYDROCHLORIDE 10 MG: 10 TABLET, FILM COATED ORAL at 16:10

## 2018-02-26 RX ADMIN — ISOSORBIDE MONONITRATE 30 MG: 30 TABLET, EXTENDED RELEASE ORAL at 08:15

## 2018-02-26 RX ADMIN — AZITHROMYCIN 250 MG: 250 TABLET, FILM COATED ORAL at 16:10

## 2018-02-26 RX ADMIN — SIMVASTATIN 10 MG: 5 TABLET, FILM COATED ORAL at 20:24

## 2018-02-26 RX ADMIN — METOPROLOL SUCCINATE 37.5 MG: 25 TABLET, EXTENDED RELEASE ORAL at 08:12

## 2018-02-26 RX ADMIN — HYDRALAZINE HYDROCHLORIDE 10 MG: 10 TABLET, FILM COATED ORAL at 12:42

## 2018-02-26 RX ADMIN — CLOPIDOGREL BISULFATE 75 MG: 75 TABLET, FILM COATED ORAL at 08:13

## 2018-02-26 RX ADMIN — SIMVASTATIN 10 MG: 5 TABLET, FILM COATED ORAL at 00:06

## 2018-02-26 RX ADMIN — ALBUTEROL SULFATE 2.5 MG: 2.5 SOLUTION RESPIRATORY (INHALATION) at 02:51

## 2018-02-26 RX ADMIN — ASPIRIN 81 MG: 81 TABLET, COATED ORAL at 08:13

## 2018-02-26 NOTE — PROVIDER NOTIFICATION
Notified MD at 2343 PM regarding lab results.      Spoke with: Dr Villanueva    Orders were not obtained.    Comments: Md aware of elevated troponin stated that patient has an elevated troponin normally, will not treat at this time or re evaluate per MD. Patient has elevated BNP and will be started on a lasix drip.

## 2018-02-26 NOTE — ED PROVIDER NOTES
"  History     Chief Complaint   Patient presents with     Shortness of Breath     The history is provided by the patient and a caregiver.     Mary Ellen Cuba is a 89 year old female who presents to the ED for shortness of breath that started a couple of days ago. She has a medical history of recent pneumonia 5 weeks ago. She was nauseas this morning, but did not vomit. She has felt shakier than usual and unsteady. Yesterday, she had a headache that has gone away. She relates having a history of ear aches, but not right now. She has not cared to eat or drink. She ate half a muffin and drank half a cup of chocolate milk Today. Patient expresses that her memory is poor. She feels confused all of the time, and is not sure about her medication. Patient has two neighbors, an Occupational Therapist, Physical Therapist, and home care that comes to take care of her. Home care weighs her once a week. Today, one of her neighbors came to check on her and was concerned about her poor breathing. He was present with her and the usual caregiver states that she appears more short of breath than usual. The patient herself denies feeling short of breath. Patient feels thirsty\".      Patient Active Problem List   Diagnosis     Essential hypertension     Coronary artery disease involving native coronary artery of native heart without angina pectoris     Hyperlipidemia LDL goal <100     Hypothyroidism due to acquired atrophy of thyroid     Type 2 diabetes mellitus without complication (H)     Hypernatremia     Anemia in chronic kidney disease     Fractured hip, left, closed, initial encounter (H)     Urinary retention     Closed fracture of left inferior and superior pubic rami     Acute congestive heart failure (H)     Leg hematoma, left, initial encounter     CKD (chronic kidney disease) stage 4, GFR 15-29 ml/min (H)     History of CVA (cerebrovascular accident)     Left leg pain     Blind - patient reported     Lingular pneumonia, " organism unspecified     Non-ST elevated myocardial infarction (non-STEMI) (H)     LVH (left ventricular hypertrophy)     RVH (right ventricular hypertrophy)     Acute combined systolic and diastolic congestive heart failure (H)     Paroxysmal atrial fibrillation (H) - possible     Moderate pulmonary hypertension by Echo     Moderate mitral regurgitation     Acute respiratory failure with hypoxia (H)     Acute kidney injury (H)     Acute on chronic combined systolic and diastolic CHF, NYHA class 1 (H)     Past Medical History:   Diagnosis Date     CAD (coronary artery disease)     remote hx of stent placement in past     CVA (cerebral vascular accident) (H) 2013    some left sided deficits     Diabetes mellitus (H)      Hyperlipemia      Hypertension        Past Surgical History:   Procedure Laterality Date     APPENDECTOMY       GI SURGERY      gwendolyn     GI SURGERY      hemicolectomy     GYN SURGERY      oopherectomy     LASER YAG CAPSULOTOMY  3/21/2013    Procedure: LASER YAG CAPSULOTOMY;  yag capsulotomy bilateral eyes;  Surgeon: Deandre Nugent MD;  Location: [facility]     OPEN REDUCTION INTERNAL FIXATION HIP NAILING Left 1/20/2017    Procedure: OPEN REDUCTION INTERNAL FIXATION HIP NAILING;  Surgeon: Liborio Hoffman DO;  Location: [facility] OR     PHACOEMULSIFICATION WITH STANDARD INTRAOCULAR LENS IMPLANT  6/30/2011    Procedure:PHACOEMULSIFICATION WITH STANDARD INTRAOCULAR LENS IMPLANT; Surgeon:DEANDRE NUGENT; Location:[facility] OR     PHACOEMULSIFICATION WITH STANDARD INTRAOCULAR LENS IMPLANT  7/21/2011    Procedure:PHACOEMULSIFICATION WITH STANDARD INTRAOCULAR LENS IMPLANT; Surgeon:DEANDRE NUGENT; Location:[facility] OR     STENT, CORONARY, DIOGENES         Family History   Problem Relation Age of Onset     Colon Cancer Mother      Prostate Cancer Brother      Peptic Ulcer Disease Father        Social History   Substance Use Topics     Smoking status: Never Smoker     Smokeless tobacco: Never Used     Alcohol use No           There is no immunization history on file for this patient.       Allergies   Allergen Reactions     Hmg-Coa-R Inhibitors      Metformin GI Disturbance       Current Outpatient Prescriptions   Medication Sig Dispense Refill     oxyCODONE IR (ROXICODONE) 5 MG tablet Take 0.5-1 tablets (2.5-5 mg) by mouth every 4 hours as needed for moderate to severe pain 10 tablet 0     aspirin EC 81 MG EC tablet Take 1 tablet (81 mg) by mouth daily       metoprolol succinate (TOPROL XL) 25 MG 24 hr tablet Take 1.5 tablets (37.5 mg) by mouth daily 30 tablet      amLODIPine (NORVASC) 5 MG tablet Take 1 tablet (5 mg) by mouth 2 times daily 30 tablet      torsemide (DEMADEX) 20 MG tablet Take 2 tablets (40 mg) by mouth daily 60 tablet 0     clopidogrel (PLAVIX) 75 MG tablet Take 1 tablet (75 mg) by mouth daily 30 tablet 0     potassium citrate (UROCIT-K) 5 MEQ (540 MG) CR tablet Take 4 tablets (20 mEq) by mouth daily 120 tablet 1     acetaminophen (TYLENOL) 500 MG tablet Take 2 tablets (1,000 mg) by mouth 3 times daily (Patient taking differently: Take 1,000 mg by mouth 2 times daily )       senna-docusate (SENOKOT-S;PERICOLACE) 8.6-50 MG per tablet Take 1-2 tablets by mouth twice a week  100 tablet 0     fenofibrate 160 MG tablet Take 1 tablet (160 mg) by mouth daily 30 tablet      simvastatin (ZOCOR) 10 MG tablet Take 1 tablet (10 mg) by mouth At Bedtime 30 tablet      levothyroxine (SYNTHROID/LEVOTHROID) 112 MCG tablet Take 1 tablet (112 mcg) by mouth daily           Review of Systems   Constitutional: Positive for appetite change. Activity change: decrease.        POSITIVE decrease in fluid intake     HENT: Positive for ear pain.    Respiratory: Positive for shortness of breath.    Gastrointestinal: Positive for nausea. Negative for vomiting.   Neurological: Positive for headaches.        POSITIVE shakiness and unsteady, poor memory   Psychiatric/Behavioral: Positive for confusion.   All other systems reviewed and are  negative.      Physical Exam   BP: 144/74  Pulse: 88  Heart Rate: 98  Temp: 97.4  F (36.3  C)  Resp: 20  SpO2: 92 %      Physical Exam   Constitutional:   Alert smiling pleasant 89-year-old female. Her lips are dry and her oropharynx is dry. She is tachypneic at a rate of 30. She has no increased work of breathing. She has no cough congestion or audible wheezing. She is afebrile and her vital signs are otherwise stable./71  Pulse 88  Temp 97.4  F (36.3  C)  Resp 20  SpO2 90%     HENT:   Head: Normocephalic.   Right Ear: External ear normal.   Left Ear: External ear normal.   Nose: Nose normal.   Mouth/Throat: Oropharynx is clear and moist.   Eyes: Conjunctivae are normal.   Neck: Normal range of motion. Neck supple.   Cardiovascular: Normal rate, regular rhythm and normal heart sounds.    Pulmonary/Chest: Effort normal and breath sounds normal.   Abdominal: Soft. Bowel sounds are normal.   Nursing note and vitals reviewed.      ED Course     ED Course     Results for orders placed or performed during the hospital encounter of 02/25/18 (from the past 48 hour(s))   CBC with platelets differential   Result Value Ref Range    WBC 11.4 (H) 4.0 - 11.0 10e9/L    RBC Count 3.47 (L) 3.8 - 5.2 10e12/L    Hemoglobin 9.8 (L) 11.7 - 15.7 g/dL    Hematocrit 32.1 (L) 35.0 - 47.0 %    MCV 93 78 - 100 fl    MCH 28.2 26.5 - 33.0 pg    MCHC 30.5 (L) 31.5 - 36.5 g/dL    RDW 16.1 (H) 10.0 - 15.0 %    Platelet Count 250 150 - 450 10e9/L    Diff Method Automated Method     % Neutrophils 88.1 %    % Lymphocytes 5.3 %    % Monocytes 6.1 %    % Eosinophils 0.0 %    % Basophils 0.1 %    % Immature Granulocytes 0.4 %    Absolute Neutrophil 10.0 (H) 1.6 - 8.3 10e9/L    Absolute Lymphocytes 0.6 (L) 0.8 - 5.3 10e9/L    Absolute Monocytes 0.7 0.0 - 1.3 10e9/L    Absolute Eosinophils 0.0 0.0 - 0.7 10e9/L    Absolute Basophils 0.0 0.0 - 0.2 10e9/L    Abs Immature Granulocytes 0.0 0 - 0.4 10e9/L   Comprehensive metabolic panel   Result  Value Ref Range    Sodium 143 133 - 144 mmol/L    Potassium 3.9 3.4 - 5.3 mmol/L    Chloride 107 94 - 109 mmol/L    Carbon Dioxide 22 20 - 32 mmol/L    Anion Gap 14 3 - 14 mmol/L    Glucose 146 (H) 70 - 99 mg/dL    Urea Nitrogen 55 (H) 7 - 30 mg/dL    Creatinine 2.54 (H) 0.52 - 1.04 mg/dL    GFR Estimate 18 (L) >60 mL/min/1.7m2    GFR Estimate If Black 21 (L) >60 mL/min/1.7m2    Calcium 9.1 8.5 - 10.1 mg/dL    Bilirubin Total 1.1 0.2 - 1.3 mg/dL    Albumin 3.3 (L) 3.4 - 5.0 g/dL    Protein Total 7.3 6.8 - 8.8 g/dL    Alkaline Phosphatase 76 40 - 150 U/L    ALT 11 0 - 50 U/L    AST 19 0 - 45 U/L   TSH   Result Value Ref Range    TSH 6.47 (H) 0.40 - 4.00 mU/L   Nt probnp inpatient (BNP)   Result Value Ref Range    N-Terminal Pro BNP Inpatient >140223 (H) 0 - 1800 pg/mL   CRP inflammation   Result Value Ref Range    CRP Inflammation 142.0 (H) 0.0 - 8.0 mg/L   RSV rapid antigen   Result Value Ref Range    RSV Rapid Antigen Spec Type Nasopharyngeal     RSV Rapid Antigen Result Negative NEG^Negative   Influenza A/B antigen   Result Value Ref Range    Influenza A/B Agn Specimen Nasopharyngeal     Influenza A Negative NEG^Negative    Influenza B Negative NEG^Negative   XR Chest 2 Views    Narrative    CHEST TWO VIEW   2/25/2018 7:24 PM     HISTORY: Short of air.    COMPARISON: 1/30/2018      Impression    IMPRESSION: Shifting multifocal airspace opacities that appear  slightly improved in the right lung and increased in the left lung.  Findings are concerning for pneumonia. There are bilateral pleural  effusions with fluid layering along the right minor fissure. Cardiac  silhouette remains borderline enlarged. Lung volumes are low. No  pneumothorax visualized.    NICOLAS ZAZUETA MD   UA with Microscopic   Result Value Ref Range    Color Urine Selam     Appearance Urine Cloudy     Glucose Urine 50 (A) NEG^Negative mg/dL    Bilirubin Urine Negative NEG^Negative    Ketones Urine Negative NEG^Negative mg/dL    Specific Gravity  Urine 1.025 1.003 - 1.035    Blood Urine Small (A) NEG^Negative    pH Urine 5.0 5.0 - 7.0 pH    Protein Albumin Urine >499 (A) NEG^Negative mg/dL    Urobilinogen mg/dL 0.0 0.0 - 2.0 mg/dL    Nitrite Urine Negative NEG^Negative    Leukocyte Esterase Urine Negative NEG^Negative    Source Midstream Urine     WBC Urine <1 0 - 2 /HPF    RBC Urine 5 (H) 0 - 2 /HPF    Squamous Epithelial /HPF Urine 2 (H) 0 - 1 /HPF    Mucous Urine Present (A) NEG^Negative /LPF    Hyaline Casts 8 (H) 0 - 2 /LPF    Amorphous Crystals Moderate (A) NEG^Negative /HPF            EKG Interpretation:      Interpreted by Ethan Pacheco   Symptoms at time of EKG: None   Rhythm: Sinus arrhythmia  Rate: Normal  Axis: Normal  Ectopy: Premature atrial contraction  Conduction: Normal  ST Segments/ T Waves: Non-specific ST-T wave changes  Q Waves: None  Comparison to prior: Unchanged    Clinical Impression: Sinus with frequent PACs versus atrial fibrillation at a rate of 94 with no acute ST-T changes.        Procedures               Critical Care time:  none                   Medications   ondansetron (ZOFRAN) injection 4 mg (not administered)   HOLD: nitroGLYcerin IF (not administered)   nitroGLYcerin (NITRODUR) patch REMOVAL (not administered)   nitroGLYcerin (NITRODUR) Patch in Place (not administered)   ipratropium - albuterol 0.5 mg/2.5 mg/3 mL (DUONEB) neb solution 3 mL (3 mLs Nebulization Given 2/25/18 1926)   furosemide (LASIX) injection 40 mg (40 mg Intravenous Given 2/25/18 2009)   cefTRIAXone (ROCEPHIN) intermittent infusion 1 g (0 g Intravenous Stopped 2/25/18 2118)   azithromycin (ZITHROMAX) 500 mg in sodium chloride 0.9 % 250 mL intermittent infusion (0 mg Intravenous Stopped 2/25/18 2233)   metoprolol succinate (TOPROL-XL) half-tab 37.5 mg (37.5 mg Oral Given 2/25/18 2051)   nitroGLYcerin (NITRODUR) 0.4 MG/HR 24 hr patch 1 patch (1 patch Transdermal Given 2/25/18 2049)     9:42 PM--patient reevaluated again. Patient states she is feeling  much better now. She is not putting out much urine but she does appear to be breathing more at ease. I still do not appreciate any wheezing or rales. She does not take very deep breaths. She is not as tachypneic as she was. She is still however required supplemental oxygen by mask but only because she is a multiple either. Will discuss with hospitalist. I feel the patient is stable for the floor.    Assessments & Plan (with Medical Decision Making)   MDM--89-year-old female presents to the emergency department with bruising caregivers concern for her breathing. Patient's only complaint on arrival was that she was very thirsty. She does admit to some shortness of breath. She has a history of congestive heart failure and is on Demadex 40 mg a day. The neighbor/caregiver states that she lives alone and is becoming more forgetful. [facility] has been setting up her meds but it is unclear if she is taking her medications. Patient is insistent that she does take him but cannot tell me what she takes or what they're for. Patient denies any fever or chills or cough. She appears to have acute congestive heart failure with a markedly elevated BNP. She has cardiomegaly which is relatively stable on her chest x-ray. She has had bilateral pleural effusions which are stable. The radiologist reads her chest x-ray as shifting opacities concerning for pneumonia and the patient was appropriately treated for community-acquired pneumonia blood cultures and Rocephin and azithromycin. On arrival the patient stated that she was dehydrated and intake was poor. She was given a fluid bolus until it was noted that her BNP was markedly elevated. She was given 40 mg of Lasix IV and a nitroglycerin patch and her usual dose of Toprol at 37.5 mg. She is in a sinus rhythm with frequent PACs at a rate controlled at 94. With previous hospitalizations for pneumonia and her troponin was always elevated and she has chronic renal failure. She is  not having any chest pain and I did not repeat her troponin. Her EKG shows no acute ischemia and her rate is controlled. After receiving IV Lasix is the patient felt improvement in her breathing. She appeared less dyspneic. She is just starting to put out some urine. I discussed the case with  was also seen the patient. He will follow serial troponins. Patient will be a full admit. The patient is comfortable with this evaluation treatment and admission plan and all her questions answered to her satisfaction and she is admitted in improving condition.      I have reviewed the nursing notes.    I have reviewed the findings, diagnosis, plan and need for follow up with the patient.       New Prescriptions    No medications on file       Final diagnoses:   Community acquired pneumonia, unspecified laterality   Acute combined systolic and diastolic congestive heart failure (H)   Acute renal failure superimposed on chronic kidney disease, unspecified CKD stage, unspecified acute renal failure type (H)   Acute respiratory failure with hypoxia (H)   History of CVA (cerebrovascular accident)   Urinary retention   Essential hypertension   Coronary artery disease involving native coronary artery of native heart without angina pectoris   Hypothyroidism due to acquired atrophy of thyroid   Type 2 diabetes mellitus without complication, without long-term current use of insulin (H)     This document serves as a record of services personally performed by Tara, Ethan CORTÉS MD. It was created on their behalf by Alva Lynch, a trained medical scribe. The creation of this record is based on the provider's personal observations and the statements of the patient. This document has been checked and approved by the attending provider.    Note: Chart documentation done in part with Dragon Voice Recognition software. Although reviewed after completion, some word and grammatical errors may remain.    2/25/2018   [facility]  EMERGENCY DEPARTMENT     Tara, Ethan CORTÉS MD  02/25/18 8463

## 2018-02-26 NOTE — PLAN OF CARE
Problem: Patient Care Overview  Goal: Plan of Care/Patient Progress Review  Outcome: No Change  Patient has had a better day as far as her work of breathing goes, have weaned from 6 liters oximyzer this morning to 3 L NC (with RT help).  Lungs clear, no edema noted in legs. Echo completed today, worsened results since 1 month ago, Dr. Turpin did discuss this with cardiology and then also with the patient. Lewis output 450 cc cloudy urine, remains on Lasix drip at 5 cc/hour.  Telemetry reads sinus dysrhythmia.  Up to bedside commode and to the chair today with assist of 1-2, uses a walker at home for mobility. Patient does not see well, please help with tray set up.

## 2018-02-26 NOTE — CONSULTS
Care Transition Initial Assessment - RN      Met with: Patient and Caregiver.    DATA   Principal Problem:    Acute respiratory failure with hypoxia (H)  Active Problems:    Essential hypertension    Coronary artery disease involving native coronary artery of native heart without angina pectoris    Hypothyroidism due to acquired atrophy of thyroid    Type 2 diabetes mellitus without complication (H)    Anemia in chronic kidney disease    CKD (chronic kidney disease) stage 4, GFR 15-29 ml/min (H)    History of CVA (cerebrovascular accident)    Moderate pulmonary hypertension by Echo    Moderate mitral regurgitation    Acute on chronic combined systolic and diastolic CHF, NYHA class 1 (H)    Acute respiratory failure (H)             Contact information and PCP information verified: Yes      ASSESSMENT  Cognitive Status: Patient is alert orientated with some forgetfulness writer talked with caregiver Sally Alexander [phone].             Lives With: alone  Living Arrangements: condominium                 Insurance Concerns: No Insurance issues identified          This writer met with pt caregiver Sally Alexander, introduced self and role. Patient currently lives in her town home by herself with line line, [facility] Phone: [phone] down to just RN services for med set up and lung assessment and Sally checking in on her frequently. Sally is concerned because patient missed a couple days of medications has not been eating well and is back in the hospital. Sally would like to work with [facility] and pay for PCA services for 8 hours a day to help with meals and medication reminders. Patient is legally blind and does so much better in her own environment so the friends and family would like to keep her there if at all possible. Patient has a nephew who is her POA that lives in [place] but he is very involved with her care.    PLAN    Will monitor how patient does on the hospital continue  [facility] RN when she goes home may start PT again if needed and work with Sally on PCA service      Sindy Tejada CTS RN [facility] [phone] [facility] [phone]    Discharge Planner   Discharge Plans in progress: Home with friend, neighbor support and [facility] looking into PCA services  Barriers to discharge plan: mobility and medical stability  Follow up plan: Close clinic follow up CC referral, [facility]       Entered by: Sindy Tejada 02/26/2018 10:46 AM

## 2018-02-26 NOTE — PROVIDER NOTIFICATION
.DATE:  2/26/2018   TIME OF RECEIPT FROM LAB:  0645  LAB TEST:  troponin  LAB VALUE:  1.52  RESULTS GIVEN WITH READ-BACK TO (PROVIDER):  Cordell Villanueva MD  TIME LAB VALUE REPORTED TO PROVIDER:   0648

## 2018-02-26 NOTE — PROGRESS NOTES
[facility] Progress Note          Assessment and Plan:   Assessment:   Principal Problem:    Acute respiratory failure with hypoxia (H)    Assessment:  Suspected CHF exacerbation with patient having a confirmed non-STEMI with worsening EF compared to 1 month ago, however there is also concern that patient renal failure may be contributing to the volume overload.  Patient is very adamant she does not want any aggressive management such as angiogram or dialysis to be initiated going forward I would like to continue with medical management.  There is no increasing concern that secondary to patient's baseline memory issues but she is not taking her medication as prescribed, which may be why she has had a second non-STEMI in such a short timeframe.  To complicate matters, Procalcitonin is elevated at 0.66 and initial WBC was mildly elevated at 11.2 indicating possible bacterial infection component as well and patient has been started on Rocephin and azithromycin.  Patient is starting to clinically improve and has weaned down to 2 L nasal cannula oxygen this afternoon    Plan: Did discuss this case with Dr. Tang cardiologist on site from [facility].  At this time will continue patient on Lasix drip and give 1 time Zaroxolyn dosing this morning and wean O2 supplementation as tolerated. There is no benefit to heparin at this time. We will continue with metoprolol, Imdur, hydralazine, aspirin, Plavix, and statin therapy.  If blood pressures and pulse are stable, with increased Imdur to 60 mg daily and metoprolol to 50 mg daily tomorrow as per Dr. Tang's recommendations.  Goal is to maximize patient's respiratory status and attempt to make patient pain-free from a cardiac standpoint.  Did begin the conversation with patient and her emergency contact, Sally, regarding options and planning for after discharge.  Patient is very adamant she will be returning home and her goal would be to die at home if possible.   We will continue this conversation going forward    Active Problems:    Non-ST elevated myocardial infarction (non-STEMI)    Assessment: Patient having worsening shortness of breath in association with mid back discomfort with troponins increasing from 0.699 at initial presentation up to 1.520 this morning with ECHO not revealing any wall motion abnormality but there is global hypokinesia of the left ventricle with worsening ejection fraction down to 35-40% compared to echocardiogram 1 month ago, consistent with worsening heart disease and likely MI.    Plan: We will proceed with plan as above, continue to trend troponin.  Per patient request no aggressive management will be performed at this time      Acute on chronic combined systolic and diastolic CHF, NYHA class 1 (H)    Assessment: With worsening EF of 35-40%    Plan: Proceed with Lasix drip and Zaroxolyn as above      Essential hypertension    Assessment:  Blood pressures have been in the 130s-140s systolic     Plan:  continue with adjustments as above and monitor blood pressure closely for tolerance      Coronary artery disease involving native coronary artery of native heart without angina pectoris    Assessment:  With non-STEMI as above    Plan: proceed with plan as above      Hypothyroidism due to acquired atrophy of thyroid    Assessment: on synthroid    Plan: Continue without change      Type 2 diabetes mellitus without complication (H)    Assessment: Diet controlled    Plan: Continue diet control and sliding scale coverage as needed      Anemia in chronic kidney disease    Assessment: Chronic and stable    Plan: Continue to follow      CKD (chronic kidney disease) stage 4, GFR 15-29 ml/min (H)    Assessment: Creatinine is 2.4-2.5, which is stable from her baseline at last hospital discharge 1 month ago    Plan: Continue to monitor closely      History of CVA (cerebrovascular accident)    Assessment:  Without severe residual deficit    Plan: continue  plan as above and monitor      Moderate pulmonary hypertension by Echo    Assessment:  ongoing on echocardiogram    Plan: Proceed with plan as above      Moderate mitral regurgitation    Assessment: Ongoing on echocardiogram    Plan: Proceed with plan as above      # Pain Assessment:   Current Pain Score 2/25/2018 2/2/2018 2/1/2018   Patient currently in pain? denies denies denies   Pain score (0-10) 0 - -   Pain location - - -   Pain descriptors - - -   Mary Ellen parham pain level was assessed and she currently denies pain.        VTE:  SCDs  Code Status:  DNR/DNI        Interval History:   Starting to improve.  Vital signs generally better with patient weaning down on O2 support.  Eating and voiding well.  Tolerating medications without significant side effects.  No new concerns today.          Significant Problems:     Past Medical History:   Diagnosis Date     CAD (coronary artery disease)     remote hx of stent placement in past     CVA (cerebral vascular accident) (H) 2013    some left sided deficits     Diabetes mellitus (H)      Hyperlipemia      Hypertension             Physical Exam:   Blood pressure 138/76, pulse 94, temperature 97.2  F (36.2  C), temperature source Oral, resp. rate 18, weight 64.4 kg (142 lb), SpO2 95 %.  Constitutional:   awake, alert, cooperative, no apparent distress, and appears stated age     Lungs:   No increased work of breathing, good air exchange, clear to auscultation bilaterally, no crackles or wheezing     Cardiovascular:   Normal apical impulse, regular rate and rhythm     Abdomen:   Abdomen soft, bowel sounds present     Neurologic:   Awake, alert, oriented to name, place but confused about time and admits she is having more issues with her memory.  She believes her hospital stay from last month was only 3-4 days ago and states she knows she forgets some of her pills because the nurse that comes to set them up with find some not taken     Skin:   normal skin color, texture, turgor              Data:   All laboratory and imaging data in the past 24 hours reviewed    Attestation:  I have reviewed today's vital signs, notes, medications, labs and imaging.     More than 45 minutes has been spent on care of this patient today.    Electronically Signed:  Shabana Turpin MD    Note: Chart documentation done in part with Dragon Voice Recognition software. Although reviewed after completion, some word and grammatical errors may remain.

## 2018-02-26 NOTE — PROGRESS NOTES
S-(situation): Patient arrives to room 266 via cart from ED    B-(background): Acute Respiratory Failure    A-(assessment): Pt is A&O.  VSS.  Afebrile.  Denies discomfort.  Lung sounds are diminished but clear.  Sats are 90-92% on 4 liters.  Has Lewis catheter, not much noted in bag.  Pt states can breathe much better than when she came into ER.  Has red bottom but blanchable.     R-(recommendations): Orders reviewed with pt. Will monitor patient per MD orders.     Inpatient nursing criteria listed below were met:    Health care directives status obtained and documented: Yes  Core Measures assessed (SSI): Yes  SCD's Documented: Yes  Influenza Vaccine assessment done and vaccine ordered if needed: Yes  Skin issues/needs documented:Yes  Isolation needs addressed, if appropriate: NA  Fall Prevention: Care plan updated, Education given and documented Yes  MRSA swab completed for patient 55 years and older (exclude CONNIE and TKA): Yes  My Chart patient sign up addressed and documented: No  Care Plan initiated: Yes  Education Assessment documented:Yes  Education Documented (Pre-existing chronic infection such as, MRSA/VRE need education on admission): Yes  New medication patient education completed and documented (Possible Side Effects of Common Medications handout): Yes  Home medications if not able to send immediately home with family stored here: NA   Reminder note placed in discharge instructions: NA  Discharge planning review completed (admission navigator) Yes

## 2018-02-26 NOTE — ED NOTES
Oxygen sats down to 78% on room air.  Placed on 3L oxygen via NC. Sats now 90-91%.  Appears distressed with shortness of breath

## 2018-02-26 NOTE — PROGRESS NOTES
S-(situation): Low sats    B-(background): CHF    A-(assessment): Pt dropped sats while sleeping.  On N/C at 4 liters.  Sats 90-92 %.  Started to drop and oxymask put on and sats not improving.  Turned O2 up to 6 liters and still no improvement.  Neb given and oximizer put on at 6 liters.  This improved and now sats at 95% on 6 liters.  Lung sounds are diminished with cx noted in bases.    R-(recommendations): Will cont to monitor the above.

## 2018-02-26 NOTE — H&P
[facility]    History and Physical  Hospitalist       Date of Admission:  2/25/2018  Date of Service (when I saw the patient): 02/25/18    Assessment & Plan       Principal Problem:    Acute respiratory failure with hypoxia (H)    Assessment: consider acute CHF most likely although pneumonia can not be completely excluded.  She could also have pulmonary edema due to worsening renal failure.  She has not had fever or productive cough and her symptoms started abruptly overnight which would be more consistent with CHF.  Her BNP is also markedly elevated and she has mild edema on exam.  She was given lasix in the ED and then stated she felt better.  Her pulmonary HTN and moderate MR could also be contributing. She is still requiring 5 liters of oxygen by FM to maintain oxygen sats of 89% and will therefore need inpatient management.      Plan: admit to inpatient, rodriguez catheter for strict I and O's, lasix drip, daily weights, check procalcitonin, hold on antibiotics for now, start imdur and hydralazine, follow temps and WBC    Active Problems:    Acute on chronic combined systolic and diastolic CHF, NYHA class 1 (H)    Assessment: as above.  She has not had any CP but she does have ongoing intermittent interscapular pain but she states this is chronic.  Her EKG is not showing signs of acute ischemia.     Plan: as above.  Echo done last month.  Continue metoprolol.  ACEI worsened her renal function previously so will start imdur and hydralazine.  2 gram sodium diet      Essential hypertension    Assessment: BP mildly elevated    Plan: continue beta blocker and add imdur and hydralazine.  Stop amlodipine for now      Coronary artery disease involving native coronary artery of native heart without angina pectoris    Assessment: no CP and no ischemic EKG changes.     Plan: continue ASA and plavix and statin and beta blocker.  Add nitrate.  Check troponin      Anemia in chronic kidney  disease    Assessment: chronic and stable    Plan: follow      CKD (chronic kidney disease) stage 4, GFR 15-29 ml/min (H)    Assessment: noted at near her baseline from last discharge but seems to be slowly worsening over time    Plan: no acute intervention.  Patient states she would not want dialysis if her kidneys fail or if the diuretics fail      Moderate pulmonary hypertension by Echo    Assessment: noted    Plan: oxygen      Moderate mitral regurgitation    Assessment: noted    Plan: as above      Hypothyroidism due to acquired atrophy of thyroid    Assessment: chronic    Plan: continue replacement and check free T4 with last TSH being elevated      Type 2 diabetes mellitus without complication (H)    Assessment: diet controlled    Plan: ADA diet, glucose monitoring and cover with correction scale novolog      History of CVA (cerebrovascular accident)    Assessment: noted past history    Plan: ASA      # Pain Assessment:   Current Pain Score 2/2/2018 2/1/2018 1/31/2018   Patient currently in pain? denies denies denies   Pain score (0-10) - - -   Pain location - - -   Pain descriptors - - -   - Mary Ellen is experiencing pain due to chronic neck and joint pain. Pain management was discussed and the plan was created in a collaborative fashion.  Mary Ellen's response to the current recommendations: engaged  - Opioid regimen: oxycodone  - Response to opioid medications: Reduction of symptoms   - Bowel regimen: senna          DVT Prophylaxis: Pneumatic Compression Devices  Code Status: DNR / DNI    Disposition: Expected discharge in 2-3 days once acute respiratory failure resolves.    Cordell Villanueva MD    Primary Care Physician   Samantha Celis    Chief Complaint   SOB    History is obtained from the patient and patient's neighbor    History of Present Illness   Mary Ellen Cuba is a 89 year old female who presents with SOB.  Last night as she was going to bed she was starting to feel mildly SOB.  She was able to sleep  through the night but then this morning when she awoke she was significantly more SOB.  She noticed with the minimal effort it took to pull her sheets up onto herself she got markedly more SOB.  She has had a non productive cough.  She denies any fever or chills.  She did notice she got sweaty when she felt more SOB with pulling up her sheets this am.  She has chronic pain between her shoulder blades that is intermittent and unchanged.  She has had some mild nausea but no vomiting.  She has noticed some increased swelling in her legs in the last couple of days.   Her breathing was not improving through the day and therefore presented to the ED.     Past Medical History    I have reviewed this patient's medical history and updated it with pertinent information if needed.   Past Medical History:   Diagnosis Date     CAD (coronary artery disease)     remote hx of stent placement in past     CVA (cerebral vascular accident) (H) 2013    some left sided deficits     Diabetes mellitus (H)      Hyperlipemia      Hypertension        Past Surgical History   I have reviewed this patient's surgical history and updated it with pertinent information if needed.  Past Surgical History:   Procedure Laterality Date     APPENDECTOMY       GI SURGERY      gwendolyn     GI SURGERY      hemicolectomy     GYN SURGERY      oopherectomy     LASER YAG CAPSULOTOMY  3/21/2013    Procedure: LASER YAG CAPSULOTOMY;  yag capsulotomy bilateral eyes;  Surgeon: Deandre Nugent MD;  Location: [facility] OR     OPEN REDUCTION INTERNAL FIXATION HIP NAILING Left 1/20/2017    Procedure: OPEN REDUCTION INTERNAL FIXATION HIP NAILING;  Surgeon: Liborio Hoffman DO;  Location: [facility] OR     PHACOEMULSIFICATION WITH STANDARD INTRAOCULAR LENS IMPLANT  6/30/2011    Procedure:PHACOEMULSIFICATION WITH STANDARD INTRAOCULAR LENS IMPLANT; Surgeon:DEANDRE NUGENT; Location:[facility] OR     PHACOEMULSIFICATION WITH STANDARD INTRAOCULAR LENS IMPLANT  7/21/2011     Procedure:PHACOEMULSIFICATION WITH STANDARD INTRAOCULAR LENS IMPLANT; Surgeon:DEANDRE DOS SANTOS; Location:[facility] OR     STENT, CORONARY, DIOGENES         Prior to Admission Medications   Prior to Admission Medications   Prescriptions Last Dose Informant Patient Reported? Taking?   acetaminophen (TYLENOL) 500 MG tablet Unknown at Unknown time  No No   Sig: Take 2 tablets (1,000 mg) by mouth 3 times daily   Patient taking differently: Take 1,000 mg by mouth 2 times daily    amLODIPine (NORVASC) 5 MG tablet Unknown at Unknown time  Yes No   Sig: Take 1 tablet (5 mg) by mouth 2 times daily   aspirin EC 81 MG EC tablet Unknown at Unknown time  No No   Sig: Take 1 tablet (81 mg) by mouth daily   clopidogrel (PLAVIX) 75 MG tablet Unknown at Unknown time  No No   Sig: Take 1 tablet (75 mg) by mouth daily   fenofibrate 160 MG tablet Unknown at Unknown time  No No   Sig: Take 1 tablet (160 mg) by mouth daily   levothyroxine (SYNTHROID/LEVOTHROID) 112 MCG tablet Unknown at Unknown time  No No   Sig: Take 1 tablet (112 mcg) by mouth daily   metoprolol succinate (TOPROL XL) 25 MG 24 hr tablet Unknown at Unknown time  Yes No   Sig: Take 1.5 tablets (37.5 mg) by mouth daily   oxyCODONE IR (ROXICODONE) 5 MG tablet Unknown at Unknown time  Yes No   Sig: Take 0.5-1 tablets (2.5-5 mg) by mouth every 4 hours as needed for moderate to severe pain   potassium citrate (UROCIT-K) 5 MEQ (540 MG) CR tablet Unknown at Unknown time  No No   Sig: Take 4 tablets (20 mEq) by mouth daily   senna-docusate (SENOKOT-S;PERICOLACE) 8.6-50 MG per tablet Unknown at Unknown time  Yes No   Sig: Take 1-2 tablets by mouth twice a week    simvastatin (ZOCOR) 10 MG tablet Unknown at Unknown time  No No   Sig: Take 1 tablet (10 mg) by mouth At Bedtime   torsemide (DEMADEX) 20 MG tablet Unknown at Unknown time  No No   Sig: Take 2 tablets (40 mg) by mouth daily      Facility-Administered Medications: None     Allergies   Allergies   Allergen Reactions     Hmg-Coa-R  Inhibitors      Metformin GI Disturbance       Social History   I have reviewed this patient's social history and updated it with pertinent information if needed. Mary Ellen Cuba  reports that she has never smoked. She has never used smokeless tobacco. She reports that she does not drink alcohol or use illicit drugs.    Family History   I have reviewed this patient's family history and updated it with pertinent information if needed.   Family History   Problem Relation Age of Onset     Colon Cancer Mother      Prostate Cancer Brother      Peptic Ulcer Disease Father        Review of Systems   The 10 point Review of Systems is negative other than noted in the HPI or here.     Physical Exam   Temp: 97.4  F (36.3  C)   BP: 118/63 Pulse: 88 Heart Rate: 98 Resp: 20 SpO2: 90 % O2 Device: Oxymask Oxygen Delivery: 5 LPM  Vital Signs with Ranges  Temp:  [97.4  F (36.3  C)] 97.4  F (36.3  C)  Pulse:  [88] 88  Heart Rate:  [98] 98  Resp:  [20] 20  BP: (116-161)/(63-95) 118/63  SpO2:  [89 %-95 %] 90 %  0 lbs 0 oz    Constitutional:   awake, alert, cooperative, mild respiratory distress, and appears stated age     Eyes:   Lids and lashes normal, pupils equal, round and reactive to light, extra ocular muscles intact, sclera clear, conjunctiva normal     ENT:   Normocephalic, without obvious abnormality, atraumatic, sinuses nontender on palpation, external ears without lesions, oral pharynx with moist mucous membranes, tonsils without erythema or exudates, gums normal and good dentition.     Neck:   Supple, symmetrical, trachea midline, no adenopathy, thyroid symmetric, not enlarged and no tenderness, skin normal     Hematologic / Lymphatic:   no cervical lymphadenopathy and no supraclavicular lymphadenopathy     Back:   Symmetric, no curvature, spinous processes are non-tender on palpation, paraspinous muscles are non-tender on palpation, no costal vertebral tenderness     Lungs:   Mild increased work of breathing.  Good air  movement with no wheezing. Few fine rales in both bases     Cardiovascular:   Normal apical impulse, regular rate and rhythm, normal S1 and S2, no S3 or S4, and no murmur noted.  Neck veins can not be appreciated     Abdomen:   normal bowel sounds, soft, non-distended, non-tender, no masses palpated, no hepatosplenomegally     Neurologic:   Awake, alert, oriented to name, place and time.  Cranial nerves II-XII are grossly intact.  Motor is 5 out of 5 bilaterally.    Sensory is intact.      Skin:   Mild edema from her toes to her knees bilaterally       Data   Data reviewed today:  I personally reviewed the EKG tracing showing NSR with frequent PAC's but no acute ischemic changes.    Recent Labs  Lab 02/25/18  1845   WBC 11.4*   HGB 9.8*   MCV 93         POTASSIUM 3.9   CHLORIDE 107   CO2 22   BUN 55*   CR 2.54*   ANIONGAP 14   ZULEMA 9.1   *   ALBUMIN 3.3*   PROTTOTAL 7.3   BILITOTAL 1.1   ALKPHOS 76   ALT 11   AST 19       Recent Results (from the past 24 hour(s))   XR Chest 2 Views    Narrative    CHEST TWO VIEW   2/25/2018 7:24 PM     HISTORY: Short of air.    COMPARISON: 1/30/2018      Impression    IMPRESSION: Shifting multifocal airspace opacities that appear  slightly improved in the right lung and increased in the left lung.  Findings are concerning for pneumonia. There are bilateral pleural  effusions with fluid layering along the right minor fissure. Cardiac  silhouette remains borderline enlarged. Lung volumes are low. No  pneumothorax visualized.    NICOLAS ZAZUETA MD

## 2018-02-26 NOTE — ED NOTES
"Attempted to review home medications with patient and patient's \"neighbor\" who states her med list is up to date and that [facility] comes in and sets her meds up for her, but the container is still full of all her pills.  Brought up concerns about pt not remembering to take medications at appropriate times.  Pt became upset as she states she has been taking her medications as prescribed.  Unable to fully complete home med list due to unknown time of last administration of medications.   "

## 2018-02-27 LAB
ANION GAP SERPL CALCULATED.3IONS-SCNC: 11 MMOL/L (ref 3–14)
BACTERIA SPEC CULT: NO GROWTH
BUN SERPL-MCNC: 63 MG/DL (ref 7–30)
CALCIUM SERPL-MCNC: 8 MG/DL (ref 8.5–10.1)
CHLORIDE SERPL-SCNC: 107 MMOL/L (ref 94–109)
CO2 SERPL-SCNC: 25 MMOL/L (ref 20–32)
CREAT SERPL-MCNC: 2.53 MG/DL (ref 0.52–1.04)
ERYTHROCYTE [DISTWIDTH] IN BLOOD BY AUTOMATED COUNT: 15.7 % (ref 10–15)
GFR SERPL CREATININE-BSD FRML MDRD: 18 ML/MIN/1.7M2
GLUCOSE BLDC GLUCOMTR-MCNC: 108 MG/DL (ref 70–99)
GLUCOSE BLDC GLUCOMTR-MCNC: 111 MG/DL (ref 70–99)
GLUCOSE BLDC GLUCOMTR-MCNC: 114 MG/DL (ref 70–99)
GLUCOSE BLDC GLUCOMTR-MCNC: 122 MG/DL (ref 70–99)
GLUCOSE BLDC GLUCOMTR-MCNC: 129 MG/DL (ref 70–99)
GLUCOSE SERPL-MCNC: 121 MG/DL (ref 70–99)
HCT VFR BLD AUTO: 28 % (ref 35–47)
HGB BLD-MCNC: 8.4 G/DL (ref 11.7–15.7)
Lab: NORMAL
MCH RBC QN AUTO: 27.9 PG (ref 26.5–33)
MCHC RBC AUTO-ENTMCNC: 30 G/DL (ref 31.5–36.5)
MCV RBC AUTO: 93 FL (ref 78–100)
PLATELET # BLD AUTO: 206 10E9/L (ref 150–450)
POTASSIUM SERPL-SCNC: 3.6 MMOL/L (ref 3.4–5.3)
PROCALCITONIN SERPL-MCNC: 0.63 NG/ML
RBC # BLD AUTO: 3.01 10E12/L (ref 3.8–5.2)
SODIUM SERPL-SCNC: 143 MMOL/L (ref 133–144)
SPECIMEN SOURCE: NORMAL
TROPONIN I SERPL-MCNC: 1.21 UG/L (ref 0–0.04)
TROPONIN I SERPL-MCNC: 1.31 UG/L (ref 0–0.04)
WBC # BLD AUTO: 6.1 10E9/L (ref 4–11)

## 2018-02-27 PROCEDURE — 84484 ASSAY OF TROPONIN QUANT: CPT | Performed by: FAMILY MEDICINE

## 2018-02-27 PROCEDURE — 00000146 ZZHCL STATISTIC GLUCOSE BY METER IP

## 2018-02-27 PROCEDURE — A9270 NON-COVERED ITEM OR SERVICE: HCPCS | Mod: GY | Performed by: FAMILY MEDICINE

## 2018-02-27 PROCEDURE — 80048 BASIC METABOLIC PNL TOTAL CA: CPT | Performed by: FAMILY MEDICINE

## 2018-02-27 PROCEDURE — 25000132 ZZH RX MED GY IP 250 OP 250 PS 637: Mod: GY | Performed by: INTERNAL MEDICINE

## 2018-02-27 PROCEDURE — 25000128 H RX IP 250 OP 636: Performed by: INTERNAL MEDICINE

## 2018-02-27 PROCEDURE — 36415 COLL VENOUS BLD VENIPUNCTURE: CPT | Performed by: FAMILY MEDICINE

## 2018-02-27 PROCEDURE — 25000128 H RX IP 250 OP 636: Performed by: FAMILY MEDICINE

## 2018-02-27 PROCEDURE — 85027 COMPLETE CBC AUTOMATED: CPT | Performed by: FAMILY MEDICINE

## 2018-02-27 PROCEDURE — A9270 NON-COVERED ITEM OR SERVICE: HCPCS | Mod: GY | Performed by: INTERNAL MEDICINE

## 2018-02-27 PROCEDURE — 99207 ZZC CDG-MDM COMPONENT: MEETS MODERATE - UP CODED: CPT | Performed by: FAMILY MEDICINE

## 2018-02-27 PROCEDURE — 25000132 ZZH RX MED GY IP 250 OP 250 PS 637: Mod: GY | Performed by: FAMILY MEDICINE

## 2018-02-27 PROCEDURE — 99233 SBSQ HOSP IP/OBS HIGH 50: CPT | Performed by: FAMILY MEDICINE

## 2018-02-27 PROCEDURE — 84145 PROCALCITONIN (PCT): CPT | Performed by: FAMILY MEDICINE

## 2018-02-27 PROCEDURE — 12000007 ZZH R&B INTERMEDIATE

## 2018-02-27 RX ADMIN — CLOPIDOGREL BISULFATE 75 MG: 75 TABLET, FILM COATED ORAL at 08:22

## 2018-02-27 RX ADMIN — SIMVASTATIN 10 MG: 5 TABLET, FILM COATED ORAL at 20:20

## 2018-02-27 RX ADMIN — HYDRALAZINE HYDROCHLORIDE 10 MG: 10 TABLET, FILM COATED ORAL at 20:21

## 2018-02-27 RX ADMIN — AZITHROMYCIN 250 MG: 250 TABLET, FILM COATED ORAL at 08:22

## 2018-02-27 RX ADMIN — ISOSORBIDE MONONITRATE 60 MG: 30 TABLET, EXTENDED RELEASE ORAL at 08:22

## 2018-02-27 RX ADMIN — METOPROLOL SUCCINATE 50 MG: 50 TABLET, EXTENDED RELEASE ORAL at 08:22

## 2018-02-27 RX ADMIN — HYDRALAZINE HYDROCHLORIDE 10 MG: 10 TABLET, FILM COATED ORAL at 17:40

## 2018-02-27 RX ADMIN — LEVOTHYROXINE SODIUM 112 MCG: 112 TABLET ORAL at 06:58

## 2018-02-27 RX ADMIN — FUROSEMIDE 5 MG/HR: 10 INJECTION, SOLUTION INTRAVENOUS at 05:18

## 2018-02-27 RX ADMIN — HYDRALAZINE HYDROCHLORIDE 10 MG: 10 TABLET, FILM COATED ORAL at 12:06

## 2018-02-27 RX ADMIN — CEFTRIAXONE 1 G: 1 INJECTION, SOLUTION INTRAVENOUS at 20:21

## 2018-02-27 RX ADMIN — HYDRALAZINE HYDROCHLORIDE 10 MG: 10 TABLET, FILM COATED ORAL at 08:22

## 2018-02-27 RX ADMIN — ASPIRIN 81 MG: 81 TABLET, COATED ORAL at 08:22

## 2018-02-27 NOTE — PLAN OF CARE
Problem: Patient Care Overview  Goal: Plan of Care/Patient Progress Review  Outcome: No Change  VSS. Afebrile. Denies any pain/discomfort. Pt very forgetful. LS diminished throughout. BM x2 this shift. No complaints. Denies any SOB or chest pain. Denies any nausea/vomiting.

## 2018-02-27 NOTE — PROGRESS NOTES
[facility] Progress Note          Assessment and Plan:   Assessment:   Principal Problem:    Acute respiratory failure with hypoxia (H)    Assessment: Continuing to improve, with patient weaning down on O2 supplementation this morning.  Initially was suspected to be secondary to CHF exacerbation with patient having recurrent non-STEMI with worsening ejection fraction down to 35-40% however there is also concern for possible pneumonia with pro-calcitonin elevated at 0.66 and initial white blood cell count increased at 11.2.  Patient has been on Lasix drip with adequate urinary output and has also been initiated on Rocephin and azithromycin for antibiotics    Plan: We will continue Lasix infusion at 5 mg an hour and wean O2 supplementation as tolerated and transition to oral diuretics as patient achieves room air status and weight stabilizes.  Will continue with Rocephin and azithromycin for antibiotic completion and perform pro-calcitonin in 2 days to reevaluate infection status.      Active Problems:    Non-ST elevated myocardial infarction (non-STEMI) (H)    Assessment: With troponin peaking at 1.592 and with patient having no furthernow trending downward symptoms concerning for angina.  Beta-blocker and Imdur were both increased this morning as per cardiology's recommendations and so far blood pressure is tolerating this adequately.      Plan: Continue current regimen of metoprolol extended release 50 mg daily, Imdur 60 mg daily, hydralazine 10 mg 4 times a day in combination with simvastatin for plaque stabilization and aspirin and Plavix.  ARB continues to be contraindicated based on patient's renal status and worsening in the recent past when initiation was attempted.  W family conference will occur tomorrow to discuss goals going forward e will continue with medical management.        Acute on chronic combined systolic and diastolic CHF, NYHA class 1 (H)    Assessment: With ejection fraction decreasing  to 35-40%, which is reduced compared to 1 month ago     Plan:  Proceed with plan as above and care conference tomorrow      Essential hypertension    Assessment: Tolerating increased dosing of metoprolol and Imdur without difficulty    Plan: Continue to monitor for tolerance      Coronary artery disease involving native coronary artery of native heart without angina pectoris    Assessment: With non-STEMI as above    Plan: Proceed with plan as above      Memory loss    Assessment: Patient openly admits that she is having more difficulty with her memory in the past few years and she does not remember meeting me yesterday or the majority of the conversation that occurred but does once more affirm that her goals are comfort and medical management.  She also is very adamant she will be returning home and her goal would be to die at home, which is the same as she had reported yesterday.    Plan: Given her difficulty with memory and her inability to remember the details of conversation one day to another as well as her overall consistent expression of goals, will perform family conference tomorrow to discuss this further and see how we can best allow these goals to BE achieved.      Hypothyroidism due to acquired atrophy of thyroid    Assessment: On Synthroid    Plan: Continue without change      Type 2 diabetes mellitus without complication (H)    Assessment: Diet controlled    Plan: Continue carb consistent diet and sliding scale insulin as needed      Anemia in chronic kidney disease    Assessment: Chronic and stable    Plan: Continue to monitor      CKD (chronic kidney disease) stage 4, GFR 15-29 ml/min (H)    Assessment: Creatinine is stable at 2.53 and tolerating diuresis without difficulty    Plan: We will continue to monitor renal function closely as ongoing diuresis is attempted      History of CVA (cerebrovascular accident)    Assessment: Without severe residual deficits    Plan: Continue to monitor may be  contributing to patient's      Moderate pulmonary hypertension by Echo    Assessment: Respiratory status and ongoing O2 needs    Plan: Continue treatment as above and monitor, wean O2 supplementation as tolerated       Moderate mitral regurgitation    Assessment: noted and unchanged on echocardiogram compared to last month    Plan: Proceed with plan as above       # Pain Assessment:   Current Pain Score 2/27/2018 2/27/2018 2/26/2018   Patient currently in pain? denies denies denies   Pain score (0-10) 0 0 -   Pain location - - -   Pain descriptors - - -   Mary Ellen parham pain level was assessed and she currently denies pain.        VTE:  SCDs  Code Status:  DNR/DNI        Interval History:   Continues to improve - patient's breathing is feeling better today - currently on RA and oxygen levels are 92-96% during the visit.  Vital signs stable and tolerating increased medication dosing of Imdur and metoprolol without change.  Eating and voiding well.  Tolerating medications without significant side effects.  No new concerns today.           Significant Problems:     Past Medical History:   Diagnosis Date     CAD (coronary artery disease)     remote hx of stent placement in past     CVA (cerebral vascular accident) (H) 2013    some left sided deficits     Diabetes mellitus (H)      Hyperlipemia      Hypertension             Physical Exam:   Blood pressure 139/71, pulse 67, temperature 97  F (36.1  C), temperature source Oral, resp. rate 20, weight 63.7 kg (140 lb 6.9 oz), SpO2 95 %.  Constitutional:   awake, alert, cooperative, no apparent distress, and appears stated age     Lungs:   No increased work of breathing, good air exchange, very mild crackles present in bilateral bases     Cardiovascular:   Normal apical impulse, regular rate and rhythm, normal S1 and S2, no S3 or S4, and no murmur noted     Abdomen:   Bowel sounds present, abdomen soft     Musculoskeletal:   no lower extremity pitting edema present      Neurologic:   Awake, alert, oriented to name, place but gets confused about time which is her baseline.  Does not remember me from yesterday but does remember she has had several heart attacks in the last weeks and her breathing is because of the heart attacks - she still believes she has the pneumonia she had last month present today     Skin:   normal skin color, texture, turgor             Data:   All laboratory data reviewed    Attestation:  I have reviewed today's vital signs, notes, medications, labs and imaging.     Electronically Signed:  Shabana Turpin MD    Note: Chart documentation done in part with Dragon Voice Recognition software. Although reviewed after completion, some word and grammatical errors may remain.

## 2018-02-27 NOTE — PROGRESS NOTES
Talked with Sally Alexander, by phone, to arrange a family care conference.  Sally stated that patient's POA, Sukhjinder, will be available at 1130 by phone.  Sally is available at that time also.  She plans to be at [facility] at 1130 tomorrow.  Updated MD with time for family care conference.    Brii Black [facility] [phone]/ [facility] [phone]

## 2018-02-27 NOTE — PROGRESS NOTES
CTS update:   Met with patient and friend Sally Alexander [phone] writer is working on 24/7 care at home patient would like to return home instead of going to a TCU. Writer spoke with them about choices and cost for 24/7 care and they are willing to pay. Writer is working with [facility] they are checking on staffing for this and would need to do an in home assessment when patient returns home cost is $5888 up front for the 1st week [facility] [phone]. [employer] Iveth [phone] will meet with patient and Sally here 2/28/18 at 0900 to do an assessment to see if they can meet patient's needs. Cost is $26/hour. [facility] Cathy [phone] would need to do an assessment does not have the staff to cover 24/7 but is willing to work with another company. [employer] [phone] only able to help with 10 hr overnights 1-2 times per week $185 a night. CTS will continue to follow.  STEVE Tejada CTS RN

## 2018-02-27 NOTE — PLAN OF CARE
Problem: Patient Care Overview  Goal: Plan of Care/Patient Progress Review  Outcome: Improving  Lasix gtt 5 cc hour.  Lewis 325 cc output.  Up in chair for supper,  Appetite fair, placed on room service.  LS clear, infrequent non productive cough.  Sat's 91-95 on 2 LO2 n/c. 2100 trop 1.444

## 2018-02-28 LAB
ANION GAP SERPL CALCULATED.3IONS-SCNC: 10 MMOL/L (ref 3–14)
BUN SERPL-MCNC: 61 MG/DL (ref 7–30)
CALCIUM SERPL-MCNC: 8.1 MG/DL (ref 8.5–10.1)
CHLORIDE SERPL-SCNC: 106 MMOL/L (ref 94–109)
CO2 SERPL-SCNC: 27 MMOL/L (ref 20–32)
CREAT SERPL-MCNC: 2.27 MG/DL (ref 0.52–1.04)
GFR SERPL CREATININE-BSD FRML MDRD: 20 ML/MIN/1.7M2
GLUCOSE BLDC GLUCOMTR-MCNC: 103 MG/DL (ref 70–99)
GLUCOSE BLDC GLUCOMTR-MCNC: 115 MG/DL (ref 70–99)
GLUCOSE BLDC GLUCOMTR-MCNC: 128 MG/DL (ref 70–99)
GLUCOSE BLDC GLUCOMTR-MCNC: 133 MG/DL (ref 70–99)
GLUCOSE BLDC GLUCOMTR-MCNC: 161 MG/DL (ref 70–99)
GLUCOSE SERPL-MCNC: 101 MG/DL (ref 70–99)
HGB BLD-MCNC: 8.7 G/DL (ref 11.7–15.7)
POTASSIUM SERPL-SCNC: 3.2 MMOL/L (ref 3.4–5.3)
POTASSIUM SERPL-SCNC: 3.8 MMOL/L (ref 3.4–5.3)
SODIUM SERPL-SCNC: 143 MMOL/L (ref 133–144)

## 2018-02-28 PROCEDURE — 84132 ASSAY OF SERUM POTASSIUM: CPT | Performed by: FAMILY MEDICINE

## 2018-02-28 PROCEDURE — 80048 BASIC METABOLIC PNL TOTAL CA: CPT | Performed by: FAMILY MEDICINE

## 2018-02-28 PROCEDURE — A9270 NON-COVERED ITEM OR SERVICE: HCPCS | Mod: GY | Performed by: INTERNAL MEDICINE

## 2018-02-28 PROCEDURE — 99207 ZZC CDG-MDM COMPONENT: MEETS MODERATE - UP CODED: CPT | Performed by: FAMILY MEDICINE

## 2018-02-28 PROCEDURE — 99358 PROLONG SERVICE W/O CONTACT: CPT | Performed by: FAMILY MEDICINE

## 2018-02-28 PROCEDURE — 25000128 H RX IP 250 OP 636: Performed by: FAMILY MEDICINE

## 2018-02-28 PROCEDURE — 25000128 H RX IP 250 OP 636: Performed by: INTERNAL MEDICINE

## 2018-02-28 PROCEDURE — 99233 SBSQ HOSP IP/OBS HIGH 50: CPT | Performed by: FAMILY MEDICINE

## 2018-02-28 PROCEDURE — 36415 COLL VENOUS BLD VENIPUNCTURE: CPT | Performed by: FAMILY MEDICINE

## 2018-02-28 PROCEDURE — 25000132 ZZH RX MED GY IP 250 OP 250 PS 637: Mod: GY | Performed by: FAMILY MEDICINE

## 2018-02-28 PROCEDURE — 25000132 ZZH RX MED GY IP 250 OP 250 PS 637: Mod: GY | Performed by: INTERNAL MEDICINE

## 2018-02-28 PROCEDURE — A9270 NON-COVERED ITEM OR SERVICE: HCPCS | Mod: GY | Performed by: FAMILY MEDICINE

## 2018-02-28 PROCEDURE — 12000007 ZZH R&B INTERMEDIATE

## 2018-02-28 PROCEDURE — 00000146 ZZHCL STATISTIC GLUCOSE BY METER IP

## 2018-02-28 PROCEDURE — 85018 HEMOGLOBIN: CPT | Performed by: FAMILY MEDICINE

## 2018-02-28 RX ORDER — POTASSIUM CHLORIDE 1500 MG/1
20-40 TABLET, EXTENDED RELEASE ORAL
Status: DISCONTINUED | OUTPATIENT
Start: 2018-02-28 | End: 2018-03-02 | Stop reason: HOSPADM

## 2018-02-28 RX ORDER — POTASSIUM CHLORIDE 29.8 MG/ML
20 INJECTION INTRAVENOUS
Status: DISCONTINUED | OUTPATIENT
Start: 2018-02-28 | End: 2018-02-28 | Stop reason: CLARIF

## 2018-02-28 RX ORDER — POTASSIUM CL/LIDO/0.9 % NACL 10MEQ/0.1L
10 INTRAVENOUS SOLUTION, PIGGYBACK (ML) INTRAVENOUS
Status: DISCONTINUED | OUTPATIENT
Start: 2018-02-28 | End: 2018-03-02 | Stop reason: HOSPADM

## 2018-02-28 RX ORDER — POTASSIUM CHLORIDE 1.5 G/1.58G
20-40 POWDER, FOR SOLUTION ORAL
Status: DISCONTINUED | OUTPATIENT
Start: 2018-02-28 | End: 2018-03-02 | Stop reason: HOSPADM

## 2018-02-28 RX ORDER — POTASSIUM CHLORIDE 7.45 MG/ML
10 INJECTION INTRAVENOUS
Status: DISCONTINUED | OUTPATIENT
Start: 2018-02-28 | End: 2018-03-02 | Stop reason: HOSPADM

## 2018-02-28 RX ORDER — TORSEMIDE 20 MG/1
40 TABLET ORAL DAILY
Status: DISCONTINUED | OUTPATIENT
Start: 2018-02-28 | End: 2018-03-02 | Stop reason: HOSPADM

## 2018-02-28 RX ADMIN — ISOSORBIDE MONONITRATE 60 MG: 30 TABLET, EXTENDED RELEASE ORAL at 08:48

## 2018-02-28 RX ADMIN — METOPROLOL SUCCINATE 50 MG: 50 TABLET, EXTENDED RELEASE ORAL at 08:48

## 2018-02-28 RX ADMIN — CLOPIDOGREL BISULFATE 75 MG: 75 TABLET, FILM COATED ORAL at 08:48

## 2018-02-28 RX ADMIN — HYDRALAZINE HYDROCHLORIDE 10 MG: 10 TABLET, FILM COATED ORAL at 12:57

## 2018-02-28 RX ADMIN — AZITHROMYCIN 250 MG: 250 TABLET, FILM COATED ORAL at 08:47

## 2018-02-28 RX ADMIN — CEFTRIAXONE 1 G: 1 INJECTION, SOLUTION INTRAVENOUS at 20:12

## 2018-02-28 RX ADMIN — LEVOTHYROXINE SODIUM 112 MCG: 112 TABLET ORAL at 06:07

## 2018-02-28 RX ADMIN — SIMVASTATIN 10 MG: 5 TABLET, FILM COATED ORAL at 20:13

## 2018-02-28 RX ADMIN — FUROSEMIDE 5 MG/HR: 10 INJECTION, SOLUTION INTRAVENOUS at 01:10

## 2018-02-28 RX ADMIN — HYDRALAZINE HYDROCHLORIDE 10 MG: 10 TABLET, FILM COATED ORAL at 08:49

## 2018-02-28 RX ADMIN — POTASSIUM CHLORIDE 40 MEQ: 1500 TABLET, EXTENDED RELEASE ORAL at 09:57

## 2018-02-28 RX ADMIN — POTASSIUM CHLORIDE 20 MEQ: 1500 TABLET, EXTENDED RELEASE ORAL at 12:57

## 2018-02-28 RX ADMIN — ASPIRIN 81 MG: 81 TABLET, COATED ORAL at 08:48

## 2018-02-28 RX ADMIN — SENNOSIDES AND DOCUSATE SODIUM 1 TABLET: 8.6; 5 TABLET ORAL at 08:49

## 2018-02-28 RX ADMIN — HYDRALAZINE HYDROCHLORIDE 10 MG: 10 TABLET, FILM COATED ORAL at 20:13

## 2018-02-28 RX ADMIN — TORSEMIDE 40 MG: 20 TABLET ORAL at 09:54

## 2018-02-28 RX ADMIN — HYDRALAZINE HYDROCHLORIDE 10 MG: 10 TABLET, FILM COATED ORAL at 16:50

## 2018-02-28 NOTE — PLAN OF CARE
Problem: Cardiac: Heart Failure (Adult)  Goal: Signs and Symptoms of Listed Potential Problems Will be Absent, Minimized or Managed (Cardiac: Heart Failure)  Signs and symptoms of listed potential problems will be absent, minimized or managed by discharge/transition of care (reference Cardiac: Heart Failure (Adult) CPG).   VSS, RA, afebrile. Rodriguez dc'd and patient is DTV. Intermittent confusion with forgetfulness. Patient is up with walker, gait belt and 1A. Lungs are diminished with scattered crackles bilaterally, good appetite and+BS. tele DC'd this shift. Potassium 3.2 replaced and recheck at 1700. Will continue to monitor per POC.     Problem: Renal Insufficiency Comorbidity  Goal: Renal Insufficiency  Patient comorbidity will be monitored for signs and symptoms of Renal Insufficiency (Chronic) condition.  Problems will be absent, minimized or managed by discharge/transition of care.   Outcome: Improving  Creatinine 2.27 rodriguez dc'd this shift With  800cc's UOP.

## 2018-02-28 NOTE — PLAN OF CARE
"Problem: Patient Care Overview  Goal: Plan of Care/Patient Progress Review  Outcome: Therapy, progress toward functional goals as expected  S-(situation): shift note    B-(background): CHF    A-(assessment): Pt alert but has some confusion this shift.  Very upset about not being able to sleep, hearing noises and states people that are not employees are coming into her room.  Tried to redirect her, but was truly upset.  VSS.  Afebrile.  Denies discomfort.  Having good urine output through her catheter.  Is getting upset about the \"tubes and wires\" that are connected to her.  Tele on and pt having multiple PVC's, rhythm irregular.  Lung sounds are clear.  Sats are 93-95 on R/A.  No cough noted this shift.    R-(recommendations): Will cont to monitor the above.      "

## 2018-02-28 NOTE — PROGRESS NOTES
"Family Conference    Present at this care conference with the patient, the patient's good friend (Sally), and patient's power of  Sukhjinder was available by speaker phone.  Medical staff present was myself, the  (Ashley),  student, and patient's nurse (Vanessa).      Begin the conversation by  informing everyone involved that given patient's memory issues, she is having difficulty retaining information from day-to-day and it is important that everyone understands where the patient is medically at this time.  Patient openly admits that her memory is \"awful\" and managed although she remembers seeing me yesterday she is not sure of all the details we discussed.  When asked what she does remember, she is aware that she is at [facility] and that she has had several heart attacks recently.      Discussed in detail what we do know regarding patient's current medical status as well as conversations with the cardiologist and patient's expression of goals over the past 2 days.  Patient does affirm DNR/DNI status, she also does confirm that she would not want any aggressive interventions performed such as angiogram or stress testing.   she is willing to take medications to control her symptoms and is aware that she has been admitted to missing medication doses at home secondary to her memory but her goal is to return home, and her ultimate goal is to die at home.    At this time patient is open to ongoing medical management of her worsening heart failure and heart attack and would like to complete this hospitalization with ongoing current care, reports that she would not want to be hospitalized and would not even want to be brought to the emergency room going forward following the return home, she but instead would like to focus on comfort.  She would be open to treating acute minor infections that were to occur in the future, but would not want to be overly aggressive.  She is open to either " oral or IM antibiotics but would not  IV antibiotics and is open to either nutrition by mouth or consideration of nutrition IV if it is a reversible short-lived condition, but would not want ongoing IV administration and would not want enteral feedings.      Discussed how we can make this happen.  Patient financially cannot afford 24 7 nursing care in the home environment and social work has been working with patient and her family to try and arrange this for time of discharge.  Did also discuss that given patient's worsening cardiac status and recurrent MIs as well as her age and overall deconditioning, she is felt to be a candidate for hospice at this time and discussed in detail what this would entail.  Patient is interested in hospice services at time of discharge and Sally and Sukhjinder are in agreement with this.      We will proceed with current medical management with the goal of improving patient's symptoms and stabilizing her weight.  Anticipate discharge home with 24/7 home nursing support and hospice services at time of discharge.    49 minutes was spent in this family conference.  Throughout the day, over 65 minutes has been spent in care of this patient.    Electronically Signed:  Shabana Turpin MD

## 2018-02-28 NOTE — PLAN OF CARE
Problem: Cardiac: Heart Failure (Adult)  Goal: Signs and Symptoms of Listed Potential Problems Will be Absent, Minimized or Managed (Cardiac: Heart Failure)  Signs and symptoms of listed potential problems will be absent, minimized or managed by discharge/transition of care (reference Cardiac: Heart Failure (Adult) CPG).   Outcome: Improving  VSS.  Continues on the lasix gtt.  Lewis in place.  UO-900 for day shift.  Pt reports dyspnea at rest but pt is able to have a conversation without difficulty.  LS diminished, RA.  Appetite fair.  Up in chair for meals.

## 2018-02-28 NOTE — PROGRESS NOTES
Family Care Conference was held today at 1140.  In attendance were: patient, her friend Sally, hospitalist, , social work student, bedside RN.  Patient's nephew and Sukhjinder ORDONEZ, listening in over speaker phone.      Patient's recent and current medical status was discussed in detail.  Patient's goals and wishes moving forward were discussed.  POLST form was reviewed, completed, and signed.  Option of Hospice services was also discussed.      Patient wishes to be comfort care and has decided that Hospice services would be beneficial.  Medicare guidelines for Hospice services was discussed.  Discussed area Hospice agencies.  Patient currently has [facility] services, and she has chosen to stay with [facility] for Hospice.      Patient and her friend Sally had met with Iveth from [facility] this morning at 0900.  Patient is interested in their 24/7 service where a staff person would be in the home approximately 8-12 hours per shift, so possibly 2 to 3 staff going into the home per day.   has spoken with Iveth this afternoon and she plans to contact Sally regarding setting up a time to complete paperwork and discuss down payment for the services. They will then work on scheduling staff for in home 24/7 service.      has contacted Britta at [facility].  Discussed that patient would like to switch from her current [facility] services to [facility]. Britta contacted patient's friend, Sally.  [facility] will see patient and Sally at patient's home on Monday, March 5th, for start of care, as this is the soonest that Hospice has an opening.  Patient will remain with [facility] services until she is switched over to Hospice.      Care Transitions will continue to follow and assist with d/c planning.

## 2018-02-28 NOTE — PROGRESS NOTES
[facility] Progress Note          Assessment and Plan:   Assessment:   Principal Problem:    Acute respiratory failure with hypoxia (H)    Assessment: Secondary to CHF exacerbation with patient having recurrent non-STEMI with worsening ejection fraction down to 35-40%, however there is also concern for possible pneumonia with pro-calcitonin elevated at 0.66 and initial leukocytosis of 11.2.  Patient has been on a Lasix drip with appropriate urinary output as well as placed on Rocephin and azithromycin for antibiotics.  She has been weaned down to room air and is feeling back to her baseline breathing status.  Discontinue IV drip and transition to oral Demadex    Plan: And monitor for weight stabilization.  We will continue with Rocephin and azithromycin and complete pro calcitonin repeat level tomorrow to further reevaluate infection status.  Anticipate discharge in the next 1-2 days once patient is medically stable and once appropriate disposition plan is in place.  Care conference is scheduled for later this morning.  With troponins peaking up to 1.592    Active Problems:    Non-ST elevated myocardial infarction (non-STEMI) (H)    Assessment: But now trending downward with patient having no further symptoms concerning for angina.  Patient has tolerated the increased dosing of metoprolol and Imdur without difficultyand has had resolution of the mid back discomfort which may be anginal equivalent.  Patient continues to confirm she desires nonaggressive intervention but is okay with medical management to improve her quality of life    Plan:  continue with current regimen and monitor closely for tolerance, will avoid ARB secondary to patient's renal status and history of worsening renal status following attempts at ARB initiation during her January 2018 non-STEMI.  Proceed with family conference this afternoon      Acute on chronic combined systolic and diastolic CHF, NYHA class 1 (H)    Assessment: With  "ejection fraction decreasing to 35-40%, reduced compared to 1 month ago    Plan: Proceed with plan as above      Essential hypertension    Assessment:  tolerating increased dosing of medication without difficulty    Plan:   continue to monitor closely       Memory loss    Assessment: Patient does admit to having a \"horrible memory\" and has challenges remembering information given her from 1 day to the next    Plan:  We will proceed with family conference as above      Coronary artery disease involving native coronary artery of native heart without angina pectoris    Assessment:  With recurrent non-STEMI as above    Plan: Proceed with plan as above      Hypothyroidism due to acquired atrophy of thyroid    Assessment: On Synthroid    Plan:  Continue without change      Type 2 diabetes mellitus without complication (H)    Assessment:  Diet controlled    Plan: Continue carb counting and sliding scale insulin as needed       Anemia in chronic kidney disease    Assessment:  chronic and stable    Plan: No further monitoring as needed      CKD (chronic kidney disease) stage 4, GFR 15-29 ml/min (H)    Assessment: chronic and stable, with creatinine actually improving slightly from patient's recent baseline and is down to 2.27    Plan: Continue to monitor as patient is transition to oral diuretics      History of CVA (cerebrovascular accident)    Assessment: Previous history without known reoccurrence    Plan: Continue to monitor      Moderate pulmonary hypertension by Echo    Assessment: Present by echo    Plan: Continue care as above      Moderate mitral regurgitation    Assessment: Noted on echo and unchanged compared to 1 month ago    Plan: Proceed with treatment as above       # Pain Assessment:   Current Pain Score 2/28/2018 2/28/2018 2/28/2018   Patient currently in pain? denies denies denies   Pain score (0-10) - - -   Pain location - - -   Pain descriptors - - -   Mary Ellen parham pain level was assessed and she currently " denies pain.        VTE:  SCDs  Code Status:  DNR/DNI        Interval History:   Continues to improve.  Vital signs stable with patient being weaned off O2 supplementation yesterday afternoon and has remained off overnight.  Breathing is feeling much improved.  Eating fairly well and voiding well.  Tolerating medications without significant side effects.  No new concerns today.           Significant Problems:     Past Medical History:   Diagnosis Date     CAD (coronary artery disease)     remote hx of stent placement in past     CVA (cerebral vascular accident) (H) 2013    some left sided deficits     Diabetes mellitus (H)      Hyperlipemia      Hypertension             Physical Exam:   Blood pressure 159/67, pulse 67, temperature 97.5  F (36.4  C), temperature source Oral, resp. rate 18, weight 63 kg (138 lb 14.2 oz), SpO2 96 %.  Constitutional:   awake, alert, cooperative, no apparent distress, and appears stated age     Lungs:   No increased work of breathing, good air exchange, clear to auscultation bilaterally, no crackles or wheezing     Cardiovascular:   Normal apical impulse, regular rate and rhythm - does have some early beats (PVCs vs PAC) which are unchanged     Abdomen:   Bowel sounds present, abdomen soft and non-tender     Musculoskeletal:   no lower extremity pitting edema present     Neurologic:   Awake, alert, oriented to name, place but has more difficulty with time and situation - does remember she has had a heart attack but is confusing details of her recent stays, not remembering large parts of conversations had in the past few days      Skin:   normal skin color, texture, turgor             Data:   All laboratory data reviewed    Attestation:  I have reviewed today's vital signs, notes, medications, labs and imaging.     Electronically Signed:  Shabana Turpin MD    Note: Chart documentation done in part with Dragon Voice Recognition software. Although reviewed after completion, some  word and grammatical errors may remain.

## 2018-02-28 NOTE — PROGRESS NOTES
S-(situation): Respiratory     B-(background): CHF, acute respiratory failure with hypoxia, community-acquired pneumonia, hospital day 3    A-(assessment): lungs are clear in all fields, 02 sats ranged from 95-96% with RA throughout shift. RR ranged from 18-20 throughout shift. Weak, non-productive cough. Hemoglobin is 8.7, nail beds pink. Cough-deep breathing done with encouragement.     R-(recommendations): Maintain HOB 30-45 degrees; monitor O2 sats and VS q4h. Encourage use of IS and cough and deep breathing q2h. Encourage increased activity.     [facility] Dewitt [facility]

## 2018-03-01 LAB
ANION GAP SERPL CALCULATED.3IONS-SCNC: 9 MMOL/L (ref 3–14)
BUN SERPL-MCNC: 59 MG/DL (ref 7–30)
CALCIUM SERPL-MCNC: 8.3 MG/DL (ref 8.5–10.1)
CHLORIDE SERPL-SCNC: 107 MMOL/L (ref 94–109)
CO2 SERPL-SCNC: 29 MMOL/L (ref 20–32)
CREAT SERPL-MCNC: 2.07 MG/DL (ref 0.52–1.04)
GFR SERPL CREATININE-BSD FRML MDRD: 22 ML/MIN/1.7M2
GLUCOSE BLDC GLUCOMTR-MCNC: 106 MG/DL (ref 70–99)
GLUCOSE BLDC GLUCOMTR-MCNC: 106 MG/DL (ref 70–99)
GLUCOSE BLDC GLUCOMTR-MCNC: 112 MG/DL (ref 70–99)
GLUCOSE BLDC GLUCOMTR-MCNC: 138 MG/DL (ref 70–99)
GLUCOSE BLDC GLUCOMTR-MCNC: 148 MG/DL (ref 70–99)
GLUCOSE SERPL-MCNC: 98 MG/DL (ref 70–99)
POTASSIUM SERPL-SCNC: 3.9 MMOL/L (ref 3.4–5.3)
PROCALCITONIN SERPL-MCNC: 0.24 NG/ML
SODIUM SERPL-SCNC: 145 MMOL/L (ref 133–144)
TROPONIN I SERPL-MCNC: 0.39 UG/L (ref 0–0.04)

## 2018-03-01 PROCEDURE — A9270 NON-COVERED ITEM OR SERVICE: HCPCS | Mod: GY | Performed by: FAMILY MEDICINE

## 2018-03-01 PROCEDURE — 80048 BASIC METABOLIC PNL TOTAL CA: CPT | Performed by: FAMILY MEDICINE

## 2018-03-01 PROCEDURE — 12000000 ZZH R&B MED SURG/OB

## 2018-03-01 PROCEDURE — 99232 SBSQ HOSP IP/OBS MODERATE 35: CPT | Performed by: FAMILY MEDICINE

## 2018-03-01 PROCEDURE — 84145 PROCALCITONIN (PCT): CPT | Performed by: FAMILY MEDICINE

## 2018-03-01 PROCEDURE — 84484 ASSAY OF TROPONIN QUANT: CPT | Performed by: FAMILY MEDICINE

## 2018-03-01 PROCEDURE — A9270 NON-COVERED ITEM OR SERVICE: HCPCS | Mod: GY | Performed by: INTERNAL MEDICINE

## 2018-03-01 PROCEDURE — 25000132 ZZH RX MED GY IP 250 OP 250 PS 637: Mod: GY | Performed by: INTERNAL MEDICINE

## 2018-03-01 PROCEDURE — 25000132 ZZH RX MED GY IP 250 OP 250 PS 637: Mod: GY | Performed by: FAMILY MEDICINE

## 2018-03-01 PROCEDURE — 00000146 ZZHCL STATISTIC GLUCOSE BY METER IP

## 2018-03-01 PROCEDURE — 36415 COLL VENOUS BLD VENIPUNCTURE: CPT | Performed by: FAMILY MEDICINE

## 2018-03-01 RX ADMIN — ASPIRIN 81 MG: 81 TABLET, COATED ORAL at 09:44

## 2018-03-01 RX ADMIN — HYDRALAZINE HYDROCHLORIDE 10 MG: 10 TABLET, FILM COATED ORAL at 13:43

## 2018-03-01 RX ADMIN — HYDRALAZINE HYDROCHLORIDE 10 MG: 10 TABLET, FILM COATED ORAL at 20:34

## 2018-03-01 RX ADMIN — ISOSORBIDE MONONITRATE 60 MG: 30 TABLET, EXTENDED RELEASE ORAL at 09:44

## 2018-03-01 RX ADMIN — TORSEMIDE 40 MG: 20 TABLET ORAL at 09:44

## 2018-03-01 RX ADMIN — LEVOTHYROXINE SODIUM 112 MCG: 112 TABLET ORAL at 06:39

## 2018-03-01 RX ADMIN — SENNOSIDES AND DOCUSATE SODIUM 1 TABLET: 8.6; 5 TABLET ORAL at 09:44

## 2018-03-01 RX ADMIN — HYDRALAZINE HYDROCHLORIDE 10 MG: 10 TABLET, FILM COATED ORAL at 09:44

## 2018-03-01 RX ADMIN — HYDRALAZINE HYDROCHLORIDE 10 MG: 10 TABLET, FILM COATED ORAL at 17:22

## 2018-03-01 RX ADMIN — SIMVASTATIN 10 MG: 5 TABLET, FILM COATED ORAL at 20:34

## 2018-03-01 RX ADMIN — CLOPIDOGREL BISULFATE 75 MG: 75 TABLET, FILM COATED ORAL at 09:44

## 2018-03-01 RX ADMIN — METOPROLOL SUCCINATE 50 MG: 50 TABLET, EXTENDED RELEASE ORAL at 09:45

## 2018-03-01 RX ADMIN — AZITHROMYCIN 250 MG: 250 TABLET, FILM COATED ORAL at 09:44

## 2018-03-01 NOTE — DISCHARGE INSTRUCTIONS
Follow Up Appointments:    Tuesday March 6th at 11:00am  Dr. Celis  [facility]  [phone]  Please bring Insurance card, photo ID, copay if have one.    If need to cancel please call within 24hrs of the appointment.

## 2018-03-01 NOTE — PROGRESS NOTES
Family friend Sally sherwood, [employer] has coverage for overnights throughout the weekend. Still working on days. Sally will be giving her a ride home on d/c. Sally's # [phone]

## 2018-03-01 NOTE — PROGRESS NOTES
Pt is much more confused tonight than she was last evening.  Not aware of where she is and where she is going following discharge.  Talking about people not paying their way and that she will have to pay for them.  Unable to redirect.  Up and out of bed many times.  Does not use call light.  Bed alarm on at all times.

## 2018-03-01 NOTE — PROGRESS NOTES
[facility] Progress Note          Assessment and Plan:   Assessment:   Principal Problem:    Acute respiratory failure with hypoxia (H)    Assessment: Secondary to CHF exacerbation with patient having recurrent non-STEMI with echocardiogram showing worsening ejection fraction down to 35-40% however there is also concern for possible pneumonia with pro-calcitonin level elevated at 0.66 and initial leukocytosis of 11.2.,  Patient has had adequate diuresis on a Lasix drip and has transitioned to oral diuretics with ongoing weight loss and resolution of O2 supplementation needs.  Pro-calcitonin has returned and has also dropped drastically, indicating antibiotics are appropriate for possible infectious source.  Patient does have 24/7 nursing support that likely will start tomorrow at time of discharge.      Plan:  We will continue with Demadex 20 mg daily and monitor for weight stabilization as well as toleranceThe patient's renal function, especially given the almost 3 kg drop of weight over the past 24 hours.  Will recheck creatinine tomorrow morning.  Anticipate discharge home tomorrow with 24/7 nursing support and hospice services    Active Problems:    Non-ST elevated myocardial infarction (non-STEMI) (H)    Assessment: Troponins have been trending downward nicely with patient having no further symptoms concerning for angina.  Patient has tolerated the increased dosing of metoprolol and Imdur without difficulty and is no longer having the mid back discomfort which may be an anginal equivalent.      Plan: Continue his medications at current regimen, anticipate discharge home tomorrow       Acute on chronic combined systolic and diastolic CHF, NYHA class 1 (H)    Assessment: With acute exacerbation causing respiratory failure as above    Plan: Proceed with plan as above      Essential hypertension    Assessment: blood pressures have tolerated dose adjustments of blood pressure regimen without difficulty -  blood pressure this morning was actually elevated but improved to goal range following morning medication administration     Plan: continue with current regimen and monitor      Coronary artery disease involving native coronary artery of native heart without angina pectoris    Assessment: with non-STEMI as above    Plan: continue plan as above      Hypothyroidism due to acquired atrophy of thyroid    Assessment: on synthroid    Plan: continue without change      Type 2 diabetes mellitus without complication (H)    Assessment: diet controlled, with blood sugars at goal    Plan: continue diabetic diet and SSI if needed      Anemia in chronic kidney disease    Assessment: chronic and stable    Plan: no further monitoring is needed.        CKD (chronic kidney disease) stage 4, GFR 15-29 ml/min (H)    Assessment: creatinine has actually improved with more aggressive diuresis - currently is now 2.07, which is improved from the 2.4-2.5 at time of presentation and early in this hospital stay    Plan: Continue to try to maintain dry weight with transition to oral Demadex, will need to monitor creatinine closely       History of CVA (cerebrovascular accident)    Assessment: no residual symptoms    Plan: continue to monitor      Moderate pulmonary hypertension by Echo    Assessment: chronic and stable    Plan:  Continue treatment of CHF as above      Moderate mitral regurgitation    Assessment: chronic and stable    Plan: continue treatment of CHF as above       # Pain Assessment:   Current Pain Score 2/28/2018 2/28/2018 2/28/2018   Patient currently in pain? denies denies denies   Pain score (0-10) - - -   Pain location - - -   Pain descriptors - - -   Mary Ellen s pain level was assessed and she currently denies pain.        VTE:  SCDs  Code Status:  DNR/DNI        Interval History:   Continues to improve.  Vital signs stable and tolerating adjustments to cardiac medications without hypotension - was slightly hypertensive this  morning but normalized now that morning medications have been given.  Still very forgetful.  Eating and voiding well.  Tolerating medications without significant side effects.  No new concerns today.           Significant Problems:     Past Medical History:   Diagnosis Date     CAD (coronary artery disease)     remote hx of stent placement in past     CVA (cerebral vascular accident) (H) 2013    some left sided deficits     Diabetes mellitus (H)      Hyperlipemia      Hypertension             Physical Exam:   Blood pressure 130/63, pulse 59, temperature 96.4  F (35.8  C), temperature source Oral, resp. rate 20, weight 60.3 kg (132 lb 15 oz), SpO2 98 %.  Constitutional:   awake, alert, cooperative, no apparent distress, and appears stated age     Lungs:   No increased work of breathing, good air exchange, clear to auscultation bilaterally, no crackles or wheezing     Cardiovascular:   Normal apical impulse, regular rate with occasional early beats on auscultation      Abdomen:   Bowel sounds present, abdomen soft and non-tender     Musculoskeletal:   no lower extremity pitting edema present     Neurologic:   Awake, alert, oriented to name, place but not time.  Does remember portions of the care conference yesterday and feels comfortable with her choices.     Skin:   normal skin color, texture, turgor             Data:   All laboratory data reviewed    Attestation:  I have reviewed today's vital signs, notes, medications, labs and imaging.     Electronically Signed:  Shabana Turpin MD    Note: Chart documentation done in part with Dragon Voice Recognition software. Although reviewed after completion, some word and grammatical errors may remain.

## 2018-03-01 NOTE — PROGRESS NOTES
S-(situation): End of shift note    B-(background): CHF symptoms    A-(assessment):VSS. Afebrile. On RA. Patient alert but confused/forgetful. Patient does not use callight when assistance is needed, often sets bed alarm off when she wants to get up to go to the bathroom. Staff anticipate patient needs on hourly round checks or sooner. Patient voiding with no difficulties.

## 2018-03-02 VITALS
TEMPERATURE: 97.4 F | WEIGHT: 133.6 LBS | BODY MASS INDEX: 23.67 KG/M2 | DIASTOLIC BLOOD PRESSURE: 60 MMHG | SYSTOLIC BLOOD PRESSURE: 105 MMHG | RESPIRATION RATE: 18 BRPM | HEART RATE: 55 BPM | OXYGEN SATURATION: 95 %

## 2018-03-02 LAB
ANION GAP SERPL CALCULATED.3IONS-SCNC: 10 MMOL/L (ref 3–14)
BACTERIA SPEC CULT: ABNORMAL
BUN SERPL-MCNC: 59 MG/DL (ref 7–30)
CALCIUM SERPL-MCNC: 8.2 MG/DL (ref 8.5–10.1)
CHLORIDE SERPL-SCNC: 104 MMOL/L (ref 94–109)
CO2 SERPL-SCNC: 30 MMOL/L (ref 20–32)
CREAT SERPL-MCNC: 2.17 MG/DL (ref 0.52–1.04)
GFR SERPL CREATININE-BSD FRML MDRD: 21 ML/MIN/1.7M2
GLUCOSE BLDC GLUCOMTR-MCNC: 105 MG/DL (ref 70–99)
GLUCOSE BLDC GLUCOMTR-MCNC: 122 MG/DL (ref 70–99)
GLUCOSE BLDC GLUCOMTR-MCNC: 93 MG/DL (ref 70–99)
GLUCOSE SERPL-MCNC: 105 MG/DL (ref 70–99)
POTASSIUM SERPL-SCNC: 3.6 MMOL/L (ref 3.4–5.3)
SODIUM SERPL-SCNC: 144 MMOL/L (ref 133–144)
SPECIMEN SOURCE: ABNORMAL

## 2018-03-02 PROCEDURE — 25000132 ZZH RX MED GY IP 250 OP 250 PS 637: Mod: GY | Performed by: FAMILY MEDICINE

## 2018-03-02 PROCEDURE — 25000132 ZZH RX MED GY IP 250 OP 250 PS 637: Mod: GY | Performed by: INTERNAL MEDICINE

## 2018-03-02 PROCEDURE — 99239 HOSP IP/OBS DSCHRG MGMT >30: CPT | Performed by: FAMILY MEDICINE

## 2018-03-02 PROCEDURE — A9270 NON-COVERED ITEM OR SERVICE: HCPCS | Mod: GY | Performed by: FAMILY MEDICINE

## 2018-03-02 PROCEDURE — 00000146 ZZHCL STATISTIC GLUCOSE BY METER IP

## 2018-03-02 PROCEDURE — 36415 COLL VENOUS BLD VENIPUNCTURE: CPT | Performed by: FAMILY MEDICINE

## 2018-03-02 PROCEDURE — A9270 NON-COVERED ITEM OR SERVICE: HCPCS | Mod: GY | Performed by: INTERNAL MEDICINE

## 2018-03-02 PROCEDURE — 80048 BASIC METABOLIC PNL TOTAL CA: CPT | Performed by: FAMILY MEDICINE

## 2018-03-02 RX ORDER — ACETAMINOPHEN 500 MG
500-1000 TABLET ORAL EVERY 6 HOURS PRN
COMMUNITY
Start: 2018-03-02

## 2018-03-02 RX ORDER — HYDRALAZINE HYDROCHLORIDE 10 MG/1
10 TABLET, FILM COATED ORAL 4 TIMES DAILY
Qty: 120 TABLET | Refills: 0 | Status: SHIPPED | OUTPATIENT
Start: 2018-03-02

## 2018-03-02 RX ORDER — ISOSORBIDE MONONITRATE 60 MG/1
60 TABLET, EXTENDED RELEASE ORAL DAILY
Qty: 30 TABLET | Refills: 0 | Status: SHIPPED | OUTPATIENT
Start: 2018-03-03

## 2018-03-02 RX ORDER — METOPROLOL SUCCINATE 50 MG/1
50 TABLET, EXTENDED RELEASE ORAL DAILY
Qty: 30 TABLET | Refills: 0 | Status: SHIPPED | OUTPATIENT
Start: 2018-03-02

## 2018-03-02 RX ORDER — AMOXICILLIN 250 MG
1 CAPSULE ORAL DAILY
Qty: 100 TABLET | Refills: 0 | Status: SHIPPED | OUTPATIENT
Start: 2018-03-02

## 2018-03-02 RX ADMIN — HYDRALAZINE HYDROCHLORIDE 10 MG: 10 TABLET, FILM COATED ORAL at 11:58

## 2018-03-02 RX ADMIN — ASPIRIN 81 MG: 81 TABLET, COATED ORAL at 08:56

## 2018-03-02 RX ADMIN — CLOPIDOGREL BISULFATE 75 MG: 75 TABLET, FILM COATED ORAL at 08:56

## 2018-03-02 RX ADMIN — METOPROLOL SUCCINATE 50 MG: 50 TABLET, EXTENDED RELEASE ORAL at 08:56

## 2018-03-02 RX ADMIN — SENNOSIDES AND DOCUSATE SODIUM 1 TABLET: 8.6; 5 TABLET ORAL at 08:56

## 2018-03-02 RX ADMIN — HYDRALAZINE HYDROCHLORIDE 10 MG: 10 TABLET, FILM COATED ORAL at 08:56

## 2018-03-02 RX ADMIN — ISOSORBIDE MONONITRATE 60 MG: 30 TABLET, EXTENDED RELEASE ORAL at 08:56

## 2018-03-02 RX ADMIN — LEVOTHYROXINE SODIUM 112 MCG: 112 TABLET ORAL at 06:43

## 2018-03-02 RX ADMIN — TORSEMIDE 40 MG: 20 TABLET ORAL at 08:56

## 2018-03-02 NOTE — PROGRESS NOTES
"Name: Mary Ellen Cuba    MRN#: [id number]    Reason for Hospitalization: Urinary retention [R33.9]  Essential hypertension [I10]  History of CVA (cerebrovascular accident) [Z86.73]  Acute combined systolic and diastolic congestive heart failure (H) [I50.41]  Acute respiratory failure with hypoxia (H) [J96.01]  Hypothyroidism due to acquired atrophy of thyroid [E03.4]  Coronary artery disease involving native coronary artery of native heart without angina pectoris [I25.10]  Type 2 diabetes mellitus without complication, without long-term current use of insulin (H) [E11.9]  Acute renal failure superimposed on chronic kidney disease, unspecified CKD stage, unspecified acute renal failure type (H) [N17.9, N18.9]  Community acquired pneumonia, unspecified laterality [J18.9]    Discharge Date: 3/2/18     Patient / Family response to discharge plan: Patient, her friend/caregiver Sally, and nephew, Sukhjinder, are in agreement with the final d/c plan of 24/7 caregiver service by [facility] (out of pocket cost).  Sally plans to \"fill in any gaps in service as needed\".  [facility] to see patient tomorrow and [facility] to do start of service with patient on Monday at 1pm.  Patient is glad to be going home.     has spoken with Sally over the phone.  She stated that the [facility] nurse was coming at 1400 to do a one time medication set up for the patient.  Sally was getting the new medications from the [facility] pharmacy. Sally also will be with patient until 6pm tonight which is when the first caregiver from [facility] will arrive.  Sally will be here to transport patient home between 3 and 4pm today.      has spoken with Rasheeda at [facility].  She stated that she received the doctor's orders and they are still scheduled for Monday.      Follow-Up Appt: Follow up appointment has been scheduled for Tuesday, March 6th at 11 a.m. With Dr. Celis.     Other Providers (Care Coordinator, [facility], " PCA services etc): No    Discharge Disposition: home    DENTON RICHARDSON

## 2018-03-02 NOTE — DISCHARGE SUMMARY
[facility] Discharge Summary    Mary Ellen Cuba MRN# [id number]   Age: 89 year old YOB: 1924     Date of Admission:  2/25/2018  Date of Discharge::  3/2/2018  Admitting Physician:  Cordell Villanueva MD  Discharge Physician:  Shabana Turpin MD    Home clinic: [facility]          Admission Diagnoses:   Urinary retention [R33.9]  Essential hypertension [I10]  History of CVA (cerebrovascular accident) [Z86.73]  Acute combined systolic and diastolic congestive heart failure (H) [I50.41]  Acute respiratory failure with hypoxia (H) [J96.01]  Hypothyroidism due to acquired atrophy of thyroid [E03.4]  Coronary artery disease involving native coronary artery of native heart without angina pectoris [I25.10]  Type 2 diabetes mellitus without complication, without long-term current use of insulin (H) [E11.9]  Acute renal failure superimposed on chronic kidney disease, unspecified CKD stage, unspecified acute renal failure type (H) [N17.9, N18.9]  Community acquired pneumonia, unspecified laterality [J18.9]          Discharge Diagnosis:   Principal Problem:    Acute respiratory failure with hypoxia (H)    Assessment: Secondary to worsening CHF with acute exacerbation caused by recurrent non-STEMI.  Patient was placed on Lasix drip with adequate diuresis achieved and she is now maintaining her weight on oral Demadex.  Given her ongoing worsening CHF, recurrent non-STEMI's, and overall decline, patient has decided to return home with 24/7 agency nurse support so she can remain in her home setting and to initiate hospice services as her goal going forward is to be comfortable, not be hospitalized any further, and to die at home.      Plan:  Patient will discharge with ongoing beta-blocker, hydralazine, Imdur, Demadex, ASA and Plavix for medical management of CHF and CAD.  ACE/ARB was not given secondary to patient having worsening renal function after her non-STEMI last month  with ongoing elevated creatinine.  The 24/7 agency nurse support will begin this evening and patient will have her home health care through the weekend, with hospice services to begin next week.  A POLST form was completed reflecting her wishes going forward     Active Problems:    Non-ST elevated myocardial infarction (non-STEMI) (H)    Assessment: with troponin increasing as high as 1.592 during this hospitalization before trending downward and echocardiogram showing worsening ejection fraction down to 35% identified.      Plan: Discharge with plan as above      Acute on chronic combined systolic and diastolic CHF, NYHA class 1 (H)    Assessment: Worsening as above    Plan: Discharge with plan as above      Essential hypertension    Assessment: Blood pressures improving with adjustments to blood pressure medication as above    Plan: Discharge with plan as above      Coronary artery disease involving native coronary artery of native heart without angina pectoris    Assessment: With recurrent non-STEMI's as above    Plan: Discharge with plan as above      Hypothyroidism due to acquired atrophy of thyroid    Assessment: On Synthroid    Plan: Continue without change      Type 2 diabetes mellitus without complication (H)    Assessment:  Diet controlled    Plan:   discharge without change      Anemia in chronic kidney disease    Assessment:   chronic and stable    Plan: Discharge without change      CKD (chronic kidney disease) stage 4, GFR 15-29 ml/min (H)    Assessment: Creatinine has actually slightly improved down to 2.17 with aggressive diuresis, whereas it had been 2.4-2.6 at the end of last hospitalization in the beginning of the S1    Plan: Discharge with plan as above, avoid ACE and ARB medications going forward.       History of CVA (cerebrovascular accident)    Assessment:  previous history but without residual deficits    Plan:  discharge with plan as above       Moderate pulmonary hypertension by Echo     Assessment:  chronic and stable     Plan: Discharge with plan as above      Moderate mitral regurgitation    Assessment: Chronic and stable    Plan: Discharge with plan as above    # Discharge Pain Plan:   - Patient currently has NO PAIN and is not being prescribed pain medications on discharge.  Will continue with Imdur which has helped the patient's mid back pain which was thought to be an anginal equivalent          Procedures:   No procedures performed during this admission          Medications Prior to Admission:     Prescriptions Prior to Admission   Medication Sig Dispense Refill Last Dose     oxyCODONE IR (ROXICODONE) 5 MG tablet Take 0.5-1 tablets (2.5-5 mg) by mouth every 4 hours as needed for moderate to severe pain 10 tablet 0 Unknown at Unknown time     aspirin EC 81 MG EC tablet Take 1 tablet (81 mg) by mouth daily   Unknown at Unknown time     torsemide (DEMADEX) 20 MG tablet Take 2 tablets (40 mg) by mouth daily 60 tablet 0 Unknown at Unknown time     clopidogrel (PLAVIX) 75 MG tablet Take 1 tablet (75 mg) by mouth daily 30 tablet 0 Unknown at Unknown time     [DISCONTINUED] amLODIPine (NORVASC) 5 MG tablet Take 1 tablet (5 mg) by mouth 2 times daily 30 tablet  Unknown at Unknown time     [DISCONTINUED] acetaminophen (TYLENOL) 500 MG tablet Take 2 tablets (1,000 mg) by mouth 3 times daily (Patient taking differently: Take 1,000 mg by mouth 2 times daily )   Unknown at Unknown time     simvastatin (ZOCOR) 10 MG tablet Take 1 tablet (10 mg) by mouth At Bedtime 30 tablet  Unknown at Unknown time     levothyroxine (SYNTHROID/LEVOTHROID) 112 MCG tablet Take 1 tablet (112 mcg) by mouth daily   Unknown at Unknown time     [DISCONTINUED] fenofibrate 160 MG tablet Take 1 tablet (160 mg) by mouth daily 30 tablet  Unknown at Unknown time             Discharge Medications:     Current Discharge Medication List      START taking these medications    Details   isosorbide mononitrate (IMDUR) 60 MG 24 hr tablet Take  1 tablet (60 mg) by mouth daily  Qty: 30 tablet, Refills: 0    Associated Diagnoses: Coronary artery disease involving native coronary artery of native heart without angina pectoris; Non-ST elevated myocardial infarction (non-STEMI) (H)      hydrALAZINE (APRESOLINE) 10 MG tablet Take 1 tablet (10 mg) by mouth 4 times daily  Qty: 120 tablet, Refills: 0    Associated Diagnoses: Acute combined systolic and diastolic congestive heart failure (H); Coronary artery disease involving native coronary artery of native heart without angina pectoris; Non-ST elevated myocardial infarction (non-STEMI) (H); Essential hypertension         CONTINUE these medications which have CHANGED    Details   acetaminophen (TYLENOL) 500 MG tablet Take 1-2 tablets (500-1,000 mg) by mouth every 6 hours as needed for mild pain      metoprolol succinate (TOPROL-XL) 50 MG 24 hr tablet Take 1 tablet (50 mg) by mouth daily  Qty: 30 tablet, Refills: 0    Associated Diagnoses: Benign essential hypertension      senna-docusate (SENOKOT-S;PERICOLACE) 8.6-50 MG per tablet Take 1 tablet by mouth daily And may also take one extra tablet daily as needed for constipation  Qty: 100 tablet, Refills: 0    Associated Diagnoses: Closed left hip fracture, initial encounter (H)         CONTINUE these medications which have NOT CHANGED    Details   oxyCODONE IR (ROXICODONE) 5 MG tablet Take 0.5-1 tablets (2.5-5 mg) by mouth every 4 hours as needed for moderate to severe pain  Qty: 10 tablet, Refills: 0    Associated Diagnoses: Hematoma of left hip, initial encounter      aspirin EC 81 MG EC tablet Take 1 tablet (81 mg) by mouth daily    Associated Diagnoses: Non-ST elevated myocardial infarction (non-STEMI) (H)      torsemide (DEMADEX) 20 MG tablet Take 2 tablets (40 mg) by mouth daily  Qty: 60 tablet, Refills: 0    Associated Diagnoses: Acute combined systolic and diastolic congestive heart failure (H)      clopidogrel (PLAVIX) 75 MG tablet Take 1 tablet (75 mg) by  mouth daily  Qty: 30 tablet, Refills: 0    Associated Diagnoses: Non-ST elevated myocardial infarction (non-STEMI) (H)      simvastatin (ZOCOR) 10 MG tablet Take 1 tablet (10 mg) by mouth At Bedtime  Qty: 30 tablet    Associated Diagnoses: Hypercholesterolemia      levothyroxine (SYNTHROID/LEVOTHROID) 112 MCG tablet Take 1 tablet (112 mcg) by mouth daily    Associated Diagnoses: Other specified hypothyroidism         STOP taking these medications       amLODIPine (NORVASC) 5 MG tablet Comments:   Reason for Stopping:         fenofibrate 160 MG tablet Comments:   Reason for Stopping:                     Consultations:   Telephone consultation during this admission received from cardiology, Dr. Tang, regarding if any adjustments could be made in current regiment to maximize medical management following recurrent non-STEMI          Brief History of Illness:   Patient is a 89-year-old female who presented to the emergency room with severe shortness of breath she had been found -by a neighbor who is looking in on her and convinced her to come to the emergency room for evaluation.  Patient had begun feeling short of breath the night prior very suddenly with significant worsening after that time.  But there was concern for possible In the emergency room she was found to have CHF exacerbation recurrent community-acquired pneumonia.  Decision was made to admit patient for ongoing management          Hospital Course:     in the hospital, patient had serial troponins performed which did confirm recurrent non-STEMI with peak troponin of 1.592.  Patient continued to be adamant that she wanted medical management without aggressive intervention procedures or stress testing performed.  She was initiated on hydralazine and Imdur and her beta-blocker was titrated up.  She was also continued on aspirin and Plavix.  Cardiology was consulted and no additional adjustments in medical management were identified.  Patient's shortness of  breath improved greatly following Lasix infusion with pro-calcitonin was mildly elevated and she did complete full course of Rocephin and azithromycin for possible pneumonia with pro-calcitonin appropriately declining.  She was weaned off O2 supplementation and transition to oral Demadex.  A care conference did occur during this hospitalization and goals going forward were identified.  Patient's main goal is to avoid hospitalization and to die at home.  Given her memory issues, macular degeneration, and defined goals, patient will be discharged home with 24/7 agency nursing support and initiation of hospice.          Physical Exam:   Vitals were reviewed  Constitutional:   awake, alert, cooperative, no apparent distress, and appears stated age     Lungs:   No increased work of breathing, good air exchange, clear to auscultation bilaterally, no crackles or wheezing     Cardiovascular:   Normal apical impulse, regular rate and rhythm, normal S1 and S2, no S3 or S4, and no murmur noted     Abdomen:   Bowel sounds present, abdomen soft and non-tender     Musculoskeletal:   no lower extremity pitting edema present     Neurologic:   Awake, alert, oriented to name, place but not time, does remember she has had several heart attacks and that the plan is to go home with agency nurse and hospice and once more affirms these are her wishes     Skin:   normal skin color, texture, turgor              Discharge Instructions and Follow-Up:   Discharge diet: Moderate consistent carbohydrate (7710-1722 mona / 4-6 CHO units per meal), low salt diet   Discharge activity: Activity as tolerated   Discharge follow-up: Hospice will initiate on 3/5/18  Follow up with primary care provider in 4 days as scheduled           Discharge Disposition:   Discharged to home with 24/7 hour agency nurse support and home care services for the weekend with hospice services starting on Monday.       Attestation:  I have reviewed today's vital signs, notes,  medications, labs and imaging.    More than 30 minutes was spent on this discharge.      Shabana Turpin MD    Note: Chart documentation done in part with Dragon Voice Recognition software. Although reviewed after completion, some word and grammatical errors may remain.

## 2018-03-02 NOTE — PROGRESS NOTES
S-(situation): Patient discharged to home via W/C with Family    B-(background): CHF    A-(assessment): Pt is alert but confused at times.  VSS.  Afebrile.  Lung sounds are clear.  Voiding and taking fluids well.      R-(recommendations): Discharge instructions reviewed with pt and family. Listed belongings gathered and returned to patient.          Discharge Nursing Criteria:     Care Plan and Patient education resolved: Yes    New Medications- pt has been educated about purpose and side effects: Yes    Vaccines  Influenza status verified at discharge:  Yes      Intentionally Retained Items (examples: Drains, Wound packing, ureteral stents, rodriguez, PICC line or IV)  Patient was sent home with nothing left in place.   Information you need to know about your procedure form filled out and copy given to patient: No  Follow up appointment made for removal: No    MISC  Prescriptions if needed, hard copies sent with patient  NA  Home and hospital aquired medications returned to patient: NA  Medication Bin checked and emptied on discharge Yes  Patient reports post-discharge pain management plan is effective: Yes    TKA/CONNIE ONLY:   Discharged with ALICIA Stockings No  ALICIA stocking Education Completed No

## 2018-03-02 NOTE — PROGRESS NOTES
S-(situation): End of shift note    B-(background): CHF    A-(assessment): Alert with confusion and forgetfullnes. VSS. Afebrile. Patient denies SOB. Patient denies pain.

## 2018-03-02 NOTE — PLAN OF CARE
Problem: Patient Care Overview  Goal: Plan of Care/Patient Progress Review  Outcome: Improving  Vitals stable, afebrile, on room air. Pt is up with a stand by assist and walked in the hallway x3. Pt is eating and drinking well with good urine output. Blood sugars low 100's. No insulin coverage needed. Pt denies any pain or shortness of breath.

## 2018-03-03 LAB
BACTERIA SPEC CULT: NORMAL
SPECIMEN SOURCE: NORMAL

## 2018-06-25 ENCOUNTER — HOSPITAL ENCOUNTER (EMERGENCY)
Facility: CLINIC | Age: 83
Discharge: HOME | End: 2018-06-26
Attending: FAMILY MEDICINE | Admitting: FAMILY MEDICINE
Payer: MEDICARE

## 2018-06-25 ENCOUNTER — APPOINTMENT (OUTPATIENT)
Dept: CT IMAGING | Facility: CLINIC | Age: 83
End: 2018-06-25
Attending: FAMILY MEDICINE
Payer: MEDICARE

## 2018-06-25 VITALS
HEART RATE: 58 BPM | TEMPERATURE: 97.3 F | DIASTOLIC BLOOD PRESSURE: 86 MMHG | SYSTOLIC BLOOD PRESSURE: 158 MMHG | RESPIRATION RATE: 20 BRPM | WEIGHT: 138 LBS | OXYGEN SATURATION: 99 % | BODY MASS INDEX: 24.45 KG/M2

## 2018-06-25 DIAGNOSIS — S42.294A OTHER CLOSED NONDISPLACED FRACTURE OF PROXIMAL END OF RIGHT HUMERUS, INITIAL ENCOUNTER: ICD-10-CM

## 2018-06-25 PROCEDURE — 23600 CLTX PROX HUMRL FX W/O MNPJ: CPT | Mod: 54 | Performed by: FAMILY MEDICINE

## 2018-06-25 PROCEDURE — 96376 TX/PRO/DX INJ SAME DRUG ADON: CPT | Mod: 59 | Performed by: FAMILY MEDICINE

## 2018-06-25 PROCEDURE — 72128 CT CHEST SPINE W/O DYE: CPT

## 2018-06-25 PROCEDURE — 25000128 H RX IP 250 OP 636: Performed by: FAMILY MEDICINE

## 2018-06-25 PROCEDURE — 72125 CT NECK SPINE W/O DYE: CPT

## 2018-06-25 PROCEDURE — 73200 CT UPPER EXTREMITY W/O DYE: CPT | Mod: RT

## 2018-06-25 PROCEDURE — 99283 EMERGENCY DEPT VISIT LOW MDM: CPT | Mod: 25 | Performed by: FAMILY MEDICINE

## 2018-06-25 PROCEDURE — 99285 EMERGENCY DEPT VISIT HI MDM: CPT | Mod: 25 | Performed by: FAMILY MEDICINE

## 2018-06-25 PROCEDURE — 96374 THER/PROPH/DIAG INJ IV PUSH: CPT | Performed by: FAMILY MEDICINE

## 2018-06-25 PROCEDURE — 23600 CLTX PROX HUMRL FX W/O MNPJ: CPT | Mod: RT | Performed by: FAMILY MEDICINE

## 2018-06-25 RX ORDER — FENTANYL CITRATE 50 UG/ML
30 INJECTION, SOLUTION INTRAMUSCULAR; INTRAVENOUS ONCE
Status: COMPLETED | OUTPATIENT
Start: 2018-06-25 | End: 2018-06-25

## 2018-06-25 RX ORDER — FENTANYL CITRATE 50 UG/ML
25 INJECTION, SOLUTION INTRAMUSCULAR; INTRAVENOUS ONCE
Status: COMPLETED | OUTPATIENT
Start: 2018-06-25 | End: 2018-06-25

## 2018-06-25 RX ADMIN — FENTANYL CITRATE 25 MCG: 50 INJECTION, SOLUTION INTRAMUSCULAR; INTRAVENOUS at 22:32

## 2018-06-25 RX ADMIN — FENTANYL CITRATE 30 MCG: 50 INJECTION, SOLUTION INTRAMUSCULAR; INTRAVENOUS at 23:59

## 2018-06-25 NOTE — ED AVS SNAPSHOT
[facility] Emergency Department    [address] [place] [address]    Phone:  [phone]    Fax:  [phone]                                       Mary Ellen Cuba   MRN: [id number]    Department:  [facility] Emergency Department   Date of Visit:  6/25/2018           After Visit Summary Signature Page     I have received my discharge instructions, and my questions have been answered. I have discussed any challenges I see with this plan with the nurse or doctor.    ..........................................................................................................................................  Patient/Patient Representative Signature      ..........................................................................................................................................  Patient Representative Print Name and Relationship to Patient    ..................................................               ................................................  Date                                            Time    ..........................................................................................................................................  Reviewed by Signature/Title    ...................................................              ..............................................  Date                                                            Time

## 2018-06-25 NOTE — ED AVS SNAPSHOT
[facility] Emergency Department    [address]     [place] [address]    Phone:  [phone]    Fax:  [phone]                                       Mary Ellen Cuba   MRN: [id number]    Department:  [facility] Emergency Department   Date of Visit:  6/25/2018           Patient Information     Date Of Birth          1/20/1924        Your diagnoses for this visit were:     Other closed nondisplaced fracture of proximal end of right humerus, initial encounter        You were seen by Jah Gamble MD.      Follow-up Information     Follow up with Samantha Celis MD. Schedule an appointment as soon as possible for a visit in 5 days.    Specialty:  Family Practice    Why:  For follow up on your ED stay    Contact information:    [place] [address] [address] [facility] [address]  [phone]          Discharge Instructions         Shoulder Fracture  You have a break (fracture) of the shoulder. This may be a small crack in the bone. Or it may be a major break with the broken parts pushed out of position.     If you have only a crack in the bone and no bone fragments are out of place, you will probably be treated with a shoulder immobilizer. This is a special type of sling. Casts are usually not used for this type of fracture. Your bone should heal in 4 to 8 weeks. More serious injuries may need surgery to put the bones back into the correct position for healing.  Home care  Follow these tips to care for yourself at home:    Leave the shoulder immobilizer in place. This will support the injured arm at your side. This is the best position for the bone to heal.    The shoulder immobilizer is adjustable. If it becomes loose, adjust it so that your forearm is level with the ground (horizontal). Your hand should be level with your elbow.    Apply an ice pack to the injured area for 20 minutes every 1 to 2 hours the first day. You can make an ice pack by putting ice cubes in a  plastic bag. A bag of frozen peas or something similar works well too. Wrap the bag in a towel before putting it on your shoulder. Continue with ice packs 3 to 4 times a day for the next 2 to 3 days. Then use the ice as needed to relieve pain and swelling.    You may take acetaminophen or ibuprofen to relieve pain, unless another pain medicine was prescribed. If you have chronic liver or kidney disease or ever had a stomach ulcer or gastrointestinal bleeding, talk with your doctor before using these medicines.    Don t take the sling off before your next exam unless you were told to do so. Ask if you should move your elbow, wrist, and hand.  Follow-up care  Follow up with your healthcare provider, or as advised.  A shoulder joint will become stiff if left in a sling for too long. Ask your doctor when it is safe to begin range-of-motion exercises.  When to seek medical advice  Call your healthcare provider right away if any of these occur:    Your fingers become swollen, cold, blue, numb, or tingly    Your shoulder or upper arm swells a lot or looks very bruised    The pain in your shoulder gets worse    The splint or immobilizer breaks    You have a fever or chills  Date Last Reviewed: 8/1/2016 2000-2017 [employer]. [address] [place] [address], [place] [address]. All rights reserved. This information is not intended as a substitute for professional medical care. Always follow your healthcare professional's instructions.          24 Hour Appointment Hotline       To make an appointment at any [facility] clinic, call [phone] ([phone]). If you don't have a family doctor or clinic, we will help you find one. [facility] clinics are conveniently located to serve the needs of you and your family.             Review of your medicines      CONTINUE these medicines which may have CHANGED, or have new prescriptions. If we are uncertain of the size of tablets/capsules you have at home, strength may be  listed as something that might have changed.        Dose / Directions Last dose taken    * oxyCODONE IR 5 MG tablet   Commonly known as:  ROXICODONE   Dose:  2.5-5 mg   What changed:  Another medication with the same name was added. Make sure you understand how and when to take each.   Quantity:  10 tablet        Take 0.5-1 tablets (2.5-5 mg) by mouth every 4 hours as needed for moderate to severe pain   Refills:  0        * oxyCODONE IR 5 MG tablet   Commonly known as:  ROXICODONE   Dose:  5 mg   What changed:  You were already taking a medication with the same name, and this prescription was added. Make sure you understand how and when to take each.   Quantity:  12 tablet        Take 1 tablet (5 mg) by mouth every 4 hours as needed for pain   Refills:  0        * Notice:  This list has 2 medication(s) that are the same as other medications prescribed for you. Read the directions carefully, and ask your doctor or other care provider to review them with you.      Our records show that you are taking the medicines listed below. If these are incorrect, please call your family doctor or clinic.        Dose / Directions Last dose taken    acetaminophen 500 MG tablet   Commonly known as:  TYLENOL   Dose:  500-1000 mg        Take 1-2 tablets (500-1,000 mg) by mouth every 6 hours as needed for mild pain   Refills:  0        aspirin 81 MG EC tablet   Dose:  81 mg        Take 1 tablet (81 mg) by mouth daily   Refills:  0        clopidogrel 75 MG tablet   Commonly known as:  PLAVIX   Dose:  75 mg   Quantity:  30 tablet        Take 1 tablet (75 mg) by mouth daily   Refills:  0        hydrALAZINE 10 MG tablet   Commonly known as:  APRESOLINE   Dose:  10 mg   Quantity:  120 tablet        Take 1 tablet (10 mg) by mouth 4 times daily   Refills:  0        isosorbide mononitrate 60 MG 24 hr tablet   Commonly known as:  IMDUR   Dose:  60 mg   Quantity:  30 tablet        Take 1 tablet (60 mg) by mouth daily   Refills:  0         levothyroxine 112 MCG tablet   Commonly known as:  SYNTHROID/LEVOTHROID   Dose:  112 mcg        Take 1 tablet (112 mcg) by mouth daily   Refills:  0        metoprolol succinate 50 MG 24 hr tablet   Commonly known as:  TOPROL XL   Dose:  50 mg   Quantity:  30 tablet        Take 1 tablet (50 mg) by mouth daily   Refills:  0        senna-docusate 8.6-50 MG per tablet   Commonly known as:  SENOKOT-S;PERICOLACE   Dose:  1 tablet   Quantity:  100 tablet        Take 1 tablet by mouth daily And may also take one extra tablet daily as needed for constipation   Refills:  0        simvastatin 10 MG tablet   Commonly known as:  ZOCOR   Dose:  10 mg   Quantity:  30 tablet        Take 1 tablet (10 mg) by mouth At Bedtime   Refills:  0        torsemide 20 MG tablet   Commonly known as:  DEMADEX   Dose:  40 mg   Quantity:  60 tablet        Take 2 tablets (40 mg) by mouth daily   Refills:  0                Information about OPIOIDS     PRESCRIPTION OPIOIDS: WHAT YOU NEED TO KNOW   We gave you an opioid (narcotic) pain medicine. It is important to manage your pain, but opioids are not always the best choice. You should first try all the other options your care team gave you. Take this medicine for as short a time (and as few doses) as possible.     These medicines have risks:    DO NOT drive when on new or higher doses of pain medicine. These medicines can affect your alertness and reaction times, and you could be arrested for driving under the influence (DUI). If you need to use opioids long-term, talk to your care team about driving.    DO NOT operate heave machinery    DO NOT do any other dangerous activities while taking these medicines.     DO NOT drink any alcohol while taking these medicines.      If the opioid prescribed includes acetaminophen, DO NOT take with any other medicines that contain acetaminophen. Read all labels carefully. Look for the word  acetaminophen  or  Tylenol.  Ask your pharmacist if you have questions or  are unsure.    You can get addicted to pain medicines, especially if you have a history of addiction (chemical, alcohol or substance dependence). Talk to your care team about ways to reduce this risk.    Store your pills in a secure place, locked if possible. We will not replace any lost or stolen medicine. If you don t finish your medicine, please throw away (dispose) as directed by your pharmacist. The [place] [employer]ution [employer] Agency has more information about safe disposal: https://www.pca.[facility].[place].us/living-green/managing-unwanted-medications.     All opioids tend to cause constipation. Drink plenty of water and eat foods that have a lot of fiber, such as fruits, vegetables, prune juice, apple juice and high-fiber cereal. Take a laxative (Miralax, milk of magnesia, Colace, Senna) if you don t move your bowels at least every other day.         Prescriptions were sent or printed at these locations (1 Prescription)                   Other Prescriptions                Printed at Department/Unit printer (1 of 1)         oxyCODONE IR (ROXICODONE) 5 MG tablet                Procedures and tests performed during your visit     CT Cervical Spine w/o Contrast    CT Shoulder Right w/o Contrast    CT Thoracic Spine w/o Contrast    Sling      Orders Needing Specimen Collection     None      Pending Results     Date and Time Order Name Status Description    6/25/2018 2222 CT Shoulder Right w/o Contrast In process             Pending Culture Results     No orders found for last 3 day(s).            Pending Results Instructions     If you had any lab results that were not finalized at the time of your Discharge, you can call the ED Lab Result RN at [phone]. You will be contacted by this team for any positive Lab results or changes in treatment. The nurses are available 7 days a week from 10A to 6:30P.  You can leave a message 24 hours per day and they will return your call.        Thank you for choosing [facility]      "  Thank you for choosing [facility] for your care. Our goal is always to provide you with excellent care. Hearing back from our patients is one way we can continue to improve our services. Please take a few minutes to complete the written survey that you may receive in the mail after you visit with us. Thank you!        [employer] Information     [employer] lets you send messages to your doctor, view your test results, renew your prescriptions, schedule appointments and more. To sign up, go to [email or web address] . Click on \"Log in\" on the left side of the screen, which will take you to the Welcome page. Then click on \"Sign up Now\" on the right side of the page.     You will be asked to enter the access code listed below, as well as some personal information. Please follow the directions to create your username and password.     Your access code is: [email or web address]  Expires: 2018 12:38 AM     Your access code will  in 90 days. If you need help or a new code, please call your [facility] clinic or [phone].        Care EveryWhere ID     This is your Care EveryWhere ID. This could be used by other organizations to access your [facility] medical records  [id number]        Equal Access to Services     ADELAIDE LOREDO : Sylvain Curry, wamandy burns, qaybta kaalmada jeyson, judith garcia. So [facility] [phone].    ATENCIÓN: Si habla español, tiene a amezcua disposición servicios gratuitos de asistencia lingüística. Llame al [phone].    We comply with applicable federal civil rights laws and [place] laws. We do not discriminate on the basis of race, color, national origin, age, disability, sex, sexual orientation, or gender identity.            After Visit Summary       This is your record. Keep this with you and show to your community pharmacist(s) and doctor(s) at your next visit.                  "

## 2018-06-26 RX ORDER — OXYCODONE HYDROCHLORIDE 5 MG/1
5 TABLET ORAL EVERY 4 HOURS PRN
Qty: 12 TABLET | Refills: 0 | Status: SHIPPED | OUTPATIENT
Start: 2018-06-26

## 2018-06-26 NOTE — ED PROVIDER NOTES
History     Chief Complaint   Patient presents with     Shoulder Pain     HPI  Mary Ellen Cuba is a 89 year old female who presents with right shoulder pain after a fall at home.  Patient is currently on hospice care for end-stage CHF.  Patient was walking to the bathroom when she just lost her balance and fell directly onto her right shoulder.  She was able to stand with assistance and is been able to walk.  Most of her pain is in her shoulder.  Patient denies hitting her head.  There was no loss of consciousness.  Patient denies any neck pain.  Patient denies any nausea or vomiting.    Problem List:    Patient Active Problem List    Diagnosis Date Noted     Acute on chronic combined systolic and diastolic CHF, NYHA class 1 (H) 02/25/2018     Priority: Medium     Acute respiratory failure (H) 02/25/2018     Priority: Medium     Acute kidney injury (H) 01/31/2018     Priority: Medium     Acute respiratory failure with hypoxia (H) 01/30/2018     Priority: Medium     LVH (left ventricular hypertrophy) 01/29/2018     Priority: Medium     RVH (right ventricular hypertrophy) 01/29/2018     Priority: Medium     Acute combined systolic and diastolic congestive heart failure (H) 01/29/2018     Priority: Medium     Paroxysmal atrial fibrillation (H) - possible 01/29/2018     Priority: Medium     Moderate pulmonary hypertension by Echo 01/29/2018     Priority: Medium     Moderate mitral regurgitation 01/29/2018     Priority: Medium     Lingular pneumonia, organism unspecified 01/28/2018     Priority: Medium     Non-ST elevated myocardial infarction (non-STEMI) (H) 01/28/2018     Priority: Medium     Left leg pain 12/13/2017     Priority: Medium     Blind - patient reported 12/13/2017     Priority: Medium     Leg hematoma, left, initial encounter 12/12/2017     Priority: Medium     CKD (chronic kidney disease) stage 4, GFR 15-29 ml/min (H) 12/12/2017     Priority: Medium     History of CVA (cerebrovascular accident)  12/12/2017     Priority: Medium     Acute congestive heart failure (H) 05/26/2017     Priority: Medium     Closed fracture of left inferior and superior pubic rami 05/15/2017     Priority: Medium     Urinary retention 01/21/2017     Priority: Medium     Fractured hip, left, closed, initial encounter (H) 01/20/2017     Priority: Medium     Essential hypertension 01/19/2017     Priority: Medium     Coronary artery disease involving native coronary artery of native heart without angina pectoris 01/19/2017     Priority: Medium     Hyperlipidemia LDL goal <100 01/19/2017     Priority: Medium     Hypothyroidism due to acquired atrophy of thyroid 01/19/2017     Priority: Medium     Type 2 diabetes mellitus without complication (H) 01/19/2017     Priority: Medium     Hypernatremia 01/19/2017     Priority: Medium     Anemia in chronic kidney disease 01/19/2017     Priority: Medium        Past Medical History:    Past Medical History:   Diagnosis Date     CAD (coronary artery disease)      CVA (cerebral vascular accident) (H) 2013     Diabetes mellitus (H)      Hyperlipemia      Hypertension        Past Surgical History:    Past Surgical History:   Procedure Laterality Date     APPENDECTOMY       GI SURGERY      gwendolyn     GI SURGERY      hemicolectomy     GYN SURGERY      oopherectomy     LASER YAG CAPSULOTOMY  3/21/2013    Procedure: LASER YAG CAPSULOTOMY;  yag capsulotomy bilateral eyes;  Surgeon: Deandre Nugent MD;  Location: [facility] OR     OPEN REDUCTION INTERNAL FIXATION HIP NAILING Left 1/20/2017    Procedure: OPEN REDUCTION INTERNAL FIXATION HIP NAILING;  Surgeon: Liborio Hoffman DO;  Location: [facility] OR     PHACOEMULSIFICATION WITH STANDARD INTRAOCULAR LENS IMPLANT  6/30/2011    Procedure:PHACOEMULSIFICATION WITH STANDARD INTRAOCULAR LENS IMPLANT; Surgeon:DEANDRE NUGENT; Location:[facility] OR     PHACOEMULSIFICATION WITH STANDARD INTRAOCULAR LENS IMPLANT  7/21/2011    Procedure:PHACOEMULSIFICATION WITH STANDARD  INTRAOCULAR LENS IMPLANT; Surgeon:DEANDRE DOS SANTOS; Location:[facility] OR     STENT, CORONARY, DIOGENES         Family History:    Family History   Problem Relation Age of Onset     Colon Cancer Mother      Prostate Cancer Brother      Peptic Ulcer Disease Father        Social History:  Marital Status:   [5]  Social History   Substance Use Topics     Smoking status: Never Smoker     Smokeless tobacco: Never Used     Alcohol use No        Medications:      acetaminophen (TYLENOL) 500 MG tablet   aspirin EC 81 MG EC tablet   clopidogrel (PLAVIX) 75 MG tablet   hydrALAZINE (APRESOLINE) 10 MG tablet   isosorbide mononitrate (IMDUR) 60 MG 24 hr tablet   levothyroxine (SYNTHROID/LEVOTHROID) 112 MCG tablet   metoprolol succinate (TOPROL-XL) 50 MG 24 hr tablet   oxyCODONE IR (ROXICODONE) 5 MG tablet   senna-docusate (SENOKOT-S;PERICOLACE) 8.6-50 MG per tablet   simvastatin (ZOCOR) 10 MG tablet   torsemide (DEMADEX) 20 MG tablet         Review of Systems   All other systems reviewed and are negative.      Physical Exam   BP: 158/86  Pulse: 58  Temp: 97.3  F (36.3  C)  Resp: 20  Weight: 62.6 kg (138 lb)  SpO2: 99 %      Physical Exam   Constitutional: She appears well-developed and well-nourished. No distress.   HENT:   Head: Normocephalic and atraumatic.   Mouth/Throat: Oropharynx is clear and moist.   Musculoskeletal:        Right shoulder: She exhibits decreased range of motion, tenderness, bony tenderness, swelling, effusion, deformity and pain. She exhibits no crepitus, no laceration, no spasm, normal pulse and normal strength.        Cervical back: She exhibits tenderness and bony tenderness.        Thoracic back: She exhibits tenderness and bony tenderness.        Back:    Skin: She is not diaphoretic.   Nursing note and vitals reviewed.      ED Course     ED Course     Procedures           Results for orders placed or performed during the hospital encounter of 06/25/18   CT Cervical Spine w/o Contrast    Narrative    CT  CERVICAL SPINE W/O CONTRAST  6/25/2018 11:37 PM     HISTORY: Fall, neck pain.    TECHNIQUE: Axial images through cervical spine without contrast.  Sagittal and coronal reformatted images. Radiation dose for this scan  was reduced using automated exposure control, adjustment of the mA  and/or kV according to patient size, or iterative reconstruction  technique.    COMPARISON: 1/19/2017.    FINDINGS: No acute fractures or alignment abnormality. 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 Mild curve convex to the  left. Moderate degenerative changes with multilevel degenerative disc  disease and moderate bilateral mid and lower degenerative facet  arthropathy. No prevertebral soft tissue swelling or significant  central canal narrowing. Incidentally noted is a small right pleural  effusion and an aberrant right subclavian artery, a normal variant.      Impression    IMPRESSION: No fracture or other acute findings. Degenerative changes.    JAVIER PORTILLO MD   CT Thoracic Spine w/o Contrast    Narrative    CT THORACIC SPINE W/O CONTRAST  6/25/2018 11:36 PM     HISTORY: Fall, back pain.    TECHNIQUE: Axial images through the thoracic spine without contrast.  Sagittal and coronal reformatted images. Radiation dose for this scan  was reduced using automated exposure control, adjustment of the mA  and/or kV according to patient size, or iterative reconstruction  technique.    COMPARISON: None.    FINDINGS: No acute fractures. Moderate degenerative changes throughout  the thoracic spine with multilevel degenerative disc disease in the  lower T-spine. Moderate kyphosis and mild anterior wedging of a few  vertebral bodies which appear chronic. Osteopenia. No significant  central canal narrowing. Moderate right and small left pleural  effusions. Cardiomegaly. Coronary artery calcifications. Aberrant  right subclavian artery. 3.8 cm right renal cyst.      Impression    IMPRESSION:  1. No acute fractures.  2.  Degenerative changes and kyphosis.  3. Bilateral pleural effusions and cardiomegaly may be due to CHF.    JAVIER PORTILLO MD   CT Shoulder Right w/o Contrast    Narrative    CT RIGHT SHOULDER WITHOUT CONTRAST   6/25/2018 11:35 PM    HISTORY: Pain after falling.    COMPARISON: None.    TECHNIQUE: Routine thin cut CT imaging was performed through the right  shoulder without contrast. Radiation dose for this scan was reduced  using automated exposure control, adjustment of the mA and/or kV  according to patient size, or iterative reconstruction technique.     FINDINGS: There is a fracture of the greater tuberosity which is  displaced posterior to the humeral head. This measures just under 3 cm  in greatest dimension. The margins of this appear to be smooth and  therefore the age is indeterminate. However, there is no evidence of  developing cortication along the lateral aspect of the humeral head  which would favor a more recent injury.    No other fracture or osseous lesion is demonstrated. There are  moderate degenerative changes of the glenohumeral joint with mild  degenerative changes of the acromioclavicular joint. There is loss of  space between the humeral head and the acromion suggesting a rotator  cuff tear, likely of the supraspinatus. There is a moderate-sized  right pleural effusion. No other soft tissue abnormality is  demonstrated.      Impression    IMPRESSION:  1. Fracture of the greater tuberosity which is displaced posterior to  the humeral head. Although the age is uncertain, this is suspicious  for a recent fracture.  2. Probable supraspinatus tendon tear.  3. Degenerative changes as described above.  4. Moderate-sized right pleural effusion.    DERICK VALLADARES MD     Medications   fentaNYL (PF) (SUBLIMAZE) injection 25 mcg (25 mcg Intravenous Given 6/25/18 3642)   fentaNYL (PF) (SUBLIMAZE) injection 30 mcg (30 mcg Intravenous Given 6/25/18 4394)     CT shows a fracture of the shoulder, this  is nondisplaced.  There is no other injuries noted on the neck or back.  At this point is safe to discharge the patient home.  She was put in a sling for comfort.  Patient will continue to use her oxycodone as needed for discomfort.    Medications   fentaNYL (PF) (SUBLIMAZE) injection 25 mcg (not administered)       Assessments & Plan (with Medical Decision Making)  Shoulder fracture     I have reviewed the nursing notes.    I have reviewed the findings, diagnosis, plan and need for follow up with the patient.      Discharge Medication List as of 6/26/2018 12:39 AM      START taking these medications    Details   !! oxyCODONE IR (ROXICODONE) 5 MG tablet Take 1 tablet (5 mg) by mouth every 4 hours as needed for pain, Disp-12 tablet, R-0, Local Print       !! - Potential duplicate medications found. Please discuss with provider.          Final diagnoses:   Other closed nondisplaced fracture of proximal end of right humerus, initial encounter       6/25/2018   [facility] EMERGENCY DEPARTMENT     Jah Gamble MD  06/27/18 5474

## 2018-06-26 NOTE — DISCHARGE INSTRUCTIONS
Shoulder Fracture  You have a break (fracture) of the shoulder. This may be a small crack in the bone. Or it may be a major break with the broken parts pushed out of position.     If you have only a crack in the bone and no bone fragments are out of place, you will probably be treated with a shoulder immobilizer. This is a special type of sling. Casts are usually not used for this type of fracture. Your bone should heal in 4 to 8 weeks. More serious injuries may need surgery to put the bones back into the correct position for healing.  Home care  Follow these tips to care for yourself at home:    Leave the shoulder immobilizer in place. This will support the injured arm at your side. This is the best position for the bone to heal.    The shoulder immobilizer is adjustable. If it becomes loose, adjust it so that your forearm is level with the ground (horizontal). Your hand should be level with your elbow.    Apply an ice pack to the injured area for 20 minutes every 1 to 2 hours the first day. You can make an ice pack by putting ice cubes in a plastic bag. A bag of frozen peas or something similar works well too. Wrap the bag in a towel before putting it on your shoulder. Continue with ice packs 3 to 4 times a day for the next 2 to 3 days. Then use the ice as needed to relieve pain and swelling.    You may take acetaminophen or ibuprofen to relieve pain, unless another pain medicine was prescribed. If you have chronic liver or kidney disease or ever had a stomach ulcer or gastrointestinal bleeding, talk with your doctor before using these medicines.    Don t take the sling off before your next exam unless you were told to do so. Ask if you should move your elbow, wrist, and hand.  Follow-up care  Follow up with your healthcare provider, or as advised.  A shoulder joint will become stiff if left in a sling for too long. Ask your doctor when it is safe to begin range-of-motion exercises.  When to seek medical  advice  Call your healthcare provider right away if any of these occur:    Your fingers become swollen, cold, blue, numb, or tingly    Your shoulder or upper arm swells a lot or looks very bruised    The pain in your shoulder gets worse    The splint or immobilizer breaks    You have a fever or chills  Date Last Reviewed: 8/1/2016 2000-2017 [employer]. [address] [place] [address]. All rights reserved. This information is not intended as a substitute for professional medical care. Always follow your healthcare professional's instructions.

## 2020-01-18 NOTE — H&P
Assumed patient care. [facility]    History and Physical  Hospitalist       Date of Admission:  5/15/2017  Date of Service (when I saw the patient): 05/15/17    Assessment & Plan       Active Problems:    Closed fracture of left inferior and superior pubic rami    Assessment: suspect this is due to an osteoporotic fracture since she describes her leg just giving out and she fell to her knee without direct trauma to her pelvis.      Plan: register to observation, PT and ortho consults, SS consult for rehab placement, pain control with tylenol and oral narcotics for now since she has refused IV narcotics, she can weight bear as tolerated      Essential hypertension    Assessment: controlled    Plan: continue home meds      Coronary artery disease involving native coronary artery of native heart without angina pectoris    Assessment: asymptomatic    Plan: no change in meds      Type 2 diabetes mellitus without complication (H)    Assessment: diet controlled    Plan: ADA diet and glucose monitoring      Hypothyroidism due to acquired atrophy of thyroid    Assessment: chronic    Plan: resume replacement on discharge      Chronic kidney disease, stage III (moderate)    Assessment: stable    Plan: routine outpatient recheck    DVT Prophylaxis: anticipate less than 24 hour stay  Code Status: DNR - discussed on admission    Disposition: Expected discharge in 1 days once PT and ortho consults complete, pain reasonably controlled and rehab bed available.    Cordell Villanueva MD    Primary Care Physician   iLbor[facility] Parks    Chief Complaint   Left groin pain    History is obtained from the patient    History of Present Illness   Mary Ellen Cuba is a 89 year old female who presents with left groin pain.  She was standing in her kitchen when she felt her left leg give out.  She went down to her knee but did not experience any pain initially. She got back up and went to the table and played solitaire.  She then tried  to get up and again her left leg gave out and she went down on her knee a second time.  Now she was experiencing pain in the left side of her groin and she was brought to the ED by EMS.    Past Medical History    I have reviewed this patient's medical history and updated it with pertinent information if needed.   Past Medical History:   Diagnosis Date     CAD (coronary artery disease)     remote hx of stent placement in past     CVA (cerebral vascular accident) (H) 2013    some left sided deficits     Diabetes mellitus (H)      Hyperlipemia      Hypertension        Past Surgical History   I have reviewed this patient's surgical history and updated it with pertinent information if needed.  Past Surgical History:   Procedure Laterality Date     APPENDECTOMY       GI SURGERY      gwendolyn     GI SURGERY      hemicolectomy     GYN SURGERY      oopherectomy     LASER YAG CAPSULOTOMY  3/21/2013    Procedure: LASER YAG CAPSULOTOMY;  yag capsulotomy bilateral eyes;  Surgeon: Deandre Nugent MD;  Location: [facility] OR     OPEN REDUCTION INTERNAL FIXATION HIP NAILING Left 1/20/2017    Procedure: OPEN REDUCTION INTERNAL FIXATION HIP NAILING;  Surgeon: Liborio Hoffman DO;  Location: [facility] OR     PHACOEMULSIFICATION WITH STANDARD INTRAOCULAR LENS IMPLANT  6/30/2011    Procedure:PHACOEMULSIFICATION WITH STANDARD INTRAOCULAR LENS IMPLANT; Surgeon:DEANDRE NUGENT; Location:[facility] OR     PHACOEMULSIFICATION WITH STANDARD INTRAOCULAR LENS IMPLANT  7/21/2011    Procedure:PHACOEMULSIFICATION WITH STANDARD INTRAOCULAR LENS IMPLANT; Surgeon:DEANDRE NUGENT; Location:[facility] OR     STENT, CORONARY, DIOGENES         Prior to Admission Medications   Prior to Admission Medications   Prescriptions Last Dose Informant Patient Reported? Taking?   Calcium-Vitamin D (CALCIUM + D PO) 5/15/2017 at 1000  Yes Yes   Sig: Take 1 tablet by mouth daily.   Multiple Vitamins-Minerals (CENTRUM SILVER ULTRA WOMENS) TABS 5/15/2017 at 1000  Yes Yes   Sig: Take 1 tablet  by mouth daily.   VITAMIN D, CHOLECALCIFEROL, PO Unknown at Unknown time  Yes No   Sig: Take  by mouth once a week.   acetaminophen (TYLENOL) 325 MG tablet   No No   Sig: Take 2 tablets (650 mg) by mouth every 4 hours as needed for other (surgical pain)   allopurinol (ZYLOPRIM) 100 MG tablet 5/15/2017 at 1000  No Yes   Sig: Take 1 tablet (100 mg) by mouth 2 times daily   amLODIPine (NORVASC) 5 MG tablet 5/15/2017 at 1000  No Yes   Sig: Take 1 tablet (5 mg) by mouth daily   aspirin 325 MG tablet 5/15/2017 at 1000  No Yes   Sig: Take 1 tablet (325 mg) by mouth daily   clopidogrel (PLAVIX) 75 MG tablet 5/15/2017 at 1000  No Yes   Sig: Take 1 tablet (75 mg) by mouth daily   fenofibrate 160 MG tablet 5/15/2017 at 1000  No Yes   Sig: Take 1 tablet (160 mg) by mouth daily   folic acid (FOLVITE) 400 MCG tablet 5/15/2017 at 1000  Yes Yes   Sig: Take 400 mcg by mouth daily.   levothyroxine (SYNTHROID/LEVOTHROID) 112 MCG tablet 5/15/2017 at 1000  No Yes   Sig: Take 1 tablet (112 mcg) by mouth daily   metoprolol (TOPROL XL) 25 MG 24 hr tablet 5/15/2017 at 1000  No Yes   Sig: Take 1 tablet (25 mg) by mouth daily   potassium citrate (UROCIT-K) 5 MEQ (540 MG) CR tablet 5/15/2017 at 1000  No Yes   Sig: Take 2 tablets (10 mEq) by mouth daily   senna-docusate (SENOKOT-S;PERICOLACE) 8.6-50 MG per tablet Unknown at Unknown time  Yes No   Sig: Take 1-2 tablets by mouth 2 times daily as needed (constipation )   simvastatin (ZOCOR) 10 MG tablet 5/14/2017 at 2100  No Yes   Sig: Take 1 tablet (10 mg) by mouth At Bedtime      Facility-Administered Medications: None     Allergies   Allergies   Allergen Reactions     Hmg-Coa-R Inhibitors      Metformin GI Disturbance       Social History   I have reviewed this patient's social history and updated it with pertinent information if needed. Mary Ellen PATRICE Cuba  reports that she has never smoked. She does not have any smokeless tobacco history on file. She reports that she does not drink alcohol or  use illicit drugs.    Family History   I have reviewed this patient's family history and updated it with pertinent information if needed.   Family History   Problem Relation Age of Onset     Colon Cancer Mother      Prostate Cancer Brother      Peptic Ulcer Disease Father        Review of Systems   The 10 point Review of Systems is negative other than noted in the HPI or here.     Physical Exam   Temp: 96.8  F (36  C) Temp src: Oral BP: 169/79 Pulse: 60   Resp: 16 SpO2: 93 % O2 Device: None (Room air)    Vital Signs with Ranges  Temp:  [96.8  F (36  C)-97.2  F (36.2  C)] 96.8  F (36  C)  Pulse:  [60-72] 60  Resp:  [16] 16  BP: (169-198)/() 169/79  SpO2:  [92 %-93 %] 93 %  145 lbs 8.06 oz    Constitutional:   awake, alert, cooperative, no apparent distress, and appears stated age     Eyes:   Lids and lashes normal, pupils equal, round and reactive to light, extra ocular muscles intact, sclera clear, conjunctiva normal     ENT:   Normocephalic, without obvious abnormality, atraumatic, sinuses nontender on palpation, external ears without lesions, oral pharynx with moist mucous membranes, tonsils without erythema or exudates, gums normal and good dentition.     Neck:   Supple, symmetrical, trachea midline, no adenopathy, thyroid symmetric, not enlarged and no tenderness, skin normal     Hematologic / Lymphatic:   no cervical lymphadenopathy and no supraclavicular lymphadenopathy     Back:   Symmetric, no curvature, spinous processes are non-tender on palpation, paraspinous muscles are non-tender on palpation, no costal vertebral tenderness     Lungs:   No increased work of breathing, good air exchange, clear to auscultation bilaterally, no crackles or wheezing     Cardiovascular:   Normal apical impulse, regular rate and rhythm, normal S1 and S2, no S3 or S4, and no murmur noted     Abdomen:    normal bowel sounds, soft, non-distended, non-tender, no masses palpated, no hepatosplenomegally     Neurologic:    Awake, alert, oriented to name, place and time.  Cranial nerves II-XII are grossly intact.  Motor is 5 out of 5 on her right hand and foot but 4/5 with  strength in her left hand.  Left leg not tested due to pain.  Sensory is intact.       Data   Data reviewed today:  I personally reviewed no images or EKG's today.  No lab results found in last 7 days.    Recent Results (from the past 24 hour(s))   Femur XR,  2 views, left    Narrative    LEFT FEMUR TWO VIEWS    5/15/2017 6:35 PM     HISTORY: Pain.    COMPARISON: None.      Impression    IMPRESSION: Previous open reduction internal fixation of proximal  femur. No evidence for any acute femur fracture. Degenerative changes  are noted in the hip and knee joint. Vascular calcifications also  noted.     CHRIS TOBIN MD   Pelvis XR, 1-2 views    Narrative    PELVIS ONE TO TWO VIEWS   5/15/2017 6:35 PM     HISTORY: Pain.    COMPARISON: 4/20/2017.      Impression    IMPRESSION: Questionable left superior inferior pubic rami fractures  are present. If clinically indicated, oblique views of the pelvis  could be helpful. Previous fixation of left hip noted along with  degenerative changes in both hips, lumbar spine, and symphysis pubis.     CHRIS TOBIN MD

## 2022-03-21 NOTE — PROGRESS NOTES
S-(situation): Patient arrives to room 267 via cart from ED    B-(background): fall, fractured left hip    A-(assessment): pt has IV in place.  States pain is much better after taking Rx in ED.  Pt alert/oriented.  Has Friend Irene with her, it is ok to give her pt information.  Pt does not have children.      R-(recommendations): Orders reviewed with pt. Will monitor patient per MD orders. Monitor pain, vs, labs, maintain bed rest.  IVF, NPO    Inpatient nursing criteria listed below were met:    Health care directives status obtained and documented: Yes  Core Measures assessed (SSI): Yes  SCD's Documented: Yes  Vaccine assessment done and vaccines ordered if appropriate: Yes  Skin issues/needs documented:NA  Isolation needs addressed, if appropriate: NA  Fall Prevention: Care plan updated, Education given and documented Yes  MRSA swab completed for patient 55 years and older (exclude CONNIE and TKA): Yes  My Chart patient sign up addressed and documented: Yes  Care Plan initiated: Yes  Education Assessment documented:Yes  Education Documented (Pre-existing chronic infection such as, MRSA/VRE need education on admission): Yes  New medication patient education completed and documented (Possible Side Effects of Common Medications handout): Yes  Home medications if not able to send immediately home with family stored here: NA   Reminder note placed in discharge instructions: NA  Discharge planning review completed (admission navigator) Yes, rehab          English
